# Patient Record
Sex: FEMALE | Race: WHITE | Employment: OTHER | URBAN - METROPOLITAN AREA
[De-identification: names, ages, dates, MRNs, and addresses within clinical notes are randomized per-mention and may not be internally consistent; named-entity substitution may affect disease eponyms.]

---

## 2017-05-09 ENCOUNTER — OFFICE VISIT (OUTPATIENT)
Dept: GASTROENTEROLOGY | Age: 82
End: 2017-05-09
Payer: MEDICARE

## 2017-05-09 VITALS
WEIGHT: 170.5 LBS | RESPIRATION RATE: 18 BRPM | BODY MASS INDEX: 33.47 KG/M2 | HEART RATE: 65 BPM | TEMPERATURE: 97.4 F | HEIGHT: 60 IN | DIASTOLIC BLOOD PRESSURE: 76 MMHG | SYSTOLIC BLOOD PRESSURE: 155 MMHG

## 2017-05-09 DIAGNOSIS — Z87.19 HISTORY OF RECTAL BLEEDING: ICD-10-CM

## 2017-05-09 DIAGNOSIS — K62.89 PROCTALGIA: Primary | ICD-10-CM

## 2017-05-09 PROCEDURE — 4040F PNEUMOC VAC/ADMIN/RCVD: CPT | Performed by: INTERNAL MEDICINE

## 2017-05-09 PROCEDURE — 1090F PRES/ABSN URINE INCON ASSESS: CPT | Performed by: INTERNAL MEDICINE

## 2017-05-09 PROCEDURE — 99202 OFFICE O/P NEW SF 15 MIN: CPT | Performed by: INTERNAL MEDICINE

## 2017-05-09 PROCEDURE — 1036F TOBACCO NON-USER: CPT | Performed by: INTERNAL MEDICINE

## 2017-05-09 PROCEDURE — 1123F ACP DISCUSS/DSCN MKR DOCD: CPT | Performed by: INTERNAL MEDICINE

## 2017-05-09 PROCEDURE — G8417 CALC BMI ABV UP PARAM F/U: HCPCS | Performed by: INTERNAL MEDICINE

## 2017-05-09 PROCEDURE — G8427 DOCREV CUR MEDS BY ELIG CLIN: HCPCS | Performed by: INTERNAL MEDICINE

## 2017-05-09 RX ORDER — ALLOPURINOL 100 MG/1
100 TABLET ORAL 2 TIMES DAILY
COMMUNITY
End: 2017-07-18 | Stop reason: SDUPTHER

## 2018-02-16 ENCOUNTER — HOSPITAL ENCOUNTER (OUTPATIENT)
Age: 83
Discharge: HOME | End: 2018-02-16
Payer: MEDICARE

## 2018-02-16 DIAGNOSIS — E78.2 MIXED HYPERLIPIDEMIA: ICD-10-CM

## 2018-02-16 DIAGNOSIS — I10 ESSENTIAL HYPERTENSION, BENIGN: ICD-10-CM

## 2018-02-16 DIAGNOSIS — E03.9 HYPOTHYROIDISM, UNSPECIFIED TYPE: ICD-10-CM

## 2018-02-16 LAB
ALT SERPL-CCNC: 18 U/L (ref 5–33)
ANION GAP SERPL CALCULATED.3IONS-SCNC: 14 MMOL/L (ref 9–17)
AST SERPL-CCNC: 23 U/L
BUN BLDV-MCNC: 22 MG/DL (ref 8–23)
BUN/CREAT BLD: 20 (ref 9–20)
CALCIUM SERPL-MCNC: 10.2 MG/DL (ref 8.6–10.4)
CHLORIDE BLD-SCNC: 101 MMOL/L (ref 98–107)
CHOLESTEROL/HDL RATIO: 3.8
CHOLESTEROL: 168 MG/DL
CO2: 29 MMOL/L (ref 20–31)
CREAT SERPL-MCNC: 1.12 MG/DL (ref 0.5–0.9)
GFR AFRICAN AMERICAN: 56 ML/MIN
GFR NON-AFRICAN AMERICAN: 46 ML/MIN
GFR SERPL CREATININE-BSD FRML MDRD: ABNORMAL ML/MIN/{1.73_M2}
GFR SERPL CREATININE-BSD FRML MDRD: ABNORMAL ML/MIN/{1.73_M2}
GLUCOSE BLD-MCNC: 108 MG/DL (ref 70–99)
HCT VFR BLD CALC: 41.2 % (ref 36–46)
HDLC SERPL-MCNC: 44 MG/DL
HEMOGLOBIN: 13.2 G/DL (ref 12–16)
LDL CHOLESTEROL: 92 MG/DL (ref 0–130)
MCH RBC QN AUTO: 32 PG (ref 26–34)
MCHC RBC AUTO-ENTMCNC: 32 G/DL (ref 31–37)
MCV RBC AUTO: 100.1 FL (ref 80–100)
NRBC AUTOMATED: ABNORMAL PER 100 WBC
PDW BLD-RTO: 15 % (ref 12.1–15.2)
PLATELET # BLD: 183 K/UL (ref 140–450)
PMV BLD AUTO: 10.1 FL (ref 6–12)
POTASSIUM SERPL-SCNC: 4.2 MMOL/L (ref 3.7–5.3)
RBC # BLD: 4.11 M/UL (ref 4–5.2)
SODIUM BLD-SCNC: 144 MMOL/L (ref 135–144)
T3 FREE: 2.8 PG/ML (ref 2.02–4.43)
THYROXINE, FREE: 1.61 NG/DL (ref 0.93–1.7)
TRIGL SERPL-MCNC: 158 MG/DL
VLDLC SERPL CALC-MCNC: ABNORMAL MG/DL (ref 1–30)
WBC # BLD: 8.1 K/UL (ref 3.5–11)

## 2018-02-16 PROCEDURE — 84439 ASSAY OF FREE THYROXINE: CPT

## 2018-02-16 PROCEDURE — 80061 LIPID PANEL: CPT

## 2018-02-16 PROCEDURE — 85027 COMPLETE CBC AUTOMATED: CPT

## 2018-02-16 PROCEDURE — 84460 ALANINE AMINO (ALT) (SGPT): CPT

## 2018-02-16 PROCEDURE — 84443 ASSAY THYROID STIM HORMONE: CPT

## 2018-02-16 PROCEDURE — 80048 BASIC METABOLIC PNL TOTAL CA: CPT

## 2018-02-16 PROCEDURE — 84481 FREE ASSAY (FT-3): CPT

## 2018-02-16 PROCEDURE — 36415 COLL VENOUS BLD VENIPUNCTURE: CPT

## 2018-02-16 PROCEDURE — 84450 TRANSFERASE (AST) (SGOT): CPT

## 2018-02-20 LAB — TSH SERPL DL<=0.05 MIU/L-ACNC: 0.46 MIU/L (ref 0.3–5)

## 2018-09-26 ENCOUNTER — HOSPITAL ENCOUNTER (OUTPATIENT)
Age: 83
Discharge: HOME | End: 2018-09-26
Payer: MEDICARE

## 2018-09-26 DIAGNOSIS — R73.01 IMPAIRED FASTING GLUCOSE: ICD-10-CM

## 2018-09-26 DIAGNOSIS — I10 ESSENTIAL HYPERTENSION, BENIGN: ICD-10-CM

## 2018-09-26 DIAGNOSIS — E03.9 HYPOTHYROIDISM, UNSPECIFIED TYPE: ICD-10-CM

## 2018-09-26 DIAGNOSIS — N18.30 CHRONIC KIDNEY DISEASE, STAGE III (MODERATE) (HCC): ICD-10-CM

## 2018-09-26 DIAGNOSIS — M10.9 GOUT, UNSPECIFIED CAUSE, UNSPECIFIED CHRONICITY, UNSPECIFIED SITE: ICD-10-CM

## 2018-09-26 DIAGNOSIS — E78.2 MIXED HYPERLIPIDEMIA: ICD-10-CM

## 2018-09-26 LAB
ALT SERPL-CCNC: 17 U/L (ref 5–33)
ANION GAP SERPL CALCULATED.3IONS-SCNC: 15 MMOL/L (ref 9–17)
AST SERPL-CCNC: 19 U/L
BUN BLDV-MCNC: 24 MG/DL (ref 8–23)
BUN/CREAT BLD: 20 (ref 9–20)
CALCIUM SERPL-MCNC: 10 MG/DL (ref 8.6–10.4)
CHLORIDE BLD-SCNC: 106 MMOL/L (ref 98–107)
CHOLESTEROL/HDL RATIO: 3.4
CHOLESTEROL: 148 MG/DL
CO2: 26 MMOL/L (ref 20–31)
CREAT SERPL-MCNC: 1.23 MG/DL (ref 0.5–0.9)
ESTIMATED AVERAGE GLUCOSE: 114 MG/DL
GFR AFRICAN AMERICAN: 50 ML/MIN
GFR NON-AFRICAN AMERICAN: 41 ML/MIN
GFR SERPL CREATININE-BSD FRML MDRD: ABNORMAL ML/MIN/{1.73_M2}
GFR SERPL CREATININE-BSD FRML MDRD: ABNORMAL ML/MIN/{1.73_M2}
GLUCOSE BLD-MCNC: 103 MG/DL (ref 70–99)
HBA1C MFR BLD: 5.6 % (ref 4.8–5.9)
HCT VFR BLD CALC: 40.8 % (ref 36.3–47.1)
HDLC SERPL-MCNC: 44 MG/DL
HEMOGLOBIN: 13.1 G/DL (ref 11.9–15.1)
LDL CHOLESTEROL: 77 MG/DL (ref 0–130)
MCH RBC QN AUTO: 32.5 PG (ref 25.2–33.5)
MCHC RBC AUTO-ENTMCNC: 32.1 G/DL (ref 28.4–34.8)
MCV RBC AUTO: 101.2 FL (ref 82.6–102.9)
NRBC AUTOMATED: 0 PER 100 WBC
PDW BLD-RTO: 14.1 % (ref 11.8–14.4)
PLATELET # BLD: 178 K/UL (ref 138–453)
PMV BLD AUTO: 11.7 FL (ref 8.1–13.5)
POTASSIUM SERPL-SCNC: 3.9 MMOL/L (ref 3.7–5.3)
RBC # BLD: 4.03 M/UL (ref 3.95–5.11)
SODIUM BLD-SCNC: 147 MMOL/L (ref 135–144)
T3 FREE: 2.68 PG/ML (ref 2.02–4.43)
THYROXINE, FREE: 1.76 NG/DL (ref 0.93–1.7)
TRIGL SERPL-MCNC: 135 MG/DL
TSH SERPL DL<=0.05 MIU/L-ACNC: 0.2 MIU/L (ref 0.3–5)
URIC ACID: 6.3 MG/DL (ref 2.4–5.7)
VLDLC SERPL CALC-MCNC: NORMAL MG/DL (ref 1–30)
WBC # BLD: 7 K/UL (ref 3.5–11.3)

## 2018-09-26 PROCEDURE — 83036 HEMOGLOBIN GLYCOSYLATED A1C: CPT

## 2018-09-26 PROCEDURE — 84450 TRANSFERASE (AST) (SGOT): CPT

## 2018-09-26 PROCEDURE — 84439 ASSAY OF FREE THYROXINE: CPT

## 2018-09-26 PROCEDURE — 84443 ASSAY THYROID STIM HORMONE: CPT

## 2018-09-26 PROCEDURE — 84460 ALANINE AMINO (ALT) (SGPT): CPT

## 2018-09-26 PROCEDURE — 80061 LIPID PANEL: CPT

## 2018-09-26 PROCEDURE — 84481 FREE ASSAY (FT-3): CPT

## 2018-09-26 PROCEDURE — 36415 COLL VENOUS BLD VENIPUNCTURE: CPT

## 2018-09-26 PROCEDURE — 84550 ASSAY OF BLOOD/URIC ACID: CPT

## 2018-09-26 PROCEDURE — 85027 COMPLETE CBC AUTOMATED: CPT

## 2018-09-26 PROCEDURE — 80048 BASIC METABOLIC PNL TOTAL CA: CPT

## 2018-11-14 ENCOUNTER — HOSPITAL ENCOUNTER (OUTPATIENT)
Age: 83
Discharge: HOME | End: 2018-11-14
Payer: MEDICARE

## 2018-11-14 DIAGNOSIS — E03.9 HYPOTHYROIDISM, UNSPECIFIED TYPE: ICD-10-CM

## 2018-11-14 LAB
T3 FREE: 2.59 PG/ML (ref 2.02–4.43)
THYROXINE, FREE: 1.79 NG/DL (ref 0.93–1.7)
TSH SERPL DL<=0.05 MIU/L-ACNC: 0.32 MIU/L (ref 0.3–5)

## 2018-11-14 PROCEDURE — 84439 ASSAY OF FREE THYROXINE: CPT

## 2018-11-14 PROCEDURE — 36415 COLL VENOUS BLD VENIPUNCTURE: CPT

## 2018-11-14 PROCEDURE — 84443 ASSAY THYROID STIM HORMONE: CPT

## 2018-11-14 PROCEDURE — 84481 FREE ASSAY (FT-3): CPT

## 2019-02-07 ENCOUNTER — APPOINTMENT (OUTPATIENT)
Dept: CT IMAGING | Age: 84
End: 2019-02-07
Payer: MEDICARE

## 2019-02-07 ENCOUNTER — APPOINTMENT (OUTPATIENT)
Dept: GENERAL RADIOLOGY | Age: 84
End: 2019-02-07
Payer: MEDICARE

## 2019-02-07 ENCOUNTER — APPOINTMENT (OUTPATIENT)
Dept: ULTRASOUND IMAGING | Age: 84
End: 2019-02-07
Payer: MEDICARE

## 2019-02-07 ENCOUNTER — HOSPITAL ENCOUNTER (EMERGENCY)
Age: 84
Discharge: ANOTHER HOSPITAL | End: 2019-02-07
Attending: EMERGENCY MEDICINE
Payer: MEDICARE

## 2019-02-07 VITALS
TEMPERATURE: 98.4 F | DIASTOLIC BLOOD PRESSURE: 64 MMHG | HEART RATE: 76 BPM | WEIGHT: 164 LBS | RESPIRATION RATE: 18 BRPM | BODY MASS INDEX: 32.2 KG/M2 | HEIGHT: 60 IN | SYSTOLIC BLOOD PRESSURE: 122 MMHG | OXYGEN SATURATION: 95 %

## 2019-02-07 DIAGNOSIS — R20.2 PARESTHESIAS: ICD-10-CM

## 2019-02-07 DIAGNOSIS — R74.8 ELEVATED LIVER ENZYMES: ICD-10-CM

## 2019-02-07 DIAGNOSIS — K83.1 BILIARY OBSTRUCTION: Primary | ICD-10-CM

## 2019-02-07 LAB
-: ABNORMAL
ABSOLUTE EOS #: 0.1 K/UL (ref 0–0.44)
ABSOLUTE IMMATURE GRANULOCYTE: 0.03 K/UL (ref 0–0.3)
ABSOLUTE LYMPH #: 0.7 K/UL (ref 1.1–3.7)
ABSOLUTE MONO #: 0.56 K/UL (ref 0.1–1.2)
ALBUMIN SERPL-MCNC: 4.2 G/DL (ref 3.5–5.2)
ALBUMIN/GLOBULIN RATIO: 1.5 (ref 1–2.5)
ALP BLD-CCNC: 167 U/L (ref 35–104)
ALT SERPL-CCNC: 116 U/L (ref 5–33)
AMORPHOUS: ABNORMAL
ANION GAP SERPL CALCULATED.3IONS-SCNC: 8 MMOL/L (ref 9–17)
AST SERPL-CCNC: 124 U/L
BACTERIA: ABNORMAL
BASOPHILS # BLD: 0 % (ref 0–2)
BASOPHILS ABSOLUTE: <0.03 K/UL (ref 0–0.2)
BILIRUB SERPL-MCNC: 0.8 MG/DL (ref 0.3–1.2)
BILIRUBIN DIRECT: 0.25 MG/DL
BILIRUBIN URINE: NEGATIVE
BILIRUBIN, INDIRECT: 0.55 MG/DL (ref 0–1)
BUN BLDV-MCNC: 20 MG/DL (ref 8–23)
BUN/CREAT BLD: 17 (ref 9–20)
CALCIUM SERPL-MCNC: 9.9 MG/DL (ref 8.6–10.4)
CASTS UA: ABNORMAL /LPF
CHLORIDE BLD-SCNC: 103 MMOL/L (ref 98–107)
CO2: 29 MMOL/L (ref 20–31)
COLOR: YELLOW
COMMENT UA: ABNORMAL
CREAT SERPL-MCNC: 1.17 MG/DL (ref 0.5–0.9)
CRYSTALS, UA: ABNORMAL /HPF
D-DIMER QUANTITATIVE: 0.71 MG/L FEU (ref 0.19–0.5)
DIFFERENTIAL TYPE: ABNORMAL
EKG ATRIAL RATE: 69 BPM
EKG P AXIS: 41 DEGREES
EKG P-R INTERVAL: 188 MS
EKG Q-T INTERVAL: 372 MS
EKG QRS DURATION: 76 MS
EKG QTC CALCULATION (BAZETT): 398 MS
EKG R AXIS: 5 DEGREES
EKG T AXIS: 9 DEGREES
EKG VENTRICULAR RATE: 69 BPM
EOSINOPHILS RELATIVE PERCENT: 1 % (ref 1–4)
EPITHELIAL CELLS UA: ABNORMAL /HPF (ref 0–25)
GFR AFRICAN AMERICAN: 53 ML/MIN
GFR NON-AFRICAN AMERICAN: 44 ML/MIN
GFR SERPL CREATININE-BSD FRML MDRD: ABNORMAL ML/MIN/{1.73_M2}
GFR SERPL CREATININE-BSD FRML MDRD: ABNORMAL ML/MIN/{1.73_M2}
GLOBULIN: ABNORMAL G/DL (ref 1.5–3.8)
GLUCOSE BLD-MCNC: 101 MG/DL (ref 70–99)
GLUCOSE URINE: NEGATIVE
HCT VFR BLD CALC: 41 % (ref 36.3–47.1)
HEMOGLOBIN: 13.1 G/DL (ref 11.9–15.1)
IMMATURE GRANULOCYTES: 0 %
KETONES, URINE: NEGATIVE
LEUKOCYTE ESTERASE, URINE: ABNORMAL
LIPASE: 47 U/L (ref 13–60)
LYMPHOCYTES # BLD: 8 % (ref 24–43)
MCH RBC QN AUTO: 32.8 PG (ref 25.2–33.5)
MCHC RBC AUTO-ENTMCNC: 32 G/DL (ref 28.4–34.8)
MCV RBC AUTO: 102.8 FL (ref 82.6–102.9)
MONOCYTES # BLD: 7 % (ref 3–12)
MUCUS: ABNORMAL
NITRITE, URINE: POSITIVE
NRBC AUTOMATED: 0 PER 100 WBC
OTHER OBSERVATIONS UA: ABNORMAL
PDW BLD-RTO: 13.3 % (ref 11.8–14.4)
PH UA: 6 (ref 5–9)
PLATELET # BLD: 166 K/UL (ref 138–453)
PLATELET ESTIMATE: ABNORMAL
PMV BLD AUTO: 12.2 FL (ref 8.1–13.5)
POTASSIUM SERPL-SCNC: 3.9 MMOL/L (ref 3.7–5.3)
PROTEIN UA: NEGATIVE
RBC # BLD: 3.99 M/UL (ref 3.95–5.11)
RBC # BLD: ABNORMAL 10*6/UL
RBC UA: ABNORMAL /HPF (ref 0–2)
RENAL EPITHELIAL, UA: ABNORMAL /HPF
SEG NEUTROPHILS: 84 % (ref 36–65)
SEGMENTED NEUTROPHILS ABSOLUTE COUNT: 7.17 K/UL (ref 1.5–8.1)
SODIUM BLD-SCNC: 140 MMOL/L (ref 135–144)
SPECIFIC GRAVITY UA: 1.01 (ref 1.01–1.02)
TOTAL PROTEIN: 7 G/DL (ref 6.4–8.3)
TRICHOMONAS: ABNORMAL
TROPONIN INTERP: NORMAL
TROPONIN T: <0.03 NG/ML
TROPONIN, HIGH SENSITIVITY: NORMAL NG/L (ref 0–14)
TSH SERPL DL<=0.05 MIU/L-ACNC: 0.48 MIU/L (ref 0.3–5)
TSH SERPL DL<=0.05 MIU/L-ACNC: 0.49 MIU/L (ref 0.3–5)
TURBIDITY: CLEAR
URINE HGB: NEGATIVE
UROBILINOGEN, URINE: NORMAL
WBC # BLD: 8.6 K/UL (ref 3.5–11.3)
WBC # BLD: ABNORMAL 10*3/UL
WBC UA: ABNORMAL /HPF (ref 0–5)
YEAST: ABNORMAL

## 2019-02-07 PROCEDURE — 81001 URINALYSIS AUTO W/SCOPE: CPT

## 2019-02-07 PROCEDURE — 6370000000 HC RX 637 (ALT 250 FOR IP): Performed by: EMERGENCY MEDICINE

## 2019-02-07 PROCEDURE — 85379 FIBRIN DEGRADATION QUANT: CPT

## 2019-02-07 PROCEDURE — 6360000002 HC RX W HCPCS: Performed by: EMERGENCY MEDICINE

## 2019-02-07 PROCEDURE — 76705 ECHO EXAM OF ABDOMEN: CPT

## 2019-02-07 PROCEDURE — 80076 HEPATIC FUNCTION PANEL: CPT

## 2019-02-07 PROCEDURE — 6360000004 HC RX CONTRAST MEDICATION: Performed by: EMERGENCY MEDICINE

## 2019-02-07 PROCEDURE — 74174 CTA ABD&PLVS W/CONTRAST: CPT

## 2019-02-07 PROCEDURE — 84484 ASSAY OF TROPONIN QUANT: CPT

## 2019-02-07 PROCEDURE — 36415 COLL VENOUS BLD VENIPUNCTURE: CPT

## 2019-02-07 PROCEDURE — 71046 X-RAY EXAM CHEST 2 VIEWS: CPT

## 2019-02-07 PROCEDURE — 86403 PARTICLE AGGLUT ANTBDY SCRN: CPT

## 2019-02-07 PROCEDURE — 87186 SC STD MICRODIL/AGAR DIL: CPT

## 2019-02-07 PROCEDURE — 93005 ELECTROCARDIOGRAM TRACING: CPT

## 2019-02-07 PROCEDURE — 70450 CT HEAD/BRAIN W/O DYE: CPT

## 2019-02-07 PROCEDURE — 85025 COMPLETE CBC W/AUTO DIFF WBC: CPT

## 2019-02-07 PROCEDURE — 80048 BASIC METABOLIC PNL TOTAL CA: CPT

## 2019-02-07 PROCEDURE — 87088 URINE BACTERIA CULTURE: CPT

## 2019-02-07 PROCEDURE — 99285 EMERGENCY DEPT VISIT HI MDM: CPT

## 2019-02-07 PROCEDURE — 71275 CT ANGIOGRAPHY CHEST: CPT

## 2019-02-07 PROCEDURE — 83690 ASSAY OF LIPASE: CPT

## 2019-02-07 PROCEDURE — 2580000003 HC RX 258: Performed by: EMERGENCY MEDICINE

## 2019-02-07 PROCEDURE — 84443 ASSAY THYROID STIM HORMONE: CPT

## 2019-02-07 PROCEDURE — 87086 URINE CULTURE/COLONY COUNT: CPT

## 2019-02-07 PROCEDURE — 1200000000 HC SEMI PRIVATE

## 2019-02-07 RX ORDER — SODIUM CHLORIDE, SODIUM LACTATE, POTASSIUM CHLORIDE, CALCIUM CHLORIDE 600; 310; 30; 20 MG/100ML; MG/100ML; MG/100ML; MG/100ML
1000 INJECTION, SOLUTION INTRAVENOUS ONCE
Status: DISCONTINUED | OUTPATIENT
Start: 2019-02-07 | End: 2019-02-07

## 2019-02-07 RX ORDER — ACETAMINOPHEN 500 MG
1000 TABLET ORAL ONCE
Status: COMPLETED | OUTPATIENT
Start: 2019-02-07 | End: 2019-02-07

## 2019-02-07 RX ADMIN — SODIUM CHLORIDE, POTASSIUM CHLORIDE, SODIUM LACTATE AND CALCIUM CHLORIDE 1000 ML: 600; 310; 30; 20 INJECTION, SOLUTION INTRAVENOUS at 12:16

## 2019-02-07 RX ADMIN — IOPAMIDOL 100 ML: 755 INJECTION, SOLUTION INTRAVENOUS at 11:52

## 2019-02-07 RX ADMIN — WATER 1 G: 1 INJECTION INTRAMUSCULAR; INTRAVENOUS; SUBCUTANEOUS at 12:56

## 2019-02-07 RX ADMIN — ACETAMINOPHEN 1000 MG: 500 TABLET, FILM COATED ORAL at 21:18

## 2019-02-07 ASSESSMENT — PAIN SCALES - GENERAL: PAINLEVEL_OUTOF10: 8

## 2019-02-08 ENCOUNTER — HOSPITAL ENCOUNTER (INPATIENT)
Age: 84
LOS: 7 days | Discharge: HOME | DRG: 445 | End: 2019-02-15
Attending: INTERNAL MEDICINE | Admitting: INTERNAL MEDICINE
Payer: MEDICARE

## 2019-02-08 PROBLEM — N30.00 ACUTE CYSTITIS: Status: ACTIVE | Noted: 2019-02-08

## 2019-02-08 LAB
ALBUMIN SERPL-MCNC: 3.7 G/DL (ref 3.5–5.2)
ALBUMIN/GLOBULIN RATIO: ABNORMAL (ref 1–2.5)
ALP BLD-CCNC: 137 U/L (ref 35–104)
ALT SERPL-CCNC: 77 U/L (ref 5–33)
AMYLASE: 83 U/L (ref 28–100)
ANION GAP SERPL CALCULATED.3IONS-SCNC: 15 MMOL/L (ref 9–17)
AST SERPL-CCNC: 61 U/L
BILIRUB SERPL-MCNC: 0.64 MG/DL (ref 0.3–1.2)
BUN BLDV-MCNC: 20 MG/DL (ref 8–23)
BUN/CREAT BLD: 19 (ref 9–20)
CALCIUM SERPL-MCNC: 9.3 MG/DL (ref 8.6–10.4)
CHLORIDE BLD-SCNC: 104 MMOL/L (ref 98–107)
CO2: 25 MMOL/L (ref 20–31)
CREAT SERPL-MCNC: 1.04 MG/DL (ref 0.5–0.9)
GFR AFRICAN AMERICAN: >60 ML/MIN
GFR NON-AFRICAN AMERICAN: 50 ML/MIN
GFR SERPL CREATININE-BSD FRML MDRD: ABNORMAL ML/MIN/{1.73_M2}
GFR SERPL CREATININE-BSD FRML MDRD: ABNORMAL ML/MIN/{1.73_M2}
GLUCOSE BLD-MCNC: 112 MG/DL (ref 70–99)
HCT VFR BLD CALC: 35.9 % (ref 36–46)
HEMOGLOBIN: 11.9 G/DL (ref 12–16)
LIPASE: 38 U/L (ref 13–60)
MAGNESIUM: 1.6 MG/DL (ref 1.6–2.6)
MCH RBC QN AUTO: 32.5 PG (ref 26–34)
MCHC RBC AUTO-ENTMCNC: 33 G/DL (ref 31–37)
MCV RBC AUTO: 98.5 FL (ref 80–100)
NRBC AUTOMATED: ABNORMAL PER 100 WBC
PDW BLD-RTO: 14.9 % (ref 11.5–14.5)
PHOSPHORUS: 3.8 MG/DL (ref 2.6–4.5)
PLATELET # BLD: 154 K/UL (ref 130–400)
PMV BLD AUTO: 10.3 FL (ref 6–12)
POTASSIUM SERPL-SCNC: 3.8 MMOL/L (ref 3.7–5.3)
RBC # BLD: 3.65 M/UL (ref 4–5.2)
SODIUM BLD-SCNC: 144 MMOL/L (ref 135–144)
TOTAL PROTEIN: 5.8 G/DL (ref 6.4–8.3)
WBC # BLD: 7.4 K/UL (ref 3.5–11)

## 2019-02-08 PROCEDURE — 80053 COMPREHEN METABOLIC PANEL: CPT

## 2019-02-08 PROCEDURE — 99223 1ST HOSP IP/OBS HIGH 75: CPT | Performed by: INTERNAL MEDICINE

## 2019-02-08 PROCEDURE — 85027 COMPLETE CBC AUTOMATED: CPT

## 2019-02-08 PROCEDURE — 6370000000 HC RX 637 (ALT 250 FOR IP): Performed by: INTERNAL MEDICINE

## 2019-02-08 PROCEDURE — 36415 COLL VENOUS BLD VENIPUNCTURE: CPT

## 2019-02-08 PROCEDURE — 1200000000 HC SEMI PRIVATE

## 2019-02-08 PROCEDURE — 84100 ASSAY OF PHOSPHORUS: CPT

## 2019-02-08 PROCEDURE — 99222 1ST HOSP IP/OBS MODERATE 55: CPT | Performed by: INTERNAL MEDICINE

## 2019-02-08 PROCEDURE — 2500000003 HC RX 250 WO HCPCS: Performed by: NURSE PRACTITIONER

## 2019-02-08 PROCEDURE — 83690 ASSAY OF LIPASE: CPT

## 2019-02-08 PROCEDURE — 6370000000 HC RX 637 (ALT 250 FOR IP): Performed by: NURSE PRACTITIONER

## 2019-02-08 PROCEDURE — 82150 ASSAY OF AMYLASE: CPT

## 2019-02-08 PROCEDURE — 83735 ASSAY OF MAGNESIUM: CPT

## 2019-02-08 RX ORDER — DEXTROSE, SODIUM CHLORIDE, AND POTASSIUM CHLORIDE 5; .45; .15 G/100ML; G/100ML; G/100ML
INJECTION INTRAVENOUS CONTINUOUS
Status: DISCONTINUED | OUTPATIENT
Start: 2019-02-08 | End: 2019-02-14

## 2019-02-08 RX ORDER — POTASSIUM CHLORIDE 20 MEQ/1
40 TABLET, EXTENDED RELEASE ORAL PRN
Status: DISCONTINUED | OUTPATIENT
Start: 2019-02-08 | End: 2019-02-15 | Stop reason: HOSPADM

## 2019-02-08 RX ORDER — LEVOTHYROXINE SODIUM 112 UG/1
112 TABLET ORAL DAILY
Status: DISCONTINUED | OUTPATIENT
Start: 2019-02-09 | End: 2019-02-15 | Stop reason: HOSPADM

## 2019-02-08 RX ORDER — POTASSIUM CHLORIDE 7.45 MG/ML
10 INJECTION INTRAVENOUS PRN
Status: DISCONTINUED | OUTPATIENT
Start: 2019-02-08 | End: 2019-02-15 | Stop reason: HOSPADM

## 2019-02-08 RX ORDER — ALLOPURINOL 100 MG/1
100 TABLET ORAL DAILY
Status: DISCONTINUED | OUTPATIENT
Start: 2019-02-08 | End: 2019-02-15 | Stop reason: HOSPADM

## 2019-02-08 RX ORDER — NICOTINE 21 MG/24HR
1 PATCH, TRANSDERMAL 24 HOURS TRANSDERMAL DAILY PRN
Status: DISCONTINUED | OUTPATIENT
Start: 2019-02-08 | End: 2019-02-15 | Stop reason: HOSPADM

## 2019-02-08 RX ORDER — MAGNESIUM SULFATE 1 G/100ML
1 INJECTION INTRAVENOUS PRN
Status: DISCONTINUED | OUTPATIENT
Start: 2019-02-08 | End: 2019-02-15 | Stop reason: HOSPADM

## 2019-02-08 RX ORDER — ACETAMINOPHEN 325 MG/1
650 TABLET ORAL EVERY 4 HOURS PRN
Status: DISCONTINUED | OUTPATIENT
Start: 2019-02-08 | End: 2019-02-08

## 2019-02-08 RX ORDER — SODIUM CHLORIDE 0.9 % (FLUSH) 0.9 %
10 SYRINGE (ML) INJECTION EVERY 12 HOURS SCHEDULED
Status: DISCONTINUED | OUTPATIENT
Start: 2019-02-08 | End: 2019-02-15 | Stop reason: HOSPADM

## 2019-02-08 RX ORDER — ATENOLOL 50 MG/1
50 TABLET ORAL EVERY MORNING
Status: DISCONTINUED | OUTPATIENT
Start: 2019-02-09 | End: 2019-02-15 | Stop reason: HOSPADM

## 2019-02-08 RX ORDER — BISACODYL 10 MG
10 SUPPOSITORY, RECTAL RECTAL DAILY PRN
Status: DISCONTINUED | OUTPATIENT
Start: 2019-02-08 | End: 2019-02-15 | Stop reason: HOSPADM

## 2019-02-08 RX ORDER — POTASSIUM CHLORIDE 20MEQ/15ML
40 LIQUID (ML) ORAL PRN
Status: DISCONTINUED | OUTPATIENT
Start: 2019-02-08 | End: 2019-02-15 | Stop reason: HOSPADM

## 2019-02-08 RX ORDER — ONDANSETRON 2 MG/ML
4 INJECTION INTRAMUSCULAR; INTRAVENOUS EVERY 6 HOURS PRN
Status: DISCONTINUED | OUTPATIENT
Start: 2019-02-08 | End: 2019-02-15 | Stop reason: HOSPADM

## 2019-02-08 RX ORDER — SODIUM CHLORIDE 0.9 % (FLUSH) 0.9 %
10 SYRINGE (ML) INJECTION PRN
Status: DISCONTINUED | OUTPATIENT
Start: 2019-02-08 | End: 2019-02-15 | Stop reason: HOSPADM

## 2019-02-08 RX ORDER — ONDANSETRON 2 MG/ML
4 INJECTION INTRAMUSCULAR; INTRAVENOUS EVERY 6 HOURS PRN
Status: DISCONTINUED | OUTPATIENT
Start: 2019-02-08 | End: 2019-02-08 | Stop reason: SDUPTHER

## 2019-02-08 RX ORDER — ONDANSETRON 4 MG/1
4 TABLET, ORALLY DISINTEGRATING ORAL EVERY 6 HOURS PRN
Status: DISCONTINUED | OUTPATIENT
Start: 2019-02-08 | End: 2019-02-15 | Stop reason: HOSPADM

## 2019-02-08 RX ORDER — TRAMADOL HYDROCHLORIDE 50 MG/1
50 TABLET ORAL EVERY 6 HOURS PRN
Status: DISCONTINUED | OUTPATIENT
Start: 2019-02-08 | End: 2019-02-15 | Stop reason: HOSPADM

## 2019-02-08 RX ADMIN — ALLOPURINOL 100 MG: 100 TABLET ORAL at 20:13

## 2019-02-08 RX ADMIN — ACETAMINOPHEN 650 MG: 325 TABLET ORAL at 15:42

## 2019-02-08 RX ADMIN — ACETAMINOPHEN 650 MG: 325 TABLET ORAL at 04:59

## 2019-02-08 RX ADMIN — FAMOTIDINE 20 MG: 10 INJECTION, SOLUTION INTRAVENOUS at 09:30

## 2019-02-08 RX ADMIN — POTASSIUM CHLORIDE, DEXTROSE MONOHYDRATE AND SODIUM CHLORIDE: 150; 5; 450 INJECTION, SOLUTION INTRAVENOUS at 13:14

## 2019-02-08 RX ADMIN — POTASSIUM CHLORIDE, DEXTROSE MONOHYDRATE AND SODIUM CHLORIDE: 150; 5; 450 INJECTION, SOLUTION INTRAVENOUS at 20:12

## 2019-02-08 RX ADMIN — POTASSIUM CHLORIDE, DEXTROSE MONOHYDRATE AND SODIUM CHLORIDE: 150; 5; 450 INJECTION, SOLUTION INTRAVENOUS at 05:00

## 2019-02-08 ASSESSMENT — PAIN DESCRIPTION - LOCATION
LOCATION: LEG

## 2019-02-08 ASSESSMENT — PAIN DESCRIPTION - FREQUENCY
FREQUENCY: INTERMITTENT
FREQUENCY: CONTINUOUS

## 2019-02-08 ASSESSMENT — PAIN SCALES - GENERAL
PAINLEVEL_OUTOF10: 1
PAINLEVEL_OUTOF10: 3

## 2019-02-08 ASSESSMENT — PAIN DESCRIPTION - DESCRIPTORS
DESCRIPTORS: ACHING
DESCRIPTORS: ACHING;DISCOMFORT
DESCRIPTORS: ACHING;DISCOMFORT

## 2019-02-08 ASSESSMENT — PAIN DESCRIPTION - ORIENTATION
ORIENTATION: RIGHT

## 2019-02-08 ASSESSMENT — PAIN DESCRIPTION - PROGRESSION
CLINICAL_PROGRESSION: NOT CHANGED
CLINICAL_PROGRESSION: NOT CHANGED

## 2019-02-08 ASSESSMENT — PAIN DESCRIPTION - PAIN TYPE
TYPE: ACUTE PAIN
TYPE: CHRONIC PAIN
TYPE: ACUTE PAIN
TYPE: ACUTE PAIN

## 2019-02-08 ASSESSMENT — PAIN - FUNCTIONAL ASSESSMENT
PAIN_FUNCTIONAL_ASSESSMENT: ACTIVITIES ARE NOT PREVENTED
PAIN_FUNCTIONAL_ASSESSMENT: PREVENTS OR INTERFERES SOME ACTIVE ACTIVITIES AND ADLS
PAIN_FUNCTIONAL_ASSESSMENT: PREVENTS OR INTERFERES SOME ACTIVE ACTIVITIES AND ADLS

## 2019-02-08 ASSESSMENT — PAIN DESCRIPTION - ONSET
ONSET: ON-GOING
ONSET: ON-GOING

## 2019-02-09 ENCOUNTER — APPOINTMENT (OUTPATIENT)
Dept: MRI IMAGING | Age: 84
DRG: 445 | End: 2019-02-09
Attending: INTERNAL MEDICINE
Payer: MEDICARE

## 2019-02-09 ENCOUNTER — APPOINTMENT (OUTPATIENT)
Dept: GENERAL RADIOLOGY | Age: 84
DRG: 445 | End: 2019-02-09
Attending: INTERNAL MEDICINE
Payer: MEDICARE

## 2019-02-09 PROBLEM — M15.9 OSTEOARTHRITIS OF MULTIPLE JOINTS: Status: ACTIVE | Noted: 2019-02-09

## 2019-02-09 PROBLEM — M79.604 RIGHT LEG PAIN: Status: ACTIVE | Noted: 2019-02-09

## 2019-02-09 LAB
ALBUMIN SERPL-MCNC: 3.6 G/DL (ref 3.5–5.2)
ALBUMIN/GLOBULIN RATIO: ABNORMAL (ref 1–2.5)
ALP BLD-CCNC: 132 U/L (ref 35–104)
ALT SERPL-CCNC: 57 U/L (ref 5–33)
ANION GAP SERPL CALCULATED.3IONS-SCNC: 15 MMOL/L (ref 9–17)
AST SERPL-CCNC: 43 U/L
BILIRUB SERPL-MCNC: 0.54 MG/DL (ref 0.3–1.2)
BILIRUBIN DIRECT: 0.13 MG/DL
BILIRUBIN, INDIRECT: 0.41 MG/DL (ref 0–1)
BUN BLDV-MCNC: 15 MG/DL (ref 8–23)
BUN/CREAT BLD: 15 (ref 9–20)
CALCIUM SERPL-MCNC: 8.8 MG/DL (ref 8.6–10.4)
CHLORIDE BLD-SCNC: 104 MMOL/L (ref 98–107)
CO2: 22 MMOL/L (ref 20–31)
CREAT SERPL-MCNC: 0.97 MG/DL (ref 0.5–0.9)
CULTURE: ABNORMAL
CULTURE: ABNORMAL
GFR AFRICAN AMERICAN: >60 ML/MIN
GFR NON-AFRICAN AMERICAN: 54 ML/MIN
GFR SERPL CREATININE-BSD FRML MDRD: ABNORMAL ML/MIN/{1.73_M2}
GFR SERPL CREATININE-BSD FRML MDRD: ABNORMAL ML/MIN/{1.73_M2}
GLOBULIN: ABNORMAL G/DL (ref 1.5–3.8)
GLUCOSE BLD-MCNC: 124 MG/DL (ref 70–99)
HAV IGM SER IA-ACNC: NONREACTIVE
HEPATITIS B CORE IGM ANTIBODY: NONREACTIVE
HEPATITIS B SURFACE ANTIGEN: NONREACTIVE
HEPATITIS C ANTIBODY: NONREACTIVE
Lab: ABNORMAL
ORGANISM: ABNORMAL
POTASSIUM SERPL-SCNC: 3.8 MMOL/L (ref 3.7–5.3)
SODIUM BLD-SCNC: 141 MMOL/L (ref 135–144)
SPECIMEN DESCRIPTION: ABNORMAL
STATUS: ABNORMAL
TOTAL PROTEIN: 6.1 G/DL (ref 6.4–8.3)

## 2019-02-09 PROCEDURE — 6370000000 HC RX 637 (ALT 250 FOR IP): Performed by: INTERNAL MEDICINE

## 2019-02-09 PROCEDURE — A9579 GAD-BASE MR CONTRAST NOS,1ML: HCPCS | Performed by: INTERNAL MEDICINE

## 2019-02-09 PROCEDURE — 6360000002 HC RX W HCPCS: Performed by: NURSE PRACTITIONER

## 2019-02-09 PROCEDURE — 74183 MRI ABD W/O CNTR FLWD CNTR: CPT

## 2019-02-09 PROCEDURE — 73502 X-RAY EXAM HIP UNI 2-3 VIEWS: CPT

## 2019-02-09 PROCEDURE — 1200000000 HC SEMI PRIVATE

## 2019-02-09 PROCEDURE — 2500000003 HC RX 250 WO HCPCS: Performed by: INTERNAL MEDICINE

## 2019-02-09 PROCEDURE — 72100 X-RAY EXAM L-S SPINE 2/3 VWS: CPT

## 2019-02-09 PROCEDURE — 2580000003 HC RX 258: Performed by: NURSE PRACTITIONER

## 2019-02-09 PROCEDURE — APPSS30 APP SPLIT SHARED TIME 16-30 MINUTES: Performed by: NURSE PRACTITIONER

## 2019-02-09 PROCEDURE — 80074 ACUTE HEPATITIS PANEL: CPT

## 2019-02-09 PROCEDURE — 80076 HEPATIC FUNCTION PANEL: CPT

## 2019-02-09 PROCEDURE — 80048 BASIC METABOLIC PNL TOTAL CA: CPT

## 2019-02-09 PROCEDURE — 99232 SBSQ HOSP IP/OBS MODERATE 35: CPT | Performed by: INTERNAL MEDICINE

## 2019-02-09 PROCEDURE — 36415 COLL VENOUS BLD VENIPUNCTURE: CPT

## 2019-02-09 PROCEDURE — 2500000003 HC RX 250 WO HCPCS: Performed by: NURSE PRACTITIONER

## 2019-02-09 PROCEDURE — 6360000004 HC RX CONTRAST MEDICATION: Performed by: INTERNAL MEDICINE

## 2019-02-09 RX ORDER — HYDRALAZINE HYDROCHLORIDE 20 MG/ML
10 INJECTION INTRAMUSCULAR; INTRAVENOUS EVERY 6 HOURS PRN
Status: DISCONTINUED | OUTPATIENT
Start: 2019-02-09 | End: 2019-02-15 | Stop reason: HOSPADM

## 2019-02-09 RX ORDER — SODIUM CHLORIDE 0.9 % (FLUSH) 0.9 %
10 SYRINGE (ML) INJECTION
Status: DISCONTINUED | OUTPATIENT
Start: 2019-02-09 | End: 2019-02-15 | Stop reason: HOSPADM

## 2019-02-09 RX ORDER — 0.9 % SODIUM CHLORIDE 0.9 %
20 INTRAVENOUS SOLUTION INTRAVENOUS
Status: DISCONTINUED | OUTPATIENT
Start: 2019-02-09 | End: 2019-02-15 | Stop reason: HOSPADM

## 2019-02-09 RX ORDER — CEFTRIAXONE 1 G/1
1 INJECTION, POWDER, FOR SOLUTION INTRAMUSCULAR; INTRAVENOUS EVERY 24 HOURS
Status: DISCONTINUED | OUTPATIENT
Start: 2019-02-09 | End: 2019-02-09 | Stop reason: SDUPTHER

## 2019-02-09 RX ADMIN — Medication 10 ML: at 10:47

## 2019-02-09 RX ADMIN — POTASSIUM CHLORIDE, DEXTROSE MONOHYDRATE AND SODIUM CHLORIDE: 150; 5; 450 INJECTION, SOLUTION INTRAVENOUS at 10:44

## 2019-02-09 RX ADMIN — ATENOLOL 50 MG: 50 TABLET ORAL at 17:34

## 2019-02-09 RX ADMIN — POTASSIUM CHLORIDE, DEXTROSE MONOHYDRATE AND SODIUM CHLORIDE: 150; 5; 450 INJECTION, SOLUTION INTRAVENOUS at 19:59

## 2019-02-09 RX ADMIN — ENOXAPARIN SODIUM 40 MG: 40 INJECTION SUBCUTANEOUS at 17:26

## 2019-02-09 RX ADMIN — FAMOTIDINE 20 MG: 10 INJECTION, SOLUTION INTRAVENOUS at 10:47

## 2019-02-09 RX ADMIN — CEFTRIAXONE SODIUM 1 G: 1 INJECTION, POWDER, FOR SOLUTION INTRAMUSCULAR; INTRAVENOUS at 06:24

## 2019-02-09 RX ADMIN — GADOTERIDOL 15 ML: 279.3 INJECTION, SOLUTION INTRAVENOUS at 11:52

## 2019-02-09 ASSESSMENT — PAIN SCALES - GENERAL
PAINLEVEL_OUTOF10: 0

## 2019-02-10 LAB
ANION GAP SERPL CALCULATED.3IONS-SCNC: 12 MMOL/L (ref 9–17)
BUN BLDV-MCNC: 11 MG/DL (ref 8–23)
BUN/CREAT BLD: 11 (ref 9–20)
CALCIUM SERPL-MCNC: 9.1 MG/DL (ref 8.6–10.4)
CHLORIDE BLD-SCNC: 108 MMOL/L (ref 98–107)
CO2: 24 MMOL/L (ref 20–31)
CREAT SERPL-MCNC: 1.03 MG/DL (ref 0.5–0.9)
GFR AFRICAN AMERICAN: >60 ML/MIN
GFR NON-AFRICAN AMERICAN: 51 ML/MIN
GFR SERPL CREATININE-BSD FRML MDRD: ABNORMAL ML/MIN/{1.73_M2}
GFR SERPL CREATININE-BSD FRML MDRD: ABNORMAL ML/MIN/{1.73_M2}
GLUCOSE BLD-MCNC: 100 MG/DL (ref 70–99)
POTASSIUM SERPL-SCNC: 3.9 MMOL/L (ref 3.7–5.3)
SODIUM BLD-SCNC: 144 MMOL/L (ref 135–144)

## 2019-02-10 PROCEDURE — 6360000002 HC RX W HCPCS: Performed by: INTERNAL MEDICINE

## 2019-02-10 PROCEDURE — APPSS30 APP SPLIT SHARED TIME 16-30 MINUTES: Performed by: NURSE PRACTITIONER

## 2019-02-10 PROCEDURE — 2580000003 HC RX 258: Performed by: NURSE PRACTITIONER

## 2019-02-10 PROCEDURE — 99232 SBSQ HOSP IP/OBS MODERATE 35: CPT | Performed by: INTERNAL MEDICINE

## 2019-02-10 PROCEDURE — 80048 BASIC METABOLIC PNL TOTAL CA: CPT

## 2019-02-10 PROCEDURE — 6370000000 HC RX 637 (ALT 250 FOR IP): Performed by: INTERNAL MEDICINE

## 2019-02-10 PROCEDURE — 36415 COLL VENOUS BLD VENIPUNCTURE: CPT

## 2019-02-10 PROCEDURE — 1200000000 HC SEMI PRIVATE

## 2019-02-10 PROCEDURE — 6360000002 HC RX W HCPCS: Performed by: NURSE PRACTITIONER

## 2019-02-10 PROCEDURE — 2500000003 HC RX 250 WO HCPCS: Performed by: NURSE PRACTITIONER

## 2019-02-10 RX ORDER — CIPROFLOXACIN 2 MG/ML
400 INJECTION, SOLUTION INTRAVENOUS EVERY 12 HOURS
Status: DISCONTINUED | OUTPATIENT
Start: 2019-02-10 | End: 2019-02-13

## 2019-02-10 RX ORDER — FAMOTIDINE 20 MG/1
20 TABLET, FILM COATED ORAL DAILY
Status: DISCONTINUED | OUTPATIENT
Start: 2019-02-10 | End: 2019-02-15 | Stop reason: HOSPADM

## 2019-02-10 RX ADMIN — ATENOLOL 50 MG: 50 TABLET ORAL at 09:14

## 2019-02-10 RX ADMIN — CEFTRIAXONE SODIUM 1 G: 1 INJECTION, POWDER, FOR SOLUTION INTRAMUSCULAR; INTRAVENOUS at 07:05

## 2019-02-10 RX ADMIN — FAMOTIDINE 20 MG: 20 TABLET ORAL at 17:17

## 2019-02-10 RX ADMIN — ALLOPURINOL 100 MG: 100 TABLET ORAL at 09:14

## 2019-02-10 RX ADMIN — TRAMADOL HYDROCHLORIDE 50 MG: 50 TABLET, FILM COATED ORAL at 09:15

## 2019-02-10 RX ADMIN — Medication 10 ML: at 09:14

## 2019-02-10 RX ADMIN — FAMOTIDINE 20 MG: 10 INJECTION, SOLUTION INTRAVENOUS at 09:14

## 2019-02-10 RX ADMIN — LEVOTHYROXINE SODIUM 112 MCG: 112 TABLET ORAL at 07:08

## 2019-02-10 RX ADMIN — TRAMADOL HYDROCHLORIDE 50 MG: 50 TABLET, FILM COATED ORAL at 21:39

## 2019-02-10 RX ADMIN — CIPROFLOXACIN 400 MG: 2 INJECTION, SOLUTION INTRAVENOUS at 17:17

## 2019-02-10 ASSESSMENT — PAIN DESCRIPTION - PROGRESSION: CLINICAL_PROGRESSION: NOT CHANGED

## 2019-02-10 ASSESSMENT — PAIN DESCRIPTION - PAIN TYPE: TYPE: ACUTE PAIN

## 2019-02-10 ASSESSMENT — PAIN DESCRIPTION - ONSET: ONSET: ON-GOING

## 2019-02-10 ASSESSMENT — PAIN SCALES - GENERAL
PAINLEVEL_OUTOF10: 0
PAINLEVEL_OUTOF10: 7
PAINLEVEL_OUTOF10: 0
PAINLEVEL_OUTOF10: 4

## 2019-02-10 ASSESSMENT — PAIN DESCRIPTION - LOCATION: LOCATION: LEG;KNEE

## 2019-02-10 ASSESSMENT — PAIN DESCRIPTION - ORIENTATION: ORIENTATION: RIGHT

## 2019-02-10 ASSESSMENT — PAIN DESCRIPTION - DESCRIPTORS: DESCRIPTORS: ACHING;DISCOMFORT

## 2019-02-10 ASSESSMENT — PAIN DESCRIPTION - FREQUENCY: FREQUENCY: INTERMITTENT

## 2019-02-10 ASSESSMENT — PAIN - FUNCTIONAL ASSESSMENT: PAIN_FUNCTIONAL_ASSESSMENT: PREVENTS OR INTERFERES SOME ACTIVE ACTIVITIES AND ADLS

## 2019-02-11 ENCOUNTER — ANESTHESIA EVENT (OUTPATIENT)
Dept: OPERATING ROOM | Age: 84
DRG: 445 | End: 2019-02-11
Payer: MEDICARE

## 2019-02-11 ENCOUNTER — APPOINTMENT (OUTPATIENT)
Dept: GENERAL RADIOLOGY | Age: 84
DRG: 445 | End: 2019-02-11
Attending: INTERNAL MEDICINE
Payer: MEDICARE

## 2019-02-11 ENCOUNTER — ANESTHESIA (OUTPATIENT)
Dept: OPERATING ROOM | Age: 84
DRG: 445 | End: 2019-02-11
Payer: MEDICARE

## 2019-02-11 VITALS — OXYGEN SATURATION: 97 % | SYSTOLIC BLOOD PRESSURE: 103 MMHG | DIASTOLIC BLOOD PRESSURE: 53 MMHG

## 2019-02-11 LAB
ALBUMIN SERPL-MCNC: 3.4 G/DL (ref 3.5–5.2)
ALBUMIN/GLOBULIN RATIO: ABNORMAL (ref 1–2.5)
ALP BLD-CCNC: 110 U/L (ref 35–104)
ALT SERPL-CCNC: 30 U/L (ref 5–33)
ANION GAP SERPL CALCULATED.3IONS-SCNC: 13 MMOL/L (ref 9–17)
AST SERPL-CCNC: 22 U/L
BILIRUB SERPL-MCNC: 0.52 MG/DL (ref 0.3–1.2)
BILIRUBIN DIRECT: 0.13 MG/DL
BILIRUBIN, INDIRECT: 0.39 MG/DL (ref 0–1)
BUN BLDV-MCNC: 10 MG/DL (ref 8–23)
BUN/CREAT BLD: 10 (ref 9–20)
CALCIUM SERPL-MCNC: 8.9 MG/DL (ref 8.6–10.4)
CHLORIDE BLD-SCNC: 106 MMOL/L (ref 98–107)
CO2: 23 MMOL/L (ref 20–31)
CREAT SERPL-MCNC: 0.98 MG/DL (ref 0.5–0.9)
GFR AFRICAN AMERICAN: >60 ML/MIN
GFR NON-AFRICAN AMERICAN: 54 ML/MIN
GFR SERPL CREATININE-BSD FRML MDRD: ABNORMAL ML/MIN/{1.73_M2}
GFR SERPL CREATININE-BSD FRML MDRD: ABNORMAL ML/MIN/{1.73_M2}
GLOBULIN: ABNORMAL G/DL (ref 1.5–3.8)
GLUCOSE BLD-MCNC: 91 MG/DL (ref 70–99)
POTASSIUM SERPL-SCNC: 4.1 MMOL/L (ref 3.7–5.3)
SODIUM BLD-SCNC: 142 MMOL/L (ref 135–144)
TOTAL PROTEIN: 5.6 G/DL (ref 6.4–8.3)

## 2019-02-11 PROCEDURE — 2720000010 HC SURG SUPPLY STERILE: Performed by: INTERNAL MEDICINE

## 2019-02-11 PROCEDURE — 2500000003 HC RX 250 WO HCPCS: Performed by: NURSE PRACTITIONER

## 2019-02-11 PROCEDURE — 0F798DZ DILATION OF COMMON BILE DUCT WITH INTRALUMINAL DEVICE, VIA NATURAL OR ARTIFICIAL OPENING ENDOSCOPIC: ICD-10-PCS | Performed by: INTERNAL MEDICINE

## 2019-02-11 PROCEDURE — 6360000002 HC RX W HCPCS: Performed by: ANESTHESIOLOGY

## 2019-02-11 PROCEDURE — 6360000002 HC RX W HCPCS: Performed by: INTERNAL MEDICINE

## 2019-02-11 PROCEDURE — 6360000002 HC RX W HCPCS: Performed by: NURSE ANESTHETIST, CERTIFIED REGISTERED

## 2019-02-11 PROCEDURE — 43274 ERCP DUCT STENT PLACEMENT: CPT | Performed by: INTERNAL MEDICINE

## 2019-02-11 PROCEDURE — 3209999900 FLUORO FOR SURGICAL PROCEDURES

## 2019-02-11 PROCEDURE — 73562 X-RAY EXAM OF KNEE 3: CPT

## 2019-02-11 PROCEDURE — C2625 STENT, NON-COR, TEM W/DEL SY: HCPCS | Performed by: INTERNAL MEDICINE

## 2019-02-11 PROCEDURE — 80048 BASIC METABOLIC PNL TOTAL CA: CPT

## 2019-02-11 PROCEDURE — 3609018800 HC ERCP DX COLLECTION SPECIMEN BRUSHING/WASHING: Performed by: INTERNAL MEDICINE

## 2019-02-11 PROCEDURE — 99232 SBSQ HOSP IP/OBS MODERATE 35: CPT | Performed by: INTERNAL MEDICINE

## 2019-02-11 PROCEDURE — 6370000000 HC RX 637 (ALT 250 FOR IP): Performed by: NURSE PRACTITIONER

## 2019-02-11 PROCEDURE — 36415 COLL VENOUS BLD VENIPUNCTURE: CPT

## 2019-02-11 PROCEDURE — 80076 HEPATIC FUNCTION PANEL: CPT

## 2019-02-11 PROCEDURE — 3700000000 HC ANESTHESIA ATTENDED CARE: Performed by: INTERNAL MEDICINE

## 2019-02-11 PROCEDURE — 1200000000 HC SEMI PRIVATE

## 2019-02-11 PROCEDURE — 7100000000 HC PACU RECOVERY - FIRST 15 MIN: Performed by: INTERNAL MEDICINE

## 2019-02-11 PROCEDURE — 7100000001 HC PACU RECOVERY - ADDTL 15 MIN: Performed by: INTERNAL MEDICINE

## 2019-02-11 PROCEDURE — 6370000000 HC RX 637 (ALT 250 FOR IP): Performed by: INTERNAL MEDICINE

## 2019-02-11 PROCEDURE — 93971 EXTREMITY STUDY: CPT

## 2019-02-11 PROCEDURE — 2580000003 HC RX 258: Performed by: NURSE ANESTHETIST, CERTIFIED REGISTERED

## 2019-02-11 PROCEDURE — 2580000003 HC RX 258: Performed by: ANESTHESIOLOGY

## 2019-02-11 PROCEDURE — 6360000004 HC RX CONTRAST MEDICATION: Performed by: INTERNAL MEDICINE

## 2019-02-11 PROCEDURE — 2500000003 HC RX 250 WO HCPCS: Performed by: NURSE ANESTHETIST, CERTIFIED REGISTERED

## 2019-02-11 PROCEDURE — 0FC98ZZ EXTIRPATION OF MATTER FROM COMMON BILE DUCT, VIA NATURAL OR ARTIFICIAL OPENING ENDOSCOPIC: ICD-10-PCS | Performed by: INTERNAL MEDICINE

## 2019-02-11 PROCEDURE — 74328 X-RAY BILE DUCT ENDOSCOPY: CPT

## 2019-02-11 PROCEDURE — 43264 ERCP REMOVE DUCT CALCULI: CPT | Performed by: INTERNAL MEDICINE

## 2019-02-11 PROCEDURE — 3700000001 HC ADD 15 MINUTES (ANESTHESIA): Performed by: INTERNAL MEDICINE

## 2019-02-11 PROCEDURE — BF101ZZ FLUOROSCOPY OF BILE DUCTS USING LOW OSMOLAR CONTRAST: ICD-10-PCS | Performed by: INTERNAL MEDICINE

## 2019-02-11 PROCEDURE — C1769 GUIDE WIRE: HCPCS | Performed by: INTERNAL MEDICINE

## 2019-02-11 PROCEDURE — 74300 X-RAY BILE DUCTS/PANCREAS: CPT | Performed by: INTERNAL MEDICINE

## 2019-02-11 PROCEDURE — 2709999900 HC NON-CHARGEABLE SUPPLY: Performed by: INTERNAL MEDICINE

## 2019-02-11 DEVICE — BILIARY STENT WITH NAVIFLEXTM RX DELIVERY SYSTEM
Type: IMPLANTABLE DEVICE | Site: BILE DUCT | Status: FUNCTIONAL
Brand: ADVANIX™ BILIARY

## 2019-02-11 RX ORDER — SODIUM CHLORIDE, SODIUM LACTATE, POTASSIUM CHLORIDE, CALCIUM CHLORIDE 600; 310; 30; 20 MG/100ML; MG/100ML; MG/100ML; MG/100ML
INJECTION, SOLUTION INTRAVENOUS CONTINUOUS PRN
Status: DISCONTINUED | OUTPATIENT
Start: 2019-02-11 | End: 2019-02-11 | Stop reason: SDUPTHER

## 2019-02-11 RX ORDER — PROMETHAZINE HYDROCHLORIDE 25 MG/ML
6.25 INJECTION, SOLUTION INTRAMUSCULAR; INTRAVENOUS
Status: COMPLETED | OUTPATIENT
Start: 2019-02-11 | End: 2019-02-11

## 2019-02-11 RX ORDER — LIDOCAINE HYDROCHLORIDE 20 MG/ML
INJECTION, SOLUTION EPIDURAL; INFILTRATION; INTRACAUDAL; PERINEURAL PRN
Status: DISCONTINUED | OUTPATIENT
Start: 2019-02-11 | End: 2019-02-11 | Stop reason: SDUPTHER

## 2019-02-11 RX ORDER — HYDRALAZINE HYDROCHLORIDE 20 MG/ML
5 INJECTION INTRAMUSCULAR; INTRAVENOUS EVERY 10 MIN PRN
Status: DISCONTINUED | OUTPATIENT
Start: 2019-02-11 | End: 2019-02-11

## 2019-02-11 RX ORDER — SODIUM CHLORIDE, SODIUM LACTATE, POTASSIUM CHLORIDE, AND CALCIUM CHLORIDE .6; .31; .03; .02 G/100ML; G/100ML; G/100ML; G/100ML
250 INJECTION, SOLUTION INTRAVENOUS ONCE
Status: COMPLETED | OUTPATIENT
Start: 2019-02-11 | End: 2019-02-11

## 2019-02-11 RX ORDER — OXYCODONE HYDROCHLORIDE AND ACETAMINOPHEN 5; 325 MG/1; MG/1
2 TABLET ORAL PRN
Status: DISCONTINUED | OUTPATIENT
Start: 2019-02-11 | End: 2019-02-11

## 2019-02-11 RX ORDER — OXYCODONE HYDROCHLORIDE AND ACETAMINOPHEN 5; 325 MG/1; MG/1
1 TABLET ORAL PRN
Status: DISCONTINUED | OUTPATIENT
Start: 2019-02-11 | End: 2019-02-11

## 2019-02-11 RX ORDER — FENTANYL CITRATE 50 UG/ML
INJECTION, SOLUTION INTRAMUSCULAR; INTRAVENOUS PRN
Status: DISCONTINUED | OUTPATIENT
Start: 2019-02-11 | End: 2019-02-11 | Stop reason: SDUPTHER

## 2019-02-11 RX ORDER — FENTANYL CITRATE 50 UG/ML
25 INJECTION, SOLUTION INTRAMUSCULAR; INTRAVENOUS EVERY 5 MIN PRN
Status: DISCONTINUED | OUTPATIENT
Start: 2019-02-11 | End: 2019-02-11

## 2019-02-11 RX ORDER — ONDANSETRON 2 MG/ML
INJECTION INTRAMUSCULAR; INTRAVENOUS PRN
Status: DISCONTINUED | OUTPATIENT
Start: 2019-02-11 | End: 2019-02-11 | Stop reason: SDUPTHER

## 2019-02-11 RX ORDER — HYDROCODONE BITARTRATE AND ACETAMINOPHEN 5; 325 MG/1; MG/1
1 TABLET ORAL EVERY 4 HOURS PRN
Status: DISCONTINUED | OUTPATIENT
Start: 2019-02-11 | End: 2019-02-15 | Stop reason: HOSPADM

## 2019-02-11 RX ORDER — LABETALOL HYDROCHLORIDE 5 MG/ML
5 INJECTION, SOLUTION INTRAVENOUS EVERY 10 MIN PRN
Status: DISCONTINUED | OUTPATIENT
Start: 2019-02-11 | End: 2019-02-11

## 2019-02-11 RX ORDER — PROPOFOL 10 MG/ML
INJECTION, EMULSION INTRAVENOUS PRN
Status: DISCONTINUED | OUTPATIENT
Start: 2019-02-11 | End: 2019-02-11 | Stop reason: SDUPTHER

## 2019-02-11 RX ORDER — HYDROCODONE BITARTRATE AND ACETAMINOPHEN 5; 325 MG/1; MG/1
2 TABLET ORAL EVERY 4 HOURS PRN
Status: DISCONTINUED | OUTPATIENT
Start: 2019-02-11 | End: 2019-02-15 | Stop reason: HOSPADM

## 2019-02-11 RX ORDER — DEXAMETHASONE SODIUM PHOSPHATE 10 MG/ML
INJECTION INTRAMUSCULAR; INTRAVENOUS PRN
Status: DISCONTINUED | OUTPATIENT
Start: 2019-02-11 | End: 2019-02-11 | Stop reason: SDUPTHER

## 2019-02-11 RX ORDER — ONDANSETRON 2 MG/ML
4 INJECTION INTRAMUSCULAR; INTRAVENOUS
Status: DISCONTINUED | OUTPATIENT
Start: 2019-02-11 | End: 2019-02-11

## 2019-02-11 RX ORDER — SUCCINYLCHOLINE CHLORIDE 20 MG/ML
INJECTION INTRAMUSCULAR; INTRAVENOUS PRN
Status: DISCONTINUED | OUTPATIENT
Start: 2019-02-11 | End: 2019-02-11 | Stop reason: SDUPTHER

## 2019-02-11 RX ADMIN — POTASSIUM CHLORIDE, DEXTROSE MONOHYDRATE AND SODIUM CHLORIDE: 150; 5; 450 INJECTION, SOLUTION INTRAVENOUS at 02:31

## 2019-02-11 RX ADMIN — SODIUM CHLORIDE, POTASSIUM CHLORIDE, SODIUM LACTATE AND CALCIUM CHLORIDE: 600; 310; 30; 20 INJECTION, SOLUTION INTRAVENOUS at 14:07

## 2019-02-11 RX ADMIN — FENTANYL CITRATE 50 MCG: 50 INJECTION, SOLUTION INTRAMUSCULAR; INTRAVENOUS at 14:14

## 2019-02-11 RX ADMIN — HYDROCODONE BITARTRATE AND ACETAMINOPHEN 2 TABLET: 5; 325 TABLET ORAL at 23:33

## 2019-02-11 RX ADMIN — INDOMETHACIN 50 MG: 50 SUPPOSITORY RECTAL at 16:40

## 2019-02-11 RX ADMIN — PROMETHAZINE HYDROCHLORIDE 6.25 MG: 25 INJECTION INTRAMUSCULAR; INTRAVENOUS at 16:05

## 2019-02-11 RX ADMIN — ONDANSETRON 4 MG: 2 INJECTION, SOLUTION INTRAMUSCULAR; INTRAVENOUS at 14:23

## 2019-02-11 RX ADMIN — DEXAMETHASONE SODIUM PHOSPHATE 4 MG: 10 INJECTION INTRAMUSCULAR; INTRAVENOUS at 14:23

## 2019-02-11 RX ADMIN — TRAMADOL HYDROCHLORIDE 50 MG: 50 TABLET, FILM COATED ORAL at 21:12

## 2019-02-11 RX ADMIN — CIPROFLOXACIN 400 MG: 2 INJECTION, SOLUTION INTRAVENOUS at 08:39

## 2019-02-11 RX ADMIN — PROPOFOL 110 MG: 10 INJECTION, EMULSION INTRAVENOUS at 14:14

## 2019-02-11 RX ADMIN — SODIUM CHLORIDE, POTASSIUM CHLORIDE, SODIUM LACTATE AND CALCIUM CHLORIDE 250 ML: 600; 310; 30; 20 INJECTION, SOLUTION INTRAVENOUS at 16:34

## 2019-02-11 RX ADMIN — CIPROFLOXACIN 400 MG: 2 INJECTION, SOLUTION INTRAVENOUS at 21:33

## 2019-02-11 RX ADMIN — FENTANYL CITRATE 50 MCG: 50 INJECTION, SOLUTION INTRAMUSCULAR; INTRAVENOUS at 14:26

## 2019-02-11 RX ADMIN — LIDOCAINE HYDROCHLORIDE 50 MG: 20 INJECTION, SOLUTION EPIDURAL; INFILTRATION; INTRACAUDAL; PERINEURAL at 14:14

## 2019-02-11 RX ADMIN — SUCCINYLCHOLINE CHLORIDE 100 MG: 20 INJECTION, SOLUTION INTRAMUSCULAR; INTRAVENOUS at 14:14

## 2019-02-11 ASSESSMENT — PULMONARY FUNCTION TESTS
PIF_VALUE: 1
PIF_VALUE: 23
PIF_VALUE: 21
PIF_VALUE: 9
PIF_VALUE: 22
PIF_VALUE: 0
PIF_VALUE: 15
PIF_VALUE: 2
PIF_VALUE: 1
PIF_VALUE: 1
PIF_VALUE: 21
PIF_VALUE: 21
PIF_VALUE: 2
PIF_VALUE: 21
PIF_VALUE: 22
PIF_VALUE: 23
PIF_VALUE: 1
PIF_VALUE: 21
PIF_VALUE: 22
PIF_VALUE: 2
PIF_VALUE: 22
PIF_VALUE: 21
PIF_VALUE: 23
PIF_VALUE: 22
PIF_VALUE: 2
PIF_VALUE: 22
PIF_VALUE: 21
PIF_VALUE: 4
PIF_VALUE: 21
PIF_VALUE: 2
PIF_VALUE: 24
PIF_VALUE: 23
PIF_VALUE: 20
PIF_VALUE: 22
PIF_VALUE: 21
PIF_VALUE: 22
PIF_VALUE: 21
PIF_VALUE: 1
PIF_VALUE: 25
PIF_VALUE: 23
PIF_VALUE: 22
PIF_VALUE: 1
PIF_VALUE: 3
PIF_VALUE: 23
PIF_VALUE: 3
PIF_VALUE: 20
PIF_VALUE: 27
PIF_VALUE: 1
PIF_VALUE: 21
PIF_VALUE: 31
PIF_VALUE: 2
PIF_VALUE: 22
PIF_VALUE: 22
PIF_VALUE: 19
PIF_VALUE: 1
PIF_VALUE: 19
PIF_VALUE: 5

## 2019-02-11 ASSESSMENT — PAIN DESCRIPTION - FREQUENCY
FREQUENCY: CONTINUOUS
FREQUENCY: CONTINUOUS

## 2019-02-11 ASSESSMENT — PAIN SCALES - GENERAL
PAINLEVEL_OUTOF10: 0
PAINLEVEL_OUTOF10: 10
PAINLEVEL_OUTOF10: 10
PAINLEVEL_OUTOF10: 0
PAINLEVEL_OUTOF10: 0
PAINLEVEL_OUTOF10: 4
PAINLEVEL_OUTOF10: 4

## 2019-02-11 ASSESSMENT — PAIN DESCRIPTION - PAIN TYPE
TYPE: CHRONIC PAIN

## 2019-02-11 ASSESSMENT — PAIN DESCRIPTION - LOCATION
LOCATION: KNEE;LEG
LOCATION: LEG
LOCATION: KNEE;LEG
LOCATION: LEG

## 2019-02-11 ASSESSMENT — PAIN DESCRIPTION - DESCRIPTORS
DESCRIPTORS: ACHING;DISCOMFORT;SORE
DESCRIPTORS: ACHING;DISCOMFORT;SORE

## 2019-02-11 ASSESSMENT — PAIN DESCRIPTION - ORIENTATION
ORIENTATION: RIGHT

## 2019-02-12 LAB
ABSOLUTE EOS #: 0.3 K/UL (ref 0–0.4)
ABSOLUTE IMMATURE GRANULOCYTE: ABNORMAL K/UL (ref 0–0.3)
ABSOLUTE LYMPH #: 0.4 K/UL (ref 1–4.8)
ABSOLUTE MONO #: 0.7 K/UL (ref 0.2–0.8)
ALBUMIN SERPL-MCNC: 3.6 G/DL (ref 3.5–5.2)
ALBUMIN/GLOBULIN RATIO: ABNORMAL (ref 1–2.5)
ALP BLD-CCNC: 106 U/L (ref 35–104)
ALT SERPL-CCNC: 28 U/L (ref 5–33)
ANION GAP SERPL CALCULATED.3IONS-SCNC: 15 MMOL/L (ref 9–17)
AST SERPL-CCNC: 25 U/L
BASOPHILS # BLD: 0 % (ref 0–2)
BASOPHILS ABSOLUTE: 0 K/UL (ref 0–0.2)
BILIRUB SERPL-MCNC: 0.43 MG/DL (ref 0.3–1.2)
BILIRUBIN DIRECT: 0.09 MG/DL
BILIRUBIN, INDIRECT: 0.34 MG/DL (ref 0–1)
BUN BLDV-MCNC: 16 MG/DL (ref 8–23)
BUN/CREAT BLD: 12 (ref 9–20)
CALCIUM SERPL-MCNC: 8.9 MG/DL (ref 8.6–10.4)
CHLORIDE BLD-SCNC: 104 MMOL/L (ref 98–107)
CO2: 22 MMOL/L (ref 20–31)
CREAT SERPL-MCNC: 1.29 MG/DL (ref 0.5–0.9)
DIFFERENTIAL TYPE: ABNORMAL
EOSINOPHILS RELATIVE PERCENT: 2 % (ref 1–4)
GFR AFRICAN AMERICAN: 47 ML/MIN
GFR NON-AFRICAN AMERICAN: 39 ML/MIN
GFR SERPL CREATININE-BSD FRML MDRD: ABNORMAL ML/MIN/{1.73_M2}
GFR SERPL CREATININE-BSD FRML MDRD: ABNORMAL ML/MIN/{1.73_M2}
GLOBULIN: ABNORMAL G/DL (ref 1.5–3.8)
GLUCOSE BLD-MCNC: 148 MG/DL (ref 70–99)
HCT VFR BLD CALC: 34.7 % (ref 36–46)
HEMOGLOBIN: 11.6 G/DL (ref 12–16)
IMMATURE GRANULOCYTES: ABNORMAL %
LYMPHOCYTES # BLD: 3 % (ref 24–44)
MAGNESIUM: 1.2 MG/DL (ref 1.6–2.6)
MCH RBC QN AUTO: 33 PG (ref 26–34)
MCHC RBC AUTO-ENTMCNC: 33.5 G/DL (ref 31–37)
MCV RBC AUTO: 98.7 FL (ref 80–100)
MONOCYTES # BLD: 6 % (ref 1–7)
NRBC AUTOMATED: ABNORMAL PER 100 WBC
PDW BLD-RTO: 14.8 % (ref 11.5–14.5)
PLATELET # BLD: 150 K/UL (ref 130–400)
PLATELET ESTIMATE: ABNORMAL
PMV BLD AUTO: 11 FL (ref 6–12)
POTASSIUM SERPL-SCNC: 4.2 MMOL/L (ref 3.7–5.3)
RBC # BLD: 3.51 M/UL (ref 4–5.2)
RBC # BLD: ABNORMAL 10*6/UL
SEG NEUTROPHILS: 89 % (ref 36–66)
SEGMENTED NEUTROPHILS ABSOLUTE COUNT: 10.5 K/UL (ref 1.8–7.7)
SODIUM BLD-SCNC: 141 MMOL/L (ref 135–144)
TOTAL PROTEIN: 5.8 G/DL (ref 6.4–8.3)
WBC # BLD: 11.8 K/UL (ref 3.5–11)
WBC # BLD: ABNORMAL 10*3/UL

## 2019-02-12 PROCEDURE — 99232 SBSQ HOSP IP/OBS MODERATE 35: CPT | Performed by: INTERNAL MEDICINE

## 2019-02-12 PROCEDURE — 1200000000 HC SEMI PRIVATE

## 2019-02-12 PROCEDURE — 85025 COMPLETE CBC W/AUTO DIFF WBC: CPT

## 2019-02-12 PROCEDURE — APPSS30 APP SPLIT SHARED TIME 16-30 MINUTES: Performed by: NURSE PRACTITIONER

## 2019-02-12 PROCEDURE — 83735 ASSAY OF MAGNESIUM: CPT

## 2019-02-12 PROCEDURE — 36415 COLL VENOUS BLD VENIPUNCTURE: CPT

## 2019-02-12 PROCEDURE — 6370000000 HC RX 637 (ALT 250 FOR IP): Performed by: NURSE PRACTITIONER

## 2019-02-12 PROCEDURE — 6360000002 HC RX W HCPCS: Performed by: NURSE PRACTITIONER

## 2019-02-12 PROCEDURE — 6360000002 HC RX W HCPCS: Performed by: INTERNAL MEDICINE

## 2019-02-12 PROCEDURE — 99253 IP/OBS CNSLTJ NEW/EST LOW 45: CPT | Performed by: ORTHOPAEDIC SURGERY

## 2019-02-12 PROCEDURE — 6370000000 HC RX 637 (ALT 250 FOR IP): Performed by: INTERNAL MEDICINE

## 2019-02-12 PROCEDURE — 80076 HEPATIC FUNCTION PANEL: CPT

## 2019-02-12 PROCEDURE — 80048 BASIC METABOLIC PNL TOTAL CA: CPT

## 2019-02-12 RX ADMIN — HYDROCODONE BITARTRATE AND ACETAMINOPHEN 2 TABLET: 5; 325 TABLET ORAL at 08:51

## 2019-02-12 RX ADMIN — HYDROCODONE BITARTRATE AND ACETAMINOPHEN 2 TABLET: 5; 325 TABLET ORAL at 18:27

## 2019-02-12 RX ADMIN — CIPROFLOXACIN 400 MG: 2 INJECTION, SOLUTION INTRAVENOUS at 22:04

## 2019-02-12 RX ADMIN — ATENOLOL 50 MG: 50 TABLET ORAL at 08:48

## 2019-02-12 RX ADMIN — FAMOTIDINE 20 MG: 20 TABLET ORAL at 08:48

## 2019-02-12 RX ADMIN — CIPROFLOXACIN 400 MG: 2 INJECTION, SOLUTION INTRAVENOUS at 08:49

## 2019-02-12 RX ADMIN — LEVOTHYROXINE SODIUM 112 MCG: 112 TABLET ORAL at 06:14

## 2019-02-12 RX ADMIN — ALLOPURINOL 100 MG: 100 TABLET ORAL at 08:49

## 2019-02-12 ASSESSMENT — PAIN SCALES - GENERAL
PAINLEVEL_OUTOF10: 5
PAINLEVEL_OUTOF10: 8

## 2019-02-13 ENCOUNTER — APPOINTMENT (OUTPATIENT)
Dept: ULTRASOUND IMAGING | Age: 84
DRG: 445 | End: 2019-02-13
Attending: INTERNAL MEDICINE
Payer: MEDICARE

## 2019-02-13 PROBLEM — E83.42 HYPOMAGNESEMIA: Status: ACTIVE | Noted: 2019-02-13

## 2019-02-13 PROBLEM — N17.9 AKI (ACUTE KIDNEY INJURY) (HCC): Status: ACTIVE | Noted: 2019-02-13

## 2019-02-13 LAB
ABSOLUTE EOS #: 0.4 K/UL (ref 0–0.4)
ABSOLUTE IMMATURE GRANULOCYTE: ABNORMAL K/UL (ref 0–0.3)
ABSOLUTE LYMPH #: 0.7 K/UL (ref 1–4.8)
ABSOLUTE MONO #: 1.2 K/UL (ref 0.2–0.8)
ALBUMIN SERPL-MCNC: 3.7 G/DL (ref 3.5–5.2)
ALBUMIN/GLOBULIN RATIO: ABNORMAL (ref 1–2.5)
ALP BLD-CCNC: 109 U/L (ref 35–104)
ALT SERPL-CCNC: 26 U/L (ref 5–33)
ANION GAP SERPL CALCULATED.3IONS-SCNC: 14 MMOL/L (ref 9–17)
AST SERPL-CCNC: 24 U/L
BASOPHILS # BLD: 0 % (ref 0–2)
BASOPHILS ABSOLUTE: 0 K/UL (ref 0–0.2)
BILIRUB SERPL-MCNC: 0.37 MG/DL (ref 0.3–1.2)
BILIRUBIN DIRECT: 0.1 MG/DL
BILIRUBIN, INDIRECT: 0.27 MG/DL (ref 0–1)
BUN BLDV-MCNC: 24 MG/DL (ref 8–23)
BUN/CREAT BLD: 16 (ref 9–20)
CALCIUM SERPL-MCNC: 9.3 MG/DL (ref 8.6–10.4)
CHLORIDE BLD-SCNC: 103 MMOL/L (ref 98–107)
CO2: 24 MMOL/L (ref 20–31)
CREAT SERPL-MCNC: 1.5 MG/DL (ref 0.5–0.9)
DIFFERENTIAL TYPE: ABNORMAL
EOSINOPHILS RELATIVE PERCENT: 3 % (ref 1–4)
GFR AFRICAN AMERICAN: 40 ML/MIN
GFR NON-AFRICAN AMERICAN: 33 ML/MIN
GFR SERPL CREATININE-BSD FRML MDRD: ABNORMAL ML/MIN/{1.73_M2}
GFR SERPL CREATININE-BSD FRML MDRD: ABNORMAL ML/MIN/{1.73_M2}
GLOBULIN: ABNORMAL G/DL (ref 1.5–3.8)
GLUCOSE BLD-MCNC: 99 MG/DL (ref 70–99)
HCT VFR BLD CALC: 35.6 % (ref 36–46)
HEMOGLOBIN: 11.7 G/DL (ref 12–16)
IMMATURE GRANULOCYTES: ABNORMAL %
LYMPHOCYTES # BLD: 5 % (ref 24–44)
MAGNESIUM: 1.3 MG/DL (ref 1.6–2.6)
MCH RBC QN AUTO: 32.6 PG (ref 26–34)
MCHC RBC AUTO-ENTMCNC: 32.8 G/DL (ref 31–37)
MCV RBC AUTO: 99.3 FL (ref 80–100)
MONOCYTES # BLD: 8 % (ref 1–7)
NRBC AUTOMATED: ABNORMAL PER 100 WBC
PDW BLD-RTO: 15.1 % (ref 11.5–14.5)
PLATELET # BLD: 158 K/UL (ref 130–400)
PLATELET ESTIMATE: ABNORMAL
PMV BLD AUTO: 10.4 FL (ref 6–12)
POTASSIUM SERPL-SCNC: 4.1 MMOL/L (ref 3.7–5.3)
RBC # BLD: 3.58 M/UL (ref 4–5.2)
RBC # BLD: ABNORMAL 10*6/UL
SEG NEUTROPHILS: 84 % (ref 36–66)
SEGMENTED NEUTROPHILS ABSOLUTE COUNT: 13.2 K/UL (ref 1.8–7.7)
SODIUM BLD-SCNC: 141 MMOL/L (ref 135–144)
TOTAL PROTEIN: 6.2 G/DL (ref 6.4–8.3)
WBC # BLD: 15.6 K/UL (ref 3.5–11)
WBC # BLD: ABNORMAL 10*3/UL

## 2019-02-13 PROCEDURE — 99232 SBSQ HOSP IP/OBS MODERATE 35: CPT | Performed by: INTERNAL MEDICINE

## 2019-02-13 PROCEDURE — 6360000002 HC RX W HCPCS: Performed by: INTERNAL MEDICINE

## 2019-02-13 PROCEDURE — 83735 ASSAY OF MAGNESIUM: CPT

## 2019-02-13 PROCEDURE — 6370000000 HC RX 637 (ALT 250 FOR IP): Performed by: INTERNAL MEDICINE

## 2019-02-13 PROCEDURE — 6360000002 HC RX W HCPCS: Performed by: NURSE PRACTITIONER

## 2019-02-13 PROCEDURE — 36415 COLL VENOUS BLD VENIPUNCTURE: CPT

## 2019-02-13 PROCEDURE — 80076 HEPATIC FUNCTION PANEL: CPT

## 2019-02-13 PROCEDURE — 76770 US EXAM ABDO BACK WALL COMP: CPT

## 2019-02-13 PROCEDURE — 1200000000 HC SEMI PRIVATE

## 2019-02-13 PROCEDURE — 6370000000 HC RX 637 (ALT 250 FOR IP): Performed by: NURSE PRACTITIONER

## 2019-02-13 PROCEDURE — 80048 BASIC METABOLIC PNL TOTAL CA: CPT

## 2019-02-13 PROCEDURE — 2580000003 HC RX 258: Performed by: INTERNAL MEDICINE

## 2019-02-13 PROCEDURE — 85025 COMPLETE CBC W/AUTO DIFF WBC: CPT

## 2019-02-13 RX ORDER — DEXTROSE AND SODIUM CHLORIDE 5; .45 G/100ML; G/100ML
INJECTION, SOLUTION INTRAVENOUS CONTINUOUS
Status: DISCONTINUED | OUTPATIENT
Start: 2019-02-13 | End: 2019-02-15 | Stop reason: HOSPADM

## 2019-02-13 RX ORDER — CIPROFLOXACIN 2 MG/ML
200 INJECTION, SOLUTION INTRAVENOUS EVERY 12 HOURS
Status: DISCONTINUED | OUTPATIENT
Start: 2019-02-13 | End: 2019-02-15 | Stop reason: HOSPADM

## 2019-02-13 RX ORDER — MAGNESIUM SULFATE 1 G/100ML
1 INJECTION INTRAVENOUS
Status: DISPENSED | OUTPATIENT
Start: 2019-02-13 | End: 2019-02-13

## 2019-02-13 RX ADMIN — MAGNESIUM SULFATE HEPTAHYDRATE 1 G: 1 INJECTION, SOLUTION INTRAVENOUS at 14:50

## 2019-02-13 RX ADMIN — HYDROCODONE BITARTRATE AND ACETAMINOPHEN 2 TABLET: 5; 325 TABLET ORAL at 05:26

## 2019-02-13 RX ADMIN — LEVOTHYROXINE SODIUM 112 MCG: 112 TABLET ORAL at 05:26

## 2019-02-13 RX ADMIN — FAMOTIDINE 20 MG: 20 TABLET ORAL at 08:02

## 2019-02-13 RX ADMIN — MAGNESIUM SULFATE HEPTAHYDRATE 1 G: 1 INJECTION, SOLUTION INTRAVENOUS at 17:06

## 2019-02-13 RX ADMIN — CIPROFLOXACIN 200 MG: 2 INJECTION, SOLUTION INTRAVENOUS at 12:31

## 2019-02-13 RX ADMIN — MAGNESIUM SULFATE HEPTAHYDRATE 1 G: 1 INJECTION, SOLUTION INTRAVENOUS at 13:44

## 2019-02-13 RX ADMIN — HYDROCODONE BITARTRATE AND ACETAMINOPHEN 2 TABLET: 5; 325 TABLET ORAL at 14:55

## 2019-02-13 RX ADMIN — DEXTROSE AND SODIUM CHLORIDE: 5; 450 INJECTION, SOLUTION INTRAVENOUS at 13:44

## 2019-02-13 RX ADMIN — ENOXAPARIN SODIUM 40 MG: 40 INJECTION SUBCUTANEOUS at 08:02

## 2019-02-13 RX ADMIN — MAGNESIUM SULFATE HEPTAHYDRATE 1 G: 1 INJECTION, SOLUTION INTRAVENOUS at 15:53

## 2019-02-13 RX ADMIN — ATENOLOL 50 MG: 50 TABLET ORAL at 08:02

## 2019-02-13 RX ADMIN — HYDROCODONE BITARTRATE AND ACETAMINOPHEN 2 TABLET: 5; 325 TABLET ORAL at 21:22

## 2019-02-13 RX ADMIN — ALLOPURINOL 100 MG: 100 TABLET ORAL at 08:02

## 2019-02-13 ASSESSMENT — PAIN DESCRIPTION - FREQUENCY
FREQUENCY: INTERMITTENT
FREQUENCY: CONTINUOUS

## 2019-02-13 ASSESSMENT — PAIN DESCRIPTION - ORIENTATION
ORIENTATION: RIGHT
ORIENTATION: RIGHT

## 2019-02-13 ASSESSMENT — PAIN DESCRIPTION - DESCRIPTORS
DESCRIPTORS: ACHING;DISCOMFORT
DESCRIPTORS: ACHING;DISCOMFORT

## 2019-02-13 ASSESSMENT — PAIN SCALES - GENERAL
PAINLEVEL_OUTOF10: 4
PAINLEVEL_OUTOF10: 7
PAINLEVEL_OUTOF10: 7

## 2019-02-13 ASSESSMENT — PAIN DESCRIPTION - PAIN TYPE
TYPE: ACUTE PAIN;CHRONIC PAIN
TYPE: ACUTE PAIN;CHRONIC PAIN

## 2019-02-13 ASSESSMENT — PAIN DESCRIPTION - LOCATION
LOCATION: LEG
LOCATION: LEG

## 2019-02-14 VITALS
DIASTOLIC BLOOD PRESSURE: 64 MMHG | TEMPERATURE: 97.9 F | BODY MASS INDEX: 32.79 KG/M2 | HEART RATE: 106 BPM | RESPIRATION RATE: 16 BRPM | OXYGEN SATURATION: 98 % | WEIGHT: 167 LBS | HEIGHT: 60 IN | SYSTOLIC BLOOD PRESSURE: 119 MMHG

## 2019-02-14 LAB
ABSOLUTE EOS #: 0.4 K/UL (ref 0–0.4)
ABSOLUTE IMMATURE GRANULOCYTE: ABNORMAL K/UL (ref 0–0.3)
ABSOLUTE LYMPH #: 0.8 K/UL (ref 1–4.8)
ABSOLUTE MONO #: 1.2 K/UL (ref 0.2–0.8)
ANION GAP SERPL CALCULATED.3IONS-SCNC: 12 MMOL/L (ref 9–17)
BASOPHILS # BLD: 0 % (ref 0–2)
BASOPHILS ABSOLUTE: 0 K/UL (ref 0–0.2)
BUN BLDV-MCNC: 20 MG/DL (ref 8–23)
BUN/CREAT BLD: 16 (ref 9–20)
CALCIUM SERPL-MCNC: 8.8 MG/DL (ref 8.6–10.4)
CHLORIDE BLD-SCNC: 103 MMOL/L (ref 98–107)
CO2: 24 MMOL/L (ref 20–31)
CREAT SERPL-MCNC: 1.24 MG/DL (ref 0.5–0.9)
DIFFERENTIAL TYPE: ABNORMAL
EOSINOPHILS RELATIVE PERCENT: 4 % (ref 1–4)
GFR AFRICAN AMERICAN: 49 ML/MIN
GFR NON-AFRICAN AMERICAN: 41 ML/MIN
GFR SERPL CREATININE-BSD FRML MDRD: ABNORMAL ML/MIN/{1.73_M2}
GFR SERPL CREATININE-BSD FRML MDRD: ABNORMAL ML/MIN/{1.73_M2}
GLUCOSE BLD-MCNC: 110 MG/DL (ref 70–99)
HCT VFR BLD CALC: 33.4 % (ref 36–46)
HEMOGLOBIN: 11.1 G/DL (ref 12–16)
IMMATURE GRANULOCYTES: ABNORMAL %
LYMPHOCYTES # BLD: 7 % (ref 24–44)
MAGNESIUM: 2.1 MG/DL (ref 1.6–2.6)
MCH RBC QN AUTO: 33.3 PG (ref 26–34)
MCHC RBC AUTO-ENTMCNC: 33.2 G/DL (ref 31–37)
MCV RBC AUTO: 100.1 FL (ref 80–100)
MONOCYTES # BLD: 11 % (ref 1–7)
NRBC AUTOMATED: ABNORMAL PER 100 WBC
PDW BLD-RTO: 15 % (ref 11.5–14.5)
PLATELET # BLD: 148 K/UL (ref 130–400)
PLATELET ESTIMATE: ABNORMAL
PMV BLD AUTO: 10.3 FL (ref 6–12)
POTASSIUM SERPL-SCNC: 3.9 MMOL/L (ref 3.7–5.3)
RBC # BLD: 3.34 M/UL (ref 4–5.2)
RBC # BLD: ABNORMAL 10*6/UL
SEG NEUTROPHILS: 78 % (ref 36–66)
SEGMENTED NEUTROPHILS ABSOLUTE COUNT: 8.8 K/UL (ref 1.8–7.7)
SODIUM BLD-SCNC: 139 MMOL/L (ref 135–144)
URIC ACID: 5.2 MG/DL (ref 2.4–5.7)
WBC # BLD: 11.2 K/UL (ref 3.5–11)
WBC # BLD: ABNORMAL 10*3/UL

## 2019-02-14 PROCEDURE — 85025 COMPLETE CBC W/AUTO DIFF WBC: CPT

## 2019-02-14 PROCEDURE — 6370000000 HC RX 637 (ALT 250 FOR IP): Performed by: NURSE PRACTITIONER

## 2019-02-14 PROCEDURE — 2580000003 HC RX 258: Performed by: INTERNAL MEDICINE

## 2019-02-14 PROCEDURE — 80048 BASIC METABOLIC PNL TOTAL CA: CPT

## 2019-02-14 PROCEDURE — 83735 ASSAY OF MAGNESIUM: CPT

## 2019-02-14 PROCEDURE — 36415 COLL VENOUS BLD VENIPUNCTURE: CPT

## 2019-02-14 PROCEDURE — 6360000002 HC RX W HCPCS: Performed by: NURSE PRACTITIONER

## 2019-02-14 PROCEDURE — 84550 ASSAY OF BLOOD/URIC ACID: CPT

## 2019-02-14 PROCEDURE — 99232 SBSQ HOSP IP/OBS MODERATE 35: CPT | Performed by: INTERNAL MEDICINE

## 2019-02-14 PROCEDURE — 1200000000 HC SEMI PRIVATE

## 2019-02-14 PROCEDURE — 6370000000 HC RX 637 (ALT 250 FOR IP): Performed by: INTERNAL MEDICINE

## 2019-02-14 PROCEDURE — 2500000003 HC RX 250 WO HCPCS: Performed by: NURSE PRACTITIONER

## 2019-02-14 PROCEDURE — 6360000002 HC RX W HCPCS: Performed by: INTERNAL MEDICINE

## 2019-02-14 RX ORDER — SODIUM CHLORIDE 450 MG/100ML
INJECTION, SOLUTION INTRAVENOUS CONTINUOUS
Status: DISCONTINUED | OUTPATIENT
Start: 2019-02-14 | End: 2019-02-14

## 2019-02-14 RX ADMIN — ENOXAPARIN SODIUM 30 MG: 30 INJECTION SUBCUTANEOUS at 11:29

## 2019-02-14 RX ADMIN — FAMOTIDINE 20 MG: 20 TABLET ORAL at 11:29

## 2019-02-14 RX ADMIN — MAGNESIUM HYDROXIDE 30 ML: 400 SUSPENSION ORAL at 11:29

## 2019-02-14 RX ADMIN — LEVOTHYROXINE SODIUM 112 MCG: 112 TABLET ORAL at 05:55

## 2019-02-14 RX ADMIN — ATENOLOL 50 MG: 50 TABLET ORAL at 11:29

## 2019-02-14 RX ADMIN — HYDROCODONE BITARTRATE AND ACETAMINOPHEN 2 TABLET: 5; 325 TABLET ORAL at 05:55

## 2019-02-14 RX ADMIN — CIPROFLOXACIN 200 MG: 2 INJECTION, SOLUTION INTRAVENOUS at 00:49

## 2019-02-14 RX ADMIN — DEXTROSE AND SODIUM CHLORIDE: 5; 450 INJECTION, SOLUTION INTRAVENOUS at 13:27

## 2019-02-14 RX ADMIN — POTASSIUM CHLORIDE, DEXTROSE MONOHYDRATE AND SODIUM CHLORIDE: 150; 5; 450 INJECTION, SOLUTION INTRAVENOUS at 04:46

## 2019-02-14 RX ADMIN — CIPROFLOXACIN 200 MG: 2 INJECTION, SOLUTION INTRAVENOUS at 11:28

## 2019-02-14 RX ADMIN — ALLOPURINOL 100 MG: 100 TABLET ORAL at 11:29

## 2019-02-14 ASSESSMENT — PAIN DESCRIPTION - ORIENTATION
ORIENTATION: RIGHT
ORIENTATION: RIGHT

## 2019-02-14 ASSESSMENT — PAIN DESCRIPTION - LOCATION
LOCATION: LEG
LOCATION: LEG

## 2019-02-14 ASSESSMENT — PAIN DESCRIPTION - ONSET: ONSET: ON-GOING

## 2019-02-14 ASSESSMENT — PAIN SCALES - GENERAL
PAINLEVEL_OUTOF10: 3
PAINLEVEL_OUTOF10: 3
PAINLEVEL_OUTOF10: 7

## 2019-02-14 ASSESSMENT — PAIN DESCRIPTION - DESCRIPTORS
DESCRIPTORS: ACHING;DISCOMFORT
DESCRIPTORS: ACHING

## 2019-02-14 ASSESSMENT — PAIN DESCRIPTION - PROGRESSION: CLINICAL_PROGRESSION: NOT CHANGED

## 2019-02-14 ASSESSMENT — PAIN DESCRIPTION - PAIN TYPE
TYPE: ACUTE PAIN;CHRONIC PAIN
TYPE: CHRONIC PAIN

## 2019-02-14 ASSESSMENT — PAIN - FUNCTIONAL ASSESSMENT: PAIN_FUNCTIONAL_ASSESSMENT: PREVENTS OR INTERFERES SOME ACTIVE ACTIVITIES AND ADLS

## 2019-02-14 ASSESSMENT — PAIN DESCRIPTION - FREQUENCY
FREQUENCY: INTERMITTENT
FREQUENCY: INTERMITTENT

## 2019-02-25 ENCOUNTER — HOSPITAL ENCOUNTER (OUTPATIENT)
Age: 84
Discharge: HOME | End: 2019-02-25
Payer: MEDICARE

## 2019-02-25 LAB
ALBUMIN SERPL-MCNC: 3.8 G/DL (ref 3.5–5.2)
ALBUMIN/GLOBULIN RATIO: 1.4 (ref 1–2.5)
ALP BLD-CCNC: 113 U/L (ref 35–104)
ALT SERPL-CCNC: 13 U/L (ref 5–33)
ANION GAP SERPL CALCULATED.3IONS-SCNC: 14 MMOL/L (ref 9–17)
AST SERPL-CCNC: 18 U/L
BILIRUB SERPL-MCNC: 0.4 MG/DL (ref 0.3–1.2)
BILIRUBIN DIRECT: <0.08 MG/DL
BILIRUBIN, INDIRECT: ABNORMAL MG/DL (ref 0–1)
BUN BLDV-MCNC: 12 MG/DL (ref 8–23)
BUN/CREAT BLD: 13 (ref 9–20)
CALCIUM SERPL-MCNC: 9.1 MG/DL (ref 8.6–10.4)
CHLORIDE BLD-SCNC: 107 MMOL/L (ref 98–107)
CO2: 27 MMOL/L (ref 20–31)
CREAT SERPL-MCNC: 0.93 MG/DL (ref 0.5–0.9)
GFR AFRICAN AMERICAN: >60 ML/MIN
GFR NON-AFRICAN AMERICAN: 57 ML/MIN
GFR SERPL CREATININE-BSD FRML MDRD: ABNORMAL ML/MIN/{1.73_M2}
GFR SERPL CREATININE-BSD FRML MDRD: ABNORMAL ML/MIN/{1.73_M2}
GLOBULIN: ABNORMAL G/DL (ref 1.5–3.8)
GLUCOSE BLD-MCNC: 96 MG/DL (ref 70–99)
HCT VFR BLD CALC: 39.1 % (ref 36.3–47.1)
HEMOGLOBIN: 11.7 G/DL (ref 11.9–15.1)
MAGNESIUM: 1.5 MG/DL (ref 1.6–2.6)
MCH RBC QN AUTO: 32.1 PG (ref 25.2–33.5)
MCHC RBC AUTO-ENTMCNC: 29.9 G/DL (ref 28.4–34.8)
MCV RBC AUTO: 107.4 FL (ref 82.6–102.9)
NRBC AUTOMATED: 0 PER 100 WBC
PDW BLD-RTO: 13.6 % (ref 11.8–14.4)
PLATELET # BLD: 245 K/UL (ref 138–453)
PMV BLD AUTO: 11.6 FL (ref 8.1–13.5)
POTASSIUM SERPL-SCNC: 3.7 MMOL/L (ref 3.7–5.3)
RBC # BLD: 3.64 M/UL (ref 3.95–5.11)
SODIUM BLD-SCNC: 148 MMOL/L (ref 135–144)
TOTAL PROTEIN: 6.5 G/DL (ref 6.4–8.3)
WBC # BLD: 10.8 K/UL (ref 3.5–11.3)

## 2019-02-25 PROCEDURE — 80076 HEPATIC FUNCTION PANEL: CPT

## 2019-02-25 PROCEDURE — 85027 COMPLETE CBC AUTOMATED: CPT

## 2019-02-25 PROCEDURE — 36415 COLL VENOUS BLD VENIPUNCTURE: CPT

## 2019-02-25 PROCEDURE — 83735 ASSAY OF MAGNESIUM: CPT

## 2019-02-25 PROCEDURE — 80048 BASIC METABOLIC PNL TOTAL CA: CPT

## 2019-03-11 ENCOUNTER — OFFICE VISIT (OUTPATIENT)
Dept: GASTROENTEROLOGY | Age: 84
End: 2019-03-11
Payer: MEDICARE

## 2019-03-11 VITALS
BODY MASS INDEX: 32.36 KG/M2 | WEIGHT: 165.7 LBS | SYSTOLIC BLOOD PRESSURE: 166 MMHG | DIASTOLIC BLOOD PRESSURE: 87 MMHG | HEART RATE: 72 BPM

## 2019-03-11 DIAGNOSIS — Z98.890 S/P ERCP: ICD-10-CM

## 2019-03-11 DIAGNOSIS — R79.89 ELEVATED LFTS: ICD-10-CM

## 2019-03-11 DIAGNOSIS — K83.1 BILIARY OBSTRUCTION: Primary | ICD-10-CM

## 2019-03-11 DIAGNOSIS — R19.7 DIARRHEA, UNSPECIFIED TYPE: ICD-10-CM

## 2019-03-11 PROCEDURE — G8427 DOCREV CUR MEDS BY ELIG CLIN: HCPCS | Performed by: INTERNAL MEDICINE

## 2019-03-11 PROCEDURE — 1101F PT FALLS ASSESS-DOCD LE1/YR: CPT | Performed by: INTERNAL MEDICINE

## 2019-03-11 PROCEDURE — 1111F DSCHRG MED/CURRENT MED MERGE: CPT | Performed by: INTERNAL MEDICINE

## 2019-03-11 PROCEDURE — G8417 CALC BMI ABV UP PARAM F/U: HCPCS | Performed by: INTERNAL MEDICINE

## 2019-03-11 PROCEDURE — 4040F PNEUMOC VAC/ADMIN/RCVD: CPT | Performed by: INTERNAL MEDICINE

## 2019-03-11 PROCEDURE — 1036F TOBACCO NON-USER: CPT | Performed by: INTERNAL MEDICINE

## 2019-03-11 PROCEDURE — G8482 FLU IMMUNIZE ORDER/ADMIN: HCPCS | Performed by: INTERNAL MEDICINE

## 2019-03-11 PROCEDURE — 1123F ACP DISCUSS/DSCN MKR DOCD: CPT | Performed by: INTERNAL MEDICINE

## 2019-03-11 PROCEDURE — 99214 OFFICE O/P EST MOD 30 MIN: CPT | Performed by: INTERNAL MEDICINE

## 2019-03-11 PROCEDURE — 1090F PRES/ABSN URINE INCON ASSESS: CPT | Performed by: INTERNAL MEDICINE

## 2019-03-11 ASSESSMENT — ENCOUNTER SYMPTOMS
SINUS PRESSURE: 0
DIARRHEA: 1
WHEEZING: 0
COUGH: 0
RECTAL PAIN: 0
TROUBLE SWALLOWING: 0
ABDOMINAL PAIN: 0
BLOOD IN STOOL: 0
NAUSEA: 0
BACK PAIN: 0
VOICE CHANGE: 0
ANAL BLEEDING: 0
ALLERGIC/IMMUNOLOGIC NEGATIVE: 1
ABDOMINAL DISTENTION: 0
CHOKING: 0
SORE THROAT: 0
CONSTIPATION: 0
VOMITING: 0

## 2019-03-18 ENCOUNTER — TELEPHONE (OUTPATIENT)
Dept: GASTROENTEROLOGY | Age: 84
End: 2019-03-18

## 2019-04-01 ENCOUNTER — HOSPITAL ENCOUNTER (OUTPATIENT)
Age: 84
Discharge: HOME | End: 2019-04-01
Payer: MEDICARE

## 2019-04-01 DIAGNOSIS — R73.01 IMPAIRED FASTING GLUCOSE: ICD-10-CM

## 2019-04-01 DIAGNOSIS — I10 ESSENTIAL HYPERTENSION, BENIGN: ICD-10-CM

## 2019-04-01 DIAGNOSIS — E03.9 HYPOTHYROIDISM, UNSPECIFIED TYPE: ICD-10-CM

## 2019-04-01 DIAGNOSIS — E78.2 MIXED HYPERLIPIDEMIA: ICD-10-CM

## 2019-04-01 LAB
ALT SERPL-CCNC: 8 U/L (ref 5–33)
ANION GAP SERPL CALCULATED.3IONS-SCNC: 13 MMOL/L (ref 9–17)
AST SERPL-CCNC: 16 U/L
BUN BLDV-MCNC: 15 MG/DL (ref 8–23)
BUN/CREAT BLD: 17 (ref 9–20)
CALCIUM SERPL-MCNC: 9.6 MG/DL (ref 8.6–10.4)
CHLORIDE BLD-SCNC: 106 MMOL/L (ref 98–107)
CHOLESTEROL/HDL RATIO: 2.4
CHOLESTEROL: 119 MG/DL
CO2: 27 MMOL/L (ref 20–31)
CREAT SERPL-MCNC: 0.86 MG/DL (ref 0.5–0.9)
ESTIMATED AVERAGE GLUCOSE: 100 MG/DL
GFR AFRICAN AMERICAN: >60 ML/MIN
GFR NON-AFRICAN AMERICAN: >60 ML/MIN
GFR SERPL CREATININE-BSD FRML MDRD: ABNORMAL ML/MIN/{1.73_M2}
GFR SERPL CREATININE-BSD FRML MDRD: ABNORMAL ML/MIN/{1.73_M2}
GLUCOSE BLD-MCNC: 100 MG/DL (ref 70–99)
HBA1C MFR BLD: 5.1 % (ref 4.8–5.9)
HCT VFR BLD CALC: 39.5 % (ref 36.3–47.1)
HDLC SERPL-MCNC: 49 MG/DL
HEMOGLOBIN: 12 G/DL (ref 11.9–15.1)
LDL CHOLESTEROL: 51 MG/DL (ref 0–130)
MCH RBC QN AUTO: 31.6 PG (ref 25.2–33.5)
MCHC RBC AUTO-ENTMCNC: 30.4 G/DL (ref 28.4–34.8)
MCV RBC AUTO: 103.9 FL (ref 82.6–102.9)
NRBC AUTOMATED: 0 PER 100 WBC
PDW BLD-RTO: 14.3 % (ref 11.8–14.4)
PLATELET # BLD: 218 K/UL (ref 138–453)
PMV BLD AUTO: 12 FL (ref 8.1–13.5)
POTASSIUM SERPL-SCNC: 3.6 MMOL/L (ref 3.7–5.3)
RBC # BLD: 3.8 M/UL (ref 3.95–5.11)
SODIUM BLD-SCNC: 146 MMOL/L (ref 135–144)
T3 FREE: 2.58 PG/ML (ref 2.02–4.43)
THYROXINE, FREE: 1.55 NG/DL (ref 0.93–1.7)
TRIGL SERPL-MCNC: 96 MG/DL
TSH SERPL DL<=0.05 MIU/L-ACNC: 0.77 MIU/L (ref 0.3–5)
VLDLC SERPL CALC-MCNC: NORMAL MG/DL (ref 1–30)
WBC # BLD: 8 K/UL (ref 3.5–11.3)

## 2019-04-01 PROCEDURE — 84460 ALANINE AMINO (ALT) (SGPT): CPT

## 2019-04-01 PROCEDURE — 84450 TRANSFERASE (AST) (SGOT): CPT

## 2019-04-01 PROCEDURE — 84443 ASSAY THYROID STIM HORMONE: CPT

## 2019-04-01 PROCEDURE — 84439 ASSAY OF FREE THYROXINE: CPT

## 2019-04-01 PROCEDURE — 83036 HEMOGLOBIN GLYCOSYLATED A1C: CPT

## 2019-04-01 PROCEDURE — 80061 LIPID PANEL: CPT

## 2019-04-01 PROCEDURE — 36415 COLL VENOUS BLD VENIPUNCTURE: CPT

## 2019-04-01 PROCEDURE — 80048 BASIC METABOLIC PNL TOTAL CA: CPT

## 2019-04-01 PROCEDURE — 84481 FREE ASSAY (FT-3): CPT

## 2019-04-01 PROCEDURE — 85027 COMPLETE CBC AUTOMATED: CPT

## 2019-04-07 ENCOUNTER — APPOINTMENT (OUTPATIENT)
Dept: GENERAL RADIOLOGY | Age: 84
DRG: 064 | End: 2019-04-07
Payer: MEDICARE

## 2019-04-07 ENCOUNTER — APPOINTMENT (OUTPATIENT)
Dept: CT IMAGING | Age: 84
DRG: 064 | End: 2019-04-07
Payer: MEDICARE

## 2019-04-07 ENCOUNTER — APPOINTMENT (OUTPATIENT)
Dept: MRI IMAGING | Age: 84
DRG: 064 | End: 2019-04-07
Payer: MEDICARE

## 2019-04-07 ENCOUNTER — HOSPITAL ENCOUNTER (EMERGENCY)
Age: 84
Discharge: ANOTHER HOSPITAL | End: 2019-04-07
Attending: EMERGENCY MEDICINE
Payer: MEDICARE

## 2019-04-07 ENCOUNTER — HOSPITAL ENCOUNTER (INPATIENT)
Age: 84
LOS: 3 days | Discharge: NURSING, REHAB OR LONG-TERM CARE | DRG: 064 | End: 2019-04-10
Attending: EMERGENCY MEDICINE | Admitting: PSYCHIATRY & NEUROLOGY
Payer: MEDICARE

## 2019-04-07 ENCOUNTER — APPOINTMENT (OUTPATIENT)
Dept: CT IMAGING | Age: 84
End: 2019-04-07
Payer: MEDICARE

## 2019-04-07 VITALS
RESPIRATION RATE: 18 BRPM | TEMPERATURE: 98.3 F | OXYGEN SATURATION: 98 % | HEART RATE: 89 BPM | DIASTOLIC BLOOD PRESSURE: 115 MMHG | BODY MASS INDEX: 30.43 KG/M2 | WEIGHT: 155 LBS | HEIGHT: 60 IN | SYSTOLIC BLOOD PRESSURE: 146 MMHG

## 2019-04-07 DIAGNOSIS — I63.9 CEREBROVASCULAR ACCIDENT (CVA), UNSPECIFIED MECHANISM (HCC): Primary | ICD-10-CM

## 2019-04-07 DIAGNOSIS — I63.232 STENOSIS OF INTERNAL CAROTID ARTERY WITH CEREBRAL INFARCTION, LEFT (HCC): ICD-10-CM

## 2019-04-07 DIAGNOSIS — R29.90 STROKE-LIKE SYMPTOMS: Primary | ICD-10-CM

## 2019-04-07 DIAGNOSIS — I48.91 ATRIAL FIBRILLATION WITH RAPID VENTRICULAR RESPONSE (HCC): ICD-10-CM

## 2019-04-07 DIAGNOSIS — I48.91 ATRIAL FIBRILLATION WITH RVR (HCC): ICD-10-CM

## 2019-04-07 DIAGNOSIS — E83.42 HYPOMAGNESEMIA: ICD-10-CM

## 2019-04-07 DIAGNOSIS — N30.00 ACUTE CYSTITIS WITHOUT HEMATURIA: ICD-10-CM

## 2019-04-07 LAB
-: ABNORMAL
ABSOLUTE EOS #: 0 K/UL (ref 0–0.44)
ABSOLUTE IMMATURE GRANULOCYTE: 0 K/UL (ref 0–0.3)
ABSOLUTE LYMPH #: 0.4 K/UL (ref 1.1–3.7)
ABSOLUTE MONO #: 0.1 K/UL (ref 0.1–1.2)
ALBUMIN SERPL-MCNC: 4.3 G/DL (ref 3.5–5.2)
ALBUMIN/GLOBULIN RATIO: 1.5 (ref 1–2.5)
ALP BLD-CCNC: 108 U/L (ref 35–104)
ALT SERPL-CCNC: 10 U/L (ref 5–33)
AMORPHOUS: ABNORMAL
ANION GAP SERPL CALCULATED.3IONS-SCNC: 16 MMOL/L (ref 9–17)
AST SERPL-CCNC: 24 U/L
BACTERIA: ABNORMAL
BASOPHILS # BLD: 0 % (ref 0–2)
BASOPHILS ABSOLUTE: 0 K/UL (ref 0–0.2)
BILIRUB SERPL-MCNC: 0.97 MG/DL (ref 0.3–1.2)
BILIRUBIN DIRECT: 0.28 MG/DL
BILIRUBIN URINE: NEGATIVE
BILIRUBIN, INDIRECT: 0.69 MG/DL (ref 0–1)
BNP INTERPRETATION: ABNORMAL
BUN BLDV-MCNC: 12 MG/DL (ref 8–23)
BUN/CREAT BLD: 13 (ref 9–20)
CALCIUM SERPL-MCNC: 9.5 MG/DL (ref 8.6–10.4)
CASTS UA: ABNORMAL /LPF
CHLORIDE BLD-SCNC: 101 MMOL/L (ref 98–107)
CK MB: 4.7 NG/ML
CO2: 27 MMOL/L (ref 20–31)
COLOR: YELLOW
COMMENT UA: ABNORMAL
CREAT SERPL-MCNC: 0.92 MG/DL (ref 0.5–0.9)
CRYSTALS, UA: ABNORMAL /HPF
DIFFERENTIAL TYPE: ABNORMAL
EKG ATRIAL RATE: 107 BPM
EKG P AXIS: 81 DEGREES
EKG P-R INTERVAL: 144 MS
EKG Q-T INTERVAL: 328 MS
EKG QRS DURATION: 76 MS
EKG QTC CALCULATION (BAZETT): 446 MS
EKG R AXIS: 30 DEGREES
EKG T AXIS: -27 DEGREES
EKG VENTRICULAR RATE: 111 BPM
EOSINOPHILS RELATIVE PERCENT: 0 % (ref 1–4)
EPITHELIAL CELLS UA: ABNORMAL /HPF (ref 0–25)
GFR AFRICAN AMERICAN: >60 ML/MIN
GFR NON-AFRICAN AMERICAN: 58 ML/MIN
GFR SERPL CREATININE-BSD FRML MDRD: ABNORMAL ML/MIN/{1.73_M2}
GFR SERPL CREATININE-BSD FRML MDRD: ABNORMAL ML/MIN/{1.73_M2}
GLOBULIN: ABNORMAL G/DL (ref 1.5–3.8)
GLUCOSE BLD-MCNC: 125 MG/DL (ref 74–100)
GLUCOSE BLD-MCNC: 146 MG/DL (ref 70–99)
GLUCOSE URINE: NEGATIVE
HCT VFR BLD CALC: 38.6 % (ref 36.3–47.1)
HEMOGLOBIN: 12.1 G/DL (ref 11.9–15.1)
IMMATURE GRANULOCYTES: 0 %
INR BLD: 1.1 (ref 0.9–1.2)
KETONES, URINE: ABNORMAL
LEUKOCYTE ESTERASE, URINE: NEGATIVE
LYMPHOCYTES # BLD: 4 % (ref 24–43)
MAGNESIUM: 1.3 MG/DL (ref 1.6–2.6)
MCH RBC QN AUTO: 32.1 PG (ref 25.2–33.5)
MCHC RBC AUTO-ENTMCNC: 31.3 G/DL (ref 28.4–34.8)
MCV RBC AUTO: 102.4 FL (ref 82.6–102.9)
MONOCYTES # BLD: 1 % (ref 3–12)
MORPHOLOGY: NORMAL
MUCUS: ABNORMAL
MYOGLOBIN: 588 NG/ML (ref 25–58)
NITRITE, URINE: POSITIVE
NRBC AUTOMATED: 0 PER 100 WBC
OTHER OBSERVATIONS UA: ABNORMAL
PARTIAL THROMBOPLASTIN TIME: 19.6 SEC (ref 23.2–34.4)
PDW BLD-RTO: 14 % (ref 11.8–14.4)
PH UA: 6 (ref 5–9)
PLATELET # BLD: 188 K/UL (ref 138–453)
PLATELET ESTIMATE: ABNORMAL
PMV BLD AUTO: 12.5 FL (ref 8.1–13.5)
POTASSIUM SERPL-SCNC: 3.7 MMOL/L (ref 3.7–5.3)
PRO-BNP: 3813 PG/ML
PROTEIN UA: ABNORMAL
PROTHROMBIN TIME: 11.4 SEC (ref 9.7–12.2)
RBC # BLD: 3.77 M/UL (ref 3.95–5.11)
RBC # BLD: ABNORMAL 10*6/UL
RBC UA: ABNORMAL /HPF (ref 0–2)
RENAL EPITHELIAL, UA: ABNORMAL /HPF
SEG NEUTROPHILS: 95 % (ref 36–65)
SEGMENTED NEUTROPHILS ABSOLUTE COUNT: 9.4 K/UL (ref 1.5–8.1)
SODIUM BLD-SCNC: 144 MMOL/L (ref 135–144)
SPECIFIC GRAVITY UA: 1.02 (ref 1.01–1.02)
TOTAL CK: 290 U/L (ref 26–192)
TOTAL PROTEIN: 7.1 G/DL (ref 6.4–8.3)
TRICHOMONAS: ABNORMAL
TROPONIN INTERP: ABNORMAL
TROPONIN T: 0.12 NG/ML
TROPONIN T: ABNORMAL NG/ML
TROPONIN T: ABNORMAL NG/ML
TROPONIN, HIGH SENSITIVITY: 136 NG/L (ref 0–14)
TROPONIN, HIGH SENSITIVITY: 148 NG/L (ref 0–14)
TROPONIN, HIGH SENSITIVITY: ABNORMAL NG/L (ref 0–14)
TSH SERPL DL<=0.05 MIU/L-ACNC: 0.61 MIU/L (ref 0.3–5)
TURBIDITY: CLEAR
URINE HGB: ABNORMAL
UROBILINOGEN, URINE: NORMAL
WBC # BLD: 9.9 K/UL (ref 3.5–11.3)
WBC # BLD: ABNORMAL 10*3/UL
WBC UA: ABNORMAL /HPF (ref 0–5)
YEAST: ABNORMAL

## 2019-04-07 PROCEDURE — 74018 RADEX ABDOMEN 1 VIEW: CPT

## 2019-04-07 PROCEDURE — 70551 MRI BRAIN STEM W/O DYE: CPT

## 2019-04-07 PROCEDURE — 82553 CREATINE MB FRACTION: CPT

## 2019-04-07 PROCEDURE — 36415 COLL VENOUS BLD VENIPUNCTURE: CPT

## 2019-04-07 PROCEDURE — 85610 PROTHROMBIN TIME: CPT

## 2019-04-07 PROCEDURE — 2580000003 HC RX 258: Performed by: NURSE PRACTITIONER

## 2019-04-07 PROCEDURE — 99223 1ST HOSP IP/OBS HIGH 75: CPT | Performed by: PSYCHIATRY & NEUROLOGY

## 2019-04-07 PROCEDURE — 70496 CT ANGIOGRAPHY HEAD: CPT

## 2019-04-07 PROCEDURE — 70450 CT HEAD/BRAIN W/O DYE: CPT

## 2019-04-07 PROCEDURE — 93005 ELECTROCARDIOGRAM TRACING: CPT

## 2019-04-07 PROCEDURE — 83735 ASSAY OF MAGNESIUM: CPT

## 2019-04-07 PROCEDURE — 6370000000 HC RX 637 (ALT 250 FOR IP): Performed by: NEUROLOGICAL SURGERY

## 2019-04-07 PROCEDURE — 82947 ASSAY GLUCOSE BLOOD QUANT: CPT

## 2019-04-07 PROCEDURE — 70498 CT ANGIOGRAPHY NECK: CPT

## 2019-04-07 PROCEDURE — 4A10X4Z MONITORING OF CENTRAL NERVOUS ELECTRICAL ACTIVITY, EXTERNAL APPROACH: ICD-10-PCS | Performed by: PSYCHIATRY & NEUROLOGY

## 2019-04-07 PROCEDURE — 82550 ASSAY OF CK (CPK): CPT

## 2019-04-07 PROCEDURE — 84484 ASSAY OF TROPONIN QUANT: CPT

## 2019-04-07 PROCEDURE — 71045 X-RAY EXAM CHEST 1 VIEW: CPT

## 2019-04-07 PROCEDURE — 99285 EMERGENCY DEPT VISIT HI MDM: CPT

## 2019-04-07 PROCEDURE — 83874 ASSAY OF MYOGLOBIN: CPT

## 2019-04-07 PROCEDURE — 6360000002 HC RX W HCPCS: Performed by: EMERGENCY MEDICINE

## 2019-04-07 PROCEDURE — 87641 MR-STAPH DNA AMP PROBE: CPT

## 2019-04-07 PROCEDURE — 80048 BASIC METABOLIC PNL TOTAL CA: CPT

## 2019-04-07 PROCEDURE — 0042T CT BRAIN PERFUSION: CPT

## 2019-04-07 PROCEDURE — 81001 URINALYSIS AUTO W/SCOPE: CPT

## 2019-04-07 PROCEDURE — APPNB60 APP NON BILLABLE TIME 46-60 MINS: Performed by: NURSE PRACTITIONER

## 2019-04-07 PROCEDURE — 2580000003 HC RX 258: Performed by: STUDENT IN AN ORGANIZED HEALTH CARE EDUCATION/TRAINING PROGRAM

## 2019-04-07 PROCEDURE — 96374 THER/PROPH/DIAG INJ IV PUSH: CPT

## 2019-04-07 PROCEDURE — 80076 HEPATIC FUNCTION PANEL: CPT

## 2019-04-07 PROCEDURE — 37195 THROMBOLYTIC THERAPY STROKE: CPT

## 2019-04-07 PROCEDURE — 2580000003 HC RX 258: Performed by: EMERGENCY MEDICINE

## 2019-04-07 PROCEDURE — 2000000003 HC NEURO ICU R&B

## 2019-04-07 PROCEDURE — 85730 THROMBOPLASTIN TIME PARTIAL: CPT

## 2019-04-07 PROCEDURE — 84443 ASSAY THYROID STIM HORMONE: CPT

## 2019-04-07 PROCEDURE — 83880 ASSAY OF NATRIURETIC PEPTIDE: CPT

## 2019-04-07 PROCEDURE — 85025 COMPLETE CBC W/AUTO DIFF WBC: CPT

## 2019-04-07 PROCEDURE — 51702 INSERT TEMP BLADDER CATH: CPT

## 2019-04-07 PROCEDURE — 6360000004 HC RX CONTRAST MEDICATION: Performed by: EMERGENCY MEDICINE

## 2019-04-07 RX ORDER — ACETAMINOPHEN 325 MG/1
650 TABLET ORAL EVERY 4 HOURS PRN
Status: DISCONTINUED | OUTPATIENT
Start: 2019-04-07 | End: 2019-04-10 | Stop reason: HOSPADM

## 2019-04-07 RX ORDER — ONDANSETRON 2 MG/ML
4 INJECTION INTRAMUSCULAR; INTRAVENOUS EVERY 6 HOURS PRN
Status: DISCONTINUED | OUTPATIENT
Start: 2019-04-07 | End: 2019-04-10 | Stop reason: HOSPADM

## 2019-04-07 RX ORDER — SODIUM CHLORIDE 9 MG/ML
INJECTION, SOLUTION INTRAVENOUS CONTINUOUS
Status: DISCONTINUED | OUTPATIENT
Start: 2019-04-07 | End: 2019-04-07 | Stop reason: HOSPADM

## 2019-04-07 RX ORDER — SODIUM CHLORIDE 0.9 % (FLUSH) 0.9 %
10 SYRINGE (ML) INJECTION PRN
Status: DISCONTINUED | OUTPATIENT
Start: 2019-04-07 | End: 2019-04-07 | Stop reason: HOSPADM

## 2019-04-07 RX ORDER — MAGNESIUM SULFATE 1 G/100ML
1 INJECTION INTRAVENOUS ONCE
Status: COMPLETED | OUTPATIENT
Start: 2019-04-07 | End: 2019-04-07

## 2019-04-07 RX ORDER — LEVOTHYROXINE SODIUM 112 UG/1
112 TABLET ORAL DAILY
Status: DISCONTINUED | OUTPATIENT
Start: 2019-04-08 | End: 2019-04-10 | Stop reason: HOSPADM

## 2019-04-07 RX ORDER — ATORVASTATIN CALCIUM 40 MG/1
40 TABLET, FILM COATED ORAL NIGHTLY
Status: DISCONTINUED | OUTPATIENT
Start: 2019-04-07 | End: 2019-04-10 | Stop reason: HOSPADM

## 2019-04-07 RX ORDER — SODIUM CHLORIDE 0.9 % (FLUSH) 0.9 %
10 SYRINGE (ML) INJECTION EVERY 12 HOURS SCHEDULED
Status: DISCONTINUED | OUTPATIENT
Start: 2019-04-07 | End: 2019-04-10 | Stop reason: HOSPADM

## 2019-04-07 RX ORDER — SODIUM CHLORIDE 9 MG/ML
INJECTION, SOLUTION INTRAVENOUS CONTINUOUS
Status: DISCONTINUED | OUTPATIENT
Start: 2019-04-07 | End: 2019-04-09

## 2019-04-07 RX ORDER — DEXTROSE MONOHYDRATE 25 G/50ML
12.5 INJECTION, SOLUTION INTRAVENOUS
Status: DISCONTINUED | OUTPATIENT
Start: 2019-04-07 | End: 2019-04-07 | Stop reason: HOSPADM

## 2019-04-07 RX ORDER — 0.9 % SODIUM CHLORIDE 0.9 %
250 INTRAVENOUS SOLUTION INTRAVENOUS ONCE
Status: COMPLETED | OUTPATIENT
Start: 2019-04-07 | End: 2019-04-08

## 2019-04-07 RX ORDER — MAGNESIUM SULFATE 1 G/100ML
1 INJECTION INTRAVENOUS
Status: DISCONTINUED | OUTPATIENT
Start: 2019-04-07 | End: 2019-04-10 | Stop reason: HOSPADM

## 2019-04-07 RX ORDER — SODIUM CHLORIDE 0.9 % (FLUSH) 0.9 %
10 SYRINGE (ML) INJECTION EVERY 12 HOURS SCHEDULED
Status: DISCONTINUED | OUTPATIENT
Start: 2019-04-07 | End: 2019-04-07 | Stop reason: HOSPADM

## 2019-04-07 RX ORDER — 0.9 % SODIUM CHLORIDE 0.9 %
500 INTRAVENOUS SOLUTION INTRAVENOUS ONCE
Status: COMPLETED | OUTPATIENT
Start: 2019-04-07 | End: 2019-04-07

## 2019-04-07 RX ORDER — FUROSEMIDE 10 MG/ML
20 INJECTION INTRAMUSCULAR; INTRAVENOUS ONCE
Status: DISCONTINUED | OUTPATIENT
Start: 2019-04-07 | End: 2019-04-07

## 2019-04-07 RX ORDER — CHLORHEXIDINE GLUCONATE 0.12 MG/ML
15 RINSE ORAL 2 TIMES DAILY
Status: DISCONTINUED | OUTPATIENT
Start: 2019-04-07 | End: 2019-04-10 | Stop reason: HOSPADM

## 2019-04-07 RX ORDER — SODIUM CHLORIDE 0.9 % (FLUSH) 0.9 %
10 SYRINGE (ML) INJECTION PRN
Status: DISCONTINUED | OUTPATIENT
Start: 2019-04-07 | End: 2019-04-10 | Stop reason: HOSPADM

## 2019-04-07 RX ADMIN — MAGNESIUM SULFATE HEPTAHYDRATE 1 G: 1 INJECTION, SOLUTION INTRAVENOUS at 17:23

## 2019-04-07 RX ADMIN — IOHEXOL 140 ML: 350 INJECTION, SOLUTION INTRAVENOUS at 16:18

## 2019-04-07 RX ADMIN — SODIUM CHLORIDE 500 ML: 9 INJECTION, SOLUTION INTRAVENOUS at 17:21

## 2019-04-07 RX ADMIN — ALTEPLASE 6.3 MG: KIT at 15:11

## 2019-04-07 RX ADMIN — Medication 15 ML: at 21:37

## 2019-04-07 RX ADMIN — SODIUM CHLORIDE 250 ML: 9 INJECTION, SOLUTION INTRAVENOUS at 21:23

## 2019-04-07 RX ADMIN — CEFTRIAXONE SODIUM 1 G: 1 INJECTION, POWDER, FOR SOLUTION INTRAMUSCULAR; INTRAVENOUS at 16:40

## 2019-04-07 RX ADMIN — SODIUM CHLORIDE: 9 INJECTION, SOLUTION INTRAVENOUS at 21:23

## 2019-04-07 RX ADMIN — ALTEPLASE 56.9 MG: KIT at 15:12

## 2019-04-07 ASSESSMENT — PAIN SCALES - WONG BAKER: WONGBAKER_NUMERICALRESPONSE: 0

## 2019-04-07 NOTE — H&P
Neuro ICU History & Physical    Patient Name: Cassandra Grande  Patient : 1930  Room/Bed:   Allergies: No Known Allergies  Problem List:   Patient Active Problem List   Diagnosis    Hypothyroidism    Hyperlipidemia    Hypertension    Gout    Arthritis    Irritable bowel syndrome    Biliary obstruction    Abnormal finding on imaging    Elevated LFTs    Abdominal discomfort    Acute cystitis    Choledocholithiasis    Right leg pain    Osteoarthritis of multiple joints    VERONICA (acute kidney injury) ([facility] [address]    Hypomagnesemia    Diarrhea    Stroke-like symptoms       CHIEF COMPLAINT     Unable to state due to expressive aphasia/dysarthria    HPI    History Obtained From: EMR    The patient is a 80 y.o. female presented with a history of HTN, HLD, CKD stage 3, and hypothyroidism who presents as a transfer from [address] [place] [address] s/p tPA administration for stroke like symptoms. LKW 1030AM when a neighbor last saw her well. Per records, patient's granddaughter visited her at 1245PM and found her standing in the kitchen leaning against the kitchen table. Granddaughter noticed the patient had weakness of her right arm, garbled speech, and needed support to stand. On initial evaluation at [facility] ED, NIH 9 for garbled speech with expressive aphasia, right upper extremity weakness, and bilateral lower extremity weakness. CT Head with no acute abnormality. TPA administered at 1512. Transferred to [facility] ED for further evaluation post tPA. Taken directly to CT scanner on arrival to [facility] ED. NIH 15. Patient is alert and tracks appropriately. She has severe expressive aphasia and dysarthria. Intermittently nods appropriately and follows simple commands, but not consistently. Mild right upper extremity weakness. Bilateral lower extremities edematous and weak, unable to antigravity. EKG with atrial fibrillation, rate 100-110's. Started on Rocephin for UTI.   Trending troponins due to elevation at [facility]. Of note, in EMS report it states the family suspected patient likely fell prior to their arrival.  Patient developed large hematoma with bruising on right forehead, bruising to right foot. CTA Head/Neck revealed severe left ICA stenosis. Given 500cc fluid bolus. Goal -185. STAT MRI Brain performed not demonstrating any area of acute infarct. Admitted to the Neuro ICU for close neurological monitoring post tPA. Admitted to ICU From: ED  Reason for ICU Admission: S/P TPA       PATIENT HISTORY   Past Medical History:        Diagnosis Date    Arthritis     Chronic kidney disease, stage 3 ([facility] [address]     Gout     Hyperlipidemia     Hypertension     Hypothyroidism     Irritable bowel syndrome        Past Surgical History:        Procedure Laterality Date    CARPAL TUNNEL RELEASE  2012    Dr. Radha Boyer - right side    CATARACT REMOVAL WITH IMPLANT Bilateral     CHOLECYSTECTOMY      ERCP N/A 2/11/2019    Driss Birmingham MD - ERCP, Sphincterotomy, balloon dilatation and stent placement with removal of multiple stones    TOTAL KNEE ARTHROPLASTY Right     TOTAL KNEE ARTHROPLASTY Left        Social History:   Social History     Socioeconomic History    Marital status:       Spouse name: Not on file    Number of children: Not on file    Years of education: Not on file    Highest education level: Not on file   Occupational History    Not on file   Social Needs    Financial resource strain: Not on file    Food insecurity:     Worry: Not on file     Inability: Not on file    Transportation needs:     Medical: Not on file     Non-medical: Not on file   Tobacco Use    Smoking status: Never Smoker    Smokeless tobacco: Never Used   Substance and Sexual Activity    Alcohol use: No    Drug use: No    Sexual activity: Not on file   Lifestyle    Physical activity:     Days per week: Not on file     Minutes per session: Not on file    Stress: Not on file   Relationships  Social connections:     Talks on phone: Not on file     Gets together: Not on file     Attends Gnosticist service: Not on file     Active member of club or organization: Not on file     Attends meetings of clubs or organizations: Not on file     Relationship status: Not on file    Intimate partner violence:     Fear of current or ex partner: Not on file     Emotionally abused: Not on file     Physically abused: Not on file     Forced sexual activity: Not on file   Other Topics Concern    Not on file   Social History Narrative    Not on file       Family History:       Problem Relation Age of Onset    High Blood Pressure Mother     Heart Disease Father        Allergies:    Patient has no known allergies. Medications Prior to Admission:    Not in a hospital admission. Current Medications:  Current Facility-Administered Medications: cefTRIAXone (ROCEPHIN) 1 g IVPB in 50 mL D5W minibag, 1 g, Intravenous, Once  magnesium sulfate 1 g in dextrose 5% 100 mL IVPB, 1 g, Intravenous, Once  furosemide (LASIX) injection 20 mg, 20 mg, Intravenous, Once    REVIEW OF SYSTEMS     Unable to obtain reliable ROS due to patient status. PHYSICAL EXAM:     BP (!) 149/95   Pulse 118   Resp 20   SpO2 97%     Estimated body mass index is 30.27 kg/m² as calculated from the following:    Height as of an earlier encounter on 4/7/19: 5' (1.524 m). Weight as of an earlier encounter on 4/7/19: 155 lb (70.3 kg).  []<16 Severe malnutrition  []16-16.99 Moderate malnutrition  []17-18.49 Mild malnutrition  []18.5-24.9 Normal  []25-29.9 Overweight (not obese)  [x]30-34.9 Obese class 1 (Low Risk)  []35-39.9 Obese class 2 (Moderate Risk)  []?40 Obese class 3 (High Risk)    PHYSICAL EXAM:  CONSTITUTIONAL:  Obese. Alert. Severe expressive aphasia and dysarthria. Tracks appropriately. Appears to have some degree of receptive aphasia; difficulty following simple commands intermittently. Tries to state yes/no and nod.    HEAD: 04/07/2019    GLUCOSE 88 04/23/2012       RADIOLOGY:   Ct Head Wo Contrast    Result Date: 4/7/2019  EXAMINATION: CT OF THE HEAD WITHOUT CONTRAST  4/7/2019 2:10 pm TECHNIQUE: CT of the head was performed without the administration of intravenous contrast. Dose modulation, iterative reconstruction, and/or weight based adjustment of the mA/kV was utilized to reduce the radiation dose to as low as reasonably achievable. COMPARISON: 02/07/2019 HISTORY: ORDERING SYSTEM PROVIDED HISTORY: ACUTE VISUAL DEFICIT (OTHER THAN PHOTOPHOBIA AND AURA) TECHNOLOGIST PROVIDED HISTORY: Slurred speech FINDINGS: BRAIN/VENTRICLES: There is no acute infarct or acute intracranial hemorrhage present. There is no mass effect or midline shift present. There is no ventriculomegaly or abnormal extra-axial fluid collection present. There is diffuse cerebral volume loss. Periventricular hypoattenuation is present. ORBITS: Limited evaluation of the orbits is unremarkable. SINUSES: The paranasal sinuses and mastoid air cells are clear. SOFT TISSUES/SKULL:  No lytic or blastic osseous lesions are identified. 1. No acute intracranial process identified. 2. Stable diffuse cerebral volume loss and chronic small vessel ischemic changes. The above findings were discussed with Dr. Daisy Landau at 12:20 p.m. on 04/07/2019. Xr Chest Portable    Result Date: 4/7/2019  EXAMINATION: SINGLE XRAY VIEW OF THE CHEST 4/7/2019 4:31 pm COMPARISON: February 7, 2019 HISTORY: ORDERING SYSTEM PROVIDED HISTORY: Concern for fluid overload TECHNOLOGIST PROVIDED HISTORY: Concern for fluid overload FINDINGS: Cardiac monitoring leads overlie the chest.  Enlarged heart with central vascular congestion and hazy left basilar opacity. Trace effusions. No pneumothorax. Shallow inspiration magnified cardiac size and pulmonary vasculature. Cardiomegaly and mild vascular congestion magnified by shallow inspiration. Labs and Images reviewed with:    [] Casi García, MD    [] Rosa Blakely MD  [x] Myles Hendricks MD  --[] there are no new interval images to review. ASSESSMENT AND PLAN:       The patient is a 79 yo female with a history of HTN, HLD, CKD stage 3, and hypothyroidism who presents as a transfer from [address] [place] [address] s/p TPA for stroke like symptoms. Initial NIH 9. On arrival to [facility] ED, NIH 15; severe expressive aphasia and dysarthria, mild receptive aphasia, right facial droop, right upper extremity drift, bilateral lower extremity weakness. Found to be in new onset afib. CTA Head/Neck revealed severe left ICA stenosis.     NEUROLOGIC:  - Expressive aphasia, dysarthria, right facial droop  - CT Head with no acute findings  - S/P TPA administration @5703  - CTA Head/Neck revealed severe left ICA stenosis  - STAT MRI Brain did not demonstrate an acute infarct  - Repeat CT Head 4/8 around 1512 (24h post tPA)  - Neuro- Endovascular following  - Goal -185  - Neuro checks per protocol    CARDIOVASCULAR:  - Goal -185  - PRN Hydralazine/Labetalol  - History of HTN  - Hold home antihypertensives (Atenolol)  - New onset atrial fibrillation on EKG  - PRN Lopressor IV for HR>120 sustained  - Elevated troponin, 148; continue to trend  - Pro- BNP 3813; given 20mg IVP Lasix in ED  - Echocardiogram ordered  - F/U Lipid panel  - History of HLD; Lipitor 40mg QHS  - Continue telemetry    PULMONARY:  - Maintaining O2 sats on nasal canula  - CXR with cardiomegaly and mild vascular congestion  - Given Lasix 20mg IVP x1 per ED physician   - Continue to monitor for respiratory distress    RENAL/FLUID/ELECTROLYTE:  - Reported history of CKD stage 3  - BUN 12/ Creatinine 0.92  - Monitor I&O  - Manzanares placed out outlying ED, maintain while within tPA precautions  - IVF: [email or web address]  - Hypomagnesia, Mag 1.3; given 1g in ED  - Replace electrolytes PRN  - Daily BMP    GI/NUTRITION:  NUTRITION:  No diet orders on file  - Bowel regimen: Milk of Magnesia  - GI prophylaxis: Not indicated    ID/HEME:  - Afebrile on arrival  - No leukocytosis, WBC 9.9  - UA consistent with UTI  - Started on Rocephin for UTI  - Continue to monitor for fevers  - H&H 12.1/38.6  - Platelets 850  - Daily CBC    ENDOCRINE:  - Continue to monitor blood glucose, goal <180  - Recent hemoglobin A1C 4/1/19 was 5.1    OTHER:  - PT/OT/ST  - Code Status: FULL    PROPHYLAXIS:  Stress ulcer: Not indicated    DVT PROPHYLAXIS:  - SCD sleeves - Thigh High   - EDGARDO stockings - Thigh High  - No chemoprophylaxis anticoagulation for 24h post tPA. DISPOSITION: Admit to Neuro ICU for close monitoring post tPA.       Nash Chavez, APRN - [address] [facility]  Neuro Critical Care Service   Pager [phone]  4/7/2019     4:48 PM

## 2019-04-07 NOTE — PROGRESS NOTES
Pt transported to room 529 from MRI with portable monitor. Report given to [employer]. Pt  s Right side noted to be ecchymotic from waist to right foot.

## 2019-04-07 NOTE — ED PROVIDER NOTES
[facility]     Emergency Department     Faculty Note/ Attestation      Pt Name: Liz Portillo                                       MRN: [id number]  [place] 5/2/1930  Date of evaluation: 4/7/2019    Patients PCP:    Nida Robertson MD    Attestation  I performed a history and physical examination of the patient and discussed management with the resident. I reviewed the residents note and agree with the documented findings and plan of care. Any areas of disagreement are noted on the chart. I was personally present for the key portions of any procedures. I have documented in the chart those procedures where I was not present during the key portions. I have reviewed the emergency nurses triage note. I agree with the chief complaint, past medical history, past surgical history, allergies, medications, social and family history as documented unless otherwise noted below. For Physician Assistant/ Nurse Practitioner cases/documentation I have personally evaluated this patient and have completed at least one if not all key elements of the E/M (history, physical exam, and MDM). Additional findings are as noted. Initial Screens:             Vitals:    Vitals:    04/07/19 1559   BP: (!) 149/95   Pulse: 118   Resp: 20   SpO2: 97%       CHIEF COMPLAINT       Chief Complaint   Patient presents with    Cerebrovascular Accident     The pt was last known well at 1030 she was found later with table turned and on the ground. The pt was found to have NIHSS of 9 and was given TPA at 1512.   Infusion compelte just before arrival.       DIAGNOSTIC RESULTS     RADIOLOGY:   CTA HEAD W CONTRAST    (Results Pending)   CTA NECK W CONTRAST    (Results Pending)   CT BRAIN PERFUSION    (Results Pending)       LABS:  Labs Reviewed - No data to display    EMERGENCY DEPARTMENT COURSE:     -------------------------  BP: (!) 149/95,  , Pulse: 118, Resp: 20  Physical Exam   Constitutional: She appears

## 2019-04-07 NOTE — PROGRESS NOTES
CARDIAC ULTRASOUND:  A limited, bedside ultrasound of the heart was performed. The medical necessity was to evaluate for a pericardial effusion. The structures studied were the heart and pericardium. FINDINGS:  Pericardial effusion was not identified. The study was technically difficult.

## 2019-04-07 NOTE — ED NOTES
Report given to 5B. Pt to MRI on Monitor with dominik and will go up to ICU after MRI.      Anil Client, RN  04/07/19 9202

## 2019-04-07 NOTE — ED PROVIDER NOTES
[facility]  ED  Emergency Department Encounter  EmergencyMedicine Resident     Pt Name:Mitzy Yu  MRN: [id number]  [place] 5/2/1930  Date of evaluation: 4/7/19  PCP:  Katlyn Abdul MD    CHIEF COMPLAINT       Chief Complaint   Patient presents with    Cerebrovascular Accident       HISTORY OF PRESENT ILLNESS  (Location/Symptom, Timing/Onset, Context/Setting, Quality, Duration, Modifying Factors, Severity.)      Chrissy Barreto is a 80 y.o. female who presents as a transfer from [id number] [place] [address] Road at [facility] for concern for a stroke. There was concern for some right-sided weakness and aphasia at the outside hospital facility, patient last known well was 10:30 AM, patient was within the TPA window and after consultation with the stroke team, patient was given TPA at the outside hospital facility. Patient otherwise unable to answer questions. Does follow some limited commands, no obvious weakness of the bilateral upper extremities but significant weakness of the bilateral lower extremities. Patient had a NIH of 9 at outside hospital facility. PAST MEDICAL / SURGICAL / SOCIAL / FAMILY HISTORY      has a past medical history of Arthritis, Chronic kidney disease, stage 3 ([address] Gout, Hyperlipidemia, Hypertension, Hypothyroidism, and Irritable bowel syndrome. has a past surgical history that includes Cataract removal with implant (Bilateral); Cholecystectomy; Carpal tunnel release (2012); ERCP (N/A, 2/11/2019); Total knee arthroplasty (Right); and Total knee arthroplasty (Left). Social History     Socioeconomic History    Marital status:       Spouse name: Not on file    Number of children: Not on file    Years of education: Not on file    Highest education level: Not on file   Occupational History    Not on file   Social Needs    Financial resource strain: Not on file    Food insecurity:     Worry: Not on file     Inability: Not on file    Transportation needs: adjustment of the mA/kV was utilized to reduce the radiation dose to as low as reasonably achievable. COMPARISON: 02/07/2019 HISTORY: ORDERING SYSTEM PROVIDED HISTORY: ACUTE VISUAL DEFICIT (OTHER THAN PHOTOPHOBIA AND AURA) TECHNOLOGIST PROVIDED HISTORY: Slurred speech FINDINGS: BRAIN/VENTRICLES: There is no acute infarct or acute intracranial hemorrhage present. There is no mass effect or midline shift present. There is no ventriculomegaly or abnormal extra-axial fluid collection present. There is diffuse cerebral volume loss. Periventricular hypoattenuation is present. ORBITS: Limited evaluation of the orbits is unremarkable. SINUSES: The paranasal sinuses and mastoid air cells are clear. SOFT TISSUES/SKULL:  No lytic or blastic osseous lesions are identified. 1. No acute intracranial process identified. 2. Stable diffuse cerebral volume loss and chronic small vessel ischemic changes. The above findings were discussed with Dr. Krystal Donnelly at 12:20 p.m. on 04/07/2019. Xr Chest Portable    Result Date: 4/7/2019  EXAMINATION: SINGLE XRAY VIEW OF THE CHEST 4/7/2019 4:31 pm COMPARISON: February 7, 2019 HISTORY: ORDERING SYSTEM PROVIDED HISTORY: Concern for fluid overload TECHNOLOGIST PROVIDED HISTORY: Concern for fluid overload FINDINGS: Cardiac monitoring leads overlie the chest.  Enlarged heart with central vascular congestion and hazy left basilar opacity. Trace effusions. No pneumothorax. Shallow inspiration magnified cardiac size and pulmonary vasculature. Cardiomegaly and mild vascular congestion magnified by shallow inspiration.        EKG    EKG Interpretation    Interpreted by me    Rhythm: A fib  Rate: 117  Axis: normal  Ectopy: PVC  Conduction: normal  ST Segments: no acute change  T Waves: no acute change  Q Waves: none    Clinical Impression: A fib RVR    All EKG's are interpreted by the Emergency Department Physician who either signs or Co-signs this chart in the absence of a cardiologist.    EMERGENCY DEPARTMENT COURSE:    Discussed with stroke team, CT of the thoracic and lumbar spine were ordered due to the bilateral weakness of the lower extremities and the possible fall the patient had. Otherwise patient will be admitted to the neuro ICU, they will also be doing a endovascular procedure on the left ICA. Discussed with cardiology with Dr. Amna Trevizo, no further intervention from cardiology at this time given stroke symptoms. PROCEDURES:  None    CONSULTS:  IP CONSULT TO CARDIOLOGY    CRITICAL CARE:  None    FINAL IMPRESSION      1. Stroke-like symptoms    2. 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 Acute cystitis without hematuria    4. Hypomagnesemia    5.  Atrial fibrillation with RVR ([facility] [address]          DISPOSITION / PLAN     DISPOSITION Admitted 04/07/2019 04:47:44 PM      PATIENT REFERRED TO:  Nida Robertson MD  [address] [facility] [address]  [phone]            DISCHARGE MEDICATIONS:  New Prescriptions    No medications on file       Maryellen Grier MD  Emergency Medicine Resident    (Please note that portions of thisnote were completed with a voice recognition program.  Efforts were made to edit the dictations but occasionally words are mis-transcribed.)       Maryellen Grier MD  04/07/19 9689

## 2019-04-07 NOTE — FLOWSHEET NOTE
[facility] CARE DEPARTMENT - [address]     Emergency/Trauma Note    PATIENT NAME: Jameson Han    Shift date: 4/7/19  Shift day: Sunday  Shift # 2    Room # 25/25   Name: Jameson Han            Age: 80 y.o. Gender: female          Religious: Jain    Trauma/Incident type: Stroke Alert  Admit Date & Time: 4/7/2019  3:53 PM        PATIENT/EVENT DESCRIPTION:  Jameson Han is a 80 y.o. female who arrived via [address]  from [facility] as a Stroke Alert. Patient is aware of where she is at but doesn't process things well. . Pt to be admitted to 25/25. SPIRITUAL ASSESSMENT/INTERVENTION:  Patient was laying flat in bed after arrival via [facility] from [facility] and then going through a CT scan. Patient responded to her name but asked several times who the  was. When  said that he saw she had a son named Isamar Zacarias, she said she did not know him. So  talked about her daughter to which the patient responded positively. When  asked if the patient wanted prayer, the patient again asked who the  was. The  explained who he was again and she said yes she would like prayer.  prayed for the patient.  explained to the patient that if she needed a  to just ask the nurses and we were always available. PATIENT BELONGINGS:  None      REGISTRATION STAFF NOTIFIED? YES      WHAT IS YOUR SPIRITUAL CARE PLAN FOR THIS PATIENT?:   Chaplains will remain available to the patient and her family for any further spiritual or emotional needs that she may have. Electronically signed by Joseph Milner on 4/7/2019 at 4:37 PM.  [facility]  [phone]       04/07/19 1635   Encounter Summary   Services provided to: Patient   Referral/Consult From: Multi-disciplinary team   Support System Family members   Place of [place] [address] No   Continue Visiting   (4/7/19)   Complexity of Encounter High   Length of Encounter 30 minutes   Spiritual Assessment Completed Yes   Crisis   Type Stroke Alert   Assessment Calm;Fearful   Intervention Active listening;Nurtured hope;Prayer;Sustaining presence/ Ministry of presence   Outcome Acceptance   Spiritual/Christian   Type Spiritual struggle

## 2019-04-07 NOTE — CONSULTS
Endovascular Neurosurgery Note  Stroke Alert paged @1246, ETA 3700, arrived [address]  ER Room # 25  Arrival to patient bedside @1550  4/7/2019 3:55 PM    Pt Name: Shawnee Zhang  MRN: [id number]  [place]: 5/2/1930  Date of evaluation: 4/7/2019  Primary Care Physician: Codie Gracia MD    Shawnee Zhang is a 80 y.o. female with a history of HTN, HLD, CKD stage 3, and hypothyroidism who presents as a transfer from [facility] ED s/p tPA administration for stroke like symptoms. LKW 1030AM when a neighbor last saw her well. Per records, patient's granddaughter visited her at 1245PM and found her standing in the kitchen leaning against the kitchen table. Granddaughter noticed the patient had weakness of her right arm, garbled speech, and needed support to stand. On initial evaluation at [facility] ED, NIH 9 for garbled speech with expressive aphasia, right upper extremity weakness, and bilateral lower extremity weakness. CT Head with no acute abnormality. TPA administered at 1512. Transferred to [address] [place] [address] ED for further evaluation post tPA. Taken directly to CT scanner on arrival to [address] [place] [address]. NIH 15. Patient is alert and tracks appropriately. She has severe expressive aphasia and dysarthria. Intermittently nods appropriately and follows simple commands, but not consistently. Mild right upper extremity weakness. Bilateral lower extremities edematous and weak, unable to antigravity. EKG with atrial fibrillation, rate 100-110's. Started on Rocephin for UTI. Trending troponins due to elevation at [facility]. Allergies  has No Known Allergies. Medications  Prior to Admission medications    Medication Sig Start Date End Date Taking?  Authorizing Provider   levothyroxine (SYNTHROID) 112 MCG tablet Take 1 tablet by mouth Daily 11/29/18   Codie Gracia MD   atorvastatin (LIPITOR) 20 MG tablet Take 1 tablet by mouth nightly 11/29/18   Codie Gracia MD   atenolol (TENORMIN) 50 stroke scale items in the order listed. Record performance in each category after each subscale exam. Do not go back and change scores. Follow directions provided for each exam technique. Scores should reflect what the patient does, not what the clinician thinks the patient can do. The clinician should record answers while administering the exam and work quickly. Except where indicated, the patient should not be coached (i.e., repeated requests to patient to make a special effort). NIH Stroke Scale Total (if not done complete detailed one below):    1a.  Level of consciousness:  0 - alert; keenly responsive  1b. Level of consciousness questions:  2 - answers neither question correctly  1c. Level of consciousness questions:  1 - performs one task correctly  2. Best Gaze:  0 - normal  3. Visual:  0 - no visual loss  4. Facial Palsy:  1 - minor paralysis (flattened nasolabial fold, asymmetric on smiling)  5a. Motor left arm:  0 - no drift, limb holds 90 (or 45) degrees for full 10 seconds  5b. Motor right arm:  1 - drift, limb holds 90 (or 45) degrees but drifts down before full 10 seconds: does not hit bed  6a. Motor left leg:  3 - no effort against gravity; leg falls to bed immediately  6b. Motor right leg:  3 - no effort against gravity; leg falls to bed immediately  7. Limb Ataxia:  0 - absent  8. Sensory:  0 - normal; no sensory loss  9. Best Language:  2 - severe aphasia; all communication is through fragmentary expression; great need for inference, questioning, and guessing by the listener. Range of information that can be exchanged is limited; listener carries burden of communication. Examiner cannot identify materials provided from patient response. 10.  Dysarthria:  2 - severe; patient speech is so slurred as to be unintelligible in the absence of or our of proportion to any dysphagia, or is mute/anarthric   11.   Extinction and Inattention:  0 - no abnormality    TOTAL: 15    Imaging:  CT brain without contrast:  No acute abnormality  CTA head/neck with and without contrast:  Severe left ICA stenosis  MRI brain without contrast (limited stroke protocol):  Pending    Assessment  1. Last Known Well (date and time): 4/7/19 1030AM  2. Candidate for IV tPA therapy     Yes [x] TPA administered at 1512    No  [] due to the following exclusion criteria:   3. Candidate for Thrombectomy    Yes []      No [x] due to the following exclusion criteria: No LVO    Recommendations:   [] General Care Status - prefer 5th floor (5A/5C)  [x] ICU Status - prefer Neuro ICU (5B)  Please use the following admission order set for stroke admission:   [] [id number] - BRITANY Intercerebral Hemorrhage Admission   [] [id number] - BRITANY Sub Arachnoid Hemorrhage Admission   [] [id number] - BRITANY Ischemic Stroke TPA Treatment Focused   [x] [id number] - IP Ischemic Stroke ICU Post Alteplase (TPA) Admission    [] [id number] - GEN Ischemic Stroke Non-Thrombolytic Focussed    The patient is a 79 yo female with a history of HTN, HLD, CKD stage 3, and hypothyroidism who presents as a transfer from [address] [place] [address] s/p TPA for stroke like symptoms. Initial NIH 9. On arrival to [facility] ED, NIH 15; severe expressive aphasia and dysarthria, mild receptive aphasia, right facial droop, right upper extremity drift, bilateral lower extremity weakness. Found to be in new onset afib. CTA Head/Neck revealed severe left ICA stenosis. Obtain STAT MRI head without contrast - limited stroke evaluation  0.9% NS - 500 ml bolus post IV contrast   0.9% NS infusion at 80 ml/hour  Recommend -185  Hold antiplatelet/anticoagulation for 24h post tPA  Statin therapy   2D cardiac echo  Physical Therapy  Occupational Therapy  Speech Therapy with swallow evaluation  Fasting Lipid panel  Hgb A1c  Ok for primary team to start anticoagulation as appropriate for DVT prophylaxis  NIHSS assessment every shift / change in caregiver.      Discussed with Dr. Rip No, APRN - CNP  Neuro Critical Care  Pager [address] [place] [phone]

## 2019-04-07 NOTE — ED PROVIDER NOTES
[facility] ED  eMERGENCY dEPARTMENT eNCOUnter      Pt Name: John Gomes  MRN: [id number]  [place] 5/2/1930  Date of evaluation: 4/7/2019  Provider: Alicja Donald MD    CHIEF COMPLAINT     Chief Complaint   Patient presents with    Cerebrovascular Accident     last well known yesterday at noon         HISTORY OF PRESENT ILLNESS   (Location/Symptom, Timing/Onset, Context/Setting,Quality, Duration, Modifying Factors, Severity)  Note limiting factors. John Gomes is a 80 y.o. female who presents to the emergency department brought in by EMS for evaluation of possible stroke. Patient's granddaughter visited her at 12:45 PM today and found her standing by the kitchen against a kitchen table. She noticed that the patient had weakness of the right arm and needed support to stand and his speech was garbled. This is a very new finding for her compared to her baseline. Patient's family member saw her yesterday and she was acting his normal self. I am told that a neighbor visited her today around 10:30 AM at which time also she was acting her normal self. The history is provided by the patient, the EMS personnel, a relative and medical records. Nursing Notes werereviewed. REVIEW OF SYSTEMS    (2-9 systems for level 4, 10 or more for level 5)     Review of Systems   Unable to perform ROS: Mental status change       Except as noted above the remainder of the review of systems was reviewed and negative.        PAST MEDICAL HISTORY     Past Medical History:   Diagnosis Date    Arthritis     Chronic kidney disease, stage 3 ([facility] [address]     Gout     Hyperlipidemia     Hypertension     Hypothyroidism     Irritable bowel syndrome          SURGICALHISTORY       Past Surgical History:   Procedure Laterality Date    CARPAL TUNNEL RELEASE  2012    Dr. Rip Ingram - right side    CATARACT REMOVAL WITH IMPLANT Bilateral     CHOLECYSTECTOMY      ERCP N/A 2/11/2019    Christian Dowd MD - ERCP, Not on file     Emotionally abused: Not on file     Physically abused: Not on file     Forced sexual activity: Not on file   Other Topics Concern    Not on file   Social History Narrative    Not on file       SCREENINGS   NIH Stroke Scale  NIH Stroke Scale Assessed: Yes  Interval: Baseline  Level of Consciousness (1a. ): Alert  LOC Questions (1b. ): Answers one correctly  LOC Commands (1c. ): Obeys both correctly  Best Gaze (2. ): Normal  Visual (3. ): No visual loss  Facial Palsy (4. ): (!) Minor  Motor Arm, Left (5a. ): No drift  Motor Arm, Right (5b. ): Drift, but does not hit bed  Motor Leg, Left (6a. ): Some effort against gravity  Motor Leg, Right (6b. ): Some effort against gravity  Limb Ataxia (7. ): (!) Present in one limb  Sensory (8. ): Normal  Best Language (9. ): Mild to moderate aphasia  Dysarthria (10. ): Normal  Extinction and Inattention (11): No neglect  Total: 9Glasgow Coma Scale  Eye Opening: Spontaneous  Best Verbal Response: Inappropriate words  Best Motor Response: Obeys commands  Streetman Coma Scale Score: 13        PHYSICAL EXAM    (up to 7 for level 4, 8 or more for level 5)     ED Triage Vitals   BP Temp Temp src Pulse Resp SpO2 Height Weight   04/07/19 1410 04/07/19 1410 -- 04/07/19 1401 04/07/19 1401 04/07/19 1401 04/07/19 1410 04/07/19 1410   (!) 180/101 98.3 °F (36.8 °C)  92 19 98 % 5' (1.524 m) 155 lb (70.3 kg)       Physical Exam   Constitutional:   Elderly appearing, in no acute distress. HENT:   Head: Normocephalic. Right Ear: External ear normal.   Left Ear: External ear normal.   Nose: Nose normal.   Mouth/Throat: Oropharynx is clear and moist.   Eyes: Pupils are equal, round, and reactive to light. EOM are normal. No scleral icterus. Neck: Neck supple. Cardiovascular: Normal rate. An irregularly irregular rhythm present. Exam reveals distant heart sounds. Pulmonary/Chest: Effort normal. She has no decreased breath sounds. She has no rhonchi.    Abdominal: Normal Non- 58 (*)     All other components within normal limits   BRAIN NATRIURETIC PEPTIDE - Abnormal; Notable for the following components:    Pro-BNP 3,813 (*)     All other components within normal limits   CK - Abnormal; Notable for the following components: Total  (*)     All other components within normal limits   HEPATIC FUNCTION PANEL - Abnormal; Notable for the following components:    Alkaline Phosphatase 108 (*)     All other components within normal limits   CBC WITH AUTO DIFFERENTIAL - Abnormal; Notable for the following components:    RBC 3.77 (*)     Seg Neutrophils 95 (*)     Lymphocytes 4 (*)     Monocytes 1 (*)     Eosinophils % 0 (*)     Segs Absolute 9.40 (*)     Absolute Lymph # 0.40 (*)     All other components within normal limits   APTT - Abnormal; Notable for the following components:    PTT 19.6 (*)     All other components within normal limits   URINALYSIS - Abnormal; Notable for the following components:    Ketones, Urine 1+ (*)     Urine Hgb 2+ (*)     Protein, UA 2+ (*)     Nitrite, Urine POSITIVE (*)     All other components within normal limits   MYOGLOBIN, SERUM - Abnormal; Notable for the following components:    Myoglobin 588 (*)     All other components within normal limits   MICROSCOPIC URINALYSIS - Abnormal; Notable for the following components:    Bacteria, UA 3+ (*)     All other components within normal limits   GLUCOSE, WHOLE BLOOD - Abnormal; Notable for the following components:    POC Glucose 125 (*)     All other components within normal limits   CK-MB   TROPONIN   PROTIME-INR   TROPONIN   TROPONIN   TSH WITHOUT REFLEX   MAGNESIUM   POCT GLUCOSE       All other labs were within normal range ornot returned as of this dictation.     EMERGENCY DEPARTMENT COURSE and DIFFERENTIAL DIAGNOSIS/MDM:   Vitals:    Vitals:    04/07/19 1401 04/07/19 1410   BP:  (!) 180/101   Pulse: 92    Resp: 19    Temp:  98.3 °F (36.8 °C)   SpO2: 98%    Weight:  155 lb (70.3 kg) Height:  5' (1.524 m)       3:02 PM: Patient's findings and disposition were discussed with Dr. Cheo Feliz and the plan is to administer alteplase. Consent is obtained from her son's and patient is being started on IV alteplase per protocol. She will be transferred to [address] [place] [address]    CRITICAL CARE TIME   Total Critical Caretime was 30 minutes, excluding separately reportable procedures. There was a high probability of clinically significant/life threatening deterioration in the patient's condition which required my urgent intervention. CONSULTS:  None    PROCEDURES:  Unlessotherwise noted below, none     Procedures    FINAL IMPRESSION      1. Cerebrovascular accident (CVA), unspecified mechanism ([facility] [address]    2. Atrial fibrillation with rapid ventricular response [facility])          DISPOSITION/PLAN   DISPOSITION Decision To Transfer 04/07/2019 03:02:08 PM      PATIENT REFERRED TO:  No follow-up provider specified. DISCHARGE MEDICATIONS:         Problem List:  Patient Active Problem List   Diagnosis Code    Hypothyroidism E03.9    Hyperlipidemia E78.5    Hypertension I10    Gout M10.9    Arthritis M19.90    Irritable bowel syndrome K58.9    Biliary obstruction K83.1    Abnormal finding on imaging R93.89    Elevated LFTs R94.5    Abdominal discomfort R10.9    Acute cystitis N30.00    Choledocholithiasis K80.50    Right leg pain M79.604    Osteoarthritis of multiple joints M15.9    VERONICA (acute kidney injury) ([facility] [address] N17.9    Hypomagnesemia E83.42    Diarrhea R19.7           Summation      Patient Course:  Transferred.     ED Medicationsadministered this visit:    Medications   0.9 % sodium chloride infusion (has no administration in time range)   alteplase (ACTIVASE) injection 6.3 mg (6.3 mg Intravenous Given 4/7/19 1511)     Followed by   alteplase (ACTIVASE) injection 56.9 mg (56.9 mg Intravenous New Bag 4/7/19 1512)   sodium chloride flush 0.9 % injection 10 mL (has no administration in time range)   sodium chloride flush 0.9 % injection 10 mL (has no administration in time range)   dextrose 50 % solution 12.5 g (has no administration in time range)       New Prescriptions from this visit:    New Prescriptions    No medications on file       Follow-up:  No follow-up provider specified. Final Impression:   1. Cerebrovascular accident (CVA), unspecified mechanism ([address]    2.  Atrial fibrillation with rapid ventricular response ([address]               (Please note that portions of this note were completed with a voice recognitionprogram.  Efforts were made to edit the dictations but occasionally words are mis-transcribed.)    Rigo Meyer MD (electronically signed)  Attending Emergency Physician            Rigo Meyer MD  04/07/19 Ricky [id number], MD  04/07/19 0641

## 2019-04-07 NOTE — FLOWSHEET NOTE
Arrives per stretcher from ED. Total lifted to the bed and all monitors applied. Noted multiple areas of ecchymosis mostly to the right side and buttocks. Alert and able to look directly at the writer. Expressive aphasia noted as well as some receptive aphasia. Nods head yes and no and will at times follow a simple command. NIH is 14 at this time. TPA flow sheet continues at this time.

## 2019-04-08 ENCOUNTER — APPOINTMENT (OUTPATIENT)
Dept: GENERAL RADIOLOGY | Age: 84
DRG: 064 | End: 2019-04-08
Payer: MEDICARE

## 2019-04-08 ENCOUNTER — APPOINTMENT (OUTPATIENT)
Dept: CT IMAGING | Age: 84
DRG: 064 | End: 2019-04-08
Payer: MEDICARE

## 2019-04-08 LAB
% CKMB: 1.5 % (ref 0–3)
ABSOLUTE EOS #: <0.03 K/UL (ref 0–0.44)
ABSOLUTE IMMATURE GRANULOCYTE: 0.04 K/UL (ref 0–0.3)
ABSOLUTE LYMPH #: 0.55 K/UL (ref 1.1–3.7)
ABSOLUTE MONO #: 0.64 K/UL (ref 0.1–1.2)
ANION GAP SERPL CALCULATED.3IONS-SCNC: 18 MMOL/L (ref 9–17)
BASOPHILS # BLD: 0 % (ref 0–2)
BASOPHILS ABSOLUTE: 0.03 K/UL (ref 0–0.2)
BNP INTERPRETATION: ABNORMAL
BUN BLDV-MCNC: 12 MG/DL (ref 8–23)
BUN/CREAT BLD: ABNORMAL (ref 9–20)
CALCIUM IONIZED: 1.1 MMOL/L (ref 1.13–1.33)
CALCIUM SERPL-MCNC: 8.7 MG/DL (ref 8.6–10.4)
CHLORIDE BLD-SCNC: 102 MMOL/L (ref 98–107)
CHOLESTEROL/HDL RATIO: 2.5
CHOLESTEROL: 119 MG/DL
CK MB: 5.8 NG/ML
CKMB INTERPRETATION: ABNORMAL
CO2: 23 MMOL/L (ref 20–31)
CREAT SERPL-MCNC: 0.8 MG/DL (ref 0.5–0.9)
DIFFERENTIAL TYPE: ABNORMAL
EOSINOPHILS RELATIVE PERCENT: 0 % (ref 1–4)
ESTIMATED AVERAGE GLUCOSE: 100 MG/DL
GFR AFRICAN AMERICAN: >60 ML/MIN
GFR NON-AFRICAN AMERICAN: >60 ML/MIN
GFR SERPL CREATININE-BSD FRML MDRD: ABNORMAL ML/MIN/{1.73_M2}
GFR SERPL CREATININE-BSD FRML MDRD: ABNORMAL ML/MIN/{1.73_M2}
GLUCOSE BLD-MCNC: 131 MG/DL (ref 70–99)
HBA1C MFR BLD: 5.1 % (ref 4–6)
HCT VFR BLD CALC: 31.9 % (ref 36.3–47.1)
HDLC SERPL-MCNC: 48 MG/DL
HEMOGLOBIN: 10 G/DL (ref 11.9–15.1)
IMMATURE GRANULOCYTES: 0 %
LDL CHOLESTEROL: 55 MG/DL (ref 0–130)
LV EF: 60 %
LVEF MODALITY: NORMAL
LYMPHOCYTES # BLD: 5 % (ref 24–43)
MAGNESIUM: 1.4 MG/DL (ref 1.6–2.6)
MCH RBC QN AUTO: 32.3 PG (ref 25.2–33.5)
MCHC RBC AUTO-ENTMCNC: 31.3 G/DL (ref 28.4–34.8)
MCV RBC AUTO: 102.9 FL (ref 82.6–102.9)
MONOCYTES # BLD: 6 % (ref 3–12)
MRSA, DNA, NASAL: NORMAL
MYOGLOBIN: 441 NG/ML (ref 25–58)
NRBC AUTOMATED: 0 PER 100 WBC
PDW BLD-RTO: 14.3 % (ref 11.8–14.4)
PHOSPHORUS: 3.7 MG/DL (ref 2.6–4.5)
PLATELET # BLD: 163 K/UL (ref 138–453)
PLATELET ESTIMATE: ABNORMAL
PMV BLD AUTO: 12.6 FL (ref 8.1–13.5)
POTASSIUM SERPL-SCNC: 3.3 MMOL/L (ref 3.7–5.3)
PRO-BNP: 4880 PG/ML
PROCALCITONIN: 0.1 NG/ML
RBC # BLD: 3.1 M/UL (ref 3.95–5.11)
RBC # BLD: ABNORMAL 10*6/UL
SEG NEUTROPHILS: 89 % (ref 36–65)
SEGMENTED NEUTROPHILS ABSOLUTE COUNT: 10.25 K/UL (ref 1.5–8.1)
SODIUM BLD-SCNC: 143 MMOL/L (ref 135–144)
SPECIMEN DESCRIPTION: NORMAL
TOTAL CK: 383 U/L (ref 26–192)
TRIGL SERPL-MCNC: 79 MG/DL
TROPONIN INTERP: ABNORMAL
TROPONIN T: ABNORMAL NG/ML
TROPONIN, HIGH SENSITIVITY: 124 NG/L (ref 0–14)
VLDLC SERPL CALC-MCNC: NORMAL MG/DL (ref 1–30)
WBC # BLD: 11.5 K/UL (ref 3.5–11.3)
WBC # BLD: ABNORMAL 10*3/UL

## 2019-04-08 PROCEDURE — 80048 BASIC METABOLIC PNL TOTAL CA: CPT

## 2019-04-08 PROCEDURE — 6370000000 HC RX 637 (ALT 250 FOR IP): Performed by: NURSE PRACTITIONER

## 2019-04-08 PROCEDURE — 92523 SPEECH SOUND LANG COMPREHEN: CPT

## 2019-04-08 PROCEDURE — 83874 ASSAY OF MYOGLOBIN: CPT

## 2019-04-08 PROCEDURE — 4A10X4Z MONITORING OF CENTRAL NERVOUS ELECTRICAL ACTIVITY, EXTERNAL APPROACH: ICD-10-PCS | Performed by: PSYCHIATRY & NEUROLOGY

## 2019-04-08 PROCEDURE — 80061 LIPID PANEL: CPT

## 2019-04-08 PROCEDURE — 94762 N-INVAS EAR/PLS OXIMTRY CONT: CPT

## 2019-04-08 PROCEDURE — 84100 ASSAY OF PHOSPHORUS: CPT

## 2019-04-08 PROCEDURE — 95951 PR EEG MONITORING/VIDEORECORD: CPT | Performed by: PSYCHIATRY & NEUROLOGY

## 2019-04-08 PROCEDURE — APPNB60 APP NON BILLABLE TIME 46-60 MINS: Performed by: NURSE PRACTITIONER

## 2019-04-08 PROCEDURE — 93880 EXTRACRANIAL BILAT STUDY: CPT

## 2019-04-08 PROCEDURE — 76937 US GUIDE VASCULAR ACCESS: CPT

## 2019-04-08 PROCEDURE — 84145 PROCALCITONIN (PCT): CPT

## 2019-04-08 PROCEDURE — 2580000003 HC RX 258: Performed by: NURSE PRACTITIONER

## 2019-04-08 PROCEDURE — 82553 CREATINE MB FRACTION: CPT

## 2019-04-08 PROCEDURE — 99233 SBSQ HOSP IP/OBS HIGH 50: CPT | Performed by: PSYCHIATRY & NEUROLOGY

## 2019-04-08 PROCEDURE — 83036 HEMOGLOBIN GLYCOSYLATED A1C: CPT

## 2019-04-08 PROCEDURE — 99291 CRITICAL CARE FIRST HOUR: CPT | Performed by: PSYCHIATRY & NEUROLOGY

## 2019-04-08 PROCEDURE — 83880 ASSAY OF NATRIURETIC PEPTIDE: CPT

## 2019-04-08 PROCEDURE — 95951 HC EEG MONITORING VIDEO RECORDING: CPT

## 2019-04-08 PROCEDURE — 6370000000 HC RX 637 (ALT 250 FOR IP): Performed by: NEUROLOGICAL SURGERY

## 2019-04-08 PROCEDURE — 92610 EVALUATE SWALLOWING FUNCTION: CPT

## 2019-04-08 PROCEDURE — 6360000002 HC RX W HCPCS: Performed by: NURSE PRACTITIONER

## 2019-04-08 PROCEDURE — 93970 EXTREMITY STUDY: CPT

## 2019-04-08 PROCEDURE — 36415 COLL VENOUS BLD VENIPUNCTURE: CPT

## 2019-04-08 PROCEDURE — 97161 PT EVAL LOW COMPLEX 20 MIN: CPT

## 2019-04-08 PROCEDURE — 83735 ASSAY OF MAGNESIUM: CPT

## 2019-04-08 PROCEDURE — 2000000003 HC NEURO ICU R&B

## 2019-04-08 PROCEDURE — 84484 ASSAY OF TROPONIN QUANT: CPT

## 2019-04-08 PROCEDURE — 82550 ASSAY OF CK (CPK): CPT

## 2019-04-08 PROCEDURE — 85025 COMPLETE CBC W/AUTO DIFF WBC: CPT

## 2019-04-08 PROCEDURE — 51798 US URINE CAPACITY MEASURE: CPT | Performed by: NURSE PRACTITIONER

## 2019-04-08 PROCEDURE — 71045 X-RAY EXAM CHEST 1 VIEW: CPT

## 2019-04-08 PROCEDURE — 93005 ELECTROCARDIOGRAM TRACING: CPT

## 2019-04-08 PROCEDURE — 70450 CT HEAD/BRAIN W/O DYE: CPT

## 2019-04-08 PROCEDURE — 97530 THERAPEUTIC ACTIVITIES: CPT

## 2019-04-08 PROCEDURE — 72128 CT CHEST SPINE W/O DYE: CPT

## 2019-04-08 PROCEDURE — 72131 CT LUMBAR SPINE W/O DYE: CPT

## 2019-04-08 PROCEDURE — 82330 ASSAY OF CALCIUM: CPT

## 2019-04-08 PROCEDURE — 93306 TTE W/DOPPLER COMPLETE: CPT

## 2019-04-08 RX ORDER — MAGNESIUM SULFATE 1 G/100ML
1 INJECTION INTRAVENOUS
Status: DISPENSED | OUTPATIENT
Start: 2019-04-08 | End: 2019-04-08

## 2019-04-08 RX ORDER — MAGNESIUM SULFATE 1 G/100ML
1 INJECTION INTRAVENOUS
Status: ACTIVE | OUTPATIENT
Start: 2019-04-08 | End: 2019-04-08

## 2019-04-08 RX ORDER — MIDAZOLAM HYDROCHLORIDE 5 MG/ML
10 INJECTION INTRAMUSCULAR; INTRAVENOUS ONCE
Status: DISCONTINUED | OUTPATIENT
Start: 2019-04-08 | End: 2019-04-08

## 2019-04-08 RX ORDER — SENNA AND DOCUSATE SODIUM 50; 8.6 MG/1; MG/1
2 TABLET, FILM COATED ORAL DAILY
Status: DISCONTINUED | OUTPATIENT
Start: 2019-04-08 | End: 2019-04-10 | Stop reason: HOSPADM

## 2019-04-08 RX ORDER — LEVETIRACETAM 5 MG/ML
500 INJECTION INTRAVASCULAR EVERY 12 HOURS
Status: DISCONTINUED | OUTPATIENT
Start: 2019-04-08 | End: 2019-04-08

## 2019-04-08 RX ORDER — LEVETIRACETAM 10 MG/ML
1000 INJECTION INTRAVASCULAR ONCE
Status: DISCONTINUED | OUTPATIENT
Start: 2019-04-08 | End: 2019-04-08

## 2019-04-08 RX ORDER — ASPIRIN 81 MG/1
81 TABLET ORAL DAILY
Status: DISCONTINUED | OUTPATIENT
Start: 2019-04-08 | End: 2019-04-10 | Stop reason: HOSPADM

## 2019-04-08 RX ORDER — ATENOLOL 50 MG/1
50 TABLET ORAL DAILY
Status: DISCONTINUED | OUTPATIENT
Start: 2019-04-08 | End: 2019-04-08

## 2019-04-08 RX ORDER — METOPROLOL TARTRATE 5 MG/5ML
INJECTION INTRAVENOUS
Status: DISPENSED
Start: 2019-04-08 | End: 2019-04-08

## 2019-04-08 RX ORDER — POTASSIUM CHLORIDE 20 MEQ/1
20 TABLET, EXTENDED RELEASE ORAL
Status: COMPLETED | OUTPATIENT
Start: 2019-04-08 | End: 2019-04-09

## 2019-04-08 RX ADMIN — ASPIRIN 81 MG: 81 TABLET ORAL at 18:05

## 2019-04-08 RX ADMIN — MAGNESIUM SULFATE HEPTAHYDRATE 1 G: 1 INJECTION, SOLUTION INTRAVENOUS at 14:00

## 2019-04-08 RX ADMIN — ENOXAPARIN SODIUM 40 MG: 40 INJECTION SUBCUTANEOUS at 20:35

## 2019-04-08 RX ADMIN — Medication 15 ML: at 20:35

## 2019-04-08 RX ADMIN — DESMOPRESSIN ACETATE 40 MG: 0.2 TABLET ORAL at 20:35

## 2019-04-08 RX ADMIN — Medication 15 ML: at 11:45

## 2019-04-08 RX ADMIN — ONDANSETRON 4 MG: 2 INJECTION INTRAMUSCULAR; INTRAVENOUS at 06:15

## 2019-04-08 RX ADMIN — POTASSIUM CHLORIDE 20 MEQ: 1500 TABLET, EXTENDED RELEASE ORAL at 20:35

## 2019-04-08 RX ADMIN — POTASSIUM CHLORIDE 20 MEQ: 1500 TABLET, EXTENDED RELEASE ORAL at 14:19

## 2019-04-08 RX ADMIN — MAGNESIUM SULFATE HEPTAHYDRATE 1 G: 1 INJECTION, SOLUTION INTRAVENOUS at 11:43

## 2019-04-08 RX ADMIN — LEVOTHYROXINE SODIUM 112 MCG: 112 TABLET ORAL at 07:52

## 2019-04-08 RX ADMIN — CEFTRIAXONE SODIUM 1 G: 1 INJECTION, POWDER, FOR SOLUTION INTRAMUSCULAR; INTRAVENOUS at 01:43

## 2019-04-08 ASSESSMENT — PAIN SCALES - GENERAL
PAINLEVEL_OUTOF10: 0
PAINLEVEL_OUTOF10: 0

## 2019-04-08 NOTE — PROGRESS NOTES
Physical Therapy    Facility/Department: [address]  Initial Assessment    NAME: Shawnee Zhang  : 1930  MRN: [id number]     Chief Complaint   Patient presents with    Cerebrovascular Accident   The patient is a 80 y.o. female presented with a history of HTN, HLD, CKD stage 3, and hypothyroidism who presents as a transfer from [facility] ED s/p tPA administration for stroke like symptoms.  LKW 1030AM when a neighbor last saw her well.  Per records, patient's granddaughter visited her at 1245PM and found her standing in the kitchen leaning against the kitchen table.  Granddaughter noticed the patient had weakness of her right arm, garbled speech, and needed support to stand.  On initial evaluation at [facility] ED, NIH 9 for garbled speech with expressive aphasia, right upper extremity weakness, and bilateral lower extremity weakness.  CT Head with no acute abnormality.  TPA administered at 1512.  Transferred to [facility]. [facility] ED for further evaluation post tPA.  Taken directly to CT scanner on arrival to [facility]. [facility] ED. [address]. Patient is alert and tracks appropriately. She has severe expressive aphasia and dysarthria. Intermittently nods appropriately and follows simple commands, but not consistently. Mild right upper extremity weakness. Bilateral lower extremities edematous and weak, unable to antigravity. EKG with atrial fibrillation, rate 100-110's. Started on Rocephin for UTI.  Trending troponins due to elevation at [facility].  Of note, in EMS report it states the family suspected patient likely fell prior to their arrival.  Patient developed large hematoma with bruising on right forehead, bruising to right foot. CTA Head/Neck revealed severe left ICA stenosis. Given 500cc fluid bolus. Goal -185. STAT MRI Brain performed not demonstrating any area of acute infarct. Admitted to the Neuro ICU for close neurological monitoring post tPA.     Date of Service: 2019    Discharge Recommendations:  Further therapy recommended at discharge. Assessment   Body structures, Functions, Activity limitations: Decreased functional mobility ; Decreased endurance;Decreased strength  Assessment: Pt completed bed mobility tasks using SBA, transfers CGA +2 for safety, amb 5' using no AD with CGA +2 for safety from EOB to chair. Pt occasionally confused with instruction to tasks, benefits from 1 step commands. Unable to assess further mobility at this time secondary to administration of tPA. Per initial assessment, patient has not yet demonstrated safety with activity/ambulation and is unsafe to return to prior living arrangements at this time. Will continue to assess. Specific instructions for Next Treatment: Ambulation with RW wit progression towards standard cane as safe and able. Prognosis: Good  Decision Making: Low Complexity  REQUIRES PT FOLLOW UP: Yes  Activity Tolerance  Activity Tolerance: Patient Tolerated treatment well       Patient Diagnosis(es): The primary encounter diagnosis was Stroke-like symptoms. 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 has a past medical history of Arthritis, Chronic kidney disease, stage 3 ([address] Gout, Hyperlipidemia, Hypertension, Hypothyroidism, and Irritable bowel syndrome. has a past surgical history that includes Cataract removal with implant (Bilateral); Cholecystectomy; Carpal tunnel release (2012); ERCP (N/A, 2/11/2019); Total knee arthroplasty (Right); and Total knee arthroplasty (Left).     Restrictions  Restrictions/Precautions  Restrictions/Precautions: Fall Risk, General Precautions  Required Braces or Orthoses?: No  Implants present? : Deep brain stimulator  Position Activity Restriction  Other position/activity restrictions: Bedrest during and post tPA  Vision/Hearing  Vision: Impaired  Vision Exceptions: Wears glasses for distance  Hearing: Exceptions to [facility]  Hearing Exceptions: Bilateral hearing aid     Subjective  General  Chart Reviewed: Yes  Patient assessed for rehabilitation services?: Yes  Response To Previous Treatment: Not applicable  Family / Caregiver Present: No  Follows Commands: Within Functional Limits(Occasional difficulty with understanding commands. )  Subjective  Subjective: Pt and RN agreeable to PT. Pain Screening  Patient Currently in Pain: No  Vital Signs  Patient Currently in Pain: No  Pre Treatment Pain Screening  Comments / Details: Pt was administered tPA earlier, however was becoming restless in bed. RN requested transfering from EOB to chair at this time. Orientation  Orientation  Overall Orientation Status: Within Functional Limits  Social/Functional History  Social/Functional History  Lives With: Alone  Type of Home: Apartment  Home Layout: One level  Home Access: Level entry  Bathroom Shower/Tub: Tub/Shower unit  Bathroom Toilet: Standard  Bathroom Accessibility: Accessible  Home Equipment: FRS  ADL Assistance: Independent  Homemaking Assistance: Independent  Ambulation Assistance: Independent(Uses a cane. )  Transfer Assistance: Independent  Active : Yes  Mode of Transportation: Car  Additional Comments: Pt reports she lives alone and has occasional help from her son when needed. Otherwise she is ind with ADLs. Objective    AROM RLE (degrees)  RLE AROM: WFL  AROM LLE (degrees)  LLE AROM : WFL  AROM RUE (degrees)  RUE AROM : WFL  AROM LUE (degrees)  LUE AROM : WFL  Strength RLE  Strength RLE: WFL  Strength LLE  Strength LLE: WFL  Strength RUE  Strength RUE: WFL  Strength LUE  Strength LUE: WFL  Strength Other  Other: Pt had difficulty understanding instructions for MMT. Per observation, pt WFL.    Motor Control  Gross Motor?: WFL(Finger-nose WFL bilaterally. )  Sensation  Overall Sensation Status: WNL(Pt denies

## 2019-04-08 NOTE — PROGRESS NOTES
Dr. Ye Fowler to bedside for patient evaulation d/t low SBP. Dr. Ye Fowler in contact with Dr. Aleta Foley, continue to monitor neuro assessment with low SBP. No further orders at this time.

## 2019-04-08 NOTE — PLAN OF CARE
Problem: Risk for Impaired Skin Integrity  Goal: Tissue integrity - skin and mucous membranes  Description  Structural intactness and normal physiological function of skin and  mucous membranes. 4/8/2019 1737 by Kathrine Espinosa RN  Outcome: Ongoing  4/8/2019 0518 by Chica Croft RN  Outcome: Met This Shift  Note:   Patient maintained tissue integrity, skin and mucous membranes. Problem: Falls - Risk of:  Goal: Will remain free from falls  Description  Will remain free from falls  4/8/2019 1737 by Kathrine Espinosa RN  Outcome: Ongoing  4/8/2019 0518 by Chica Croft RN  Outcome: Met This Shift  Note:   Frequent nursing rounds to bedside, bed alarm remains on. Free from falls this shift. Goal: Absence of physical injury  Description  Absence of physical injury  Outcome: Ongoing     Problem: HEMODYNAMIC STATUS  Goal: Patient has stable vital signs and fluid balance  Outcome: Ongoing     Problem: ACTIVITY INTOLERANCE/IMPAIRED MOBILITY  Goal: Mobility/activity is maintained at optimum level for patient  Outcome: Ongoing     Problem: COMMUNICATION IMPAIRMENT  Goal: Ability to express needs and understand communication  4/8/2019 1737 by Kathrine Espinosa RN  Outcome: Ongoing  4/8/2019 0518 by Chica Croft RN  Outcome: Ongoing  Note:   Patient remains at times with expressive aphasia, requires extra time to express needs for communication.      Problem: Neurological  Goal: Maximum potential motor/sensory/cognitive function  Outcome: Ongoing     Problem: Musculor/Skeletal Functional Status  Goal: Highest potential functional level  Outcome: Ongoing

## 2019-04-08 NOTE — PROGRESS NOTES
infarct. Last 24 hours:  Overnight, patient was hypotensive 110-120s with goal 140-185, and was given 250 mL normal saline bolus. On exam this morning, patient had some global aphasia with right facial droop. Repeat exam during rounds, patient was alert and oriented x 3. She is brisk and following commands. She continues to have bilateral lower extremity weakness with generalized edema. Will hold Keppra, and obtain EEG to evaluate for subclinical seizures. UA reviewed and is not concerning for UTI, will watch off antibiotics.      MEDICATIONS:     CURRENT MEDICATIONS:  SCHEDULED MEDICATIONS:   levothyroxine  112 mcg Oral Daily    sodium chloride flush  10 mL Intravenous 2 times per day    atorvastatin  40 mg Oral Nightly    cefTRIAXone (ROCEPHIN) IV  1 g Intravenous Q24H    chlorhexidine  15 mL Mouth/Throat BID     CONTINUOUS INFUSIONS:   sodium chloride 80 mL/hr at 19     PRN MEDICATIONS:   sodium chloride flush, acetaminophen, magnesium hydroxide, ondansetron, magnesium sulfate    VITALS 24 Hours     Temperature Range: Temp: 98.1 °F (36.7 °C) Temp  Av.3 °F (36.8 °C)  Min: 98.1 °F (36.7 °C)  Max: 98.6 °F (37 °C)  BP Range: Systolic (84TBK), YXK:018 , Min:111 , LNS:505     Diastolic (28QJE), RBM:57, Min:42, Max:202    Pulse Range: Pulse  Av.1  Min: 74  Max: 126  Respiration Range: Resp  Av.2  Min: 15  Max: 29  Current Pulse Ox: SpO2: 92 %  24HR Pulse Ox Range: SpO2  Av.8 %  Min: 92 %  Max: 98 %  Patient Vitals for the past 12 hrs:   BP Temp Temp src Pulse Resp SpO2   19 0705 (!) 170/92 -- -- 125 20 92 %   19 0605 (!) 157/67 -- -- 122 17 97 %   19 0535 (!) 189/87 -- -- 109 17 96 %   19 0510 (!) 138/56 -- -- 107 16 97 %   19 0445 (!) 143/64 -- -- 110 18 97 %   19 0435 (!) 141/57 -- -- 93 16 97 %   19 0405 (!) 117/57 -- -- 75 17 95 %   19 0305 132/86 -- -- 86 18 96 %   19 0205 (!) 125/42 -- -- 74 16 98 %   19 0105 (!) 1a.  Level of consciousness:  0 - alert; keenly responsive  1b. Level of consciousness questions:  0 - answers both questions correctly  1c. Level of consciousness questions:  0 - performs both tasks correctly  2. Best Gaze:  0 - normal  3. Visual:  0 - no visual loss  4. Facial Palsy:  1 - minor paralysis (flattened nasolabial fold, asymmetric on smiling)  5a. Motor left arm:  0 - no drift, limb holds 90 (or 45) degrees for full 10 seconds  5b. Motor right arm:  0 - no drift, limb holds 90 (or 45) degrees for full 10 seconds  6a. Motor left le - some effort against gravity; leg falls to bed by 5 seconds but has some effort against gravity  6b. Motor right le - some effort against gravity; leg falls to bed by 5 seconds but has some effort against gravity  7. Limb Ataxia:  0 - absent  8. Sensory:  0 - normal; no sensory loss  9. Best Language:  1 - mild to moderate aphasia; some obvious loss of fluency or facility of comprehension without significant limitation on ideas expressed or form of expression. Reduction of speech and/or comprehension, however, makes conversation about provided materials difficult or impossible. For example, in conversation about provided materials, examiner can identify picture or naming card content from patient's response. 10.  Dysarthria:  1 - mild to moderate, patient slurs at least some words and at worst, can be understood with some difficulty  11. Extinction and Inattention:  0 - no abnormality    INTAKE/OUTPUT & DRAINS   24 HOUR INTAKE/OUTPUT:    Intake/Output Summary (Last 24 hours) at 2019 0803  Last data filed at 2019 0517  Gross per 24 hour   Intake 1358 ml   Output 2125 ml   Net -767 ml       DRAINS:  [x] There are no drains for Neuro Critical Care to monitor at this time.    LABS      Recent Results (from the past 24 hour(s))   EKG 12 Lead    Collection Time: 19  2:13 PM   Result Value Ref Range    Ventricular Rate 111 BPM Atrial Rate 107 BPM    P-R Interval 144 ms    QRS Duration 76 ms    Q-T Interval 328 ms    QTc Calculation (Bazett) 446 ms    P Axis 81 degrees    R Axis 30 degrees    T Axis -27 degrees   Urinalysis    Collection Time: 04/07/19  2:30 PM   Result Value Ref Range    Color, UA YELLOW YELLOW    Turbidity UA CLEAR CLEAR    Glucose, Ur NEGATIVE NEGATIVE    Bilirubin Urine NEGATIVE NEGATIVE    Ketones, Urine 1+ (A) NEGATIVE    Specific Gravity, UA 1.020 1.010 - 1.020    Urine Hgb 2+ (A) NEGATIVE    pH, UA 6.0 5.0 - 9.0    Protein, UA 2+ (A) NEGATIVE    Urobilinogen, Urine Normal Normal    Nitrite, Urine POSITIVE (A) NEGATIVE    Leukocyte Esterase, Urine NEGATIVE NEGATIVE    Urinalysis Comments NOT REPORTED    Microscopic Urinalysis    Collection Time: 04/07/19  2:30 PM   Result Value Ref Range    -          WBC, UA 0 TO 2 0 - 5 /HPF    RBC, UA 0 TO 2 0 - 2 /HPF    Casts UA NOT REPORTED /LPF    Crystals UA NOT REPORTED None /HPF    Epithelial Cells UA 0 TO 2 0 - 25 /HPF    Renal Epithelial, Urine NOT REPORTED 0 /HPF    Bacteria, UA 3+ (A) None    Mucus, UA NOT REPORTED None    Trichomonas, UA NOT REPORTED None    Amorphous, UA NOT REPORTED None    Other Observations UA NOT REPORTED NOT REQ.     Yeast, UA NOT REPORTED None   Basic Metabolic Panel    Collection Time: 04/07/19  2:35 PM   Result Value Ref Range    Glucose 146 (H) 70 - 99 mg/dL    BUN 12 8 - 23 mg/dL    CREATININE 0.92 (H) 0.50 - 0.90 mg/dL    Bun/Cre Ratio 13 9 - 20    Calcium 9.5 8.6 - 10.4 mg/dL    Sodium 144 135 - 144 mmol/L    Potassium 3.7 3.7 - 5.3 mmol/L    Chloride 101 98 - 107 mmol/L    CO2 27 20 - 31 mmol/L    Anion Gap 16 9 - 17 mmol/L    GFR Non-African American 58 (L) >60 mL/min    GFR African American >60 >60 mL/min    GFR Comment          GFR Staging         Brain Natriuretic Peptide    Collection Time: 04/07/19  2:35 PM   Result Value Ref Range    Pro-BNP 3,813 (H) <300 pg/mL    BNP Interpretation Pro-BNP Reference Range:    CK    Collection Time: 04/07/19  2:35 PM   Result Value Ref Range    Total  (H) 26 - 192 U/L   CK MB    Collection Time: 04/07/19  2:35 PM   Result Value Ref Range    CK-MB 4.7 <5.4 ng/mL   Hepatic function panel    Collection Time: 04/07/19  2:35 PM   Result Value Ref Range    Alb 4.3 3.5 - 5.2 g/dL    Alkaline Phosphatase 108 (H) 35 - 104 U/L    ALT 10 5 - 33 U/L    AST 24 <32 U/L    Total Bilirubin 0.97 0.3 - 1.2 mg/dL    Bilirubin, Direct 0.28 <0.31 mg/dL    Bilirubin, Indirect 0.69 0.00 - 1.00 mg/dL    Total Protein 7.1 6.4 - 8.3 g/dL    Globulin NOT REPORTED 1.5 - 3.8 g/dL    Albumin/Globulin Ratio 1.5 1.0 - 2.5   Troponin    Collection Time: 04/07/19  2:35 PM   Result Value Ref Range    Troponin, High Sensitivity NOT REPORTED 0 - 14 ng/L    Troponin T 0.12 (HH) <0.03 ng/mL    Troponin Interp         TSH without Reflex    Collection Time: 04/07/19  2:35 PM   Result Value Ref Range    TSH 0.61 0.30 - 5.00 mIU/L   CBC auto differential    Collection Time: 04/07/19  2:35 PM   Result Value Ref Range    WBC 9.9 3.5 - 11.3 k/uL    RBC 3.77 (L) 3.95 - 5.11 m/uL    Hemoglobin 12.1 11.9 - 15.1 g/dL    Hematocrit 38.6 36.3 - 47.1 %    .4 82.6 - 102.9 fL    MCH 32.1 25.2 - 33.5 pg    MCHC 31.3 28.4 - 34.8 g/dL    RDW 14.0 11.8 - 14.4 %    Platelets 804 500 - 830 k/uL    MPV 12.5 8.1 - 13.5 fL    NRBC Automated 0.0 0.0 per 100 WBC    Differential Type NOT REPORTED     WBC Morphology NOT REPORTED     RBC Morphology NOT REPORTED     Platelet Estimate NOT REPORTED     Seg Neutrophils 95 (H) 36 - 65 %    Lymphocytes 4 (L) 24 - 43 %    Monocytes 1 (L) 3 - 12 %    Eosinophils % 0 (L) 1 - 4 %    Immature Granulocytes 0 0 %    Basophils 0 0 - 2 %    Segs Absolute 9.40 (H) 1.50 - 8.10 k/uL    Absolute Lymph # 0.40 (L) 1.10 - 3.70 k/uL    Absolute Mono # 0.10 0.10 - 1.20 k/uL    Absolute Eos # 0.00 0.00 - 0.44 k/uL    Absolute Immature Granulocyte 0.00 0.00 - 0.30 k/uL    Basophils # 0.00 0.0 - 0.2 k/uL    Morphology Normal    APTT Collection Time: 04/07/19  2:35 PM   Result Value Ref Range    PTT 19.6 (L) 23.2 - 34.4 sec   Protime-INR    Collection Time: 04/07/19  2:35 PM   Result Value Ref Range    Protime 11.4 9.7 - 12.2 sec    INR 1.1 0.9 - 1.2   Magnesium    Collection Time: 04/07/19  2:35 PM   Result Value Ref Range    Magnesium 1.3 (LL) 1.6 - 2.6 mg/dL   Myoglobin, serum    Collection Time: 04/07/19  2:35 PM   Result Value Ref Range    Myoglobin 588 (H) 25 - 58 ng/mL   Glucose, Whole Blood    Collection Time: 04/07/19  2:35 PM   Result Value Ref Range    POC Glucose 125 (H) 74 - 100 mg/dL   EKG 12 Lead    Collection Time: 04/07/19  4:36 PM   Result Value Ref Range    Ventricular Rate 117 BPM    Atrial Rate 110 BPM    QRS Duration 72 ms    Q-T Interval 346 ms    QTc Calculation (Bazett) 482 ms    R Axis 31 degrees    T Axis -8 degrees   Troponin    Collection Time: 04/07/19  4:57 PM   Result Value Ref Range    Troponin, High Sensitivity 148 (HH) 0 - 14 ng/L    Troponin T NOT REPORTED <0.03 ng/mL    Troponin Interp NOT REPORTED    Troponin    Collection Time: 04/07/19 10:55 PM   Result Value Ref Range    Troponin, High Sensitivity 136 (HH) 0 - 14 ng/L    Troponin T NOT REPORTED <0.03 ng/mL    Troponin Interp NOT REPORTED    EKG 12 Lead    Collection Time: 04/08/19  4:36 AM   Result Value Ref Range    Ventricular Rate 124 BPM    Atrial Rate 127 BPM    QRS Duration 72 ms    Q-T Interval 258 ms    QTc Calculation (Bazett) 370 ms    R Axis 24 degrees    T Axis -150 degrees         RADIOLOGY   Xr Abdomen (kub) (single Ap View)    Result Date: 4/7/2019  EXAMINATION: SINGLE SUPINE XRAY VIEW(S) OF THE ABDOMEN 4/7/2019 5:14 pm COMPARISON: None. HISTORY: ORDERING SYSTEM PROVIDED HISTORY: r/o metal for MRI TECHNOLOGIST PROVIDED HISTORY: r/o metal for MRI FINDINGS: Nonspecific bowel gas pattern. The biliary stent is noted in the right upper quadrant. Stool throughout the colon. EKG leads overlie the abdomen. Radiodense biliary stent. Correlation with  guidelines as to the composition of this stent is necessary prior to MRI. Ct Head Wo Contrast    Result Date: 4/7/2019  EXAMINATION: CT OF THE HEAD WITHOUT CONTRAST  4/7/2019 4:49 pm TECHNIQUE: CT of the head was performed without the administration of intravenous contrast. Dose modulation, iterative reconstruction, and/or weight based adjustment of the mA/kV was utilized to reduce the radiation dose to as low as reasonably achievable. COMPARISON: CT of the head 2 p.m. today HISTORY: ORDERING SYSTEM PROVIDED HISTORY: s/p tPA, possible fall TECHNOLOGIST PROVIDED HISTORY: Ordering Physician Provided Reason for Exam: CVA Acuity: Acute Type of Exam: Subsequent/Follow-up FINDINGS: BRAIN/VENTRICLES: There is no acute intracranial hemorrhage, mass effect or midline shift. No abnormal extra-axial fluid collection. The gray-white differentiation is maintained without evidence of an acute infarct. There is prominence of the ventricles and sulci due to global parenchymal volume loss. There are nonspecific areas of hypoattenuation within the periventricular and subcortical white matter, which likely represent chronic microvascular ischemic change. ORBITS: The visualized portion of the orbits demonstrate no acute abnormality. SINUSES: The visualized paranasal sinuses and mastoid air cells demonstrate no acute abnormality. SOFT TISSUES/SKULL: Subcutaneous hematoma right for demonstrates interval enlargement. Possible small subcutaneous hematoma developing in the left forehead. No acute intracranial abnormality. Enlarging right subcutaneous hematoma.      Ct Head Wo Contrast    Result Date: 4/7/2019  EXAMINATION: CT OF THE HEAD WITHOUT CONTRAST  4/7/2019 2:10 pm TECHNIQUE: CT of the head was performed without the administration of intravenous contrast. Dose modulation, iterative reconstruction, and/or weight based adjustment of the mA/kV was utilized to reduce the radiation dose to as low as reasonably achievable. COMPARISON: 02/07/2019 HISTORY: ORDERING SYSTEM PROVIDED HISTORY: ACUTE VISUAL DEFICIT (OTHER THAN PHOTOPHOBIA AND AURA) TECHNOLOGIST PROVIDED HISTORY: Slurred speech FINDINGS: BRAIN/VENTRICLES: There is no acute infarct or acute intracranial hemorrhage present. There is no mass effect or midline shift present. There is no ventriculomegaly or abnormal extra-axial fluid collection present. There is diffuse cerebral volume loss. Periventricular hypoattenuation is present. ORBITS: Limited evaluation of the orbits is unremarkable. SINUSES: The paranasal sinuses and mastoid air cells are clear. SOFT TISSUES/SKULL:  No lytic or blastic osseous lesions are identified. 1. No acute intracranial process identified. 2. Stable diffuse cerebral volume loss and chronic small vessel ischemic changes. The above findings were discussed with Dr. Candance Senter at 12:20 p.m. on 04/07/2019. Xr Chest Portable    Result Date: 4/8/2019  EXAMINATION: SINGLE XRAY VIEW OF THE CHEST 4/8/2019 7:01 am COMPARISON: 7 April 2019 HISTORY: ORDERING SYSTEM PROVIDED HISTORY: SOB TECHNOLOGIST PROVIDED HISTORY: SOB FINDINGS: AP portable view of the chest time stamped at 653 hours demonstrates overlying cardiac monitoring electrodes. Stable mild cardiomegaly, and subtle mild vascular congestion with tiny effusions are redemonstrated. No localized consolidation or extrapleural air is noted. Aorta is calcified ectatic. Osseous structures are age-appropriate with scoliotic curvature. No significant change from prior study. Mild cardiomegaly and subtle vascular congestion are noted.      Xr Chest Portable    Result Date: 4/7/2019  EXAMINATION: SINGLE XRAY VIEW OF THE CHEST 4/7/2019 4:31 pm COMPARISON: February 7, 2019 HISTORY: ORDERING SYSTEM PROVIDED HISTORY: Concern for fluid overload TECHNOLOGIST PROVIDED HISTORY: Concern for fluid overload FINDINGS: Cardiac monitoring leads overlie the chest.  Enlarged heart with central vascular congestion and hazy left basilar opacity. Trace effusions. No pneumothorax. Shallow inspiration magnified cardiac size and pulmonary vasculature. Cardiomegaly and mild vascular congestion magnified by shallow inspiration. Cta Neck W Contrast    Addendum Date: 4/7/2019    ADDENDUM: Curved multiplanar reformats post processed on independent workstation have become available. No change to the original report. Result Date: 4/7/2019  EXAMINATION: CTA OF THE NECK 4/7/2019 4:05 pm TECHNIQUE: CTA of the neck was performed with the administration of intravenous contrast. Multiplanar reformatted images are provided for review. MIP images are provided for review. Stenosis of the internal carotid arteries measured using NASCET criteria. Dose modulation, iterative reconstruction, and/or weight based adjustment of the mA/kV was utilized to reduce the radiation dose to as low as reasonably achievable. COMPARISON: None. HISTORY: ORDERING SYSTEM PROVIDED HISTORY: speech difficulty, right hemibody weakness TECHNOLOGIST PROVIDED HISTORY: Ordering Physician Provided Reason for Exam: CVA ALERT Acuity: Acute Type of Exam: Initial FINDINGS: AORTIC ARCH/ARCH VESSELS: Great vessel origins are tortuous. There is a bovine arch variant branch pattern of the aortic arch. Both common carotid arteries and the right subclavian artery have a common origin off the arch. No significant stenosis is seen of the innominate artery or subclavian arteries. CAROTID ARTERIES: Moderate calcified plaque and moderate stenosis right carotid bulb. Severe calcified plaque and severe stenosis left common carotid artery bulb and proximal internal carotid artery. Common, internal, and external carotid arteries otherwise unremarkable. VERTEBRAL ARTERIES: Occlusion proximal right vertebral artery V1 segment. There is moderate segmental narrowing of the V3 segment.   Remainder of the artery appears normal.  Left vertebral artery is dominant and otherwise normal in course and caliber. There is some calcified plaque at its origin but no significant stenosis appreciated. SOFT TISSUES:  The lung apices are clear. No cervical or superior mediastinal lymphadenopathy. The visualized portion of the larynx and pharynx appear unremarkable. The parotid, submandibular and thyroid glands demonstrate no acute abnormality. Fluid collection right shoulder region incompletely imaged partially outside field of view measuring at least 27 x 50 mm in cross-sectional diameter. BONES: The visualized osseous structures appear unremarkable. Moderately severe 75-85% stenosis proximal left internal carotid artery. Moderate stenosis proximal right internal carotid artery at 50%. 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 Narrowed V3 segment right vertebral artery. RECOMMENDATIONS: Case discussed with Dr. Vanna Canales at 4:55 p.m. [facility] Ct Brain Perfusion    Result Date: 4/7/2019  EXAMINATION: CT OF THE HEAD WITH CONTRAST WITH PERFUSION 4/7/2019 4:05 pm: TECHNIQUE: CT of the head/brain was performed with the administration of intravenous contrast. Multiplanar reformatted images are provided for review. MIP images are provided for review. Dose modulation, iterative reconstruction, and/or weight based adjustment of the mA/kV was utilized to reduce the radiation dose to as low as reasonably achievable. COMPARISON: None. HISTORY: ORDERING SYSTEM PROVIDED HISTORY: aphasia, right hemibody weakness TECHNOLOGIST PROVIDED HISTORY: Ordering Physician Provided Reason for Exam: CVA ALERT Acuity: Acute Type of Exam: Initial FINDINGS: CT PERFUSION: Possible small area of increased mean transit time left posterior division MCA territory. Possible small matching area of decreased cerebral blood flow. Cerebral blood volume appears relatively symmetric.  Increased mean transit time right posterior cerebellar cortex with associated decrease in cerebral blood flow and relatively symmetric cerebral blood volume. 1. Possible small area of ischemia posterior MCA territory on the left. 2. Possible ischemia or crossed cerebellar diaschisis right cerebellar hemisphere. RECOMMENDATIONS: Case discussed with Dr. Dennise Marroquin at 4:55 p.m. Jess Lund Cta Head W Contrast    Addendum Date: 4/7/2019    ADDENDUM: 3D reformats post processed on an independent workstation have become available. No change to the original report. Result Date: 4/7/2019  EXAMINATION: CTA OF THE HEAD WITH CONTRAST  4/7/2019 4:05 pm: TECHNIQUE: CTA of the head/brain was performed with the administration of intravenous contrast. Multiplanar reformatted images are provided for review. MIP images are provided for review. Dose modulation, iterative reconstruction, and/or weight based adjustment of the mA/kV was utilized to reduce the radiation dose to as low as reasonably achievable. COMPARISON: CT of the brain from today HISTORY: ORDERING SYSTEM PROVIDED HISTORY: speech difficulty, right hemibody weakness TECHNOLOGIST PROVIDED HISTORY: Ordering Physician Provided Reason for Exam: CVA  ALERT Acuity: Acute Type of Exam: Initial FINDINGS: ANTERIOR CIRCULATION: The internal carotid arteries are normal in course and caliber without focal stenosis. The anterior cerebral and middle cerebral arteries demonstrate no focal stenosis. Calcified plaque in the cavernous segments causing mild narrowing on the right. Left A1 segment is hypoplastic. POSTERIOR CIRCULATION: The posterior cerebral arteries demonstrate no focal stenosis. The vertebral and basilar arteries appear unremarkable. BRAIN: No mass effect or midline shift. No abnormal extra-axial fluid collection. The gray-white differentiation appears grossly maintained. New or increasing subcutaneous hematoma right forehead. Enlarging subcutaneous hematoma right forehead otherwise unremarkable CTA of the head. RECOMMENDATIONS: Discussed with Dr. Dennise Marroquin at 4 55 p.m.      Mri Brain Wo Contrast    Result Date: 4/7/2019  EXAMINATION: MRI OF THE BRAIN WITHOUT CONTRAST  4/7/2019 5:35 pm TECHNIQUE: Limited multiplanar multisequence MRI of the brain was performed without the administration of intravenous contrast. COMPARISON: CT brain, CTA brain and CT perfusion brain from today. HISTORY: ORDERING SYSTEM PROVIDED HISTORY: stroke alert; aphasia, right sided weakness. FLAIR and DIFFUSION only FINDINGS: INTRACRANIAL STRUCTURES/VENTRICLES: There is no acute infarct. No mass effect or midline shift. No evidence of an acute intracranial hemorrhage. The ventricles and sulci are prominent in size and configuration. The sellar/suprasellar regions appear unremarkable. The normal signal voids within the major intracranial vessels appear maintained. Moderate periventricular T2 lengthening. ORBITS: The visualized portion of the orbits demonstrate no acute abnormality. SINUSES: The visualized paranasal sinuses and mastoid air cells are well aerated. BONES/SOFT TISSUES: Right forehead subcutaneous hematoma. Moderately severe microangiopathic changes throughout. No acute infarct. RECOMMENDATIONS: Case discussed with Dr. Sebastián Retana at 6 p.m. .         Labs and Images reviewed with:  [x] Deandre Trevizo MD  [] Dex Chi MD  [] Yelena Denson MD  [] there are no new interval images to review.      ASSESSMENT AND PLAN:     ASSESSMENT:     The patient is a 81 yo female with a history of HTN, HLD, CKD stage 3, and hypothyroidism who presents as a transfer from [address] [place] [address] s/p TPA for stroke like symptoms.  Initial NIH 9.  On arrival to [address] [place] [address]. [facility] ED, NIH 15; severe expressive aphasia and dysarthria, mild receptive aphasia, right facial droop, right upper extremity drift, bilateral lower extremity weakness.  Found to be in new onset afib.  CTA Head/Neck revealed severe left ICA stenosis.     NEUROLOGIC:  - Expressive aphasia, dysarthria, right facial droop  - CT Head with no acute findings  - S/P TPA Status: FULL     PROPHYLAXIS:  Stress ulcer: Not indicated     DVT PROPHYLAXIS:  - SCD sleeves - Thigh High   - No chemoprophylaxis anticoagulation for 24h post tPA. CODE STATUS: Full    DISPOSITION:  [x] To remain ICU: close neurological monitoring  [] OK for out of ICU from Neuro Critical Care standpoint    For any changes in exam or patient status please contact Neuro Critical Care.       Dennis Avila, KATIA - CNP  Neuro Critical Care  Pager [phone]  4/8/2019     8:03 AM

## 2019-04-08 NOTE — PROCEDURES
LONG-TERM EEG-VIDEO [id number] [address]    Patient: Andrew Taveras  Age: 80 y.o. MRN: [id number]  Dates of recordin2019     Referring Physician: No ref. provider found  History: The patient is a 80 y.o. female who presented with expressive aphasia. This long-term video-EEG monitoring study was performed in order to determine the nature of the patient's clinical events. The patient is on neuroactive medications.    Thanh Barakat   Current Facility-Administered Medications   Medication Dose Route Frequency Provider Last Rate Last Dose    sennosides-docusate sodium (SENOKOT-S) 8.6-50 MG tablet 2 tablet  2 tablet Oral Daily Tani Contrerasde, APRN - CNP        potassium chloride (KLOR-CON M) extended release tablet 20 mEq  20 mEq Oral TID WC Tani Pride, APRN - CNP        metoprolol (LOPRESSOR) 5 MG/5ML injection             levothyroxine (SYNTHROID) tablet 112 mcg  112 mcg Oral Daily Tony Pop, APRN - CNP   112 mcg at 19 0752    sodium chloride flush 0.9 % injection 10 mL  10 mL Intravenous 2 times per day Tony Pop, APRN - CNP        sodium chloride flush 0.9 % injection 10 mL  10 mL Intravenous PRN Tony Pop, APRN - CNP        acetaminophen (TYLENOL) tablet 650 mg  650 mg Oral Q4H PRN Tony Pop, APRN - CNP        magnesium hydroxide (MILK OF MAGNESIA) 400 MG/5ML suspension 30 mL  30 mL Oral Daily PRN Tony Pop, APRN - CNP        ondansetron [facility]) injection 4 mg  4 mg Intravenous Q6H PRN Tony Pop, APRN - CNP   4 mg at 19 0615    atorvastatin (LIPITOR) tablet 40 mg  40 mg Oral Nightly Tony Pop, APRN - CNP        0.9 % sodium chloride infusion   Intravenous Continuous Tony Pop, APRN - CNP 80 mL/hr at 19 2123      magnesium sulfate 1 g in dextrose 5% 100 mL IVPB  1 g Intravenous Q1H PRN Tony Pop, APRN - CNP        chlorhexidine (PERIDEX) 0.12 % solution 15 mL  15 mL Mouth/Throat BID Francesco Correa MD   15 mL at 04/08/19 3973     Technical Description: This is a 21 channel digital EEG recording with time-locked video. Electrodes were placed in accordance with the 10-20 International System of Electrode Placement. Single lead EKG monitoring was included. Baseline EEG Recording:  A formal baseline EEG recording was not obtained. Day 1 - 4/8/2019, starting at 11:23 reviewed through 20:00    Interictal EEG Samples: In the awake state, the background activity consisted of 8-9 Hz of alpha activity of 20-40 µV which was better formed over the right hemisphere. There were frequent runs of 3-5 Hz of polymorphic delta activity over the left hemisphere. During periods of behavioral sleep, vertex sharp waves and symmetric spindles were seen. No abnormalities were activated by sleep. The EKG channel revealed no abnormalities. Ictal EEG Recording / Patient Events: During this period the patient had no events or seizures. Summary: During this day of recording no events were recorded. The interictal EEG was abnormal due to frequent left hemispheric delta and theta slowing suggestive of underlying neuronal dysfunction. This finding can also be secondary to postictal slowing. Monitoring was continued in order to record the patients typical events. The EKG channel revealed no abnormalities. Britany Garcia MD  Diplomate, American Board of Psychiatry and Neurology  Diplomate, American Board of Clinical Neurophysiology  Diplomate, American Board of Epilepsy     Please note this is a preliminary report and updated on a daily basis. The final report will have a summary of behavior and electrographic findings with clinical correlation.

## 2019-04-08 NOTE — PROGRESS NOTES
Speech Language Pathology  [address]   BEDSIDE SWALLOW EVALUATION    NAME: Honey Molina  : 1930  MRN: [id number]    ADMISSION DATE: 2019  ADMITTING DIAGNOSIS: has Hypothyroidism; Hyperlipidemia; Hypertension; Gout; Arthritis; Irritable bowel syndrome; Biliary obstruction; Abnormal finding on imaging; Elevated LFTs; Abdominal discomfort; Acute cystitis; Choledocholithiasis; Right leg pain; Osteoarthritis of multiple joints; VERONICA (acute kidney injury) ([address] Hypomagnesemia; Diarrhea; Stroke-like symptoms; and New onset a-fib ([address] on their problem list.      Date of Eval: 2019  Evaluating Therapist: Chris Hager    Current Diet level:  Current Diet : NPO  Current Liquid Diet : NPO      Primary Complaint:    Honey Molina is a 80 y.o. female who presents as a transfer from [facility] for concern for a stroke. There was concern for some right-sided weakness and aphasia at the outside hospital facility, patient last known well was 10:30 AM, patient was within the TPA window and after consultation with the stroke team, patient was given TPA at the outside hospital facility. Patient otherwise unable to answer questions. Does follow some limited commands, no obvious weakness of the bilateral upper extremities but significant weakness of the bilateral lower extremities. Patient had a NIH of 9 at outside hospital facility. Pain:  Pain Assessment  Patient Currently in Pain: No  Pain Assessment: 0-10  Bautista-Baker Pain Rating: No hurt  Pain Level: 0    Reason for Referral  Honey Molina was referred for a bedside swallow evaluation to assess the efficiency of her swallow function, identify signs and symptoms of aspiration and make recommendations regarding safe dietary consistencies, effective compensatory strategies, and safe eating environment.     Impression: Patient presents with probable safe swallow for Regular diet with thin liquids as evidenced by no overt s/s of aspiration noted with consistencies tested. Recommend small sips and bites, only feed when alert and awake and upright at 90 degrees for all PO intake. Recommend close monitoring for overt/clinical s/s of aspiration and D/C PO intake and complete Modified Barium Swallow Study should they occur. Results and recommendations reported to RN. Dysphagia Outcome Severity Scale: Level 7: Normal in all situations     Treatment Plan  Requires SLP Intervention: Yes  Duration/Frequency of Treatment: 3-5X/week          Recommended Diet and Intervention  Diet Solids Recommendation: Regular  Liquid Consistency Recommendation: Thin  Recommended Form of Meds: PO  Therapeutic Interventions: Diet tolerance monitoring    Compensatory Swallowing Strategies  Compensatory Swallowing Strategies: Alternate solids and liquids;Small bites/sips;Eat/Feed slowly;Upright as possible for all oral intake    Treatment/Goals  Dysphagia Goals: The patient will tolerate recommended diet without observed clinical signs of aspiration    General  Chart Reviewed: Yes  Behavior/Cognition: Alert; Cooperative  Respiratory Status: Room air  Communication Observation: Functional  Follows Directions: Complex  Dentition: Adequate  Patient Positioning: Upright in bed  Baseline Vocal Quality: Normal  Volitional Cough: Strong  Consistencies Administered: Reg solid; Soft solid;Puree; Thin - straw      Oral Motor Deficits  Oral/Motor  Oral Motor:  Within functional limits    Oral Phase Dysfunction  Oral Phase  Oral Phase: WFL  Oral Phase  Oral Phase - Comment: Adequate mastication and oral manipulation noted, not oral loss no oral stasis     Indicators of Pharyngeal Phase Dysfunction   Pharyngeal Phase  Pharyngeal Phase: WFL  Pharyngeal Phase   Pharyngeal: Thin, Puree, Fruit and Dry Solid: No overt s/s of aspiration noted with consistencies tested    Prognosis  Prognosis  Prognosis for safe diet advancement: good  Individuals

## 2019-04-08 NOTE — PROGRESS NOTES
Neuro critical care:     80year-old patient admitted to neuro ICU for acute stroke like symptoms is status post IV TPA administration. Acute onset aphasia and confusion. New onset atrial fibrillation  History of  Hypertension and hyperlipidemia  Carotid stenosis diagnosed on CTA study on admission    Post TPA close monitoring overnight  Close hemodynamic monitoring overnight  MRI brain study reviewed, no diffusion restriction- acute ischemic strokes seen. Improved aphasia this morning. Patient can appropriately verbalize and follows all simple commands. Plan to mobilize out of bed and get PT and OT evaluations. Speech and swallow evaluation. 24-hour CT head this afternoon. Echocardiogram study. Elevated BNP pro BNP. Patient with no 02 supplements. Continue monitoring. Rate control with Lopressor 25 mg twice a day. Oral anticoagulation can be started today with no acute diffusion restriction seen on MRI brain imaging. Monitor off antibiotics- suspect E. Coli colonization and no acute UTI.     CCT spent 45 minutes excluding procedural time

## 2019-04-08 NOTE — PROGRESS NOTES
Physical Therapy  DATE: 2019    NAME: Leonela Shah  MRN: [id number]   : 1930    Patient not seen this date for Physical Therapy due to:  [] Blood transfusion in progress  [] Hemodialysis  []  Patient Declined  [] Spine Precautions   [] Strict Bedrest  [] Surgery/ Procedure  [] Testing      [x] Other: tPa administered, can't see pt until after 3pm . Pending dopplers. Check back as appropriate. [] PT being discontinued at this time. Patient independent. No further needs. [] PT being discontinued at this time as the patient has been transferred to palliative care. No further needs.     Jerel Cisneros, SPT  The above documentation was reviewed and accepted by Perla Seip, Physical Therapist.

## 2019-04-08 NOTE — PROGRESS NOTES
Intensive Care Unit  Endovascular Neurosurgery  Fellow Progress Note    CHIEF COMPLAINT:    SUBJECTIVE:  Events of the last 24 hours include:  Still experiencing expressive aphasia in am which significantly improved in the afternoon after keppra given. OBJECTIVE:    Current Medications:  Current Facility-Administered Medications: sennosides-docusate sodium (SENOKOT-S) 8.6-50 MG tablet 2 tablet, 2 tablet, Oral, Daily  magnesium sulfate 1 g in dextrose 5% 100 mL IVPB, 1 g, Intravenous, Q1H  potassium chloride (KLOR-CON M) extended release tablet 20 mEq, 20 mEq, Oral, TID WC  metoprolol (LOPRESSOR) 5 MG/5ML injection, , ,   levothyroxine (SYNTHROID) tablet 112 mcg, 112 mcg, Oral, Daily  sodium chloride flush 0.9 % injection 10 mL, 10 mL, Intravenous, 2 times per day  sodium chloride flush 0.9 % injection 10 mL, 10 mL, Intravenous, PRN  acetaminophen (TYLENOL) tablet 650 mg, 650 mg, Oral, Q4H PRN  magnesium hydroxide (MILK OF MAGNESIA) 400 MG/5ML suspension 30 mL, 30 mL, Oral, Daily PRN  ondansetron (ZOFRAN) injection 4 mg, 4 mg, Intravenous, Q6H PRN  atorvastatin (LIPITOR) tablet 40 mg, 40 mg, Oral, Nightly  0.9 % sodium chloride infusion, , Intravenous, Continuous  magnesium sulfate 1 g in dextrose 5% 100 mL IVPB, 1 g, Intravenous, Q1H PRN  chlorhexidine (PERIDEX) 0.12 % solution 15 mL, 15 mL, Mouth/Throat, BID    Physical:   VITALS:  BP (!) 167/99   Pulse 104   Temp 98.3 °F (36.8 °C) (Oral)   Resp 17   Ht 5' 1\" (1.549 m)   Wt 170 lb (77.1 kg)   SpO2 94%   BMI 32.12 kg/m²     On exam today:   Patient is Awake, eyes spontaneously opened, will nod appropriately her head to some simple questions, will follow some simple commands. Moderate to severe expressive aphasia. CN II-XII grossly intact, pupils 2 mm reactive to light bilaterally. Normal tone in four extremities. Normal sensory exam to pain. Muscle strength is at least 4/5 in 4 extremities.    No nystagmus    Data:  CBC:   Lab Results   Component Value Date    WBC 11.5 04/08/2019    RBC 3.10 04/08/2019    RBC 4.34 04/23/2012    HGB 10.0 04/08/2019    HCT 31.9 04/08/2019    .9 04/08/2019    RDW 14.3 04/08/2019     04/08/2019     04/23/2012     BMP:    Lab Results   Component Value Date     04/08/2019    K 3.3 04/08/2019     04/08/2019    CO2 23 04/08/2019    BUN 12 04/08/2019    CREATININE 0.80 04/08/2019    CALCIUM 8.7 04/08/2019    GFRAA >60 04/08/2019    LABGLOM >60 04/08/2019    GLUCOSE 131 04/08/2019    GLUCOSE 88 04/23/2012       ICU guidelines/prophylaxis active for the following:    head above bed above 30 degrees    ASSESSMENT:  Meseret Sanchez is a 80 y.o. female with  HTN, HLD, CKD stage 3, and hypothyroidism who presented on 4/7/19 with expressive aphasia for which she received t-PA. Neurological work up CTA head and neck showing 75-85% left ICA stenosis, 50% right ICA stenosis and right VA occlusion. PLAN:  Continue lipitor. Aspirin 24 hours post tpa, if repeat CTH is negative for bleed. keppra 1 gm in am, keppra 500 mg bid,LTME, exam significantly improved today. MRI brain reviewed with radiologist: no acute stroke. UTI management per primary team.   ICA stenosis: further recommendations to follow: address as inpatient vs outpatient.       Plan discussed with Dr Germania Franks MD  PGY8-Neuroendovascular Fellow  Stroke, [facility] Stroke [address] [facility]

## 2019-04-08 NOTE — PROGRESS NOTES
Speech Language Pathology  [facility]  Initial Speech/Language/Cognitive Assessment    NAME: John Gomes  : 1930   MRN: [id number]  ADMISSION DATE: 2019  ADMITTING DIAGNOSIS: has Hypothyroidism; Hyperlipidemia; Hypertension; Gout; Arthritis; Irritable bowel syndrome; Biliary obstruction; Abnormal finding on imaging; Elevated LFTs; Abdominal discomfort; Acute cystitis; Choledocholithiasis; Right leg pain; Osteoarthritis of multiple joints; VERONICA (acute kidney injury) ([address] Hypomagnesemia; Diarrhea; Stroke-like symptoms; and New onset a-fib ([address] on their problem list.      Date of Eval: 2019   Evaluating Therapist: KESHIA Mcgee    RECENT RESULTS  CT OF HEAD/MRI:   No acute intracranial abnormality.       Enlarging right subcutaneous hematoma. Primary Complaint:    John Gomes is a 80 y.o. female who presents as a transfer from [facility] for concern for a stroke. There was concern for some right-sided weakness and aphasia at the outside hospital facility, patient last known well was 10:30 AM, patient was within the TPA window and after consultation with the stroke team, patient was given TPA at the outside hospital facility. Patient otherwise unable to answer questions. Does follow some limited commands, no obvious weakness of the bilateral upper extremities but significant weakness of the bilateral lower extremities. Patient had a NIH of 9 at outside hospital facility. Pain:  Pain Assessment  Pain Assessment: 0-10  Pain Level: 0  Bautista-Baker Pain Rating: No hurt    Assessment:    Pt presents with moderate to severe cognitive deficits characterized by impaired orientation, recall, problem solving, reasoning and word retrieval skills. ST to follow up and provide treatment to address noted deficits. Education provided. Further therapy recommended at discharge. The patient should be able to tolerate at least three hours of

## 2019-04-08 NOTE — CONSULTS
[facility]  In Patient Cardiology Consult             Cardiology   Consult Note          History Obtained From:  patient, electronic medical record    HISTORY OF PRESENT ILLNESS:      The patient is a 80 y.o. female seen for atrial fibrillation with RVR. Patient has a significant past medical history of irritable bowel syndrome, hypothyroidism, hypertension, hyperlipidemia, gout, CK D stage III, and arthritis. Patient was admitted on 4/7/2019 after being transferred from [facility] emergency department following stroke like episode and TPA administration. Patient's last known well time was 10:30 AM on 4/7/2019, patient was found in her kitchen at 12:45 PM standing leaning against the kitchen table by her granddaughter. Patient was noted to have right arm weakness, garbled speech, and inability to stand without support. Patient was transferred to [facility] emergency Department for higher level of care. EKG on arrival to emergency department showed atrial fibrillation with heart rate > 110. CTA of the head and neck revealed severe stenosis of the left internal carotid artery. MRI of the brain performed did not demonstrate any area of acute infarct. It is possible the TPA aborted an infarct. Currently the patient is expressing herself well, there is no apparent extremity weakness at this time. Speech is intact as does not appear to be impaired. Patient reports she's never had any heart problems in the past.  Patient denies any atrial fibrillation events in the past.  Patient reports she is compliant with her blood pressure medications. Patient denies any palpitations, shortness of breath, or chest pain. Patient denies any complaints of orthopnea or PND. Patient denies any recent illnesses. Pt denies any history of sleep apnea. Patient's TSH is 0.61. Patient magnesium is 1.4, and potassium is 3.3.       Past Medical History:    Past Medical History:   Diagnosis Date  Arthritis     Chronic kidney disease, stage 3 (HCC)     Gout     Hyperlipidemia     Hypertension     Hypothyroidism     Irritable bowel syndrome      Past Surgical History:    Past Surgical History:   Procedure Laterality Date    CARPAL TUNNEL RELEASE  2012    Dr. Erasmo Bauer - right side    CATARACT REMOVAL WITH IMPLANT Bilateral     CHOLECYSTECTOMY      ERCP N/A 2/11/2019    Driss Birmingham MD - ERCP, Sphincterotomy, balloon dilatation and stent placement with removal of multiple stones    TOTAL KNEE ARTHROPLASTY Right     TOTAL KNEE ARTHROPLASTY Left      Home Medications:  Prior to Admission medications    Medication Sig Start Date End Date Taking? Authorizing Provider   levothyroxine (SYNTHROID) 112 MCG tablet Take 1 tablet by mouth Daily 11/29/18   Akbar Arnold MD   atorvastatin (LIPITOR) 20 MG tablet Take 1 tablet by mouth nightly 11/29/18   Akbar Arnold MD   atenolol (TENORMIN) 50 MG tablet Take 1 tablet by mouth every morning 11/29/18   Akbar Arnold MD   allopurinol (ZYLOPRIM) 100 MG tablet Take 1 tablet by mouth daily 11/29/18   Akbar Arnold MD   acetaminophen (TYLENOL) 500 MG tablet Take 500 mg by mouth every 6 hours as needed for Pain    Historical Provider, MD     Current Inpatient Medications:   sennosides-docusate sodium  2 tablet Oral Daily    levetiracetam  1,000 mg Intravenous Once    levetiracetam  500 mg Intravenous Q12H    magnesium sulfate  1 g Intravenous Q1H    potassium chloride  20 mEq Oral TID WC    magnesium sulfate  1 g Intravenous Q1H    metoprolol        levothyroxine  112 mcg Oral Daily    sodium chloride flush  10 mL Intravenous 2 times per day    atorvastatin  40 mg Oral Nightly    cefTRIAXone (ROCEPHIN) IV  1 g Intravenous Q24H    chlorhexidine  15 mL Mouth/Throat BID     Allergies:  Patient has no known allergies. Social History:    Social History     Socioeconomic History    Marital status:       Spouse name: Not on file  Number of children: Not on file    Years of education: Not on file    Highest education level: Not on file   Occupational History    Not on file   Social Needs    Financial resource strain: Not on file    Food insecurity:     Worry: Not on file     Inability: Not on file    Transportation needs:     Medical: Not on file     Non-medical: Not on file   Tobacco Use    Smoking status: Never Smoker    Smokeless tobacco: Never Used   Substance and Sexual Activity    Alcohol use: No    Drug use: No    Sexual activity: Not on file   Lifestyle    Physical activity:     Days per week: Not on file     Minutes per session: Not on file    Stress: Not on file   Relationships    Social connections:     Talks on phone: Not on file     Gets together: Not on file     Attends Rastafarian service: Not on file     Active member of club or organization: Not on file     Attends meetings of clubs or organizations: Not on file     Relationship status: Not on file    Intimate partner violence:     Fear of current or ex partner: Not on file     Emotionally abused: Not on file     Physically abused: Not on file     Forced sexual activity: Not on file   Other Topics Concern    Not on file   Social History Narrative    Not on file     Family History:   Family History   Problem Relation Age of Onset    High Blood Pressure Mother     Heart Disease Father        REVIEW OF SYSTEMS:   CONSTITUTIONAL:  Denies any fever or chills, nausea, vomiting, diarrhea, or weight loss.   HEENT:  negative  RESPIRATORY:  Denies wheezing, cough, hemoptysis, dyspnea, shortness of breath  CARDIOVASCULAR:  positive for  syncope  negative for  chest pain, dyspnea, palpitations, orthopnea, PND, exertional chest pressure/discomfort, fatigue, early saiety, edema  GASTROINTESTINAL:  negative for nausea, vomiting, abdominal pain, abdominal distention, hematemesis and hemtochezia  GENITOURINARY:  negative  INTEGUMENT/BREAST:  negative  ALLERGIC/IMMUNOLOGIC: negative  MUSCULOSKELETAL:  negative for  myalgias, arthralgias, joint swelling, stiff joints, decreased range of motion, muscle weakness and bone pain  NEUROLOGICAL:  negative for headaches, visual disturbance, coordination problems, gait problems, weakness and numbness    PHYSICAL EXAM:    VITALS:  BP (!) 167/99   Pulse 104   Temp 98.3 °F (36.8 °C) (Oral)   Resp 18   Ht 5' 1\" (1.549 m)   Wt 170 lb (77.1 kg)   SpO2 92%   BMI 32.12 kg/m²   CONSTITUTIONAL:awake, alert, cooperative, no apparent distress, and appears stated age  HEENT:pupils equal, round, sclera clear, conjunctiva normal.  Ecchymotic areas to write for head and right-sided face  Supple neck. NO THYROMEGALY  LUNGS:  No increased work of breathing, auscultation: Clear to auscultation in all fields, no wheezing, rhonchi, or rales. CARDIOVASCULAR:  Normal apical impulse, irregularly irregular rhythm, NO murmur auscultated  JVP: none  Carotids:NL, no bruit  ABDOMEN:  No scars, normal bowel sounds, soft, non-distended, non-tender, no masses palpated, no hepatosplenomegally  MUSCULOSKELETAL:no redness, warmth, or swelling of the joints  NEUROLOGIC:  Awake, alert, oriented to name, place and time. SKIN:  no bruising or bleeding, normal skin color, texture, turgor  LE: no pitting edema, ecchymosis to Rt. Leg. No cyanosis.      DATA:   ECG: Atrial fibrillation with RVR, nonspecific anteriolateral ST segment changes      Lab:   Lab Results   Component Value Date     04/08/2019    K 3.3 04/08/2019     04/08/2019    CO2 23 04/08/2019    BUN 12 04/08/2019    CREATININE 0.80 04/08/2019    GFRAA >60 04/08/2019    LABGLOM >60 04/08/2019    GLUCOSE 131 04/08/2019    GLUCOSE 88 04/23/2012    PROT 7.1 04/07/2019    LABALBU 4.3 04/07/2019    LABALBU 4.5 04/23/2012    CALCIUM 8.7 04/08/2019    BILITOT 0.97 04/07/2019    ALKPHOS 108 04/07/2019    AST 24 04/07/2019    ALT 10 04/07/2019     Magnesium:    Lab Results   Component Value Date    MG 1.4 04/08/2019     Warfarin PT/INR:    Lab Results   Component Value Date    PROTIME 11.4 04/07/2019    INR 1.1 04/07/2019     TSH:    Lab Results   Component Value Date    TSH 0.61 04/07/2019     BMP:    Lab Results   Component Value Date     04/08/2019    K 3.3 04/08/2019     04/08/2019    CO2 23 04/08/2019    BUN 12 04/08/2019    LABALBU 4.3 04/07/2019    LABALBU 4.5 04/23/2012    CREATININE 0.80 04/08/2019    CALCIUM 8.7 04/08/2019    GFRAA >60 04/08/2019    LABGLOM >60 04/08/2019    GLUCOSE 131 04/08/2019    GLUCOSE 88 04/23/2012     FLP:    Lab Results   Component Value Date    CHOL 119 04/08/2019    TRIG 79 04/08/2019    HDL 48 04/08/2019    LDLCHOLESTEROL 55 04/08/2019       IMPRESSION:  Patient Active Problem List   Diagnosis    Hypothyroidism    Hyperlipidemia    Hypertension    Gout    Arthritis    Irritable bowel syndrome    Biliary obstruction    Abnormal finding on imaging    Elevated LFTs    Abdominal discomfort    Acute cystitis    Choledocholithiasis    Right leg pain    Osteoarthritis of multiple joints    VERONICA (acute kidney injury) (HCC)    Hypomagnesemia    Diarrhea    Stroke-like symptoms    New onset a-fib (HCC)       [id number] Score for Atrial Fibrillation Stroke Risk   Risk   Factors  Component Value   C CHF No 0   H HTN Yes 1   A2 Age >= 76 Yes,  (80 y.o.) 2   D DM No 0   S2 Prior Stroke/TIA No 2   V Vascular Disease No 1   A Age 74-69 No,  (80 y.o.) 0   Sc Sex female 1    [id number]  Score  7   Score last updated 3/4/70 35:07 AM    Click here for a link to the UpToDate guideline \"Atrial Fibrillation: Anticoagulation therapy to prevent embolization    Disclaimer: Risk Score calculation is dependent on accuracy of patient problem list and past encounter diagnosis. RECOMMENDATIONS:       Atrial fibrillation with rapid ventricular response  Patient is status post TPA, on protocol till 1500.   Continue with Potassium and magnesium replacement  Continue

## 2019-04-09 ENCOUNTER — APPOINTMENT (OUTPATIENT)
Dept: CT IMAGING | Age: 84
DRG: 064 | End: 2019-04-09
Payer: MEDICARE

## 2019-04-09 PROBLEM — I63.9 CEREBRAL INFARCTION, UNSPECIFIED (HCC): Status: ACTIVE | Noted: 2019-04-09

## 2019-04-09 LAB
ABSOLUTE EOS #: 0.12 K/UL (ref 0–0.44)
ABSOLUTE IMMATURE GRANULOCYTE: 0.06 K/UL (ref 0–0.3)
ABSOLUTE LYMPH #: 1.28 K/UL (ref 1.1–3.7)
ABSOLUTE MONO #: 1.3 K/UL (ref 0.1–1.2)
ANION GAP SERPL CALCULATED.3IONS-SCNC: 14 MMOL/L (ref 9–17)
BASOPHILS # BLD: 1 % (ref 0–2)
BASOPHILS ABSOLUTE: 0.05 K/UL (ref 0–0.2)
BUN BLDV-MCNC: 14 MG/DL (ref 8–23)
BUN/CREAT BLD: NORMAL (ref 9–20)
CALCIUM IONIZED: 1.21 MMOL/L (ref 1.13–1.33)
CALCIUM SERPL-MCNC: 9.2 MG/DL (ref 8.6–10.4)
CHLORIDE BLD-SCNC: 104 MMOL/L (ref 98–107)
CO2: 21 MMOL/L (ref 20–31)
CREAT SERPL-MCNC: 0.88 MG/DL (ref 0.5–0.9)
DIFFERENTIAL TYPE: ABNORMAL
EOSINOPHILS RELATIVE PERCENT: 1 % (ref 1–4)
GFR AFRICAN AMERICAN: >60 ML/MIN
GFR NON-AFRICAN AMERICAN: >60 ML/MIN
GFR SERPL CREATININE-BSD FRML MDRD: NORMAL ML/MIN/{1.73_M2}
GFR SERPL CREATININE-BSD FRML MDRD: NORMAL ML/MIN/{1.73_M2}
GLUCOSE BLD-MCNC: 95 MG/DL (ref 70–99)
HCT VFR BLD CALC: 32.6 % (ref 36.3–47.1)
HEMOGLOBIN: 10.2 G/DL (ref 11.9–15.1)
IMMATURE GRANULOCYTES: 1 %
LYMPHOCYTES # BLD: 12 % (ref 24–43)
MAGNESIUM: 2.3 MG/DL (ref 1.6–2.6)
MCH RBC QN AUTO: 31.8 PG (ref 25.2–33.5)
MCHC RBC AUTO-ENTMCNC: 31.3 G/DL (ref 28.4–34.8)
MCV RBC AUTO: 101.6 FL (ref 82.6–102.9)
MONOCYTES # BLD: 12 % (ref 3–12)
NRBC AUTOMATED: 0 PER 100 WBC
PDW BLD-RTO: 14.2 % (ref 11.8–14.4)
PHOSPHORUS: 3.7 MG/DL (ref 2.6–4.5)
PLATELET # BLD: 147 K/UL (ref 138–453)
PLATELET ESTIMATE: ABNORMAL
PMV BLD AUTO: 12.5 FL (ref 8.1–13.5)
POTASSIUM SERPL-SCNC: 3.7 MMOL/L (ref 3.7–5.3)
PROCALCITONIN: 0.11 NG/ML
RBC # BLD: 3.21 M/UL (ref 3.95–5.11)
RBC # BLD: ABNORMAL 10*6/UL
SEG NEUTROPHILS: 74 % (ref 36–65)
SEGMENTED NEUTROPHILS ABSOLUTE COUNT: 8.03 K/UL (ref 1.5–8.1)
SODIUM BLD-SCNC: 139 MMOL/L (ref 135–144)
WBC # BLD: 10.8 K/UL (ref 3.5–11.3)
WBC # BLD: ABNORMAL 10*3/UL

## 2019-04-09 PROCEDURE — 6370000000 HC RX 637 (ALT 250 FOR IP): Performed by: NURSE PRACTITIONER

## 2019-04-09 PROCEDURE — 95951 HC EEG MONITORING VIDEO RECORDING: CPT

## 2019-04-09 PROCEDURE — 36415 COLL VENOUS BLD VENIPUNCTURE: CPT

## 2019-04-09 PROCEDURE — 84100 ASSAY OF PHOSPHORUS: CPT

## 2019-04-09 PROCEDURE — 83735 ASSAY OF MAGNESIUM: CPT

## 2019-04-09 PROCEDURE — 97166 OT EVAL MOD COMPLEX 45 MIN: CPT

## 2019-04-09 PROCEDURE — 97530 THERAPEUTIC ACTIVITIES: CPT

## 2019-04-09 PROCEDURE — APPNB60 APP NON BILLABLE TIME 46-60 MINS: Performed by: NURSE PRACTITIONER

## 2019-04-09 PROCEDURE — 97127 HC SP THER IVNTJ W/FOCUS COG FUNCJ: CPT

## 2019-04-09 PROCEDURE — 82330 ASSAY OF CALCIUM: CPT

## 2019-04-09 PROCEDURE — 72125 CT NECK SPINE W/O DYE: CPT

## 2019-04-09 PROCEDURE — 6370000000 HC RX 637 (ALT 250 FOR IP): Performed by: NEUROLOGICAL SURGERY

## 2019-04-09 PROCEDURE — 85025 COMPLETE CBC W/AUTO DIFF WBC: CPT

## 2019-04-09 PROCEDURE — 2580000003 HC RX 258: Performed by: NURSE PRACTITIONER

## 2019-04-09 PROCEDURE — 99233 SBSQ HOSP IP/OBS HIGH 50: CPT | Performed by: PSYCHIATRY & NEUROLOGY

## 2019-04-09 PROCEDURE — 6370000000 HC RX 637 (ALT 250 FOR IP): Performed by: PSYCHIATRY & NEUROLOGY

## 2019-04-09 PROCEDURE — 95951 PR EEG MONITORING/VIDEORECORD: CPT | Performed by: PSYCHIATRY & NEUROLOGY

## 2019-04-09 PROCEDURE — 6360000002 HC RX W HCPCS: Performed by: NURSE PRACTITIONER

## 2019-04-09 PROCEDURE — 70486 CT MAXILLOFACIAL W/O DYE: CPT

## 2019-04-09 PROCEDURE — 84145 PROCALCITONIN (PCT): CPT

## 2019-04-09 PROCEDURE — 2060000000 HC ICU INTERMEDIATE R&B

## 2019-04-09 PROCEDURE — 97110 THERAPEUTIC EXERCISES: CPT

## 2019-04-09 PROCEDURE — 51798 US URINE CAPACITY MEASURE: CPT | Performed by: NURSE PRACTITIONER

## 2019-04-09 PROCEDURE — 80048 BASIC METABOLIC PNL TOTAL CA: CPT

## 2019-04-09 RX ORDER — ATENOLOL 25 MG/1
25 TABLET ORAL DAILY
Status: DISCONTINUED | OUTPATIENT
Start: 2019-04-09 | End: 2019-04-09

## 2019-04-09 RX ORDER — MODAFINIL 100 MG/1
100 TABLET ORAL ONCE
Status: COMPLETED | OUTPATIENT
Start: 2019-04-09 | End: 2019-04-09

## 2019-04-09 RX ORDER — ALLOPURINOL 100 MG/1
100 TABLET ORAL DAILY
Status: DISCONTINUED | OUTPATIENT
Start: 2019-04-09 | End: 2019-04-10 | Stop reason: HOSPADM

## 2019-04-09 RX ADMIN — Medication 15 ML: at 21:14

## 2019-04-09 RX ADMIN — Medication 15 ML: at 09:00

## 2019-04-09 RX ADMIN — APIXABAN 5 MG: 5 TABLET, FILM COATED ORAL at 21:14

## 2019-04-09 RX ADMIN — ASPIRIN 81 MG: 81 TABLET ORAL at 08:11

## 2019-04-09 RX ADMIN — POTASSIUM CHLORIDE 20 MEQ: 1500 TABLET, EXTENDED RELEASE ORAL at 08:11

## 2019-04-09 RX ADMIN — MODAFINIL 100 MG: 100 TABLET ORAL at 11:47

## 2019-04-09 RX ADMIN — METOPROLOL TARTRATE 25 MG: 25 TABLET ORAL at 21:14

## 2019-04-09 RX ADMIN — DESMOPRESSIN ACETATE 40 MG: 0.2 TABLET ORAL at 21:14

## 2019-04-09 RX ADMIN — ACETAMINOPHEN 650 MG: 325 TABLET ORAL at 22:29

## 2019-04-09 RX ADMIN — ALLOPURINOL 100 MG: 100 TABLET ORAL at 11:47

## 2019-04-09 RX ADMIN — LEVOTHYROXINE SODIUM 112 MCG: 112 TABLET ORAL at 06:50

## 2019-04-09 RX ADMIN — ATENOLOL 25 MG: 25 TABLET ORAL at 08:11

## 2019-04-09 RX ADMIN — Medication 10 ML: at 21:14

## 2019-04-09 RX ADMIN — Medication 10 ML: at 09:00

## 2019-04-09 RX ADMIN — ENOXAPARIN SODIUM 40 MG: 40 INJECTION SUBCUTANEOUS at 08:11

## 2019-04-09 ASSESSMENT — PAIN SCALES - GENERAL
PAINLEVEL_OUTOF10: 0
PAINLEVEL_OUTOF10: 0
PAINLEVEL_OUTOF10: 4
PAINLEVEL_OUTOF10: 0
PAINLEVEL_OUTOF10: 0

## 2019-04-09 NOTE — PROGRESS NOTES
Speech Language Pathology  Speech Language Pathology  [facility]    Cognitive Treatment Note    Date: 4/9/2019  Patients Name: Gary Blanco  MRN: [id number]  Diagnosis:   Patient Active Problem List   Diagnosis Code    Hypothyroidism E03.9    Hyperlipidemia E78.5    Hypertension I10    Gout M10.9    Arthritis M19.90    Irritable bowel syndrome K58.9    Biliary obstruction K83.1    Abnormal finding on imaging R93.89    Elevated LFTs R94.5    Abdominal discomfort R10.9    Acute cystitis N30.00    Choledocholithiasis K80.50    Right leg pain M79.604    Osteoarthritis of multiple joints M15.9    VERONICA (acute kidney injury) ([facility] [address] N17.9    Hypomagnesemia E83.42    Diarrhea R19.7    Stroke-like symptoms R29.90    Atrial fibrillation with RVR (HCC) I48.91    Left carotid artery stenosis I65.22       Pain: 0/10    Cognitive Treatment    Treatment time: 9:20-9:45      Subjective: [x] Alert [x] Cooperative     [] Confused     [] Agitated    [] Lethargic      Objective/Assessment:    Orientation: Orientation Info: 5/5    Recall: Delayed (3 units): 0/3 increased to 3/3 with min verbal cues, 0/3 increased to 3/3 with min-mod verbal cues. Immediate (3 digits): 5/5         (4 digits): 5/10 increased to 9/10 with mod-max repetitions. Problem Solving/Reasoning: Similarities/Differences: 5/10 increased to 8/10 with mod-max verbal cues. Multiple Uses for Objects: 7/10 increased to 10/10 with mod verbal cues. Further therapy recommended at discharge. The patient should be able to tolerate at least three hours of therapy per day over 5 days or 15 hours over 7 days. Plan:  [x] Continue  services    [] Discharge from :      Discharge recommendations: [] Inpatient Rehab   [] [place]   [] Outpatient Therapy  [] Follow up at trauma clinic   [x] Other: therapy      Completed by Jody Watkins,  Clinician  Co-signed by Horace Rodgers A.CCC/SLP

## 2019-04-09 NOTE — DISCHARGE INSTR - COC
VERONICA (acute kidney injury) ([facility] [address] N17.9    Hypomagnesemia E83.42    Diarrhea R19.7    Stroke-like symptoms R29.90    Atrial fibrillation with RVR (HCC) I48.91    Left carotid artery stenosis I65.22       Isolation/Infection:   Isolation          No Isolation            Nurse Assessment:  Last Vital Signs: /76   Pulse 96   Temp 97.3 °F (36.3 °C) (Oral)   Resp 15   Ht 5' 1\" (1.549 m)   Wt 170 lb (77.1 kg)   SpO2 98%   BMI 32.12 kg/m²     Last documented pain score (0-10 scale): Pain Level: 0  Last Weight:   Wt Readings from Last 1 Encounters:   04/07/19 170 lb (77.1 kg)     Mental Status:  oriented    IV Access:  - None    Nursing Mobility/ADLs:  Walking   Assisted  Transfer  Assisted  Bathing  Assisted  Dressing  Assisted  Toileting  Assisted  Feeding  Independent  Med Admin  Assisted  Med Delivery   whole    Wound Care Documentation and Therapy:        Elimination:  Continence:   · Bowel: Yes  · Bladder: Yes  Urinary Catheter: None   Colostomy/Ileostomy/Ileal Conduit: No       Date of Last BM: 04/10/2019    Intake/Output Summary (Last 24 hours) at 4/9/2019 0733  Last data filed at 4/9/2019 0520  Gross per 24 hour   Intake 600 ml   Output 300 ml   Net 300 ml     I/O last 3 completed shifts: In: 600 [P.O.:250; I.V.:350]  Out: 300 [Urine:300]    Safety Concerns:     None and History of Falls (last 30 days)    Impairments/Disabilities:      None    Nutrition Therapy:  Current Nutrition Therapy:   - Oral Diet:  General    Routes of Feeding: Oral  Liquids: Thin Liquids  Daily Fluid Restriction: no  Last Modified Barium Swallow with Video (Video Swallowing Test): not done    Treatments at the Time of Hospital Discharge:   Respiratory Treatments: None  Oxygen Therapy:  is not on home oxygen therapy.   Ventilator:    - No ventilator support    Rehab Therapies: Physical Therapy, Occupational Therapy and Speech/Language Therapy  Weight Bearing Status/Restrictions: No weight bearing restirctions  Other Medical Equipment (for information only, NOT a DME order):  walker  Other Treatments: None    Patient's personal belongings (please select all that are sent with patient):  None    RN SIGNATURE: Electronically signed by Coral Anguiano RN, BSN on 4/10/19 at 4:01 PM      CASE MANAGEMENT/SOCIAL WORK SECTION    Inpatient Status Date: 04/07/2019    Readmission Risk Assessment Score:  Readmission Risk              Risk of Unplanned Readmission:        14           Discharging to Facility/ Agency   · Name:  [facility]  · Address:  [address]  · Phone:  [phone]  · Fax:  [phone]    Dialysis Facility (if applicable)   · Name:  · Address:  · Dialysis Schedule:  · Phone:  · Fax:    / signature: Electronically signed by Keely Cameron RN on 4/10/19 at 10:12 AM    PHYSICIAN SECTION    Prognosis: Good    Condition at Discharge: Stable    Rehab Potential (if transferring to Rehab): Good    Recommended Labs or Other Treatments After Discharge: Follow up with Cardiology    Physician Certification: I certify the above information and transfer of Ira Murrell  is necessary for the continuing treatment of the diagnosis listed and that she requires [facility] for less 30 days.      Update Admission H&P: No change in H&P    PHYSICIAN SIGNATURE:  Electronically signed by Blas Carrion MD on 4/10/19 at 4:02 PM

## 2019-04-09 NOTE — CARE COORDINATION
[address] Transitions Interview     2019    Patient: Isaias Piña Patient : 1930   MRN: [id number]  Reason for Admission: CVA  RARS: Readmission Risk Score: 15         Spoke with: Christiana Cardona    Met with patient in room, currently having LTME. Explained CTC role and follow up at discharge. Patient from [facility], referral has been placed to [facility]. nH predict tool given and reviewed with patient. nHpredict Inpatient Visit    Patient/family seen: Yes, spoke with Nichole Dueñas, patient    Provided patient/family with copy of nHpredict tool: Yes     All questions answered at the time of visit. Informed patient/family that the nHpredict is a tool, to be used as a guide, and should not alter their currently established discharge plan. Current discharge plan: [facility] in [address] [place] [address] completed with case management: Yes, spoke with Matthew Lennon CM. Predict tool matches discharge plan      Readmission Risk  Patient Active Problem List   Diagnosis    Hypothyroidism    Hyperlipidemia    Hypertension    Gout    Arthritis    Irritable bowel syndrome    Biliary obstruction    Abnormal finding on imaging    Elevated LFTs    Abdominal discomfort    Acute cystitis    Choledocholithiasis    Right leg pain    Osteoarthritis of multiple joints    VERONICA (acute kidney injury) (HCC)    Hypomagnesemia    Diarrhea    Stroke-like symptoms    Atrial fibrillation with RVR (HCC)    Left carotid artery stenosis    Cerebral infarction, unspecified [facility])       Inpatient Assessment  Care Transitions Summary    Care Transitions Inpatient Review  Medication Review  Are you able to afford your medications?:  Yes  How often do you have difficulty taking your medications?:  I always take them as prescribed.   Housing Review  Who do you live with?:  Alone  Are you an active caregiver in your home?:  No  Social Support  Do you have a ?:  No  Do you have a  Place Mark French

## 2019-04-09 NOTE — PLAN OF CARE
Problem: Risk for Impaired Skin Integrity  Goal: Tissue integrity - skin and mucous membranes  Description  Structural intactness and normal physiological function of skin and  mucous membranes. 4/9/2019 0522 by Rebecca Nassar RN  Outcome: Met This Shift  Note:   Pt. Turned in bed according to hospital policy, extremities elevated off of bed. Tissue integrity of skin and mucous membranes maintained. 4/8/2019 1737 by Alexis Dwyer RN  Outcome: Ongoing     Problem: Falls - Risk of:  Goal: Will remain free from falls  Description  Will remain free from falls  4/9/2019 0522 by Rebecca Nassar RN  Outcome: Met This Shift  Note:   Frequent nursing rounds to patients bedside, bed alarm remains on. Free from falls this shift. 4/8/2019 1737 by Alexis Dwyer RN  Outcome: Ongoing     Problem: ACTIVITY INTOLERANCE/IMPAIRED MOBILITY  Goal: Mobility/activity is maintained at optimum level for patient  4/9/2019 0522 by Rebecca Nassar RN  Outcome: Met This Shift  Note:   Patient mobility/activity maintained at optimum level for patient this shift.   4/8/2019 1737 by Alexis Dwyer RN  Outcome: Ongoing

## 2019-04-09 NOTE — PROGRESS NOTES
sequencing, and safety prior to attempts  Ambulation  Ambulation?: Yes  Ambulation 1  Surface: level tile  Device: Rolling Walker  Other Apparatus: (w/c follow for distance)  Assistance: Contact guard assistance  Distance: 200ft  Gait Deviations  Gait Deviations: Slow Coleen; Increased ELENA; Decreased step length;Decreased step height;Deviated path  Surface: Verbal cues for RW to hip proximity (initial good return with poor carry over). Balance  Posture: Good  Sitting - Static: Good  Sitting - Dynamic: Good  Standing - Static: Good  Standing - Dynamic: Good;-  Comments: Standing balance assessed with RW    Exercise  Seated LE exercise program: Long Arc Quads, hip abduction/adduction, heel/toe raises, and marches. Reps: 10  Upper extremity exercises: Bicep curl, shoulder flexion/extension, punches, tricep curl, shoulder abduction/adduction. Reps: 10     Assessment   Body structures, Functions, Activity limitations: Decreased functional mobility ; Decreased endurance;Decreased strength;Decreased balance  Assessment: Patient completing functional transfers with minimal physical assistance. Safely ambulated household distances. Balance and endurance deficits. Prognosis: Good  Patient Education: Exercises for strengthening  REQUIRES PT FOLLOW UP: Yes  Activity Tolerance  Activity Tolerance: Patient Tolerated treatment well     Goals  Short term goals  Time Frame for Short term goals: 10  Short term goal 1: Pt to complete bed mobility tasks independently  Short term goal 2: Pt to complete transfers using standard cane independently  Short term goal 3: Pt to amb 300' using standard cane with SBA  Short term goal 4: Pt to tolerate 30 min activity to assist with increasing strength and endurance.      Plan    Plan  Times per week: 5-6x/week  Current Treatment Recommendations: Strengthening, Gait Training, Functional Mobility Training, Transfer Training, Safety Education & Training, Endurance Training  Safety Devices  Type of devices: Nurse notified, Gait belt, Patient at risk for falls, Call light within reach, Left in bed, Bed alarm in place  Restraints  Initially in place: No (3 bed rails)    Therapy Time   Individual Concurrent Group Co-treatment   Time In 1457         Time Out 1521         Minutes [address] [place] [address]

## 2019-04-09 NOTE — CONSULTS
Department   of   Otolaryngology    Assessment:   Sensorineural hearing loss (she wears hearing aids)   Fall   Atrial fibrillation with RVR   Left carotid artery stenosis    Plan:   There is no clinical evidence of mastoiditis. Her brain CT yesterday revealed minimal fluid in the right mastoid air cells. Today, he facial bones CT revealed well-aerated mastoids with no opacification and no bony erosion   She should continue with her hearing aids for amplification and follow up with ENT as needed only   ENT will sign off      CHIEF   COMPLAINT:     I fell    CONSULTING PHYSICIAN:  Dr. Anh Griffin:                            Shanell Marsh      is   a   80 y.o.   female   with   significant   past   medical   history   of  irritable bowel syndrome, hypothyroidism, hypertension, hyperlipidemia, gout, CK D stage III, and arthritis. Patient was admitted on 4/7/2019 after being transferred from [facility] emergency department following stroke like episode and TPA administration. Patient's last known well time was 10:30 AM on 4/7/2019, patient was found in her kitchen at 12:45 PM standing leaning against the kitchen table by her granddaughter. Patient was noted to have right arm weakness, garbled speech, and inability to stand without support. Currently the patient is expressing herself well, there is no apparent extremity weakness at this time. Speech is intact as does not appear to be impaired. No acute hearing loss, no tinnitus, no otorrhea, no otalgia and no history of ear infections.         Past   Medical   History:       Diagnosis Date    Arthritis     Chronic kidney disease, stage 3 ([address]     Gout     Hyperlipidemia     Hypertension     Hypothyroidism     Irritable bowel syndrome           Surgical   History:       Procedure Laterality Date    CARPAL TUNNEL RELEASE  2012    Dr. Shaune Spatz - right side    CATARACT REMOVAL WITH IMPLANT Bilateral     CHOLECYSTECTOMY  ERCP N/A 2/11/2019    Geri Mojica MD - ERCP, Sphincterotomy, balloon dilatation and stent placement with removal of multiple stones    TOTAL KNEE ARTHROPLASTY Right     TOTAL KNEE ARTHROPLASTY Left           Medications: Current Facility-Administered Medications: allopurinol (ZYLOPRIM) tablet 100 mg, 100 mg, Oral, Daily  apixaban (ELIQUIS) tablet 5 mg, 5 mg, Oral, BID  metoprolol tartrate (LOPRESSOR) tablet 25 mg, 25 mg, Oral, BID  sennosides-docusate sodium (SENOKOT-S) 8.6-50 MG tablet 2 tablet, 2 tablet, Oral, Daily  aspirin EC tablet 81 mg, 81 mg, Oral, Daily  levothyroxine (SYNTHROID) tablet 112 mcg, 112 mcg, Oral, Daily  sodium chloride flush 0.9 % injection 10 mL, 10 mL, Intravenous, 2 times per day  sodium chloride flush 0.9 % injection 10 mL, 10 mL, Intravenous, PRN  acetaminophen (TYLENOL) tablet 650 mg, 650 mg, Oral, Q4H PRN  magnesium hydroxide (MILK OF MAGNESIA) 400 MG/5ML suspension 30 mL, 30 mL, Oral, Daily PRN  ondansetron (ZOFRAN) injection 4 mg, 4 mg, Intravenous, Q6H PRN  atorvastatin (LIPITOR) tablet 40 mg, 40 mg, Oral, Nightly  magnesium sulfate 1 g in dextrose 5% 100 mL IVPB, 1 g, Intravenous, Q1H PRN  chlorhexidine (PERIDEX) 0.12 % solution 15 mL, 15 mL, Mouth/Throat, BID     Allergies:      Patient has no known allergies. Social History:    Social History     Socioeconomic History    Marital status:       Spouse name: None    Number of children: None    Years of education: None    Highest education level: None   Occupational History    None   Social Needs    Financial resource strain: None    Food insecurity:     Worry: None     Inability: None    Transportation needs:     Medical: None     Non-medical: None   Tobacco Use    Smoking status: Never Smoker    Smokeless tobacco: Never Used   Substance and Sexual Activity    Alcohol use: No    Drug use: No    Sexual activity: None   Lifestyle    Physical activity:     Days per week: None     Minutes per session: None  Stress: None   Relationships    Social connections:     Talks on phone: None     Gets together: None     Attends Congregational service: None     Active member of club or organization: None     Attends meetings of clubs or organizations: None     Relationship status: None    Intimate partner violence:     Fear of current or ex partner: None     Emotionally abused: None     Physically abused: None     Forced sexual activity: None   Other Topics Concern    None   Social History Narrative    None       Family   History:       Problem Relation Age of Onset    High Blood Pressure Mother     Heart Disease Father        REVIEW   OF   SYSTEMS:      As   above   and:   CONSTITUTIONAL:  Denies any fever or chills, nausea, vomiting, diarrhea, or weight loss.   HEENT:  negative  RESPIRATORY:  Denies wheezing, cough, hemoptysis, dyspnea, shortness of breath  CARDIOVASCULAR:  positive for  syncope  negative for  chest pain, dyspnea, palpitations, orthopnea, PND, exertional chest pressure/discomfort, fatigue, early saiety, edema  GASTROINTESTINAL:  negative for nausea, vomiting, abdominal pain, abdominal distention, hematemesis and hemtochezia  GENITOURINARY:  negative  INTEGUMENT/BREAST:  negative  ALLERGIC/IMMUNOLOGIC:  negative  MUSCULOSKELETAL:  negative for  myalgias, arthralgias, joint swelling, stiff joints, decreased range of motion, muscle weakness and bone pain  NEUROLOGICAL:  negative for headaches, visual disturbance, coordination problems, gait problems, weakness and numbness      DIAGNOSTIC STUDIES REVIEWED:  Labs   and   Radiology   reports/films   reviewed         EXAM:   VITALS:      BP (!) 107/44   Pulse 80   Temp 98.3 °F (36.8 °C)   Resp 16   Ht 5' 1\" (1.549 m)   Wt 170 lb (77.1 kg)   SpO2 96%   BMI 32.12 kg/m²     CONSTITUTIONAL:      awake,   alert,   cooperative,   no   apparent   distress,   and   appears   stated   age   No labored breathing, no stridor, normal voice  EYES:      Lids   and lashes   normal,   pupils   equal,   round   and   reactive   to   light,   extra   ocular   muscles intact,   sclera   clear,   conjunctiva   Normal  HEAD:      Normocephalic,   With periorbital ecchymosis,   sinuses   nontender   on   palpation,   EARS:external   ears   without   lesions,   Minimal wax noted, TM's are normal bilaterally, no mastoid tenderness, no facial weakness  NOSE: patent nasal airway, no bleeding noted, no lesions  ORAL: oral cavity &  pharynx   with   moist   mucus   membranes,   tonsils   without erythema   or   exudates,   gums   normal   and   good   Dentition. The floor of mouth is soft without induration, the tongue base is soft as well. NECK:      Supple,   symmetrical,   trachea   midline,   no   adenopathy,   thyroid   symmetric,   not   enlarged and   no   tenderness,   skin   Normal  CHEST: equal expansion and symmetric, lungs CTA  CV: Heart RRR  NEUROLOGIC:      Awake,   alert,   oriented   to   name,   place   and   time. Cranial   nerves   II-XII   are grossly   intact. Motor   is   5   out   of   5   bilaterally. Sensory   is   Intact.   PSYCH: Normal Mood and affect  SKIN: Normal turgor, no rash      Electronically   signed   by   Cr Yu MD   on   4/9/2019   at   4:13 PM

## 2019-04-09 NOTE — PROCEDURES
LONG-TERM EEG-VIDEO [address]    Patient: John Gomes  Age: 80 y.o. MRN: [id number]  Dates of recordin2019     Referring Physician: No ref. provider found  History: The patient is a 80 y.o. female who presented with expressive aphasia. This long-term video-EEG monitoring study was performed in order to determine the nature of the patient's clinical events. The patient is on neuroactive medications.    Mady Barakat   Current Facility-Administered Medications   Medication Dose Route Frequency Provider Last Rate Last Dose    sennosides-docusate sodium (SENOKOT-S) 8.6-50 MG tablet 2 tablet  2 tablet Oral Daily KATIA Shoemaker CNP        potassium chloride (KLOR-CON M) extended release tablet 20 mEq  20 mEq Oral TID WC KATIA Shoemaker - CNP        metoprolol (LOPRESSOR) 5 MG/5ML injection             levothyroxine (SYNTHROID) tablet 112 mcg  112 mcg Oral Daily Duayne Eans, APRN - CNP   112 mcg at 19 0752    sodium chloride flush 0.9 % injection 10 mL  10 mL Intravenous 2 times per day Duayne Eans, APRN - CNP        sodium chloride flush 0.9 % injection 10 mL  10 mL Intravenous PRN Javierayne Eans, APRN - CNP        acetaminophen (TYLENOL) tablet 650 mg  650 mg Oral Q4H PRN Duayne Eans, APRN - CNP        magnesium hydroxide (MILK OF MAGNESIA) 400 MG/5ML suspension 30 mL  30 mL Oral Daily PRN Duayne Eans, APRN - CNP        ondansetron [facility]) injection 4 mg  4 mg Intravenous Q6H PRN Duayne Eans, APRN - CNP   4 mg at 19 0615    atorvastatin (LIPITOR) tablet 40 mg  40 mg Oral Nightly Duayne Eans, APRN - CNP        0.9 % sodium chloride infusion   Intravenous Continuous Duayne Eans, APRN - CNP 80 mL/hr at 19 2123      magnesium sulfate 1 g in dextrose 5% 100 mL IVPB  1 g Intravenous Q1H PRN Duayne Eans, APRN - CNP        chlorhexidine (PERIDEX) 0.12 % solution 15 mL  15 mL Mouth/Throat BID Francesco Correa MD   15 mL at 04/08/19 0868     Technical Description: This is a 21 channel digital EEG recording with time-locked video. Electrodes were placed in accordance with the 10-20 International System of Electrode Placement. Single lead EKG monitoring was included. Baseline EEG Recording:  A formal baseline EEG recording was not obtained. Day 1 - 4/8/2019, starting at 11:23    Interictal EEG Samples: In the awake state, the background activity consisted of 8-9 Hz of alpha activity of 20-40 µV which was better formed over the right hemisphere. There were frequent runs of 3-5 Hz of polymorphic delta activity over the left hemisphere. During periods of behavioral sleep, vertex sharp waves and symmetric spindles were seen. No abnormalities were activated by sleep. The EKG channel revealed no abnormalities. Ictal EEG Recording / Patient Events: During this period the patient had no events or seizures. Summary: During this day of recording no events were recorded. The interictal EEG was abnormal due to frequent left hemispheric delta and theta slowing suggestive of underlying neuronal dysfunction. This finding can also be secondary to postictal slowing. Monitoring was continued in order to record the patients typical events. The EKG channel revealed no abnormalities. Day 2 - 4/9/2019, reviewed through 7:30 am    Interictal EEG Samples: Previously seen left hemispheric slowing appeared less frequent. Ictal EEG Recording / Patient Events: During this period the patient had no events or seizures. Summary: During this day of recording no events were recorded. The interictal EEG was abnormal due to occasional left hemispheric delta and theta slowing suggestive of underlying neuronal dysfunction. This finding can also be secondary to postictal slowing. Monitoring was continued in order to record the patients typical events.  The EKG channel revealed no abnormalities. Britany Garcia MD  Diplomate, American Board of Psychiatry and Neurology  Diplomate, American Board of Clinical Neurophysiology  Diplomate, American Board of Epilepsy     Please note this is a preliminary report and updated on a daily basis. The final report will have a summary of behavior and electrographic findings with clinical correlation.

## 2019-04-09 NOTE — PROGRESS NOTES
Occupational Therapy   Occupational Therapy Initial Assessment  Date: 2019   Patient Name: Chrissy Barreto  MRN: [id number]     : 1930    Date of Service: 2019    Discharge Recommendations: Further therapy recommended at discharge. Pt demo'd balance/ADL completion deficits this date, unless pt can stay with loved ones pt will have difficulty staying safe at home alone. Equipment recommendations listed below are based on what the patient would need if they were able to return to prior living arrangements at the time of discharge. OT Equipment Recommendations  Equipment Needed: Yes  Mobility Devices: Lorrane Fruit; ADL Assistive Devices  Walker: Rolling  ADL Assistive Devices: Transfer Tub Bench    Assessment   Performance deficits / Impairments: Decreased functional mobility ; Decreased ADL status; Decreased balance;Decreased endurance;Decreased high-level IADLs  Prognosis: Good  Decision Making: Medium Complexity  Patient Education: Safety awareness, OTPOC, RW use, transfer tech's-good return  REQUIRES OT FOLLOW UP: Yes  Activity Tolerance  Activity Tolerance: Patient Tolerated treatment well;Patient limited by fatigue;Treatment limited secondary to decreased cognition  Safety Devices  Safety Devices in place: Yes  Type of devices: All fall risk precautions in place; Left in chair;Call light within reach; Chair alarm in place;Nurse notified;Gait belt  Restraints  Initially in place: No         Patient Diagnosis(es): The primary encounter diagnosis was Stroke-like symptoms. 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 has a past medical history of Arthritis, Chronic kidney disease, stage 3 ([address] Gout, Hyperlipidemia, Hypertension, Hypothyroidism, and Irritable bowel syndrome.    has a past surgical history that includes Cataract removal with implant (Bilateral); Cholecystectomy; Carpal tunnel release (2012); ERCP (N/A, 2/11/2019); Total knee arthroplasty (Right); and Total knee arthroplasty (Left). Restrictions  Restrictions/Precautions  Restrictions/Precautions: Fall Risk, General Precautions  Required Braces or Orthoses?: No  Implants present? : Deep brain stimulator  Position Activity Restriction  Other position/activity restrictions: LTME, Neg for DVT's    Subjective   General  Patient assessed for rehabilitation services?: Yes  Family / Caregiver Present: No  Diagnosis: L ICA stenosis, tPA given, AMS     Social/Functional History  Social/Functional History  Lives With: Alone  Type of Home: Apartment((I) living apartment)  Home Layout: One level  Home Access: Level entry  Bathroom Shower/Tub: Tub/Shower unit  Bathroom Toilet: Standard  Bathroom Accessibility: Accessible  Home Equipment: 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 Otherwise she is ind with ADLs.       Objective   Vision: Impaired  Vision Exceptions: Wears glasses for distance  Hearing: Exceptions to [facility]  Hearing Exceptions: Bilateral hearing aid    Orientation  Overall Orientation Status: (Pt was disoriented this am, however, pt remembered re-orientation from RN)     Balance  Sitting Balance: Modified independent   Standing Balance: Stand by assistance  Standing Balance  Time: 6 min  Activity: Pt stood bedside, func mob for household distances  Sit to stand: Minimal assistance  Stand to sit: Minimal assistance(Pt plopped down despite vc's to change position and reach back)  Functional Mobility  Functional - Mobility Device: Rolling Walker  Activity: Other  Assist Level: Contact guard assistance  Functional Mobility Comments: Some unsteadiness noted with RW, very unsteady with no device-furniture walks  ADL  Feeding: Independent  Grooming: Modified independent   UE Bathing: Supervision  LE Bathing: Stand by assistance  UE Dressing: Stand by assistance  LE Dressing: Contact guard assistance  Toileting: Stand by assistance  Tone RUE  RUE Tone: Normotonic  Tone LUE  LUE Tone: Normotonic  Coordination  Movements Are Fluid And Coordinated: No  Coordination and Movement description: Decreased speed;Gross motor impairments;Right UE;Left UE     Bed mobility  Supine to Sit: Unable to assess  Sit to Supine: Unable to assess  Scooting: Stand by assistance  Comment: Pt seated in recliner with alarm on upon arrival/exit this date  Transfers  Sit to stand: Minimal assistance  Stand to sit: Minimal assistance(Pt plopped down despite vc's to change position and reach back)     Cognition  Overall Cognitive Status: Exceptions  Following Commands:  Follows one step commands with increased time  Attention Span: Difficulty dividing attention  Safety Judgement: Decreased awareness of need for assistance  Insights: Decreased awareness of deficits     Sensation  Overall Sensation Status: WFL      LUE AROM (degrees)  LUE AROM : Exceptions  L Shoulder Flexion 0-180: Limited to 130 degrees this date-likely baseline  L Elbow Flexion 0-145: WF's  RUE AROM (degrees)  RUE AROM : Exceptions  R Shoulder Flexion 0-180: Limited to 100 degrees-may be new  R Elbow Flexion 0-145: WFL's  LUE Strength  Gross LUE Strength: Exceptions to [facility]  L Shoulder Flex: 3-/5  L Elbow Flex: 5/5  L Elbow Ext: 5/5  L Hand Grasp: 5/5  L Hand Release: 5/5  RUE Strength  Gross RUE Strength: Exceptions to [facility]  R Shoulder Flex: 3-/5  R Elbow Flex: 5/5  R Elbow Ext: 5/5  R Hand Grasp: 5/5  R Hand Release: 5/5      Plan   Plan  Times per week: 3-4x  Current Treatment Recommendations: Balance Training, Endurance Training, Functional Mobility Training, Home Management Training, Equipment Evaluation, Education, & procurement, Patient/Caregiver Education & Training, Self-Care / ADL, Safety Education & Training     AM-PAC Inpatient Daily Activity Raw Score: 19  AM-PAC Inpatient ADL T-Scale Score : 40.22  ADL Inpatient CMS 0-100% Score: 42.8  ADL Inpatient CMS G-Code Modifier : CK    Goals  Short term goals  Time Frame for Short term goals: Pt will by discharge   Short term goal 1: demo ADL UB/LB dressing/bathing activity seated with supervision  Short term goal 2: demo good safety awareness during func mob around room with RW and SBA  Short term goal 3: demo standing during func activity for 12 min with RW and supervision  Short term goal 4: demo activity tolerance for 25 min+  Short term goal 5: demo 4-step func activity with 1 vc to address cognitive deficits     Therapy Time   Individual Concurrent Group Co-treatment   Time In 0829         Time Out 0852         Minutes [address], OTR/L

## 2019-04-09 NOTE — CARE COORDINATION
Writer left voicemail message for [facility] to verify receipt of referral.    Awaiting OT initial evaluation    Reshma Antonmauro from [facility] returned call. She is forwarding to director for consideration. Awaiting if pt accepted. Cole Palafox at [facility] requested pt's social security number.   Writer provided for referral.

## 2019-04-09 NOTE — PROGRESS NOTES
Intensive Care Unit  Endovascular Neurosurgery  Fellow Progress Note    CHIEF COMPLAINT:    SUBJECTIVE:  Events of the last 24 hours include:  Neurological exam significantly improved spontaneously. OBJECTIVE:    Current Medications:  Current Facility-Administered Medications: sennosides-docusate sodium (SENOKOT-S) 8.6-50 MG tablet 2 tablet, 2 tablet, Oral, Daily  potassium chloride (KLOR-CON M) extended release tablet 20 mEq, 20 mEq, Oral, TID WC  aspirin EC tablet 81 mg, 81 mg, Oral, Daily  enoxaparin (LOVENOX) injection 40 mg, 40 mg, Subcutaneous, Daily  levothyroxine (SYNTHROID) tablet 112 mcg, 112 mcg, Oral, Daily  sodium chloride flush 0.9 % injection 10 mL, 10 mL, Intravenous, 2 times per day  sodium chloride flush 0.9 % injection 10 mL, 10 mL, Intravenous, PRN  acetaminophen (TYLENOL) tablet 650 mg, 650 mg, Oral, Q4H PRN  magnesium hydroxide (MILK OF MAGNESIA) 400 MG/5ML suspension 30 mL, 30 mL, Oral, Daily PRN  ondansetron (ZOFRAN) injection 4 mg, 4 mg, Intravenous, Q6H PRN  atorvastatin (LIPITOR) tablet 40 mg, 40 mg, Oral, Nightly  0.9 % sodium chloride infusion, , Intravenous, Continuous  magnesium sulfate 1 g in dextrose 5% 100 mL IVPB, 1 g, Intravenous, Q1H PRN  chlorhexidine (PERIDEX) 0.12 % solution 15 mL, 15 mL, Mouth/Throat, BID    Physical:   VITALS:  BP (!) 142/96   Pulse 92   Temp 97.3 °F (36.3 °C) (Oral)   Resp 15   Ht 5' 1\" (1.549 m)   Wt 170 lb (77.1 kg)   SpO2 96%   BMI 32.12 kg/m²     On exam today:   Patient is Awake, oriented x3, follow simple commands. CN II-XII grossly intact, pupils 2 mm reactive to light bilaterally. Normal tone in four extremities. Normal sensory exam to pain. Muscle strength is 3/5 in RLE, 4/5 in other 4 extremities.    No nystagmus    Data:  CBC:   Lab Results   Component Value Date    WBC 10.8 04/09/2019    RBC 3.21 04/09/2019    RBC 4.34 04/23/2012    HGB 10.2 04/09/2019    HCT 32.6 04/09/2019    .6 04/09/2019    RDW 14.2 04/09/2019    PLT 147 04/09/2019     04/23/2012     BMP:    Lab Results   Component Value Date     04/09/2019    K 3.7 04/09/2019     04/09/2019    CO2 21 04/09/2019    BUN 14 04/09/2019    CREATININE 0.88 04/09/2019    CALCIUM 9.2 04/09/2019    GFRAA >60 04/09/2019    LABGLOM >60 04/09/2019    GLUCOSE 95 04/09/2019    GLUCOSE 88 04/23/2012       ICU guidelines/prophylaxis active for the following:    head above bed above 30 degrees    ASSESSMENT:  Gary Blanco is a 80 y.o. female with  HTN, HLD, CKD stage 3, and hypothyroidism who presented on 4/7/19 with expressive aphasia for which she received t-PA. Neurological work up CTA head and neck showing 75-85% left ICA stenosis, 50% right ICA stenosis and right VA occlusion. PLAN:  Etiology of symptoms could be secondary to delirium. Continue lipitor. Continue aspirin. LTME negative so far. MRI brain reviewed with radiologist: no acute stroke. UTI management per primary team.   ICA stenosis: does not qualify for CREST2 based on carotid ultrasound results. No further recommendations during this admission, please do not hesitate to contact us for any further questions or concerns.      Plan discussed with Dr Jos Fraga MD  PGY8-Neuroendovascular Fellow  Stroke, [facility] Stroke [address] [facility]

## 2019-04-09 NOTE — H&P
[id number] [facility] Neurology   [address] [place] [address]    HISTORY AND PHYSICAL EXAMINATION            Date:   4/9/2019  Patient name:  Tobin Bobby  Date of admission:  4/7/2019  3:53 PM  MRN:   [id number]  Account:  [de-identified]  YOB: 1930  PCP:    Mihir Workman MD  Room:   [address] [place] [address]  Code Status:    Full Code    Chief Complaint:     Chief Complaint   Patient presents with    Cerebrovascular Accident       History Obtained From:     patient, electronic medical record    History of Present Illness: The patient is a 80 y.o. Non-/non  female who presents with Cerebrovascular Accident   and she is admitted to the hospital for the management of  acute ischemic stroke, new onset atrial fibrillation. He was transferred from [facility] 2 days ago status post TPA. Initial report which was 9. Symptoms include right upper extremity weakness, expressive and receptive aphasia, dysarthria, bilateral lower extremity weakness. After arriving to a facility [facility] was reported to be 15 with severe dysarthria and aphasia. Was found to have atrial fibrillation. CTA showed hypoplastic left A1 segment. Cye Raman MRI showed no acute infarct. Moderately severe microangiopathic changes. Carotid Doppler showed bilateral ICA stenosis of 50-69%. She was admitted to the neuro critical care unit for post-TPA monitoring. After admission, her condition has been waxing and waning. She would go into episodes of fluent speech followed by aphasia and confusion. EEG video monitoring has been ongoing for 2 days. It showed background activity of 8-9 Hz alpha waves better formed on the right hemisphere. Frequent runs of 3-5 Hz polymorphic delta on the left hemisphere during sleep, vertex sharp waves and symmetric spindles were seen. These findings could be due to postictal slowing. She was started on eliquis was 5 mg twice a day for atrial fibrillation.     Today, her aphasia has Absolute 10.25 (H) 1.50 - 8.10 k/uL    Absolute Lymph # 0.55 (L) 1.10 - 3.70 k/uL    Absolute Mono # 0.64 0.10 - 1.20 k/uL    Absolute Eos # <0.03 0.00 - 0.44 k/uL    Basophils # 0.03 0.00 - 0.20 k/uL    Absolute Immature Granulocyte 0.04 0.00 - 0.30 k/uL   Basic Metabolic Panel    Collection Time: 04/09/19  4:20 AM   Result Value Ref Range    Glucose 95 70 - 99 mg/dL    BUN 14 8 - 23 mg/dL    CREATININE 0.88 0.50 - 0.90 mg/dL    Bun/Cre Ratio NOT REPORTED 9 - 20    Calcium 9.2 8.6 - 10.4 mg/dL    Sodium 139 135 - 144 mmol/L    Potassium 3.7 3.7 - 5.3 mmol/L    Chloride 104 98 - 107 mmol/L    CO2 21 20 - 31 mmol/L    Anion Gap 14 9 - 17 mmol/L    GFR Non-African American >60 >60 mL/min    GFR African American >60 >60 mL/min    GFR Comment          GFR Staging NOT REPORTED    Magnesium    Collection Time: 04/09/19  4:20 AM   Result Value Ref Range    Magnesium 2.3 1.6 - 2.6 mg/dL   Phosphorus    Collection Time: 04/09/19  4:20 AM   Result Value Ref Range    Phosphorus 3.7 2.6 - 4.5 mg/dL   Calcium, Ionized    Collection Time: 04/09/19  4:20 AM   Result Value Ref Range    Calcium, Ion 1.21 1.13 - 1.33 mmol/L   CBC auto differential    Collection Time: 04/09/19  4:20 AM   Result Value Ref Range    WBC 10.8 3.5 - 11.3 k/uL    RBC 3.21 (L) 3.95 - 5.11 m/uL    Hemoglobin 10.2 (L) 11.9 - 15.1 g/dL    Hematocrit 32.6 (L) 36.3 - 47.1 %    .6 82.6 - 102.9 fL    MCH 31.8 25.2 - 33.5 pg    MCHC 31.3 28.4 - 34.8 g/dL    RDW 14.2 11.8 - 14.4 %    Platelets 698 837 - 366 k/uL    MPV 12.5 8.1 - 13.5 fL    NRBC Automated 0.0 0.0 per 100 WBC    Differential Type NOT REPORTED     WBC Morphology NOT REPORTED     RBC Morphology NOT REPORTED     Platelet Estimate NOT REPORTED     Seg Neutrophils 74 (H) 36 - 65 %    Lymphocytes 12 (L) 24 - 43 %    Monocytes 12 3 - 12 %    Eosinophils % 1 1 - 4 %    Basophils 1 0 - 2 %    Immature Granulocytes 1 (H) 0 %    Segs Absolute 8.03 1.50 - 8.10 k/uL    Absolute Lymph # 1.28 1.10 - 3.70 k/uL Absolute Mono # 1.30 (H) 0.10 - 1.20 k/uL    Absolute Eos # 0.12 0.00 - 0.44 k/uL    Basophils # 0.05 0.00 - 0.20 k/uL    Absolute Immature Granulocyte 0.06 0.00 - 0.30 k/uL   Procalcitonin    Collection Time: 04/09/19  4:20 AM   Result Value Ref Range    Procalcitonin 0.11 (H) <0.09 ng/mL       Imaging/Diagnostics:  CT of the facial bone and cervical spine 4/9/2019  CT facial bones: No acute facial bone fracture.  Mild soft tissue swelling   over the glabellar ridge.       CT cervical spine: No acute bony abnormality cervical spine.  Multilevel   degenerative changes with neural foraminal narrowing and canal stenosis C5-C6. Assessment :      Primary Problem  <principal problem not specified>    Active Hospital Problems    Diagnosis Date Noted    Left carotid artery stenosis [I65.22]     Stroke-like symptoms [R29.90] 04/07/2019    Atrial fibrillation with RVR (HCC) [I48.91]      Acute ischemic stroke attenuated with TPA     Plan:   1. Start eliquis tonight for atrial fibrillation 5 mg BID  2. Continue ASA 81 mg daily. 3. Continue atorvastatin 40 mg daily  3. Discontinue modafinil   4. Continue LTME for 1 day watching for epileptiform activity   5. Continue PT/OT  6. Continue atenolol for hypertension   7.  Metoprolol 25mg IV PRN for afib RVR    Consultations:   IP CONSULT TO CARDIOLOGY  IP CONSULT TO STROKE TEAM  IP CONSULT TO IV TEAM  IP CONSULT TO NEUROLOGY    Discussed with Dr. Jeffy Torres MD  Neurology Resident PGY-2  4/9/2019 at 10:58 AM

## 2019-04-09 NOTE — PROGRESS NOTES
Daily Progress Note  Neuro Critical Care    Patient Name: Shaun Rhodes  Patient : 1930  Room/Bed: [facility]/6635-31  Allergies: No Known Allergies  Problem List:   Patient Active Problem List   Diagnosis    Hypothyroidism    Hyperlipidemia    Hypertension    Gout    Arthritis    Irritable bowel syndrome    Biliary obstruction    Abnormal finding on imaging    Elevated LFTs    Abdominal discomfort    Acute cystitis    Choledocholithiasis    Right leg pain    Osteoarthritis of multiple joints    VERONICA [facility]acute kidney injury) ([facility] [address]    Hypomagnesemia    Diarrhea    Stroke-like symptoms    Atrial fibrillation with RVR ([facility] [address]    Left carotid artery stenosis       INTERVAL HISTORY    Initial Presentation (date of admission: 19)  The patient is a 81 yo female with history of HTN, HLD, CKD stage 3, and hypothyroidism who presents as a transfer from [facility] ED s/p tPA administration for stroke like symptoms. LKW 1030 am when a neighbor last saw her well. Per records, patient's granddaughter visited her at [phone] pm and found her standing in the kitchen leaning against the kitchen table. Granddaughter noticed the patient had weakness of her right arm, garbled speech, and needed support to stand. On initial evaluation at [facility] ED, NIH 9 for dysarthria with expressive aphasia, right upper extremity weakness, and bilateral lower extremity weakness. CT head with no acute abnormality. tPA administered at 1512. Transferred to [facility] ED for further evaluation post tPA. Taken directly to the CT scanner on arrival to [facility] ED. NIH 15. Patient was alert and tracking appropriately, she had severe dysarthria and aphasia. EKG showing atrial fibrillation, rate 100-110's. Started on Rocephin for concern of UTI. CTA head/neck revealed severe left ICA stenosis. Goal -185.     Hospital Course:  19: CTA head/neck: left ICA stenosis 75-85%; right ICA stenosis 50%; right vertebral artery Pulse Resp SpO2   04/09/19 0705 134/76 -- -- 96 15 98 %   04/09/19 0605 (!) 142/96 -- -- 92 15 96 %   04/09/19 0505 (!) 127/57 -- -- 81 14 96 %   04/09/19 0405 135/62 97.3 °F (36.3 °C) Oral 85 18 100 %   04/09/19 0305 (!) 125/46 -- -- 70 14 100 %   04/09/19 0205 (!) 140/60 -- -- 68 14 99 %   04/09/19 0105 (!) 124/52 -- -- 64 11 100 %   04/09/19 0005 (!) 120/47 97.5 °F (36.4 °C) Oral 67 12 98 %   04/08/19 2305 (!) 114/49 -- -- 66 13 100 %   04/08/19 2205 117/61 -- -- 67 14 100 %   04/08/19 2105 (!) 127/48 -- -- 74 16 96 %   04/08/19 2005 129/62 -- -- 69 14 94 %   04/08/19 1940 111/80 97.5 °F (36.4 °C) Oral 91 18 95 %     Estimated body mass index is 32.12 kg/m² as calculated from the following:    Height as of this encounter: 5' 1\" (1.549 m). Weight as of this encounter: 170 lb (77.1 kg).  []<16 Severe malnutrition  []16-16.99 Moderate malnutrition  []17-18.49 Mild malnutrition  []18.5-24.9 Normal  []25-29.9 Overweight (not obese)  [x]30-34.9 Obese class 1 (Low Risk)  []35-39.9 Obese class 2 (Moderate Risk)  []?40 Obese class 3 (High Risk)      PHYSICAL EXAM       CONSTITUTIONAL:  Well developed, well nourished, alert and oriented x 3, in no acute distress. GCS 15. Nontoxic. Dysarthric speech. No aphasia. HEAD:  normocephalic, atraumatic    EYES:  PERRLA, EOMI.  Periorbital ecchymosis bilaterally   ENT:  moist mucous membranes   NECK:  supple, symmetric   LUNGS:  Equal air entry bilaterally; clear   CARDIOVASCULAR:  normal s1 / s2; irregular   ABDOMEN:  Soft, no rigidity   NEUROLOGIC:  Mental Status:  A & O x3,awake             Cranial Nerves:    II: Visual fields:  normal  III: Pupils:  equal, round, reactive to light  III,IV,VI: Extra Ocular Movements: intact  VII: Facial strength: abnormal mild right facial droop    Motor Exam:    Drift:  present - bilateral lower extremities  Tone:  normal    Motor exam is 4 out of 5 all extremities     Sensory:    Touch:    Right Upper Extremity:  normal  Left Upper Extremity:  normal  Right Lower Extremity:  normal  Left Lower Extremity:  normal        INTAKE/OUTPUT & DRAINS   24 HOUR INTAKE/OUTPUT:    Intake/Output Summary (Last 24 hours) at 4/9/2019 0730  Last data filed at 4/9/2019 0520  Gross per 24 hour   Intake 600 ml   Output 300 ml   Net 300 ml       DRAINS:  [x] There are no drains for Neuro Critical Care to monitor at this time.    LABS      Recent Results (from the past 24 hour(s))   Troponin    Collection Time: 04/08/19  8:03 AM   Result Value Ref Range    Troponin, High Sensitivity 124 (HH) 0 - 14 ng/L    Troponin T NOT REPORTED <0.03 ng/mL    Troponin Interp NOT REPORTED    Basic Metabolic Panel    Collection Time: 04/08/19  8:03 AM   Result Value Ref Range    Glucose 131 (H) 70 - 99 mg/dL    BUN 12 8 - 23 mg/dL    CREATININE 0.80 0.50 - 0.90 mg/dL    Bun/Cre Ratio NOT REPORTED 9 - 20    Calcium 8.7 8.6 - 10.4 mg/dL    Sodium 143 135 - 144 mmol/L    Potassium 3.3 (L) 3.7 - 5.3 mmol/L    Chloride 102 98 - 107 mmol/L    CO2 23 20 - 31 mmol/L    Anion Gap 18 (H) 9 - 17 mmol/L    GFR Non-African American >60 >60 mL/min    GFR African American >60 >60 mL/min    GFR Comment          GFR Staging NOT REPORTED    Brain Natriuretic Peptide    Collection Time: 04/08/19  8:03 AM   Result Value Ref Range    Pro-BNP 4,880 (H) <300 pg/mL    BNP Interpretation Pro-BNP Reference Range:    Lipid Panel    Collection Time: 04/08/19  8:03 AM   Result Value Ref Range    Cholesterol 119 <200 mg/dL    HDL 48 >40 mg/dL    LDL Cholesterol 55 0 - 130 mg/dL    Chol/HDL Ratio 2.5 <5    Triglycerides 79 <150 mg/dL    VLDL NOT REPORTED 1 - 30 mg/dL   Magnesium    Collection Time: 04/08/19  8:03 AM   Result Value Ref Range    Magnesium 1.4 (L) 1.6 - 2.6 mg/dL   Phosphorus    Collection Time: 04/08/19  8:03 AM   Result Value Ref Range    Phosphorus 3.7 2.6 - 4.5 mg/dL   CK isoenzymes    Collection Time: 04/08/19  8:03 AM   Result Value Ref Range    Total  (H) 26 - 192 U/L    CK-MB 5.8 (H) <5.4 ng/mL    % CKMB 1.5 0.0 - 3.0 %    CKMB Interpretation COMPATIBLE WITH SKELETAL MUSCLE ORIGIN    Myoglobin, Serum    Collection Time: 04/08/19  8:03 AM   Result Value Ref Range    Myoglobin 441 (H) 25 - 58 ng/mL   Procalcitonin    Collection Time: 04/08/19  8:03 AM   Result Value Ref Range    Procalcitonin 0.10 (H) <0.09 ng/mL   Hemoglobin A1c    Collection Time: 04/08/19 11:19 AM   Result Value Ref Range    Hemoglobin A1C 5.1 4.0 - 6.0 %    Estimated Avg Glucose 100 mg/dL   Calcium, Ionized    Collection Time: 04/08/19 11:19 AM   Result Value Ref Range    Calcium, Ion 1.10 (L) 1.13 - 1.33 mmol/L   CBC WITH AUTO DIFFERENTIAL    Collection Time: 04/08/19 11:27 AM   Result Value Ref Range    WBC 11.5 (H) 3.5 - 11.3 k/uL    RBC 3.10 (L) 3.95 - 5.11 m/uL    Hemoglobin 10.0 (L) 11.9 - 15.1 g/dL    Hematocrit 31.9 (L) 36.3 - 47.1 %    .9 82.6 - 102.9 fL    MCH 32.3 25.2 - 33.5 pg    MCHC 31.3 28.4 - 34.8 g/dL    RDW 14.3 11.8 - 14.4 %    Platelets 750 764 - 648 k/uL    MPV 12.6 8.1 - 13.5 fL    NRBC Automated 0.0 0.0 per 100 WBC    Differential Type NOT REPORTED     WBC Morphology NOT REPORTED     RBC Morphology NOT REPORTED     Platelet Estimate NOT REPORTED     Seg Neutrophils 89 (H) 36 - 65 %    Lymphocytes 5 (L) 24 - 43 %    Monocytes 6 3 - 12 %    Eosinophils % 0 (L) 1 - 4 %    Basophils 0 0 - 2 %    Immature Granulocytes 0 0 %    Segs Absolute 10.25 (H) 1.50 - 8.10 k/uL    Absolute Lymph # 0.55 (L) 1.10 - 3.70 k/uL    Absolute Mono # 0.64 0.10 - 1.20 k/uL    Absolute Eos # <0.03 0.00 - 0.44 k/uL    Basophils # 0.03 0.00 - 0.20 k/uL    Absolute Immature Granulocyte 0.04 0.00 - 0.30 k/uL   Basic Metabolic Panel    Collection Time: 04/09/19  4:20 AM   Result Value Ref Range    Glucose 95 70 - 99 mg/dL    BUN 14 8 - 23 mg/dL    CREATININE 0.88 0.50 - 0.90 mg/dL    Bun/Cre Ratio NOT REPORTED 9 - 20    Calcium 9.2 8.6 - 10.4 mg/dL    Sodium 139 135 - 144 mmol/L    Potassium 3.7 3.7 - 5.3 mmol/L    Chloride 104 98 - 107 mmol/L    CO2 21 20 - 31 mmol/L    Anion Gap 14 9 - 17 mmol/L    GFR Non-African American >60 >60 mL/min    GFR African American >60 >60 mL/min    GFR Comment          GFR Staging NOT REPORTED    Magnesium    Collection Time: 04/09/19  4:20 AM   Result Value Ref Range    Magnesium 2.3 1.6 - 2.6 mg/dL   Phosphorus    Collection Time: 04/09/19  4:20 AM   Result Value Ref Range    Phosphorus 3.7 2.6 - 4.5 mg/dL   Calcium, Ionized    Collection Time: 04/09/19  4:20 AM   Result Value Ref Range    Calcium, Ion 1.21 1.13 - 1.33 mmol/L   CBC auto differential    Collection Time: 04/09/19  4:20 AM   Result Value Ref Range    WBC 10.8 3.5 - 11.3 k/uL    RBC 3.21 (L) 3.95 - 5.11 m/uL    Hemoglobin 10.2 (L) 11.9 - 15.1 g/dL    Hematocrit 32.6 (L) 36.3 - 47.1 %    .6 82.6 - 102.9 fL    MCH 31.8 25.2 - 33.5 pg    MCHC 31.3 28.4 - 34.8 g/dL    RDW 14.2 11.8 - 14.4 %    Platelets 615 950 - 199 k/uL    MPV 12.5 8.1 - 13.5 fL    NRBC Automated 0.0 0.0 per 100 WBC    Differential Type NOT REPORTED     WBC Morphology NOT REPORTED     RBC Morphology NOT REPORTED     Platelet Estimate NOT REPORTED     Seg Neutrophils 74 (H) 36 - 65 %    Lymphocytes 12 (L) 24 - 43 %    Monocytes 12 3 - 12 %    Eosinophils % 1 1 - 4 %    Basophils 1 0 - 2 %    Immature Granulocytes 1 (H) 0 %    Segs Absolute 8.03 1.50 - 8.10 k/uL    Absolute Lymph # 1.28 1.10 - 3.70 k/uL    Absolute Mono # 1.30 (H) 0.10 - 1.20 k/uL    Absolute Eos # 0.12 0.00 - 0.44 k/uL    Basophils # 0.05 0.00 - 0.20 k/uL    Absolute Immature Granulocyte 0.06 0.00 - 0.30 k/uL         RADIOLOGY   Xr Abdomen (kub) (single Ap View)    Result Date: 4/7/2019  EXAMINATION: SINGLE SUPINE XRAY VIEW(S) OF THE ABDOMEN 4/7/2019 5:14 pm COMPARISON: None. HISTORY: ORDERING SYSTEM PROVIDED HISTORY: r/o metal for MRI TECHNOLOGIST PROVIDED HISTORY: r/o metal for MRI FINDINGS: Nonspecific bowel gas pattern. The biliary stent is noted in the right upper quadrant.   Stool throughout the adjustment of the mA/kV was utilized to reduce the radiation dose to as low as reasonably achievable. COMPARISON: 02/07/2019 HISTORY: ORDERING SYSTEM PROVIDED HISTORY: ACUTE VISUAL DEFICIT (OTHER THAN PHOTOPHOBIA AND AURA) TECHNOLOGIST PROVIDED HISTORY: Slurred speech FINDINGS: BRAIN/VENTRICLES: There is no acute infarct or acute intracranial hemorrhage present. There is no mass effect or midline shift present. There is no ventriculomegaly or abnormal extra-axial fluid collection present. There is diffuse cerebral volume loss. Periventricular hypoattenuation is present. ORBITS: Limited evaluation of the orbits is unremarkable. SINUSES: The paranasal sinuses and mastoid air cells are clear. SOFT TISSUES/SKULL:  No lytic or blastic osseous lesions are identified. 1. No acute intracranial process identified. 2. Stable diffuse cerebral volume loss and chronic small vessel ischemic changes. The above findings were discussed with Dr. Krystal Donnelly at 12:20 p.m. on 04/07/2019. Xr Chest Portable    Result Date: 4/8/2019  EXAMINATION: SINGLE XRAY VIEW OF THE CHEST 4/8/2019 7:01 am COMPARISON: 7 April 2019 HISTORY: ORDERING SYSTEM PROVIDED HISTORY: SOB TECHNOLOGIST PROVIDED HISTORY: SOB FINDINGS: AP portable view of the chest time stamped at 653 hours demonstrates overlying cardiac monitoring electrodes. Stable mild cardiomegaly, and subtle mild vascular congestion with tiny effusions are redemonstrated. No localized consolidation or extrapleural air is noted. Aorta is calcified ectatic. Osseous structures are age-appropriate with scoliotic curvature. No significant change from prior study. Mild cardiomegaly and subtle vascular congestion are noted.      Xr Chest Portable    Result Date: 4/7/2019  EXAMINATION: SINGLE XRAY VIEW OF THE CHEST 4/7/2019 4:31 pm COMPARISON: February 7, 2019 HISTORY: ORDERING SYSTEM PROVIDED HISTORY: Concern for fluid overload TECHNOLOGIST PROVIDED HISTORY: Concern for fluid overload FINDINGS: Cardiac monitoring leads overlie the chest.  Enlarged heart with central vascular congestion and hazy left basilar opacity. Trace effusions. No pneumothorax. Shallow inspiration magnified cardiac size and pulmonary vasculature. Cardiomegaly and mild vascular congestion magnified by shallow inspiration. Cta Neck W Contrast    Addendum Date: 4/7/2019    ADDENDUM: Curved multiplanar reformats post processed on independent workstation have become available. No change to the original report. Result Date: 4/7/2019  EXAMINATION: CTA OF THE NECK 4/7/2019 4:05 pm TECHNIQUE: CTA of the neck was performed with the administration of intravenous contrast. Multiplanar reformatted images are provided for review. MIP images are provided for review. Stenosis of the internal carotid arteries measured using NASCET criteria. Dose modulation, iterative reconstruction, and/or weight based adjustment of the mA/kV was utilized to reduce the radiation dose to as low as reasonably achievable. COMPARISON: None. HISTORY: ORDERING SYSTEM PROVIDED HISTORY: speech difficulty, right hemibody weakness TECHNOLOGIST PROVIDED HISTORY: Ordering Physician Provided Reason for Exam: CVA ALERT Acuity: Acute Type of Exam: Initial FINDINGS: AORTIC ARCH/ARCH VESSELS: Great vessel origins are tortuous. There is a bovine arch variant branch pattern of the aortic arch. Both common carotid arteries and the right subclavian artery have a common origin off the arch. No significant stenosis is seen of the innominate artery or subclavian arteries. CAROTID ARTERIES: Moderate calcified plaque and moderate stenosis right carotid bulb. Severe calcified plaque and severe stenosis left common carotid artery bulb and proximal internal carotid artery. Common, internal, and external carotid arteries otherwise unremarkable. VERTEBRAL ARTERIES: Occlusion proximal right vertebral artery V1 segment.  There is moderate segmental narrowing of the V3 segment. Remainder of the artery appears normal.  Left vertebral artery is dominant and otherwise normal in course and caliber. There is some calcified plaque at its origin but no significant stenosis appreciated. SOFT TISSUES:  The lung apices are clear. No cervical or superior mediastinal lymphadenopathy. The visualized portion of the larynx and pharynx appear unremarkable. The parotid, submandibular and thyroid glands demonstrate no acute abnormality. Fluid collection right shoulder region incompletely imaged partially outside field of view measuring at least 27 x 50 mm in cross-sectional diameter. BONES: The visualized osseous structures appear unremarkable. Moderately severe 75-85% stenosis proximal left internal carotid artery. Moderate stenosis proximal right internal carotid artery at 50%. 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 Narrowed V3 segment right vertebral artery. RECOMMENDATIONS: Case discussed with Dr. Vanna Canales at 4:55 p.m. [facility] Ct Brain Perfusion    Result Date: 4/7/2019  EXAMINATION: CT OF THE HEAD WITH CONTRAST WITH PERFUSION 4/7/2019 4:05 pm: TECHNIQUE: CT of the head/brain was performed with the administration of intravenous contrast. Multiplanar reformatted images are provided for review. MIP images are provided for review. Dose modulation, iterative reconstruction, and/or weight based adjustment of the mA/kV was utilized to reduce the radiation dose to as low as reasonably achievable. COMPARISON: None. HISTORY: ORDERING SYSTEM PROVIDED HISTORY: aphasia, right hemibody weakness TECHNOLOGIST PROVIDED HISTORY: Ordering Physician Provided Reason for Exam: CVA ALERT Acuity: Acute Type of Exam: Initial FINDINGS: CT PERFUSION: Possible small area of increased mean transit time left posterior division MCA territory. Possible small matching area of decreased cerebral blood flow. Cerebral blood volume appears relatively symmetric.  Increased mean transit time right posterior cerebellar cortex with associated decrease in cerebral blood flow and relatively symmetric cerebral blood volume. 1. Possible small area of ischemia posterior MCA territory on the left. 2. Possible ischemia or crossed cerebellar diaschisis right cerebellar hemisphere. RECOMMENDATIONS: Case discussed with Dr. Lala Baptiste at 4:55 p.m. [facility] Cta Head W Contrast    Addendum Date: 4/7/2019    ADDENDUM: 3D reformats post processed on an independent workstation have become available. No change to the original report. Result Date: 4/7/2019  EXAMINATION: CTA OF THE HEAD WITH CONTRAST  4/7/2019 4:05 pm: TECHNIQUE: CTA of the head/brain was performed with the administration of intravenous contrast. Multiplanar reformatted images are provided for review. MIP images are provided for review. Dose modulation, iterative reconstruction, and/or weight based adjustment of the mA/kV was utilized to reduce the radiation dose to as low as reasonably achievable. COMPARISON: CT of the brain from today HISTORY: ORDERING SYSTEM PROVIDED HISTORY: speech difficulty, right hemibody weakness TECHNOLOGIST PROVIDED HISTORY: Ordering Physician Provided Reason for Exam: CVA  ALERT Acuity: Acute Type of Exam: Initial FINDINGS: ANTERIOR CIRCULATION: The internal carotid arteries are normal in course and caliber without focal stenosis. The anterior cerebral and middle cerebral arteries demonstrate no focal stenosis. Calcified plaque in the cavernous segments causing mild narrowing on the right. Left A1 segment is hypoplastic. POSTERIOR CIRCULATION: The posterior cerebral arteries demonstrate no focal stenosis. The vertebral and basilar arteries appear unremarkable. BRAIN: No mass effect or midline shift. No abnormal extra-axial fluid collection. The gray-white differentiation appears grossly maintained. New or increasing subcutaneous hematoma right forehead.      Enlarging subcutaneous hematoma right forehead otherwise unremarkable CTA of the head. RECOMMENDATIONS: Discussed with Dr. Lala Baptiste at 4 55 p.m. Mri Brain Wo Contrast    Result Date: 4/7/2019  EXAMINATION: MRI OF THE BRAIN WITHOUT CONTRAST  4/7/2019 5:35 pm TECHNIQUE: Limited multiplanar multisequence MRI of the brain was performed without the administration of intravenous contrast. COMPARISON: CT brain, CTA brain and CT perfusion brain from today. HISTORY: ORDERING SYSTEM PROVIDED HISTORY: stroke alert; aphasia, right sided weakness. FLAIR and DIFFUSION only FINDINGS: INTRACRANIAL STRUCTURES/VENTRICLES: There is no acute infarct. No mass effect or midline shift. No evidence of an acute intracranial hemorrhage. The ventricles and sulci are prominent in size and configuration. The sellar/suprasellar regions appear unremarkable. The normal signal voids within the major intracranial vessels appear maintained. Moderate periventricular T2 lengthening. ORBITS: The visualized portion of the orbits demonstrate no acute abnormality. SINUSES: The visualized paranasal sinuses and mastoid air cells are well aerated. BONES/SOFT TISSUES: Right forehead subcutaneous hematoma. Moderately severe microangiopathic changes throughout. No acute infarct. RECOMMENDATIONS: Case discussed with Dr. Lala Baptiste at 6 p.m. .         Labs and Images reviewed with:  [x] Tracy Scheuermann, MD  [] Valarie Garcia MD  [] Warren You MD  [] there are no new interval images to review.      ASSESSMENT AND PLAN:     ASSESSMENT:     The patient is a 79 yo female with a history of HTN, HLD, CKD stage 3, and hypothyroidism who presents as a transfer from [address] [place] [address] s/p [facility] for stroke like symptoms.  Initial NIH 9.  On arrival to [facility] ED, NIH 15; severe expressive aphasia and dysarthria, mild receptive aphasia, right facial droop, right upper extremity drift, bilateral lower extremity weakness.  Found to be in new onset afib.  CTA Head/Neck revealed severe left ICA stenosis.     NEUROLOGIC:  - Expressive aphasia, dysarthria, right facial droop  - CT Head with no acute findings  - S/P TPA administration @3846  - CTA Head/Neck revealed severe left ICA stenosis  - STAT MRI Brain did not demonstrate an acute infarct  - Repeat CT Head 4/8 negative for acute findings  - Follow up CT facial and CT cervical  - Discuss mastoiditis with ENT to determine need for treatment  - Carotid dopplers: 50-69% bilateral ICA stenosis  - EEG: left hemisphere delta and theta slowing  - Neuro- Endovascular following, no surgical plans  - Provigil 100 mg x 1 dose  - Goal SBP less than 160  - Neuro checks per protocol     CARDIOVASCULAR:  - Goal SBP < 160  - PRN Hydralazine/Labetalol  - History of HTN  - Metoprolol 25 mg BID  - New onset atrial fibrillation on EKG  - Cardiology following, plan MR workup outpatient  - Start Eliquis this evening  - Troponin trending down, 148 - 136  - Echocardiogram: hyperdynamic LV function, EF > 32%, grade 2 diastolic dysfunction, severely dilated LA with moderate-severe MR  - Lipid panel: Cholesterol 119; LDL 55  - History of HLD; Lipitor 40mg QHS  - Venous dopplers negative  - Continue telemetry     PULMONARY:  - Maintaining O2 sats on nasal canula  - CXR with cardiomegaly and mild vascular congestion  - BNP 4880  - Continue to monitor for respiratory distress     RENAL/FLUID/ELECTROLYTE:  - Reported history of CKD stage 3  - BUN 14 / Creatinine 0.88  - Monitor I&Os  - Replace electrolytes PRN  - Daily BMP     GI/NUTRITION:  NUTRITION: General diet  - Bowel regimen: Milk of Magnesia  - GI prophylaxis: Not indicated     ID/HEME:  - Afebrile, Tmax 37.1  - No leukocytosis, WBC 10.8  - UA - nitrite positive, No WBCs  - Urine culture: e coli and group b strep, concern for colonization  - watch off antibiotics  - Procalcitionin 0.10  - Continue to monitor for fevers  - H&H 10.2/32.6  - Platelets 210  - Daily CBC     ENDOCRINE:  - Continue to monitor blood glucose, goal <180  - Recent hemoglobin A1C 4/1/19 was 5.1     OTHER:  - PT/OT/ST  - Code Status: FULL     PROPHYLAXIS:  Stress ulcer: Not indicated     DVT PROPHYLAXIS:  - SCD sleeves - Thigh High   - Eliquis 5 mg BID      CODE STATUS: Full    DISPOSITION:  [x] OK for out of ICU from Neuro Critical Care standpoint - signout to neurology    For any changes in exam or patient status please contact Neuro Critical Care.       KATIA Ghosh - [facility]  Neuro Critical Care  Pager [phone]  4/9/2019     7:30 AM

## 2019-04-09 NOTE — PROGRESS NOTES
[facility]  Progress Note                   Date:   4/9/2019  Patient name: Leonela Shah  Date of admission:  4/7/2019  3:53 PM  MRN:   [id number]  YOB: 1930  PCP: Keysha Espinosa MD    Reason for Admission: Stroke-like symptoms [R29.90]    Subjective:       Clinical Changes /Abnormalities: Seen & examined in room sitting quietly in bedside chair eating lunch. Denies CP or SOB. Echo reviewed. Tele reviewed - episodes of SR with PVCs and Afib rate controlled noted. Denies palpitations or dizziness. Review of Systems    Medications:   Scheduled Meds:   atenolol  25 mg Oral Daily    allopurinol  100 mg Oral Daily    apixaban  5 mg Oral BID    sennosides-docusate sodium  2 tablet Oral Daily    aspirin  81 mg Oral Daily    levothyroxine  112 mcg Oral Daily    sodium chloride flush  10 mL Intravenous 2 times per day    atorvastatin  40 mg Oral Nightly    chlorhexidine  15 mL Mouth/Throat BID     Continuous Infusions:  CBC:   Recent Labs     04/07/19  1435 04/08/19  1127 04/09/19  0420   WBC 9.9 11.5* 10.8   HGB 12.1 10.0* 10.2*    163 147     BMP:    Recent Labs     04/07/19  1435 04/08/19  0803 04/09/19  0420    143 139   K 3.7 3.3* 3.7    102 104   CO2 27 23 21   BUN 12 12 14   CREATININE 0.92* 0.80 0.88   GLUCOSE 146* 131* 95     Hepatic:  Recent Labs     04/07/19  1435   AST 24   ALT 10   BILITOT 0.97   ALKPHOS 108*     Troponin:   Recent Labs     04/07/19  1657 04/07/19  2255 04/08/19  0803   TROPHS 148* 136* 124*     BNP: No results for input(s): BNP in the last 72 hours.   Lipids:   Recent Labs     04/08/19  0803   CHOL 119   HDL 48     INR:   Recent Labs     04/07/19  1435   INR 1.1       Objective:   Vitals: BP (!) 107/44   Pulse 77   Temp 99.1 °F (37.3 °C)   Resp 19   Ht 5' 1\" (1.549 m)   Wt 170 lb (77.1 kg)   SpO2 96%   BMI 32.12 kg/m²   General appearance: alert and cooperative with exam  HEENT: Head: Normocephalic, no lesions, without obvious abnormality. Neck:no JVD, trachea midline, no adenopathy  Lungs: Clear to auscultation throughout. On RA  Heart: Irregular rate and rhythm, s1/s2 auscultated, no murmurs. Back and forth Afib/SR. Rate controlled  Abdomen: soft, non-tender, bowel sounds active  Extremities: no edema  Neurologic: not done    Echo 4/8/19  Summary  Hyperdynamic LV function with EF>60%. Grade 2 diastolic function. Severely dilated LA with moderate-severe MR (underestimated by color flow). Consider MAMTA. Large LA with A. fib is a significant potential source of thromboembolism. No thrombus seen on TTE. Consider MAMTA. Moderate TR    Assessment / Acute Cardiac Problems:   1. Afib RVR  2. Troponinemia  3. CKD  4. UTI  5. Diastolic CHF on echo  6. Moderate-severe MR on echo  7. Moderate TR  8. Left hemispheric cerebral infarction s/p IV TPA  9.  Left carotid stenosis    Patient Active Problem List:     Hypothyroidism     Hyperlipidemia     Hypertension     Gout     Arthritis     Irritable bowel syndrome     Biliary obstruction     Abnormal finding on imaging     Elevated LFTs     Abdominal discomfort     Acute cystitis     Choledocholithiasis     Right leg pain     Osteoarthritis of multiple joints     VERONICA (acute kidney injury) (HCC)     Hypomagnesemia     Diarrhea     Stroke-like symptoms     Atrial fibrillation with RVR (HCC)     Left carotid artery stenosis     Cerebral infarction, unspecified (HCC)    [id number] Score for Atrial Fibrillation Stroke Risk   Risk   Factors  Component Value   C CHF No 0   H HTN Yes 1   A2 Age >= 76 Yes,  (80 y.o.) 2   D DM No 0   S2 Prior Stroke/TIA No 0   V Vascular Disease No 0   A Age 74-69 No,  (80 y.o.) 0   Sc Sex female 1    [id number]  Score  4   Score last updated 7/0/98 2:94 PM    Click here for a link to the UpToDate guideline \"Atrial Fibrillation: Anticoagulation therapy to prevent embolization    Disclaimer: Risk Score calculation is dependent on accuracy of patient problem list and past encounter diagnosis. Plan of Treatment:   1. Remains on LTME per neuro  2. Echo reviewed - mod to severe MR, underestimated. Started on Eliquis BID. Continue with rate control at this time, Metoprolol 25mg BID. Will likely need MAMTA to further evaluate MR once acute neurological issue stabilizes.  Will follow    Electronically signed by KATIA Ferrell CNP on 4/9/2019 at 1:27 PM  [address]  [phone]

## 2019-04-10 VITALS
OXYGEN SATURATION: 96 % | BODY MASS INDEX: 32.1 KG/M2 | SYSTOLIC BLOOD PRESSURE: 140 MMHG | RESPIRATION RATE: 14 BRPM | HEART RATE: 82 BPM | WEIGHT: 170 LBS | HEIGHT: 61 IN | TEMPERATURE: 97.9 F | DIASTOLIC BLOOD PRESSURE: 84 MMHG

## 2019-04-10 LAB
ABSOLUTE EOS #: 0.19 K/UL (ref 0–0.44)
ABSOLUTE IMMATURE GRANULOCYTE: 0.04 K/UL (ref 0–0.3)
ABSOLUTE LYMPH #: 0.99 K/UL (ref 1.1–3.7)
ABSOLUTE MONO #: 0.94 K/UL (ref 0.1–1.2)
ANION GAP SERPL CALCULATED.3IONS-SCNC: 9 MMOL/L (ref 9–17)
BASOPHILS # BLD: 0 % (ref 0–2)
BASOPHILS ABSOLUTE: 0.03 K/UL (ref 0–0.2)
BUN BLDV-MCNC: 14 MG/DL (ref 8–23)
BUN/CREAT BLD: ABNORMAL (ref 9–20)
CALCIUM IONIZED: 1.18 MMOL/L (ref 1.13–1.33)
CALCIUM SERPL-MCNC: 8.6 MG/DL (ref 8.6–10.4)
CHLORIDE BLD-SCNC: 104 MMOL/L (ref 98–107)
CO2: 25 MMOL/L (ref 20–31)
CREAT SERPL-MCNC: 1 MG/DL (ref 0.5–0.9)
DIFFERENTIAL TYPE: ABNORMAL
EKG ATRIAL RATE: 110 BPM
EKG ATRIAL RATE: 127 BPM
EKG Q-T INTERVAL: 258 MS
EKG Q-T INTERVAL: 346 MS
EKG QRS DURATION: 72 MS
EKG QRS DURATION: 72 MS
EKG QTC CALCULATION (BAZETT): 370 MS
EKG QTC CALCULATION (BAZETT): 482 MS
EKG R AXIS: 24 DEGREES
EKG R AXIS: 31 DEGREES
EKG T AXIS: -150 DEGREES
EKG T AXIS: -8 DEGREES
EKG VENTRICULAR RATE: 117 BPM
EKG VENTRICULAR RATE: 124 BPM
EOSINOPHILS RELATIVE PERCENT: 2 % (ref 1–4)
GFR AFRICAN AMERICAN: >60 ML/MIN
GFR NON-AFRICAN AMERICAN: 52 ML/MIN
GFR SERPL CREATININE-BSD FRML MDRD: ABNORMAL ML/MIN/{1.73_M2}
GFR SERPL CREATININE-BSD FRML MDRD: ABNORMAL ML/MIN/{1.73_M2}
GLUCOSE BLD-MCNC: 94 MG/DL (ref 70–99)
HCT VFR BLD CALC: 27.7 % (ref 36.3–47.1)
HEMOGLOBIN: 8.6 G/DL (ref 11.9–15.1)
IMMATURE GRANULOCYTES: 0 %
LYMPHOCYTES # BLD: 11 % (ref 24–43)
MAGNESIUM: 1.9 MG/DL (ref 1.6–2.6)
MCH RBC QN AUTO: 32.3 PG (ref 25.2–33.5)
MCHC RBC AUTO-ENTMCNC: 31 G/DL (ref 28.4–34.8)
MCV RBC AUTO: 104.1 FL (ref 82.6–102.9)
MONOCYTES # BLD: 10 % (ref 3–12)
NRBC AUTOMATED: 0 PER 100 WBC
PDW BLD-RTO: 13.9 % (ref 11.8–14.4)
PHOSPHORUS: 3 MG/DL (ref 2.6–4.5)
PLATELET # BLD: 154 K/UL (ref 138–453)
PLATELET ESTIMATE: ABNORMAL
PMV BLD AUTO: 12.6 FL (ref 8.1–13.5)
POTASSIUM SERPL-SCNC: 3.6 MMOL/L (ref 3.7–5.3)
RBC # BLD: 2.66 M/UL (ref 3.95–5.11)
RBC # BLD: ABNORMAL 10*6/UL
SEG NEUTROPHILS: 77 % (ref 36–65)
SEGMENTED NEUTROPHILS ABSOLUTE COUNT: 6.87 K/UL (ref 1.5–8.1)
SODIUM BLD-SCNC: 138 MMOL/L (ref 135–144)
WBC # BLD: 9.1 K/UL (ref 3.5–11.3)
WBC # BLD: ABNORMAL 10*3/UL

## 2019-04-10 PROCEDURE — 83735 ASSAY OF MAGNESIUM: CPT

## 2019-04-10 PROCEDURE — 99233 SBSQ HOSP IP/OBS HIGH 50: CPT | Performed by: PSYCHIATRY & NEUROLOGY

## 2019-04-10 PROCEDURE — 6370000000 HC RX 637 (ALT 250 FOR IP): Performed by: NURSE PRACTITIONER

## 2019-04-10 PROCEDURE — 97127 HC SP THER IVNTJ W/FOCUS COG FUNCJ: CPT

## 2019-04-10 PROCEDURE — 97116 GAIT TRAINING THERAPY: CPT

## 2019-04-10 PROCEDURE — 2580000003 HC RX 258: Performed by: NURSE PRACTITIONER

## 2019-04-10 PROCEDURE — 97110 THERAPEUTIC EXERCISES: CPT

## 2019-04-10 PROCEDURE — 6370000000 HC RX 637 (ALT 250 FOR IP): Performed by: STUDENT IN AN ORGANIZED HEALTH CARE EDUCATION/TRAINING PROGRAM

## 2019-04-10 PROCEDURE — 36415 COLL VENOUS BLD VENIPUNCTURE: CPT

## 2019-04-10 PROCEDURE — 84100 ASSAY OF PHOSPHORUS: CPT

## 2019-04-10 PROCEDURE — 6370000000 HC RX 637 (ALT 250 FOR IP): Performed by: PSYCHIATRY & NEUROLOGY

## 2019-04-10 PROCEDURE — 6370000000 HC RX 637 (ALT 250 FOR IP): Performed by: NEUROLOGICAL SURGERY

## 2019-04-10 PROCEDURE — 80048 BASIC METABOLIC PNL TOTAL CA: CPT

## 2019-04-10 PROCEDURE — 82330 ASSAY OF CALCIUM: CPT

## 2019-04-10 PROCEDURE — 85025 COMPLETE CBC W/AUTO DIFF WBC: CPT

## 2019-04-10 RX ORDER — POTASSIUM CHLORIDE 20 MEQ/1
40 TABLET, EXTENDED RELEASE ORAL ONCE
Status: COMPLETED | OUTPATIENT
Start: 2019-04-10 | End: 2019-04-10

## 2019-04-10 RX ORDER — CHLORHEXIDINE GLUCONATE 0.12 MG/ML
15 RINSE ORAL 2 TIMES DAILY
Qty: 420 ML | Refills: 0 | Status: SHIPPED | OUTPATIENT
Start: 2019-04-10 | End: 2019-04-24

## 2019-04-10 RX ORDER — ASPIRIN 81 MG/1
81 TABLET ORAL DAILY
Qty: 30 TABLET | Refills: 3 | Status: SHIPPED | OUTPATIENT
Start: 2019-04-11

## 2019-04-10 RX ORDER — ATORVASTATIN CALCIUM 40 MG/1
40 TABLET, FILM COATED ORAL NIGHTLY
Qty: 30 TABLET | Refills: 3 | Status: ON HOLD | OUTPATIENT
Start: 2019-04-10 | End: 2019-06-21 | Stop reason: HOSPADM

## 2019-04-10 RX ADMIN — LEVOTHYROXINE SODIUM 112 MCG: 112 TABLET ORAL at 09:26

## 2019-04-10 RX ADMIN — SENNOSIDES AND DOCUSATE SODIUM 2 TABLET: 8.6; 5 TABLET ORAL at 09:26

## 2019-04-10 RX ADMIN — Medication 15 ML: at 09:30

## 2019-04-10 RX ADMIN — ALLOPURINOL 100 MG: 100 TABLET ORAL at 09:26

## 2019-04-10 RX ADMIN — ASPIRIN 81 MG: 81 TABLET ORAL at 09:27

## 2019-04-10 RX ADMIN — Medication 10 ML: at 09:30

## 2019-04-10 RX ADMIN — POTASSIUM CHLORIDE 40 MEQ: 1500 TABLET, EXTENDED RELEASE ORAL at 15:24

## 2019-04-10 RX ADMIN — APIXABAN 5 MG: 5 TABLET, FILM COATED ORAL at 09:27

## 2019-04-10 NOTE — PROGRESS NOTES
Physical Therapy  [address]  Daily Treatment Note  NAME: John Gomes  : 1930  MRN: [id number]    Date of Service: 4/10/2019    Discharge Recommendations:    Further therapy recommended at discharge. PT Equipment Recommendations  Equipment Needed: No    Patient Diagnosis(es): The primary encounter diagnosis was Stroke-like symptoms. Diagnoses of Stenosis of internal carotid artery with cerebral infarction, left ([facility] Acute cystitis without hematuria, Hypomagnesemia, and Atrial fibrillation with RVR ([facility] were also pertinent to this visit. has a past medical history of Arthritis, Chronic kidney disease, stage 3 ([facility] Gout, Hyperlipidemia, Hypertension, Hypothyroidism, and Irritable bowel syndrome. has a past surgical history that includes Cataract removal with implant (Bilateral); Cholecystectomy; Carpal tunnel release (); ERCP (N/A, 2019); Total knee arthroplasty (Right); and Total knee arthroplasty (Left). Restrictions  Restrictions/Precautions  Restrictions/Precautions: Fall Risk, General Precautions  Required Braces or Orthoses?: No  Implants present? : Deep brain stimulator  Position Activity Restriction  Other position/activity restrictions: LTME, Neg for DVT's  Subjective   General  Response To Previous Treatment: Patient with no complaints from previous session.   Family / Caregiver Present: No  Subjective  Subjective: Pt agreeable to PT  Pain Screening  Patient Currently in Pain: Denies  Vital Signs  Patient Currently in Pain: Denies       Orientation  Orientation  Overall Orientation Status: Within Functional Limits  Cognition      Objective   Bed mobility  Comment: Up to chair upon arrival  Transfers  Sit to Stand: Minimal Assistance  Stand to sit: Contact guard assistance  Ambulation  Ambulation?: Yes  Ambulation 1  Surface: level tile  Device: Rolling Walker  Assistance: Contact guard assistance  Quality of Gait: Demo decreased cadenc with flexed posture, but steady, no LOB  Distance: 200 ft  Comments: Cues to maintain ELENA within RW  Stairs/Curb  Stairs?: No     Balance  Posture: Good  Sitting - Static: Good  Sitting - Dynamic: Good  Standing - Static: Good  Standing - Dynamic: Fair;+  Comments: Standing balance assessed with RW    Exercise  Seated LE exercise program: Long Arc Quads, hip abduction/adduction, heel/toe raises, and marches. Reps: 15x                       Assessment   Body structures, Functions, Activity limitations: Decreased functional mobility ; Decreased endurance;Decreased strength;Decreased balance  Assessment: Patient completing functional transfers with minimal physical assistance. Safely ambulated household distances. Balance and endurance deficits. Prognosis: Good  REQUIRES PT FOLLOW UP: Yes  Activity Tolerance  Activity Tolerance: Patient Tolerated treatment well          Goals  Short term goals  Time Frame for Short term goals: 10  Short term goal 1: Pt to complete bed mobility tasks independently  Short term goal 2: Pt to complete transfers using standard cane independently  Short term goal 3: Pt to amb 300' using standard cane with SBA  Short term goal 4: Pt to tolerate 30 min activity to assist with increasing strength and endurance. Plan    Plan  Times per week: 5-6x/week  Times per day: Daily  Specific instructions for Next Treatment: Ambulation with RW wit progression towards standard cane as safe and able.    Current Treatment Recommendations: Strengthening, Gait Training, Functional Mobility Training, Transfer Training, Safety Education & Training, Endurance Training  Safety Devices  Type of devices: Nurse notified, Gait belt, Patient at risk for falls, Call light within reach, Left in chair  Restraints  Initially in place: No     Therapy Time   Individual Concurrent Group Co-treatment   Time In 1103         Time Out 1129         Minutes [address] [place] [address]

## 2019-04-10 NOTE — CARE COORDINATION
Tiny 8, onsite visit today. Voicemail message left for [facility] liaison, to verify pt can transfer if discharged today. Call made to Darylene Hotter in Admissions at [facility]. She will verify w/Dolly that pt is appropriate for admission tonight. Per Darylene Hotter, pt can transfer to [facility] if discharged. DESAEN completed. [facility] approximately 5pm.  Faxed CORONAS and DESEAN to [phone]  Pt's assigned unit:  [facility]  Number for report given to RN:  [phone]    Updated Laz Nemwan with transport time. Left voicemail message for pt's son Odessa Duggan and informed pt of transport time. Ginger Reyes again to inform him of delay in transport. Michelle Todd arrived to unit at 1815. Odessa Urban very appreciative.

## 2019-04-10 NOTE — PROGRESS NOTES
Speech Language Pathology  Speech Language Pathology  [address] [facility]    Cognitive Treatment Note    Date: 4/10/2019  Patients Name: Cathy Sellers  MRN: [id number]  Diagnosis:   Patient Active Problem List   Diagnosis Code    Hypothyroidism E03.9    Hyperlipidemia E78.5    Hypertension I10    Gout M10.9    Arthritis M19.90    Irritable bowel syndrome K58.9    Biliary obstruction K83.1    Abnormal finding on imaging R93.89    Elevated LFTs R94.5    Abdominal discomfort R10.9    Acute cystitis N30.00    Choledocholithiasis K80.50    Right leg pain M79.604    Osteoarthritis of multiple joints M15.9    VERONICA (acute kidney injury) ([facility] N17.9    Hypomagnesemia E83.42    Diarrhea R19.7    Stroke-like symptoms R29.90    Atrial fibrillation with RVR (HCC) I48.91    Left carotid artery stenosis I65.22    Cerebral infarction, unspecified ([facility] I63.9       Pain: 0/10    Cognitive Treatment    Treatment time: 2:00-2:20      Subjective: [x] Alert [x] Cooperative     [] Confused     [] Agitated    [] Lethargic      Objective/Assessment:    Recall: (Delayed, 3 units): 2/3 increased to 3/3 with min verbal cues, 1/3 increased to 3/3 with mod verbal cues. Organization: Categorization (Fruits): +8      (Things you drink): +7 increased to +8 with min verbal cues. Problem Solving/Reasoning: Antonyms: 9/10 increased to 10/10 with min verbal cues. Problem Solving (Answering Questions): 6/10 increased to 10/10 with min verbal cues. Word Deductions: 9/10 increased to 10/10 with min verbal cues. Stating Situational Problems: 8/10 increased to 10/10 with min verbal cues. Further therapy recommended at discharge. The patient should be able to tolerate at least three hours of therapy per day over 5 days or 15 hours over 7 days.      Plan:  [x] Continue ST services    [] Discharge from :      Discharge recommendations: [] Inpatient Rehab   [] [place]   [] Outpatient

## 2019-04-10 NOTE — PROGRESS NOTES
[facility] Neurology   [address] [place] [address]    Progress Note            Date:   4/10/2019  Patient name:  Lazarus Prey  Date of admission:  4/7/2019  3:53 PM  MRN:   [id number]  Account:  [de-identified]  YOB: 1930  PCP:    Rai Murrell MD  Room:   [address] [place] [address]  Code Status:    Full Code        Interval History      4/9/2019: No acute events. Aphasia has improved. She had an episode of atrial fibrillation with RVR of 130 beats per minutes. She received 25 mg of metoprolol IV. Had transient hypotension. Currently she has normal sinus rhythm. She is being transferred from Neuro-ICU to the in-patient [facility]. Received modafinil yesterday morning and has been evaluated for improvement of her mental status. Cardiology recommended MAMTA for evaluation of MR as outpatient. Over the last 24 hours, no acute events overnight. She has been neurologically stable overnight. No headache, dizziness, focal weakness, confusion, or changes in speech. History of Present Illness: The patient is a 80 y.o. Non-/non  female who presents with Cerebrovascular Accident   and she is admitted to the hospital for the management of  acute ischemic stroke, new onset atrial fibrillation. She was transferred from [facility] 2 days ago status post TPA. Initial NIHSS was 9. Symptoms include right upper extremity weakness, expressive aphasia, dysarthria, bilateral lower extremity weakness. After arriving to a facility NIH was reported to be 15 with severe dysarthria and aphasia. Was found to have atrial fibrillation. CTA showed hypoplastic left A1 segment. [facility] MRI showed no acute infarct. Moderately severe microangiopathic changes. Carotid Doppler showed bilateral ICA stenosis of 50-69%. She was admitted to the neuro critical care unit for post-TPA monitoring. After admission, her condition has been waxing and waning.   She would go into episodes of fluent speech followed by aphasia and confusion. EEG video monitoring has been ongoing for 2 days. It showed background activity of 8-9 Hz alpha waves better formed on the right hemisphere. Frequent runs of 3-5 Hz polymorphic delta on the left hemisphere during sleep, vertex sharp waves and symmetric spindles were seen. These findings could be due to postictal slowing. She was started on eliquis 5 mg twice a day for atrial fibrillation. Past Medical History:     Past Medical History:   Diagnosis Date    Arthritis     Chronic kidney disease, stage 3 ([address]     Gout     Hyperlipidemia     Hypertension     Hypothyroidism     Irritable bowel syndrome         Past Surgical History:     Past Surgical History:   Procedure Laterality Date    CARPAL TUNNEL RELEASE  2012    Dr. Dina Perez - right side    CATARACT REMOVAL WITH IMPLANT Bilateral     CHOLECYSTECTOMY      ERCP N/A 2/11/2019    Driss Birmingham MD - ERCP, Sphincterotomy, balloon dilatation and stent placement with removal of multiple stones    TOTAL KNEE ARTHROPLASTY Right     TOTAL KNEE ARTHROPLASTY Left         Medications Prior to Admission:     Prior to Admission medications    Medication Sig Start Date End Date Taking?  Authorizing Provider   levothyroxine (SYNTHROID) 112 MCG tablet Take 1 tablet by mouth Daily 11/29/18   Sukumar Carrion MD   atorvastatin (LIPITOR) 20 MG tablet Take 1 tablet by mouth nightly 11/29/18   Sukumar Carrion MD   atenolol (TENORMIN) 50 MG tablet Take 1 tablet by mouth every morning 11/29/18   Sukumar Carrion MD   allopurinol (ZYLOPRIM) 100 MG tablet Take 1 tablet by mouth daily 11/29/18   Sukumar Carrion MD   acetaminophen (TYLENOL) 500 MG tablet Take 500 mg by mouth every 6 hours as needed for Pain    Historical Provider, MD        Current Facility-Administered Medications   Medication Dose Route Frequency Provider Last Rate Last Dose    allopurinol (ZYLOPRIM) tablet 100 mg  100 mg Oral Daily Casi REYNA MD Ricky   100 mg at 04/09/19 1147    apixaban (ELIQUIS) tablet 5 mg  5 mg Oral BID Erick Reyez MD   5 mg at 04/09/19 2114    metoprolol tartrate (LOPRESSOR) tablet 25 mg  25 mg Oral BID KATIA Montero CNP   25 mg at 04/09/19 2114    sennosides-docusate sodium (SENOKOT-S) 8.6-50 MG tablet 2 tablet  2 tablet Oral Daily KATIA Shoemaker - CNP        aspirin EC tablet 81 mg  81 mg Oral Daily Hugo Torres APRN - CNP   81 mg at 04/09/19 4354    levothyroxine (SYNTHROID) tablet 112 mcg  112 mcg Oral Daily Leslie Ramey, APRN - CNP   112 mcg at 04/09/19 0650    sodium chloride flush 0.9 % injection 10 mL  10 mL Intravenous 2 times per day Leslie Ramey, APRN - CNP   10 mL at 04/09/19 2114    sodium chloride flush 0.9 % injection 10 mL  10 mL Intravenous PRN Leslie Ramey APRN - CNP        acetaminophen (TYLENOL) tablet 650 mg  650 mg Oral Q4H PRN Leslie Ramey, APRN - CNP   650 mg at 04/09/19 2229    magnesium hydroxide (MILK OF MAGNESIA) 400 MG/5ML suspension 30 mL  30 mL Oral Daily PRN Leslie Ramey, APRN - CNP        ondansetron [facility]) injection 4 mg  4 mg Intravenous Q6H PRN Leslie Ramey APRN - CNP   4 mg at 04/08/19 0615    atorvastatin (LIPITOR) tablet 40 mg  40 mg Oral Nightly Leslie Ramey, APRN - CNP   40 mg at 04/09/19 2114    magnesium sulfate 1 g in dextrose 5% 100 mL IVPB  1 g Intravenous Q1H PRN Leslie Ramey APRN - CNP   Stopped at 04/08/19 1500    chlorhexidine (PERIDEX) 0.12 % solution 15 mL  15 mL Mouth/Throat BID Francesco Correa MD   15 mL at 04/09/19 2114       Allergies:     Patient has no known allergies. Social History:     Tobacco:    reports that she has never smoked. She has never used smokeless tobacco.  Alcohol:      reports that she does not drink alcohol. Drug Use:  reports that she does not use drugs.     Family History:     Family History   Problem Relation Age of Onset    High Blood Pressure Mother     Heart Disease Father Review of Systems:     ROS:  Constitutional  Negative for fever and chills    HEENT  Negative for ear discharge, ear pain, nosebleed    Eyes  Negative for photophobia, pain and discharge    Respiratory  Negative for hemoptysis and sputum    Cardiovascular  Negative for orthopnea, claudication. Positive for PND   Gastrointestinal  Negative for abdominal pain, diarrhea, blood in stool    Musculoskeletal  Negative for joint pain, negative for myalgia    Skin  Negative for rash or itching    Endo/heme/allergies  Negative for polydipsia, environmental allergy    Psychiatric/behavioral  Negative for suicidal ideation. Patient is not anxious        Physical Exam:   BP (!) 111/43   Pulse 73   Temp 97 °F (36.1 °C) (Oral)   Resp 15   Ht 5' 1\" (1.549 m)   Wt 170 lb (77.1 kg)   SpO2 96%   BMI 32.12 kg/m²   Temp (24hrs), Av.8 °F (36.6 °C), Min:97 °F (36.1 °C), Max:99.1 °F (37.3 °C)    Recent Labs     19  1435   POCGLU 125*       Intake/Output Summary (Last 24 hours) at 4/10/2019 0709  Last data filed at 2019 1800  Gross per 24 hour   Intake 360 ml   Output 275 ml   Net 85 ml       General Appearance:  alert, well appearing, and in no acute distress  HEENT:  Racoon eyes   Lungs: Bilateral equal air entry, clear to ausculation, no wheezing, rales or rhonchi, normal effort  Cardiovascular: normal rate, regular rhythm, no murmur, gallop, rub.   Abdomen: Soft, nontender, nondistended, normal bowel sounds, no hepatomegaly or splenomegaly  MS: Right upper extremity bruising on medial AC fossa    NEUROLOGIC EXAMINATION    MENTAL STATUS:  Alert, oriented, intact memory, no confusion, normal speech, normal language, no hallucination or delusion   CRANIAL NERVES: II     -      Visual fields intact to confrontation  III,IV,VI -  PERR, EOMs full, no ptosis  V     -     Normal facial sensation   VII    -     Normal facial symmetry  VIII   -     Left sided hearing impairment   IX,X -     Symmetrical palate  XI    - Symmetrical shoulder shrug  XII   -     Midline tongue, no atrophy    MOTOR FUNCTION: RUE: Significant for good strength of grade 5/5 in proximal and distal muscle groups   LUE: Significant for good strength of grade 5/5 in proximal and distal muscle groups   RLE: Significant for good strength of grade 5/5 in proximal and distal muscle groups   LLE: Significant for good strength of grade 5/5 in proximal and distal muscle groups      Normal bulk, normal tone and no involuntary movements, no tremor   SENSORY FUNCTION:  Normal touch, normal pin, normal vibration, normal proprioception   CEREBELLAR FUNCTION:  Intact fine motor control over upper limbs and lower limbs   REFLEX FUNCTION:  Symmetric in upper and lower extremities, no Babinski sign   STATION and GAIT  deferred      Investigations:      Laboratory Testing:  Recent Results (from the past 24 hour(s))   Basic Metabolic Panel    Collection Time: 04/10/19  4:04 AM   Result Value Ref Range    Glucose 94 70 - 99 mg/dL    BUN 14 8 - 23 mg/dL    CREATININE 1.00 (H) 0.50 - 0.90 mg/dL    Bun/Cre Ratio NOT REPORTED 9 - 20    Calcium 8.6 8.6 - 10.4 mg/dL    Sodium 138 135 - 144 mmol/L    Potassium 3.6 (L) 3.7 - 5.3 mmol/L    Chloride 104 98 - 107 mmol/L    CO2 25 20 - 31 mmol/L    Anion Gap 9 9 - 17 mmol/L    GFR Non-African American 52 (L) >60 mL/min    GFR African American >60 >60 mL/min    GFR Comment          GFR Staging NOT REPORTED    Magnesium    Collection Time: 04/10/19  4:04 AM   Result Value Ref Range    Magnesium 1.9 1.6 - 2.6 mg/dL   Phosphorus    Collection Time: 04/10/19  4:04 AM   Result Value Ref Range    Phosphorus 3.0 2.6 - 4.5 mg/dL   Calcium, Ionized    Collection Time: 04/10/19  4:04 AM   Result Value Ref Range    Calcium, Ion 1.18 1.13 - 1.33 mmol/L   CBC WITH AUTO DIFFERENTIAL    Collection Time: 04/10/19  4:04 AM   Result Value Ref Range    WBC 9.1 3.5 - 11.3 k/uL    RBC 2.66 (L) 3.95 - 5.11 m/uL    Hemoglobin 8.6 (L) 11.9 - 15.1 g/dL Hematocrit 27.7 (L) 36.3 - 47.1 %    .1 (H) 82.6 - 102.9 fL    MCH 32.3 25.2 - 33.5 pg    MCHC 31.0 28.4 - 34.8 g/dL    RDW 13.9 11.8 - 14.4 %    Platelets 079 582 - 899 k/uL    MPV 12.6 8.1 - 13.5 fL    NRBC Automated 0.0 0.0 per 100 WBC    Differential Type NOT REPORTED     Seg Neutrophils 77 (H) 36 - 65 %    Lymphocytes 11 (L) 24 - 43 %    Monocytes 10 3 - 12 %    Eosinophils % 2 1 - 4 %    Basophils 0 0 - 2 %    Immature Granulocytes 0 0 %    Segs Absolute 6.87 1.50 - 8.10 k/uL    Absolute Lymph # 0.99 (L) 1.10 - 3.70 k/uL    Absolute Mono # 0.94 0.10 - 1.20 k/uL    Absolute Eos # 0.19 0.00 - 0.44 k/uL    Basophils # 0.03 0.00 - 0.20 k/uL    Absolute Immature Granulocyte 0.04 0.00 - 0.30 k/uL    WBC Morphology NOT REPORTED     RBC Morphology MACROCYTOSIS PRESENT     Platelet Estimate NOT REPORTED        Imaging/Diagnostics:  CT of the facial bone and cervical spine 4/9/2019  CT facial bones: No acute facial bone fracture.  Mild soft tissue swelling   over the glabellar ridge.       CT cervical spine: No acute bony abnormality cervical spine.  Multilevel   degenerative changes with neural foraminal narrowing and canal stenosis C5-C6. Assessment :      Primary Problem  <principal problem not specified>    Active Hospital Problems    Diagnosis Date Noted    Cerebral infarction, unspecified ([facility] [I63.9] 04/09/2019    Left carotid artery stenosis [I65.22]     Stroke-like symptoms [R29.90] 04/07/2019    Atrial fibrillation with RVR ([facility]) [I48.91]      Acute ischemic stroke attenuated with TPA   New onset atrial fibrillation   Expressive aphasia, improved     Plan:   1. Eliquis atrial fibrillation 5 mg BID  2. Continue ASA 81 mg daily. 3. Continue atorvastatin 40 mg daily  4. Continue PT/OT  5. Metoprolol 25mg BID for atrial fibrillation   6. Metoprolol 25mg IV PRN for afib RVR  7.  Discharge to [address] [place] [address] TEAM  IP CONSULT TO IV TEAM  IP CONSULT TO NEUROLOGY  IP CONSULT TO ENDOVASCULAR NEUROSURGERY  IP CONSULT TO CARDIOLOGY  IP CONSULT TO INTERNAL MEDICINE  IP CONSULT TO OTOLARYNGOLOGY    Will discuss with Dr. Mingo Awad MD  Neurology Resident PGY-2  4/10/2019 at 7:09 AM

## 2019-04-10 NOTE — DISCHARGE SUMMARY
Neurology Discharge Summary     Patient Identification:  Cassandra Grande is a 80 y.o. female. :  1930  Admit Date:  2019  Discharge date and time: No discharge date for patient encounter. Attending Provider: lAan Multani *     Account Number: [de-identified]                                   Admission Diagnoses:   Stroke-like symptoms [R29.90]    Discharge Diagnoses: Active Problems:    Stroke-like symptoms    Atrial fibrillation with RVR (HCC)    Left carotid artery stenosis    Cerebral infarction, unspecified ([facility] [address]  Resolved Problems:    * No resolved hospital problems. *      Discharge Medications:    Current Discharge Medication List           Details   aspirin 81 MG EC tablet Take 1 tablet by mouth daily  Qty: 30 tablet, Refills: 3      apixaban (ELIQUIS) 5 MG TABS tablet Take 1 tablet by mouth 2 times daily  Qty: 60 tablet, Refills: 1      metoprolol tartrate (LOPRESSOR) 25 MG tablet Take 1 tablet by mouth 2 times daily  Qty: 60 tablet, Refills: 3      chlorhexidine (PERIDEX) 0.12 % solution Take 15 mLs by mouth 2 times daily for 14 days  Qty: 420 mL, Refills: 0              Details   atorvastatin (LIPITOR) 40 MG tablet Take 1 tablet by mouth nightly  Qty: 30 tablet, Refills: 3              Details   levothyroxine (SYNTHROID) 112 MCG tablet Take 1 tablet by mouth Daily  Qty: 90 tablet, Refills: 3      allopurinol (ZYLOPRIM) 100 MG tablet Take 1 tablet by mouth daily  Qty: 90 tablet, Refills: 3      acetaminophen (TYLENOL) 500 MG tablet Take 500 mg by mouth every 6 hours as needed for Pain           Current Discharge Medication List      STOP taking these medications       atenolol (TENORMIN) 50 MG tablet Comments:   Reason for Stopping:                 Consults:   cardiology    Hospital Course: The patient is a 80 y.o.   Non-/non  female who presents with Cerebrovascular Accident   and she is admitted to the hospital for the management of  acute ischemic stroke, new onset atrial fibrillation. She was transferred from [facility] 2 days ago status post TPA. Initial NIHSS was 9. Symptoms include right upper extremity weakness, expressive aphasia, dysarthria, bilateral lower extremity weakness. After arriving to a facility NIH was reported to be 15 with severe dysarthria and aphasia. Was found to have atrial fibrillation. CTA showed hypoplastic left A1 segment. [facility] MRI showed no acute infarct. Moderately severe microangiopathic changes. Carotid Doppler showed bilateral ICA stenosis of 50-69%. She was admitted to the neuro critical care unit for post-TPA monitoring. After admission, her condition has been waxing and waning. She would go into episodes of fluent speech followed by aphasia and confusion. EEG video monitoring has been ongoing for 2 days. It showed background activity of 8-9 Hz alpha waves better formed on the right hemisphere. Frequent runs of 3-5 Hz polymorphic delta on the left hemisphere during sleep, vertex sharp waves and symmetric spindles were seen. These findings could be due to postictal slowing. She was started on eliquis 5 mg twice a day for atrial fibrillation. 4/9/2019: No acute events. Aphasia has improved. She had an episode of atrial fibrillation with RVR of 130 beats per minutes. She received 25 mg of metoprolol IV. Had transient hypotension. Currently she has normal sinus rhythm. She is being transferred from Neuro-ICU to the [facility]-patient [facility]. Received modafinil yesterday morning and has been evaluated for improvement of her mental status. Cardiology recommended MAMTA for evaluation of MR as outpatient.      Over the last 24 hours, no acute events overnight. She has been neurologically stable overnight. No headache, dizziness, focal weakness, confusion, or changes in speech. Significant Diagnostics:   MRI Brain w/o contrast  Moderately severe microangiopathic changes throughout.  No acute infarct. CTP of the brain  1.  Possible small area of ischemia posterior MCA territory on the left. 2. Possible ischemia or crossed cerebellar diaschisis right cerebellar   hemisphere. CTA  Moderately severe 75-85% stenosis proximal left internal carotid artery.       Moderate stenosis proximal right internal carotid artery at 50%.       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  Narrowed V3   segment right vertebral artery. Doppler Carotid ultrasound:  Less than 50% bilaterally     Disposition:   SNF     Condition:  Stable    Patient Instructions: Activity: activity as tolerated  Diet: cardiac diet  Follow-up with cardiology  in 2 weeks.   Restrictions: Driving No, Swimming No, Operating heavy machinery No, Compromising heights No          Joni rGeen MD

## 2019-04-10 NOTE — PLAN OF CARE
Problem: Falls - Risk of:  Goal: Will remain free from falls  Description  Will remain free from falls  Outcome: Met This Shift  Goal: Absence of physical injury  Description  Absence of physical injury  Outcome: Met This Shift   Fall assessment preformed. Bed in low locked position with call light and tray table within reach. Education given. Will continue to monitor. Problem: HEMODYNAMIC STATUS  Goal: Patient has stable vital signs and fluid balance  Outcome: Met This Shift     Problem: Neurological  Goal: Maximum potential motor/sensory/cognitive function  Outcome: Met This Shift   Neuro assessment completed, fall precautions in place, aspirations precautions in place, assess for barriers in communication and mobility, interventions to assist in communication and mobility in place, encouraged to call for assistance, adaptive devices used as needed, assess emotional state and support offered, encouraged patient to communicate by available means, and support systems included in patient care.

## 2019-04-10 NOTE — PLAN OF CARE
PATIENT REFUSES TO WEAR BIPAP     [x] Risks and benefits explained to patient   [x] Patient refuses to wear Bipap stating \"I sleep fine without it. \"  [x] Patient verbalizes understanding of information presented.

## 2019-04-10 NOTE — PROGRESS NOTES
[facility]  Progress Note                   Date:   4/10/2019  Patient name: Chrissy Barreto  Date of admission:  4/7/2019  3:53 PM  MRN:   [id number]  YOB: 1930  PCP: Katlyn Abdul MD    Reason for Admission: Stroke-like symptoms [R29.90]    Subjective:       Clinical Changes /Abnormalities: Seen & examined in room. Denies CP or SOB. Echo reviewed. Tele reviewed - episodes of SR with PVCs and Afib rate controlled noted. Denies palpitations or dizziness. Continues to states she \"feels good. \"    Review of Systems    Medications:   Scheduled Meds:   allopurinol  100 mg Oral Daily    apixaban  5 mg Oral BID    metoprolol tartrate  25 mg Oral BID    sennosides-docusate sodium  2 tablet Oral Daily    aspirin  81 mg Oral Daily    levothyroxine  112 mcg Oral Daily    sodium chloride flush  10 mL Intravenous 2 times per day    atorvastatin  40 mg Oral Nightly    chlorhexidine  15 mL Mouth/Throat BID     Continuous Infusions:  CBC:   Recent Labs     04/08/19  1127 04/09/19  0420 04/10/19  0404   WBC 11.5* 10.8 9.1   HGB 10.0* 10.2* 8.6*    147 154     BMP:    Recent Labs     04/08/19  0803 04/09/19  0420 04/10/19  0404    139 138   K 3.3* 3.7 3.6*    104 104   CO2 23 21 25   BUN 12 14 14   CREATININE 0.80 0.88 1.00*   GLUCOSE 131* 95 94     Hepatic:  Recent Labs     04/07/19  1435   AST 24   ALT 10   BILITOT 0.97   ALKPHOS 108*     Troponin:   Recent Labs     04/07/19  1657 04/07/19  2255 04/08/19  0803   TROPHS 148* 136* 124*     BNP: No results for input(s): BNP in the last 72 hours.   Lipids:   Recent Labs     04/08/19  0803   CHOL 119   HDL 48     INR:   Recent Labs     04/07/19  1435   INR 1.1       Objective:   Vitals: BP (!) 140/84   Pulse 82   Temp 97.9 °F (36.6 °C) (Oral)   Resp 14   Ht 5' 1\" (1.549 m)   Wt 170 lb (77.1 kg)   SpO2 96%   BMI 32.12 kg/m²   General appearance: alert and cooperative with exam  HEENT: Head: Normocephalic, no lesions, without obvious abnormality. Neck:no JVD, trachea midline, no adenopathy  Lungs: Clear to auscultation throughout. On RA  Heart: Irregular rate and rhythm, s1/s2 auscultated, no murmurs. Back and forth Afib/SR. Rate controlled  Abdomen: soft, non-tender, bowel sounds active  Extremities: no edema  Neurologic: not done    Echo 4/8/19  Summary  Hyperdynamic LV function with EF>60%. Grade 2 diastolic function. Severely dilated LA with moderate-severe MR (underestimated by color flow). Consider MAMTA. Large LA with A. fib is a significant potential source of thromboembolism. No thrombus seen on TTE. Consider MAMTA. Moderate TR    Assessment / Acute Cardiac Problems:   1. Afib RVR  2. Troponinemia  3. CKD  4. UTI  5. Diastolic CHF on echo  6. Moderate-severe MR on echo  7. Moderate TR  8. Left hemispheric cerebral infarction s/p IV TPA  9.  Left carotid stenosis    Patient Active Problem List:     Hypothyroidism     Hyperlipidemia     Hypertension     Gout     Arthritis     Irritable bowel syndrome     Biliary obstruction     Abnormal finding on imaging     Elevated LFTs     Abdominal discomfort     Acute cystitis     Choledocholithiasis     Right leg pain     Osteoarthritis of multiple joints     VERONICA (acute kidney injury) (HCC)     Hypomagnesemia     Diarrhea     Stroke-like symptoms     Atrial fibrillation with RVR (HCC)     Left carotid artery stenosis     Cerebral infarction, unspecified (HCC)    [id number] Score for Atrial Fibrillation Stroke Risk   Risk   Factors  Component Value   C CHF No 0   H HTN Yes 1   A2 Age >= 76 Yes,  (80 y.o.) 2   D DM No 0   S2 Prior Stroke/TIA No 0   V Vascular Disease No 0   A Age 74-69 No,  (80 y.o.) 0   Sc Sex female 1    [id number]  Score  4   Score last updated 3/1/02 0:69 PM    Click here for a link to the UpToDate guideline \"Atrial Fibrillation: Anticoagulation therapy to prevent embolization    Disclaimer: Risk Score calculation is dependent on accuracy of patient problem list and past encounter diagnosis. Plan of Treatment:   1. Remains on LTME per neuro  2. Echo reviewed - mod to severe MR, underestimated. Started on Eliquis BID. Continue with rate control at this time, Metoprolol 25mg BID. Discussed with Dr. Jai Schuster Will likely need MAMTA to further evaluate MR once acute neurological issue stabilizes. This can be done as OP. 3. F/U in [facility] office 2-3 weeks.     Electronically signed by KATIA Mathis CNP on 4/10/2019 at 1:15 PM  [address]  [phone]

## 2019-04-10 NOTE — DISCHARGE INSTR - DIET
 Good nutrition is important when healing from an illness, injury, or surgery. Follow any nutrition recommendations given to you during your hospital stay.  If you were given an oral nutrition supplement while in the hospital, continue to take this supplement at home. You can take it with meals, in-between meals, and/or before bedtime. These supplements can be purchased at most local grocery stores, pharmacies, and chain super-stores.  If you have any questions about your diet or nutrition, call the hospital and ask for the dietitian. DASH Diet: Care Instructions  Your Care Instructions    The DASH diet is an eating plan that can help lower your blood pressure. DASH stands for Dietary Approaches to Stop Hypertension. Hypertension is high blood pressure. The DASH diet focuses on eating foods that are high in calcium, potassium, and magnesium. These nutrients can lower blood pressure. The foods that are highest in these nutrients are fruits, vegetables, low-fat dairy products, nuts, seeds, and legumes. But taking calcium, potassium, and magnesium supplements instead of eating foods that are high in those nutrients does not have the same effect. The DASH diet also includes whole grains, fish, and poultry. The DASH diet is one of several lifestyle changes your doctor may recommend to lower your high blood pressure. Your doctor may also want you to decrease the amount of sodium in your diet. Lowering sodium while following the DASH diet can lower blood pressure even further than just the DASH diet alone. Follow-up care is a key part of your treatment and safety. Be sure to make and go to all appointments, and call your doctor if you are having problems. It's also a good idea to know your test results and keep a list of the medicines you take. How can you care for yourself at home? Following the DASH diet  · Eat 4 to 5 servings of fruit each day.  A serving is 1 medium-sized piece of fruit, ½ cup chopped or canned fruit, 1/4 cup dried fruit, or 4 ounces (½ cup) of fruit juice. Choose fruit more often than fruit juice. · Eat 4 to 5 servings of vegetables each day. A serving is 1 cup of lettuce or raw leafy vegetables, ½ cup of chopped or cooked vegetables, or 4 ounces (½ cup) of vegetable juice. Choose vegetables more often than vegetable juice. · Get 2 to 3 servings of low-fat and fat-free dairy each day. A serving is 8 ounces of milk, 1 cup of yogurt, or 1 ½ ounces of cheese. · Eat 6 to 8 servings of grains each day. A serving is 1 slice of bread, 1 ounce of dry cereal, or ½ cup of cooked rice, pasta, or cooked cereal. Try to choose whole-grain products as much as possible. · Limit lean meat, poultry, and fish to 2 servings each day. A serving is 3 ounces, about the size of a deck of cards. · Eat 4 to 5 servings of nuts, seeds, and legumes (cooked dried beans, lentils, and split peas) each week. A serving is 1/3 cup of nuts, 2 tablespoons of seeds, or ½ cup of cooked beans or peas. · Limit fats and oils to 2 to 3 servings each day. A serving is 1 teaspoon of vegetable oil or 2 tablespoons of salad dressing. · Limit sweets and added sugars to 5 servings or less a week. A serving is 1 tablespoon jelly or jam, ½ cup sorbet, or 1 cup of lemonade. · Eat less than 2,300 milligrams (mg) of sodium a day. If you limit your sodium to 1,500 mg a day, you can lower your blood pressure even more. Tips for success  · Start small. Do not try to make dramatic changes to your diet all at once. You might feel that you are missing out on your favorite foods and then be more likely to not follow the plan. Make small changes, and stick with them. Once those changes become habit, add a few more changes. · Try some of the following:  ? Make it a goal to eat a fruit or vegetable at every meal and at snacks. This will make it easy to get the recommended amount of fruits and vegetables each day.   ? Try yogurt topped with fruit and nuts for a snack or healthy dessert. ? Add lettuce, tomato, cucumber, and onion to sandwiches. ? Combine a ready-made pizza crust with low-fat mozzarella cheese and lots of vegetable toppings. Try using tomatoes, squash, spinach, broccoli, carrots, cauliflower, and onions. ? Have a variety of cut-up vegetables with a low-fat dip as an appetizer instead of chips and dip. ? Sprinkle sunflower seeds or chopped almonds over salads. Or try adding chopped walnuts or almonds to cooked vegetables. ? Try some vegetarian meals using beans and peas. Add garbanzo or kidney beans to salads. Make burritos and tacos with mashed hendrix beans or black beans. Where can you learn more? Go to [email or web address] and sign in to your [employer] account. Enter J593 in the [employer]arch box to learn more about \"DASH Diet: Care Instructions. \"     If you do not have an account, please click on the \"Sign Up Now\" link. Current as of: July 22, 2018  Content Version: 11.9  © 1389-4122 [employer]. Care instructions adapted under license by [facility] ([facility]). If you have questions about a medical condition or this instruction, always ask your healthcare professional. [place] [address] any warranty or liability for your use of this information.

## 2019-04-30 ENCOUNTER — TELEPHONE (OUTPATIENT)
Dept: NEUROSURGERY | Age: 84
End: 2019-04-30

## 2019-05-06 ENCOUNTER — TELEPHONE (OUTPATIENT)
Dept: GASTROENTEROLOGY | Age: 84
End: 2019-05-06

## 2019-05-06 NOTE — TELEPHONE ENCOUNTER
Patient LVM, she would like to reschedule her EGD. Patient was previously scheduled for EGD with stent removal on 4/17/19 and the procedure was cancelled.

## 2019-05-07 ENCOUNTER — TELEPHONE (OUTPATIENT)
Dept: GASTROENTEROLOGY | Age: 84
End: 2019-05-07

## 2019-05-07 NOTE — TELEPHONE ENCOUNTER
Writer spoke to pt over phone in regards to receiving cardiac clearance back from Dr Inocencio Matson for Eliquis. Pt was instructed to stop taking Eliquis 7 days prior to procedure. Pt is to stop taking Eliquis on 6/12/19. Pt verbalizes understanding.

## 2019-06-12 ENCOUNTER — TELEPHONE (OUTPATIENT)
Dept: GASTROENTEROLOGY | Age: 84
End: 2019-06-12

## 2019-06-12 NOTE — TELEPHONE ENCOUNTER
Spoke with pt to confirm procedure @ [facility] for Wednesday June 19 @ 12:15am w/arrival 10:45am, pt has 2 drivers and is set with prep instructions

## 2019-06-17 ENCOUNTER — APPOINTMENT (OUTPATIENT)
Dept: GENERAL RADIOLOGY | Age: 84
DRG: 564 | End: 2019-06-17
Payer: MEDICARE

## 2019-06-17 ENCOUNTER — HOSPITAL ENCOUNTER (INPATIENT)
Age: 84
LOS: 3 days | Discharge: HOME HEALTH | DRG: 564 | End: 2019-06-21
Attending: EMERGENCY MEDICINE | Admitting: INTERNAL MEDICINE
Payer: MEDICARE

## 2019-06-17 ENCOUNTER — APPOINTMENT (OUTPATIENT)
Dept: CT IMAGING | Age: 84
DRG: 564 | End: 2019-06-17
Payer: MEDICARE

## 2019-06-17 DIAGNOSIS — N39.0 URINARY TRACT INFECTION WITHOUT HEMATURIA, SITE UNSPECIFIED: ICD-10-CM

## 2019-06-17 DIAGNOSIS — T79.6XXA TRAUMATIC RHABDOMYOLYSIS, INITIAL ENCOUNTER (HCC): ICD-10-CM
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
## 2019-06-17 DIAGNOSIS — T07.XXXA MULTIPLE CONTUSIONS: ICD-10-CM

## 2019-06-17 LAB
-: ABNORMAL
AMORPHOUS: ABNORMAL
ANION GAP SERPL CALCULATED.3IONS-SCNC: 16 MMOL/L (ref 9–17)
BACTERIA: ABNORMAL
BILIRUBIN URINE: NEGATIVE
BUN BLDV-MCNC: 17 MG/DL (ref 8–23)
BUN/CREAT BLD: 23 (ref 9–20)
CALCIUM SERPL-MCNC: 9.8 MG/DL (ref 8.6–10.4)
CASTS UA: ABNORMAL /LPF
CHLORIDE BLD-SCNC: 100 MMOL/L (ref 98–107)
CO2: 28 MMOL/L (ref 20–31)
COLOR: YELLOW
COMMENT UA: ABNORMAL
CREAT SERPL-MCNC: 0.75 MG/DL (ref 0.5–0.9)
CRYSTALS, UA: ABNORMAL /HPF
EPITHELIAL CELLS UA: ABNORMAL /HPF (ref 0–25)
GFR AFRICAN AMERICAN: >60 ML/MIN
GFR NON-AFRICAN AMERICAN: >60 ML/MIN
GFR SERPL CREATININE-BSD FRML MDRD: ABNORMAL ML/MIN/{1.73_M2}
GFR SERPL CREATININE-BSD FRML MDRD: ABNORMAL ML/MIN/{1.73_M2}
GLUCOSE BLD-MCNC: 137 MG/DL (ref 70–99)
GLUCOSE URINE: NEGATIVE
HCT VFR BLD CALC: 41.5 % (ref 36.3–47.1)
HEMOGLOBIN: 13 G/DL (ref 11.9–15.1)
KETONES, URINE: ABNORMAL
LEUKOCYTE ESTERASE, URINE: NEGATIVE
MCH RBC QN AUTO: 30.9 PG (ref 25.2–33.5)
MCHC RBC AUTO-ENTMCNC: 31.3 G/DL (ref 28.4–34.8)
MCV RBC AUTO: 98.6 FL (ref 82.6–102.9)
MUCUS: ABNORMAL
MYOGLOBIN: 2851 NG/ML (ref 25–58)
NITRITE, URINE: POSITIVE
NRBC AUTOMATED: 0 PER 100 WBC
OTHER OBSERVATIONS UA: ABNORMAL
PDW BLD-RTO: 13.7 % (ref 11.8–14.4)
PH UA: 6 (ref 5–9)
PLATELET # BLD: 168 K/UL (ref 138–453)
PMV BLD AUTO: 12 FL (ref 8.1–13.5)
POTASSIUM SERPL-SCNC: 3.6 MMOL/L (ref 3.7–5.3)
PROTEIN UA: ABNORMAL
RBC # BLD: 4.21 M/UL (ref 3.95–5.11)
RBC UA: ABNORMAL /HPF (ref 0–2)
RENAL EPITHELIAL, UA: ABNORMAL /HPF
SODIUM BLD-SCNC: 144 MMOL/L (ref 135–144)
SPECIFIC GRAVITY UA: 1.02 (ref 1.01–1.02)
TOTAL CK: 7521 U/L (ref 26–192)
TRICHOMONAS: ABNORMAL
TROPONIN INTERP: NORMAL
TROPONIN T: <0.03 NG/ML
TROPONIN, HIGH SENSITIVITY: NORMAL NG/L (ref 0–14)
TURBIDITY: CLEAR
URINE HGB: ABNORMAL
UROBILINOGEN, URINE: NORMAL
WBC # BLD: 12.7 K/UL (ref 3.5–11.3)
WBC UA: ABNORMAL /HPF (ref 0–5)
YEAST: ABNORMAL

## 2019-06-17 PROCEDURE — 6360000002 HC RX W HCPCS: Performed by: EMERGENCY MEDICINE

## 2019-06-17 PROCEDURE — 70450 CT HEAD/BRAIN W/O DYE: CPT

## 2019-06-17 PROCEDURE — 72125 CT NECK SPINE W/O DYE: CPT

## 2019-06-17 PROCEDURE — 93005 ELECTROCARDIOGRAM TRACING: CPT | Performed by: EMERGENCY MEDICINE

## 2019-06-17 PROCEDURE — 87186 SC STD MICRODIL/AGAR DIL: CPT

## 2019-06-17 PROCEDURE — 36415 COLL VENOUS BLD VENIPUNCTURE: CPT

## 2019-06-17 PROCEDURE — 80048 BASIC METABOLIC PNL TOTAL CA: CPT

## 2019-06-17 PROCEDURE — 2580000003 HC RX 258: Performed by: EMERGENCY MEDICINE

## 2019-06-17 PROCEDURE — 83874 ASSAY OF MYOGLOBIN: CPT

## 2019-06-17 PROCEDURE — 87086 URINE CULTURE/COLONY COUNT: CPT

## 2019-06-17 PROCEDURE — 81001 URINALYSIS AUTO W/SCOPE: CPT

## 2019-06-17 PROCEDURE — G0378 HOSPITAL OBSERVATION PER HR: HCPCS

## 2019-06-17 PROCEDURE — 87077 CULTURE AEROBIC IDENTIFY: CPT

## 2019-06-17 PROCEDURE — 71045 X-RAY EXAM CHEST 1 VIEW: CPT

## 2019-06-17 PROCEDURE — 82550 ASSAY OF CK (CPK): CPT

## 2019-06-17 PROCEDURE — 84484 ASSAY OF TROPONIN QUANT: CPT

## 2019-06-17 PROCEDURE — 99285 EMERGENCY DEPT VISIT HI MDM: CPT

## 2019-06-17 PROCEDURE — 96375 TX/PRO/DX INJ NEW DRUG ADDON: CPT

## 2019-06-17 PROCEDURE — 72128 CT CHEST SPINE W/O DYE: CPT

## 2019-06-17 PROCEDURE — 72170 X-RAY EXAM OF PELVIS: CPT

## 2019-06-17 PROCEDURE — 85027 COMPLETE CBC AUTOMATED: CPT

## 2019-06-17 PROCEDURE — 96374 THER/PROPH/DIAG INJ IV PUSH: CPT

## 2019-06-17 PROCEDURE — 6370000000 HC RX 637 (ALT 250 FOR IP): Performed by: INTERNAL MEDICINE

## 2019-06-17 PROCEDURE — 2580000003 HC RX 258: Performed by: INTERNAL MEDICINE

## 2019-06-17 PROCEDURE — 72131 CT LUMBAR SPINE W/O DYE: CPT

## 2019-06-17 RX ORDER — SODIUM CHLORIDE 0.9 % (FLUSH) 0.9 %
10 SYRINGE (ML) INJECTION EVERY 12 HOURS SCHEDULED
Status: DISCONTINUED | OUTPATIENT
Start: 2019-06-17 | End: 2019-06-21 | Stop reason: HOSPADM

## 2019-06-17 RX ORDER — SODIUM CHLORIDE 9 MG/ML
INJECTION, SOLUTION INTRAVENOUS CONTINUOUS
Status: DISCONTINUED | OUTPATIENT
Start: 2019-06-17 | End: 2019-06-18

## 2019-06-17 RX ORDER — ATORVASTATIN CALCIUM 20 MG/1
20 TABLET, FILM COATED ORAL NIGHTLY
Status: DISCONTINUED | OUTPATIENT
Start: 2019-06-17 | End: 2019-06-21 | Stop reason: HOSPADM

## 2019-06-17 RX ORDER — ALLOPURINOL 100 MG/1
100 TABLET ORAL DAILY
Status: DISCONTINUED | OUTPATIENT
Start: 2019-06-18 | End: 2019-06-21 | Stop reason: HOSPADM

## 2019-06-17 RX ORDER — SODIUM CHLORIDE 0.9 % (FLUSH) 0.9 %
10 SYRINGE (ML) INJECTION PRN
Status: DISCONTINUED | OUTPATIENT
Start: 2019-06-17 | End: 2019-06-21 | Stop reason: HOSPADM

## 2019-06-17 RX ORDER — ASPIRIN 81 MG/1
81 TABLET ORAL DAILY
Status: DISCONTINUED | OUTPATIENT
Start: 2019-06-18 | End: 2019-06-21 | Stop reason: HOSPADM

## 2019-06-17 RX ORDER — ONDANSETRON 2 MG/ML
4 INJECTION INTRAMUSCULAR; INTRAVENOUS EVERY 6 HOURS PRN
Status: DISCONTINUED | OUTPATIENT
Start: 2019-06-17 | End: 2019-06-21 | Stop reason: HOSPADM

## 2019-06-17 RX ORDER — ACETAMINOPHEN 325 MG/1
650 TABLET ORAL EVERY 4 HOURS PRN
Status: DISCONTINUED | OUTPATIENT
Start: 2019-06-17 | End: 2019-06-21 | Stop reason: HOSPADM

## 2019-06-17 RX ORDER — 0.9 % SODIUM CHLORIDE 0.9 %
500 INTRAVENOUS SOLUTION INTRAVENOUS ONCE
Status: DISCONTINUED | OUTPATIENT
Start: 2019-06-17 | End: 2019-06-21 | Stop reason: HOSPADM

## 2019-06-17 RX ORDER — 0.9 % SODIUM CHLORIDE 0.9 %
500 INTRAVENOUS SOLUTION INTRAVENOUS ONCE
Status: COMPLETED | OUTPATIENT
Start: 2019-06-17 | End: 2019-06-17

## 2019-06-17 RX ORDER — LEVOTHYROXINE SODIUM 112 UG/1
112 TABLET ORAL DAILY
Status: DISCONTINUED | OUTPATIENT
Start: 2019-06-18 | End: 2019-06-21 | Stop reason: HOSPADM

## 2019-06-17 RX ADMIN — SODIUM CHLORIDE: 9 INJECTION, SOLUTION INTRAVENOUS at 23:26

## 2019-06-17 RX ADMIN — Medication 10 ML: at 23:26

## 2019-06-17 RX ADMIN — METOPROLOL TARTRATE 25 MG: 25 TABLET ORAL at 23:42

## 2019-06-17 RX ADMIN — ATORVASTATIN CALCIUM 20 MG: 20 TABLET, FILM COATED ORAL at 23:42

## 2019-06-17 RX ADMIN — SODIUM CHLORIDE 500 ML: 9 INJECTION, SOLUTION INTRAVENOUS at 20:30

## 2019-06-17 RX ADMIN — CEFTRIAXONE 1 G: 1 INJECTION, POWDER, FOR SOLUTION INTRAMUSCULAR; INTRAVENOUS at 21:08

## 2019-06-17 NOTE — ED PROVIDER NOTES
[facility] ED  Emergency Department        Pt Name: Jameson Han  MRN: [id number]  [place] 5/2/1930  Date of evaluation: 6/17/19    CHIEF COMPLAINT       Chief Complaint   Patient presents with    Fall     unknown length of time down; complains of bilateral hip pain according to EMS    Altered Mental Status     last known well at 10 AM; EMS states concerned about patient yesterday due to confustion; today still confused       HISTORY OF PRESENT ILLNESS  (Location/Symptom, Timing/Onset, Context/Setting, Quality, Duration, ModifyingFactors, Severity.)      Jameson Han is a 80 y.o. female who presents with being found on the ground of her bathroom. Unclear how long she was down therefore. Patient does remember eating breakfast this morning but does not recall much else. Her son tried to go over to her house around 10 AM and then as second person around 6 AM, and then eventually EMS was called and they had to break down the door to get her. Patient initially complained of bilateral hip pain. She is awake, alert and oriented. Moving all extremities. Does have a recent history of a possible stroke for which she did receive TPA no deficits. This was in April of this year. Patient is on anticoagulation Eliquis and asa. H/o atrial fibrillation    PAST MEDICAL / SURGICAL / SOCIAL / FAMILY HISTORY      has a past medical history of Arthritis, Atrial fibrillation ([address] Chronic kidney disease, stage 3 ([address] Gout, Hyperlipidemia, Hypertension, Hypothyroidism, and Irritable bowel syndrome. has a past surgical history that includes Cataract removal with implant (Bilateral); Cholecystectomy; Carpal tunnel release (2012); ERCP (N/A, 2/11/2019); Total knee arthroplasty (Right); and Total knee arthroplasty (Left). Social History     Socioeconomic History    Marital status:       Spouse name: Not on file    Number of children: Not on file    Years of education: Not on file    Highest education level: Not on file   Occupational History    Not on file   Social Needs    Financial resource strain: Not on file    Food insecurity:     Worry: Not on file     Inability: Not on file    Transportation needs:     Medical: Not on file     Non-medical: Not on file   Tobacco Use    Smoking status: Never Smoker    Smokeless tobacco: Never Used   Substance and Sexual Activity    Alcohol use: No    Drug use: No    Sexual activity: Not on file   Lifestyle    Physical activity:     Days per week: Not on file     Minutes per session: Not on file    Stress: Not on file   Relationships    Social connections:     Talks on phone: Not on file     Gets together: Not on file     Attends [employer] service: Not on file     Active member of club or organization: Not on file     Attends meetings of clubs or organizations: Not on file     Relationship status: Not on file    Intimate partner violence:     Fear of current or ex partner: Not on file     Emotionally abused: Not on file     Physically abused: Not on file     Forced sexual activity: Not on file   Other Topics Concern    Not on file   Social History Narrative    Not on file       Family History   Problem Relation Age of Onset    High Blood Pressure Mother     Heart Disease Father        Allergies:  Patient has no known allergies. Home Medications:  Prior to Admission medications    Medication Sig Start Date End Date Taking?  Authorizing Provider   apixaban (ELIQUIS) 5 MG TABS tablet Take 1 tablet by mouth 2 times daily 4/25/19 6/24/19 Yes Jorge Zafar MD   aspirin 81 MG EC tablet Take 1 tablet by mouth daily  Patient taking differently: Take 325 mg by mouth daily  4/11/19  Yes Kenny Parra MD   atorvastatin (LIPITOR) 40 MG tablet Take 1 tablet by mouth nightly  Patient taking differently: Take 20 mg by mouth nightly  4/10/19  Yes Kenny Parra MD   metoprolol tartrate (LOPRESSOR) 25 MG tablet Take 1 tablet by mouth 2 times daily 4/10/19  Yes Joseph Garrett MD   levothyroxine (SYNTHROID) 112 MCG tablet Take 1 tablet by mouth Daily 11/29/18  Yes Maikel Blood MD   allopurinol (ZYLOPRIM) 100 MG tablet Take 1 tablet by mouth daily 11/29/18  Yes Timleo Blood MD   acetaminophen (TYLENOL) 500 MG tablet Take 500 mg by mouth every 6 hours as needed for Pain   Yes Historical Provider, MD       REVIEW OF SYSTEMS    (2-9 systems for level 4, 10 or more for level 5)      Review of Systems   Unable to perform ROS: Mental status change       PHYSICAL EXAM   (up to 7 for level 4, 8 or more for level 5)     INITIAL VITALS:   BP (!) 124/94   Pulse 100   Temp 98.3 °F (36.8 °C) (Tympanic)   Resp 20   Wt 165 lb (74.8 kg)   SpO2 96%   BMI 31.18 kg/m²     Physical Exam   Constitutional: She is oriented to person, place, and time. She is awake alert, oriented to person, place but not to time. She is moving all extremities, c-collar is in place. Does have some scattered bruising noted to her left lower leg. HENT:   Head: Normocephalic and atraumatic. Eyes: Conjunctivae are normal. Right eye exhibits no discharge. Left eye exhibits no discharge. Neck: Normal range of motion. Neck supple. No midline cervical ttp   Cardiovascular: Normal rate, regular rhythm and normal heart sounds. Exam reveals no gallop and no friction rub. No murmur heard. Pulmonary/Chest: Effort normal and breath sounds normal. No respiratory distress. She has no wheezes. She has no rales. She exhibits no tenderness. Abdominal: Soft. She exhibits no distension and no mass. There is no tenderness. There is no rebound and no guarding. Neurological: She is alert and oriented to person, place, and time. Skin: Skin is warm and dry. No rash noted. Patient with multiple contusions and erythema noted over right hip. She does have additional contusions over the upper thoracic region and lumbar region.   With some tenderness to palpation overlying this, no midline thoracic or lumbar tenderness to palpation. Nursing note and vitals reviewed. DIFFERENTIAL  DIAGNOSIS     Patient with fall unclear mechanism. Patient does not remember. Laying on the bathroom floor. For multiple hours,moving all extremities, on eliquis for atrial fibrillation, will get ct imaging, head and neck, ekg, trop, labs, ck, myoglobin. Family reports some confusion when they saw her last night, will check UA. Keep on telemetry and reassess after labs and imaging.     PLAN (LABS / IMAGING / EKG):  Orders Placed This Encounter   Procedures    Urine Culture    CT Head WO Contrast    CT Cervical Spine WO Contrast    XR CHEST 1 VW    XR PELVIS (1-2 VIEWS)    CT Thoracic Spine WO Contrast    CT LUMBAR SPINE WO CONTRAST    Troponin    CBC    Basic Metabolic Panel    Urinalysis    CK    Myoglobin, serum    Microscopic Urinalysis    Telemetry monitoring    EKG 12 Lead    Insert peripheral IV       MEDICATIONS ORDERED:  Orders Placed This Encounter   Medications    0.9 % sodium chloride bolus    0.9 % sodium chloride bolus    cefTRIAXone (ROCEPHIN) 1 g in sterile water 10 mL IV syringe       DIAGNOSTIC RESULTS / EMERGENCY DEPARTMENT COURSE / MDM     LABS:  Results for orders placed or performed during the hospital encounter of 06/17/19   Troponin   Result Value Ref Range    Troponin, High Sensitivity NOT REPORTED 0 - 14 ng/L    Troponin T <0.03 <0.03 ng/mL    Troponin Interp         CBC   Result Value Ref Range    WBC 12.7 (H) 3.5 - 11.3 k/uL    RBC 4.21 3.95 - 5.11 m/uL    Hemoglobin 13.0 11.9 - 15.1 g/dL    Hematocrit 41.5 36.3 - 47.1 %    MCV 98.6 82.6 - 102.9 fL    MCH 30.9 25.2 - 33.5 pg    MCHC 31.3 28.4 - 34.8 g/dL    RDW 13.7 11.8 - 14.4 %    Platelets 264 469 - 749 k/uL    MPV 12.0 8.1 - 13.5 fL    NRBC Automated 0.0 0.0 per 100 WBC   Basic Metabolic Panel   Result Value Ref Range    Glucose 137 (H) 70 - 99 mg/dL    BUN 17 8 - 23 mg/dL    CREATININE 0.75 0.50 - 0.90 mg/dL    Bun/Cre Ratio 23 (H) 9 - 20    Calcium 9.8 8.6 - 10.4 mg/dL    Sodium 144 135 - 144 mmol/L    Potassium 3.6 (L) 3.7 - 5.3 mmol/L    Chloride 100 98 - 107 mmol/L    CO2 28 20 - 31 mmol/L    Anion Gap 16 9 - 17 mmol/L    GFR Non-African American >60 >60 mL/min    GFR African American >60 >60 mL/min    GFR Comment          GFR Staging         Urinalysis   Result Value Ref Range    Color, UA YELLOW YELLOW    Turbidity UA CLEAR CLEAR    Glucose, Ur NEGATIVE NEGATIVE    Bilirubin Urine NEGATIVE NEGATIVE    Ketones, Urine 1+ (A) NEGATIVE    Specific Gravity, UA 1.025 (H) 1.010 - 1.020    Urine Hgb 3+ (A) NEGATIVE    pH, UA 6.0 5.0 - 9.0    Protein, UA 2+ (A) NEGATIVE    Urobilinogen, Urine Normal Normal    Nitrite, Urine POSITIVE (A) NEGATIVE    Leukocyte Esterase, Urine NEGATIVE NEGATIVE    Urinalysis Comments NOT REPORTED    CK   Result Value Ref Range    Total CK 7,521 (HH) 26 - 192 U/L   Myoglobin, serum   Result Value Ref Range    Myoglobin 2,851 (H) 25 - 58 ng/mL   Microscopic Urinalysis   Result Value Ref Range    -          WBC, UA 5 TO 10 0 - 5 /HPF    RBC, UA 0 TO 2 0 - 2 /HPF    Casts UA NOT REPORTED /LPF    Crystals UA NOT REPORTED None /HPF    Epithelial Cells UA 5 TO 10 0 - 25 /HPF    Renal Epithelial, Urine NOT REPORTED 0 /HPF    Bacteria, UA 3+ (A) None    Mucus, UA 1+ (A) None    Trichomonas, UA NOT REPORTED None    Amorphous, UA NOT REPORTED None    Other Observations UA NOT REPORTED NOT REQ. Yeast, UA NOT REPORTED None   EKG 12 Lead   Result Value Ref Range    Ventricular Rate 110 BPM    Atrial Rate 81 BPM    QRS Duration 68 ms    Q-T Interval 344 ms    QTc Calculation (Bazett) 465 ms    R Axis 45 degrees    T Axis 9 degrees       IMPRESSION: fall,     RADIOLOGY:  CT LUMBAR SPINE WO CONTRAST   Final Result   1. No acute fracture. CT Thoracic Spine WO Contrast   Final Result   1. No acute fracture. XR PELVIS (1-2 VIEWS)   Final Result   1. No acute fracture identified.    2. Mild-to-moderate bilateral hip osteoarthritis, degenerative changes of the   bilateral sacroiliac joints and pubic symphysis, and severe degenerative   changes of the partially imaged lower lumbar spine. CT Cervical Spine WO Contrast   Final Result   No acute intracranial abnormality. No acute cervical spine fracture. No significant change in brain or cervical spine findings when compared to   the prior studies. CT Head WO Contrast   Final Result   No acute intracranial abnormality. No acute cervical spine fracture. No significant change in brain or cervical spine findings when compared to   the prior studies. XR CHEST 1 VW   Final Result   1. Hypoventilatory chest with no acute cardiopulmonary process identified. EKG:  EKG shows atrial fibrillation with a ventricular rate of 110, nonspecific T wave changes in inferior and lateral leads. No ST elevations noted, occasional PVCs noted. EMERGENCY DEPARTMENT COURSE:  Patient's family reports she is been off Eliquis for the past week. Her CT head and cervical are negative, and her c-collar was removed. Patient without midline tenderness to palpation and has full range of motion at the neck without pain. Patient was rolled and noted to have multiple contusions to her thoracic region, and will get CT imaging. Her labs do show she is in rhabdo. And appears on exam that she has been laying on her right side. Will give IV fluids. And plan on admission    9:57 PM  Spoke with Dr. Monroe Mishra who accepted the patient, to be admitted    FINAL IMPRESSION      1. Fall, initial encounter    2. Urinary tract infection without hematuria, site unspecified    3. Multiple contusions    4. Traumatic rhabdomyolysis, initial encounter [facility])          DISPOSITION / PLAN     DISPOSITION Decision To Admit 06/17/2019 08:41:49 PM      PATIENT REFERRED TO:  No follow-up provider specified.     DISCHARGE MEDICATIONS:  New Prescriptions    No

## 2019-06-18 PROBLEM — N39.0 UTI (URINARY TRACT INFECTION): Status: ACTIVE | Noted: 2019-06-18

## 2019-06-18 PROBLEM — E44.1 MILD MALNUTRITION (HCC): Status: ACTIVE | Noted: 2019-06-18

## 2019-06-18 PROBLEM — T79.6XXA TRAUMATIC RHABDOMYOLYSIS (HCC): Status: ACTIVE | Noted: 2019-06-18

## 2019-06-18 PROBLEM — I48.0 PAF (PAROXYSMAL ATRIAL FIBRILLATION) (HCC): Status: ACTIVE | Noted: 2019-06-18

## 2019-06-18 PROBLEM — G93.41 METABOLIC ENCEPHALOPATHY: Status: ACTIVE | Noted: 2019-06-18

## 2019-06-18 PROBLEM — E87.6 HYPOKALEMIA: Status: ACTIVE | Noted: 2019-06-18

## 2019-06-18 LAB
ALBUMIN SERPL-MCNC: 3.6 G/DL (ref 3.5–5.2)
ALBUMIN/GLOBULIN RATIO: 1.4 (ref 1–2.5)
ALP BLD-CCNC: 103 U/L (ref 35–104)
ALT SERPL-CCNC: 32 U/L (ref 5–33)
AMMONIA: 13 UMOL/L (ref 11–51)
ANION GAP SERPL CALCULATED.3IONS-SCNC: 15 MMOL/L (ref 9–17)
AST SERPL-CCNC: 129 U/L
BILIRUB SERPL-MCNC: 1.37 MG/DL (ref 0.3–1.2)
BUN BLDV-MCNC: 18 MG/DL (ref 8–23)
BUN/CREAT BLD: 23 (ref 9–20)
CALCIUM SERPL-MCNC: 9.1 MG/DL (ref 8.6–10.4)
CHLORIDE BLD-SCNC: 103 MMOL/L (ref 98–107)
CO2: 25 MMOL/L (ref 20–31)
CREAT SERPL-MCNC: 0.77 MG/DL (ref 0.5–0.9)
EKG ATRIAL RATE: 81 BPM
EKG Q-T INTERVAL: 344 MS
EKG QRS DURATION: 68 MS
EKG QTC CALCULATION (BAZETT): 465 MS
EKG R AXIS: 45 DEGREES
EKG T AXIS: 9 DEGREES
EKG VENTRICULAR RATE: 110 BPM
GFR AFRICAN AMERICAN: >60 ML/MIN
GFR NON-AFRICAN AMERICAN: >60 ML/MIN
GFR SERPL CREATININE-BSD FRML MDRD: ABNORMAL ML/MIN/{1.73_M2}
GFR SERPL CREATININE-BSD FRML MDRD: ABNORMAL ML/MIN/{1.73_M2}
GLUCOSE BLD-MCNC: 132 MG/DL (ref 70–99)
MAGNESIUM: 1.3 MG/DL (ref 1.6–2.6)
MAGNESIUM: 2.8 MG/DL (ref 1.6–2.6)
POTASSIUM SERPL-SCNC: 3.4 MMOL/L (ref 3.7–5.3)
SODIUM BLD-SCNC: 143 MMOL/L (ref 135–144)
TOTAL CK: 4833 U/L (ref 26–192)
TOTAL PROTEIN: 6.2 G/DL (ref 6.4–8.3)

## 2019-06-18 PROCEDURE — 99222 1ST HOSP IP/OBS MODERATE 55: CPT | Performed by: FAMILY MEDICINE

## 2019-06-18 PROCEDURE — 80053 COMPREHEN METABOLIC PANEL: CPT

## 2019-06-18 PROCEDURE — 97530 THERAPEUTIC ACTIVITIES: CPT

## 2019-06-18 PROCEDURE — 82550 ASSAY OF CK (CPK): CPT

## 2019-06-18 PROCEDURE — 97162 PT EVAL MOD COMPLEX 30 MIN: CPT

## 2019-06-18 PROCEDURE — 6360000002 HC RX W HCPCS: Performed by: PHYSICIAN ASSISTANT

## 2019-06-18 PROCEDURE — 96367 TX/PROPH/DG ADDL SEQ IV INF: CPT

## 2019-06-18 PROCEDURE — 93010 ELECTROCARDIOGRAM REPORT: CPT | Performed by: INTERNAL MEDICINE

## 2019-06-18 PROCEDURE — 6370000000 HC RX 637 (ALT 250 FOR IP): Performed by: INTERNAL MEDICINE

## 2019-06-18 PROCEDURE — 1200000000 HC SEMI PRIVATE

## 2019-06-18 PROCEDURE — 97167 OT EVAL HIGH COMPLEX 60 MIN: CPT

## 2019-06-18 PROCEDURE — 97110 THERAPEUTIC EXERCISES: CPT

## 2019-06-18 PROCEDURE — 83735 ASSAY OF MAGNESIUM: CPT

## 2019-06-18 PROCEDURE — 96365 THER/PROPH/DIAG IV INF INIT: CPT

## 2019-06-18 PROCEDURE — 92610 EVALUATE SWALLOWING FUNCTION: CPT

## 2019-06-18 PROCEDURE — 96366 THER/PROPH/DIAG IV INF ADDON: CPT

## 2019-06-18 PROCEDURE — 96375 TX/PRO/DX INJ NEW DRUG ADDON: CPT

## 2019-06-18 PROCEDURE — 82140 ASSAY OF AMMONIA: CPT

## 2019-06-18 PROCEDURE — 2500000003 HC RX 250 WO HCPCS: Performed by: PHYSICIAN ASSISTANT

## 2019-06-18 PROCEDURE — 36415 COLL VENOUS BLD VENIPUNCTURE: CPT

## 2019-06-18 PROCEDURE — 96372 THER/PROPH/DIAG INJ SC/IM: CPT

## 2019-06-18 PROCEDURE — 92523 SPEECH SOUND LANG COMPREHEN: CPT

## 2019-06-18 RX ORDER — POTASSIUM CHLORIDE AND SODIUM CHLORIDE 900; 300 MG/100ML; MG/100ML
INJECTION, SOLUTION INTRAVENOUS CONTINUOUS
Status: DISCONTINUED | OUTPATIENT
Start: 2019-06-18 | End: 2019-06-21

## 2019-06-18 RX ORDER — MAGNESIUM SULFATE 1 G/100ML
1 INJECTION INTRAVENOUS PRN
Status: DISPENSED | OUTPATIENT
Start: 2019-06-18 | End: 2019-06-18

## 2019-06-18 RX ORDER — MAGNESIUM SULFATE 1 G/100ML
1 INJECTION INTRAVENOUS
Status: COMPLETED | OUTPATIENT
Start: 2019-06-18 | End: 2019-06-18

## 2019-06-18 RX ORDER — POTASSIUM CHLORIDE 20 MEQ/1
40 TABLET, EXTENDED RELEASE ORAL PRN
Status: DISCONTINUED | OUTPATIENT
Start: 2019-06-18 | End: 2019-06-21 | Stop reason: HOSPADM

## 2019-06-18 RX ORDER — POTASSIUM CHLORIDE 7.45 MG/ML
10 INJECTION INTRAVENOUS PRN
Status: DISCONTINUED | OUTPATIENT
Start: 2019-06-18 | End: 2019-06-21 | Stop reason: HOSPADM

## 2019-06-18 RX ORDER — METOPROLOL TARTRATE 5 MG/5ML
5 INJECTION INTRAVENOUS EVERY 6 HOURS PRN
Status: DISCONTINUED | OUTPATIENT
Start: 2019-06-18 | End: 2019-06-21

## 2019-06-18 RX ADMIN — MAGNESIUM SULFATE HEPTAHYDRATE 1 G: 1 INJECTION, SOLUTION INTRAVENOUS at 12:25

## 2019-06-18 RX ADMIN — MAGNESIUM SULFATE HEPTAHYDRATE 1 G: 1 INJECTION, SOLUTION INTRAVENOUS at 11:25

## 2019-06-18 RX ADMIN — MAGNESIUM SULFATE HEPTAHYDRATE 1 G: 1 INJECTION, SOLUTION INTRAVENOUS at 13:24

## 2019-06-18 RX ADMIN — METOPROLOL TARTRATE 25 MG: 25 TABLET ORAL at 21:54

## 2019-06-18 RX ADMIN — MAGNESIUM SULFATE HEPTAHYDRATE 1 G: 1 INJECTION, SOLUTION INTRAVENOUS at 10:03

## 2019-06-18 RX ADMIN — POTASSIUM CHLORIDE AND SODIUM CHLORIDE: 900; 300 INJECTION, SOLUTION INTRAVENOUS at 13:27

## 2019-06-18 RX ADMIN — ATORVASTATIN CALCIUM 20 MG: 20 TABLET, FILM COATED ORAL at 21:54

## 2019-06-18 RX ADMIN — METOPROLOL TARTRATE 5 MG: 5 INJECTION INTRAVENOUS at 13:51

## 2019-06-18 RX ADMIN — ENOXAPARIN SODIUM 70 MG: 80 INJECTION SUBCUTANEOUS at 17:29

## 2019-06-18 RX ADMIN — ENOXAPARIN SODIUM 70 MG: 80 INJECTION SUBCUTANEOUS at 21:54

## 2019-06-18 ASSESSMENT — PAIN SCALES - GENERAL
PAINLEVEL_OUTOF10: 0

## 2019-06-18 ASSESSMENT — PAIN - FUNCTIONAL ASSESSMENT: PAIN_FUNCTIONAL_ASSESSMENT: 0-10

## 2019-06-18 NOTE — PROGRESS NOTES
[facility]  Inpatient/Observation/Outpatient Rehabilitation    Date: 2019  Patient Name: Lizeth Cavanaugh       [x] Inpatient Acute/Observation       []  Outpatient  : 1930       [] Pt no showed for scheduled appointment    [] Pt refused/declined therapy at this time due to:           [x] Pt cancelled due to:  [] No Reason Given   [] Sick/ill   [x] Other: Unable to arouse pt to initiate PT. (AM tx)    Will attempt evaluation at our earliest opportunity.        Benito Kellogg, PTA Date: 2019

## 2019-06-18 NOTE — PROGRESS NOTES
Patient stated she had to go to the bathroom. Patient assisted to the bathroom at this time with maximum assist. Pt tolerated walking to and from very poorly. Pt washed up once in the bathroom, gown, brief and bed pad all changed. Another set of neuro's completed. Pt is the same as initial assessment. Will continue to monitor.

## 2019-06-18 NOTE — ED NOTES
Family states unable to get any physical contact with patient today. Wouldn't answer the door at either 1000, 1100, or 1700. EMS called at 1700. Patient states she remembers getting up this morning and having breakfast; however does not remember falling.      Anibal Vyas RN  06/17/19 2004

## 2019-06-18 NOTE — PROGRESS NOTES
Met with Patient's son, Kathey Hodgkin, this a.m. Patient is asleep in the room as well. Son states that Patient needs placed as he feels that she is no longer safe to be at home by herself. He chooses [address] in [address] as Patient has been there previously and she \"seemed to like it o.k. there. \"  Discussed Medicare benefit and criteria for Patient to be skilled and son verbalizes understanding of same. [facility] and christopher did come out to assess. Son states that Patient Skyler Hensen nothing, maybe $1,000 in the bank. \"  Medicaid to be applied for, per son, as even if Patient does go to SNF, this will turn into a long term placement. Patient was living in an apartment in [place], [facility]. Uses a walker. No outside services noted. PCP is Dr. Marino Dan. Patient has insurance to assist with medication costs. Plan for Patient is to go to [address] when she is medically stable. LSW to assist with transfer and any other discharge need as it develops.     [address] [facility] [address], [facility]  6/18/2019

## 2019-06-18 NOTE — PROGRESS NOTES
Patient brought up from ER via cart for admission to [facility] room 325 at this time. Report received at bedside. Patient is oriented to name and place but disoriented to time and situation. Patient was able to say the month but states its \"1919\". Admission assessment and vitals are as charted and navigator completed as best as the patient could remember.

## 2019-06-18 NOTE — PROGRESS NOTES
Occupational Therapy   Occupational Therapy Initial Assessment  Date: 2019   Patient Name: John Gomes  MRN: [id number]     : 1930    Date of Service: 2019    Discharge Recommendations:  Continue to assess pending progress, Subacute/Skilled Nursing Facility, ECF with OT       Assessment   Performance deficits / Impairments: Decreased functional mobility ; Decreased ADL status; Decreased strength;Decreased safe awareness;Decreased cognition;Decreased endurance;Decreased balance;Decreased high-level IADLs  Prognosis: Good  Decision Making: High Complexity  REQUIRES OT FOLLOW UP: Yes  Activity Tolerance  Activity Tolerance: Patient limited by fatigue  Activity Tolerance: difficulty arousing/decreased alertness/decreased cognition  Safety Devices  Safety Devices in place: Yes  Type of devices: Left in chair;Call light within reach           Patient Diagnosis(es): The primary encounter diagnosis was Fall, initial encounter. Diagnoses of Urinary tract infection without hematuria, site unspecified, Multiple contusions, and Traumatic rhabdomyolysis, initial encounter [facility]) were also pertinent to this visit. has a past medical history of Arthritis, Atrial fibrillation ([facility] [address] Chronic kidney disease, stage 3 ([facility] [address] [address] Gout, Hyperlipidemia, Hypertension, Hypothyroidism, and Irritable bowel syndrome. has a past surgical history that includes Cataract removal with implant (Bilateral); Cholecystectomy; Carpal tunnel release (); ERCP (N/A, 2019); Total knee arthroplasty (Right); and Total knee arthroplasty (Left).          Restrictions  Restrictions/Precautions  Restrictions/Precautions: General Precautions, Fall Risk       Subjective   General  Patient assessed for rehabilitation services?: Yes  Diagnosis: Fall/Rhabdomyolysis/UTI        Social/Functional History  Social/Functional History  Lives With: Alone  Type of Home: Apartment  Home Layout: One level  Home Access: Level entry  Bathroom Shower/Tub: Tub/Shower unit  [employer]: Standard  Bathroom Equipment: Shower chair, Grab bars in shower  Bathroom Accessibility: Accessible  Home Equipment: Rolling walker, Cane  ADL Assistance: Independent  Homemaking Assistance: Independent  Homemaking Responsibilities: Yes  Ambulation Assistance: Independent  Transfer Assistance: Independent  Active : Yes  Mode of Transportation: Car  Additional Comments: Pt is a poor historian at this time d/t confusion/lethargy-PLOF obtained from son/daughter in law. Pt. was previously (I) with all ADL's, used cane for fxl mobility, had Meals on Wheels twice a week.        Objective   Vision: Within Functional Limits  Hearing: Exceptions to [facility]  Hearing Exceptions: Bilateral hearing aid    Orientation  Overall Orientation Status: Impaired(pt. lethargic, unable to answer orientation questions)        ADL  Feeding: Maximum assistance  Grooming: Maximum assistance  UE Bathing: Maximum assistance  LE Bathing: Maximum assistance  UE Dressing: Maximum assistance  LE Dressing: Dependent/Total  Toileting: Dependent/Total        Transfers  Transfer Comments: unable to assess for safety concerns d/t patient confusion/poor alertness/arousal, per PT, patient requires two person assist     Cognition  Overall Cognitive Status: Exceptions  Cognition Comment: able to follow one step simple commands with frequent repitition/cueing        Sensation  Overall Sensation Status: WFL        LUE PROM (degrees)  LUE General PROM: WFL, pt. unable to complete AROM d/t drowsiness  RUE PROM (degrees)  RUE General PROM: WFL, pt. unable to complete AROM d/t drowsiness        Plan   Plan  Times per week: 7x/wk  Times per day: Daily  Current Treatment Recommendations: Strengthening, Functional Mobility Training, Safety Education & Training, Self-Care / ADL      Goals  Short term goals  Time Frame for Short term goals: 10 days  Short term goal 1: Pt. will participate in 10 mins of fxl activity with min cueing for alertness to increase activity tolerance for self cares. Short term goal 2: Pt. will engage in simple self care tasks (i.e. self feeding/grooming/UB bathing or dressing) with Min A to increase(I).        Therapy Time   Individual Concurrent Group Co-treatment   Time In 0940         Time Out 0952         Minutes [address]

## 2019-06-18 NOTE — PROGRESS NOTES
Patients neuro's rechecked at this time. Pt neuro's are unchanged from initial assessment other than the patient would not follow commands with her left hand. Patient would nod in understanding of what writer was saying but would not follow commands with that hand as writer was asking them. When writer wasn't asking patient to follow certain commands with her left hand, she would  the sheets and pulled them up higher towards her. Will continue to monitor.

## 2019-06-18 NOTE — PLAN OF CARE
PO medications held. Less alert and unable to coordinate to swallow. Will open eyes with stimulation. At report night staff stated she was up most of the night. Will attempt medications later when more awake.  Luisito Zamora RN

## 2019-06-18 NOTE — PROGRESS NOTES
Physical Therapy    Facility/Department: [facility] AT THE [facility] MED SURG  Initial Assessment    NAME: Andrew Taveras  : 1930  MRN: [id number]    Date of Service: 2019    Discharge Recommendations:  Continue to assess pending progress, Subacute/Skilled Nursing Facility, 24 hour supervision or assist, Home with Home health PT   PT Equipment Recommendations  Equipment Needed: No    Assessment   Body structures, Functions, Activity limitations: Decreased functional mobility ; Decreased cognition;Decreased high-level IADLs;Decreased ADL status; Decreased endurance;Decreased strength;Decreased balance  Assessment: The patient is an 80 y.o. female s/p fall. She demonstrates impaired cognition at this time and had difficulty following directions. She demonstrates LE weakness, and required assistance for bed mobility, transfers, and x2 assistance to ambulate 5' to chair with FWW. She would benefit from skilled PT to address her deficits to improve functional mobility. Treatment Diagnosis: General weakness, decreased activity endurance  Prognosis: Fair  Decision Making: Medium Complexity  Patient Education: POC  REQUIRES PT FOLLOW UP: Yes  Activity Tolerance  Activity Tolerance: Patient limited by cognitive status; Patient limited by fatigue       Patient Diagnosis(es): The primary encounter diagnosis was Fall, initial encounter. Diagnoses of Urinary tract infection without hematuria, site unspecified, Multiple contusions, and Traumatic rhabdomyolysis, initial encounter [facility]) were also pertinent to this visit. has a past medical history of Arthritis, Atrial fibrillation ([address] Chronic kidney disease, stage 3 ([address] Gout, Hyperlipidemia, Hypertension, Hypothyroidism, and Irritable bowel syndrome. has a past surgical history that includes Cataract removal with implant (Bilateral); Cholecystectomy; Carpal tunnel release (); ERCP (N/A, 2019);  Total knee arthroplasty (Right); and Total knee arthroplasty (Left). Restrictions  Restrictions/Precautions  Restrictions/Precautions: General Precautions, Fall Risk  Vision/Hearing  Vision: Within Functional Limits  Hearing: Exceptions to [facility]  Hearing Exceptions: Bilateral hearing aid     Subjective  General  Chart Reviewed: Yes  Patient assessed for rehabilitation services?: Yes  Family / Caregiver Present: No  Referring Practitioner: Khadra Bone PA-C  Referral Date : 06/18/19  Diagnosis: Fall with injury, W19. XXXA  Follows Commands: Within Functional Limits  Subjective  Subjective: Pt denies pain at this time. Pain Screening  Patient Currently in Pain: Denies          Orientation  Orientation  Overall Orientation Status: Impaired  Orientation Level: Oriented to person;Disoriented to place; Disoriented to time  Social/Functional History  Social/Functional History  Lives With: Alone  Type of Home: Apartment  Home Layout: One level  Home Access: Level entry  Bathroom Shower/Tub: Tub/Shower unit  Bathroom Toilet: Standard  Bathroom Equipment: Shower chair, Grab bars in shower  Bathroom Accessibility: Accessible  Home Equipment: Rolling walker, Cane  ADL Assistance: Independent  Homemaking Assistance: Independent  Homemaking Responsibilities: Yes  Ambulation Assistance: Independent  Transfer Assistance: Independent  Active : Yes  Mode of Transportation: Car  Additional Comments: Pt is a poor historian at this time d/t confusion/lethargy-PLOF obtained from son/daughter in law. Pt. was previously (I) with all ADL's, used cane for fxl mobility, had Meals on Wheels twice a week.   Cognition   Cognition  Cognition Comment: able to follow one step simple commands with frequent repitition/cueing    Objective     Observation/Palpation  Posture: Poor    AROM RLE (degrees)  RLE AROM: WFL  AROM LLE (degrees)  LLE AROM : WFL  Strength RLE  Comment: Grossly 3/5  Strength LLE  Comment: Grossly 3/5     Sensation  Overall Sensation Status: WFL  Bed mobility  Supine to Sit: Moderate assistance  Scooting: Maximal assistance  Transfers  Sit to Stand: Minimal Assistance  Stand to sit: Minimal Assistance  Ambulation  Ambulation?: Yes  WB Status: FWB  Ambulation 1  Surface: level tile  Device: Rolling Walker  Assistance: 2 Person assistance  Gait Deviations: Shuffles;Decreased step length;Decreased step height;Deviated path; Slow Coleen  Distance: 5' to bedside chair  Stairs/Curb  Stairs?: No     Balance  Posture: Fair  Sitting - Static: Fair  Sitting - Dynamic: Fair;-  Standing - Static: Poor  Standing - Dynamic: Poor        Plan   Plan  Times per week: 7  Times per day: Twice a day  Plan Comment: 1x/day on weekends  Safety Devices  Type of devices:  All fall risk precautions in place    Goals  Short term goals  Time Frame for Short term goals: 10 days  Short term goal 1: Patient will amb 22' with FWW, supervision, without LOB  Short term goal 2: Patient will perform bed mobility and transfers independently  Short term goal 3: Patient will tolerate 20-30 min of therex/act to improve endurance for ADLs  Patient Goals   Patient goals : None stated due to confusion       Therapy Time   Individual Concurrent Group Co-treatment   Time In 0730         Time Out 0755         Minutes 25                 Rajeev Tristan, PT, DPT

## 2019-06-18 NOTE — H&P
Onset    High Blood Pressure Mother     Heart Disease Father        Social History:   TOBACCO:   reports that she has never smoked. She has never used smokeless tobacco.  ETOH:   reports that she does not drink alcohol. OCCUPATION: none    Allergies:  Patient has no known allergies. Medications Prior to Admission:    Prior to Admission medications    Medication Sig Start Date End Date Taking? Authorizing Provider   apixaban (ELIQUIS) 5 MG TABS tablet Take 1 tablet by mouth 2 times daily 4/25/19 6/24/19 Yes Sylvia Frank MD   aspirin 81 MG EC tablet Take 1 tablet by mouth daily  Patient taking differently: Take 325 mg by mouth daily  4/11/19  Yes Elvis Fortune MD   atorvastatin (LIPITOR) 40 MG tablet Take 1 tablet by mouth nightly  Patient taking differently: Take 20 mg by mouth nightly  4/10/19  Yes Elvis Fortune MD   metoprolol tartrate (LOPRESSOR) 25 MG tablet Take 1 tablet by mouth 2 times daily 4/10/19  Yes Elvis Fortune MD   levothyroxine (SYNTHROID) 112 MCG tablet Take 1 tablet by mouth Daily 11/29/18  Yes Sylvia Frank MD   allopurinol (ZYLOPRIM) 100 MG tablet Take 1 tablet by mouth daily 11/29/18  Yes Sylvia Frank MD   acetaminophen (TYLENOL) 500 MG tablet Take 500 mg by mouth every 6 hours as needed for Pain   Yes Historical Provider, MD       Review of Systems:  Constitutional:negative  for fevers, and negative for chills.   Eyes: negative for visual disturbance   ENT: negative for sore throat, negative nasal congestion, and negative for earache  Respiratory: negative for shortness of breath, negative for cough, and negative for wheezing  Cardiovascular: negative for chest pain, negative for palpitations, and negative for syncope  Gastrointestinal: negative for abdominal pain, negative for nausea,negative for vomiting, negative for diarrhea, negative for constipation, and negative for hematochezia or melena  Genitourinary: negative for dysuria, negative for urinary urgency, negative for urinary frequency, and negative for hematuria  Skin: negative for skin rash, and negative for skin lesions  Neurological: negative for unilateral weakness, numbness or tingling.     Physical Exam:    Vitals:   Temp: 98.3 °F (36.8 °C)  BP: (!) 157/94  Resp: 20  Pulse: 85  SpO2: 95 %  24HR INTAKE/OUTPUT:      Intake/Output Summary (Last 24 hours) at 6/18/2019 0731  Last data filed at 6/18/2019 0412  Gross per 24 hour   Intake 665.01 ml   Output --   Net 665.01 ml       Exam:  GEN:  alert and oriented to person, place, but not time, well-developed and well-nourished, in no acute distress, she does not remember how she got to the hospital  EYES: PERRL  NECK: normal, supple  PULM: clear to auscultation bilaterally- no wheezes, rales or rhonchi, normal air movement, no respiratory distress  COR: irregularly irregular and systolic murmur  ABD:  soft, non-tender, non-distended, normal bowel sounds, no masses or organomegaly  EXT:   edema: 1+ affecting bilateral foot, bilateral ankle  NEURO: follows commands, LOYOLA, weak BUE and BLE  SKIN:  Bruising left hip and right hip, bruising across mid back, bruising BUE R>L  -----------------------------------------------------------------  Diagnostic Data:   Lab Results   Component Value Date    WBC 12.7 (H) 06/17/2019    HGB 13.0 06/17/2019     06/17/2019       Lab Results   Component Value Date    BUN 18 06/18/2019    CREATININE 0.77 06/18/2019     06/18/2019    K 3.4 (L) 06/18/2019    CALCIUM 9.1 06/18/2019     06/18/2019    CO2 25 06/18/2019    LABGLOM >60 06/18/2019       Lab Results   Component Value Date    WBCUA 5 TO 10 06/17/2019    RBCUA 0 TO 2 06/17/2019    EPITHUA 5 TO 10 06/17/2019    LEUKOCYTESUR NEGATIVE 06/17/2019    SPECGRAV 1.025 (H) 06/17/2019    GLUCOSEU NEGATIVE 06/17/2019    KETUA 1+ (A) 06/17/2019    PROTEINU 2+ (A) 06/17/2019    HGBUR 3+ (A) 06/17/2019    CASTUA NOT REPORTED 06/17/2019    CRYSTUA NOT REPORTED 06/17/2019 cervical spine April 9, 2019. HISTORY: ORDERING SYSTEM PROVIDED HISTORY: fall TECHNOLOGIST PROVIDED HISTORY: FINDINGS: CT brain: BRAIN/VENTRICLES: There is no acute intracranial hemorrhage, mass effect or midline shift. No abnormal extra-axial fluid collection. The gray-white differentiation is maintained without evidence of an acute infarct. There is no evidence of hydrocephalus. Cortical atrophy with severe chronic microvascular ischemic changes similar to the prior study. ORBITS: The visualized portion of the orbits demonstrate no acute abnormality. SINUSES: The visualized paranasal sinuses and mastoid air cells demonstrate no acute abnormality. SOFT TISSUES/SKULL:  No acute abnormality of the visualized skull or soft tissues. CT cervical spine: Stable 4 mm anterior subluxation C7 on T1 which appears to be due to facet joint degenerative change. No acute fracture is noted. Moderate to severe diffuse spondylosis is noted. Diffuse facet joint degenerative change. No prevertebral soft tissue swelling is noted. Visualized soft tissues surrounding the cervical spine demonstrate no acute findings. Visualized lung apices are clear. No acute intracranial abnormality. No acute cervical spine fracture. No significant change in brain or cervical spine findings when compared to the prior studies. Ct Cervical Spine Wo Contrast    Result Date: 6/17/2019  EXAMINATION: CT OF THE HEAD WITHOUT CONTRAST; CT OF THE CERVICAL SPINE WITHOUT CONTRAST 6/17/2019 7:57 pm TECHNIQUE: CT of the head was performed without the administration of intravenous contrast. Dose modulation, iterative reconstruction, and/or weight based adjustment of the mA/kV was utilized to reduce the radiation dose to as low as reasonably achievable.; CT of the cervical spine was performed without the administration of intravenous contrast. Multiplanar reformatted images are provided for review.  Dose modulation, iterative reconstruction, and/or weight based adjustment of the mA/kV was utilized to reduce the radiation dose to as low as reasonably achievable. COMPARISON: CT brain April 8, 2019. CT cervical spine April 9, 2019. HISTORY: ORDERING SYSTEM PROVIDED HISTORY: fall TECHNOLOGIST PROVIDED HISTORY: FINDINGS: CT brain: BRAIN/VENTRICLES: There is no acute intracranial hemorrhage, mass effect or midline shift. No abnormal extra-axial fluid collection. The gray-white differentiation is maintained without evidence of an acute infarct. There is no evidence of hydrocephalus. Cortical atrophy with severe chronic microvascular ischemic changes similar to the prior study. ORBITS: The visualized portion of the orbits demonstrate no acute abnormality. SINUSES: The visualized paranasal sinuses and mastoid air cells demonstrate no acute abnormality. SOFT TISSUES/SKULL:  No acute abnormality of the visualized skull or soft tissues. CT cervical spine: Stable 4 mm anterior subluxation C7 on T1 which appears to be due to facet joint degenerative change. No acute fracture is noted. Moderate to severe diffuse spondylosis is noted. Diffuse facet joint degenerative change. No prevertebral soft tissue swelling is noted. Visualized soft tissues surrounding the cervical spine demonstrate no acute findings. Visualized lung apices are clear. No acute intracranial abnormality. No acute cervical spine fracture. No significant change in brain or cervical spine findings when compared to the prior studies. Ct Thoracic Spine Wo Contrast    Result Date: 6/17/2019  EXAMINATION: CT OF THE THORACIC SPINE WITHOUT CONTRAST; CT OF THE LUMBAR SPINE WITHOUT CONTRAST 6/17/2019 TECHNIQUE: CT of the thoracic spine was performed without the administration of intravenous contrast. Multiplanar reformatted images are provided for review.  Dose modulation, iterative reconstruction, and/or weight based adjustment of the mA/kV was utilized to reduce the radiation dose to as low as reasonably achievable.; CT of the lumbar spine was performed without the administration of intravenous contrast. Multiplanar reformatted images are provided for review. Dose modulation, iterative reconstruction, and/or weight based adjustment of the mA/kV was utilized to reduce the radiation dose to as low as reasonably achievable. COMPARISON: 04/08/2019 HISTORY: ORDERING SYSTEM PROVIDED HISTORY: contusions and eccymosis to thoracic bilateral; ORDERING SYSTEM PROVIDED HISTORY: fall TECHNOLOGIST PROVIDED HISTORY: fall FINDINGS: BONES/ALIGNMENT:  There is no gross evidence of an acute fracture of the thoracic or lumbar spine. There is normal alignment of the spine. The bones are demineralized. DEGENERATIVE CHANGES: There is moderate to severe multilevel spondylosis and facet arthropathy. Degenerative changes involve the bilateral sacroiliac joints. SOFT TISSUES: No paraspinal mass is seen. Subcutaneous edema is present in the midline of the lumbar spine. There is dependent bibasilar atelectasis. No change in the 1.0 x 1.2 cm right thyroid nodule, no follow-up imaging is recommended. A small hiatal hernia is noted. A biliary stent is in situ. No change in the 1.7 x 1.8 cm left adrenal adenoma. A punctate nonobstructing nephrolithiasis present in the midpole of the right kidney. Moderate atherosclerosis involves the abdominal aorta. 1. No acute fracture. Ct Lumbar Spine Wo Contrast    Result Date: 6/17/2019  EXAMINATION: CT OF THE THORACIC SPINE WITHOUT CONTRAST; CT OF THE LUMBAR SPINE WITHOUT CONTRAST 6/17/2019 TECHNIQUE: CT of the thoracic spine was performed without the administration of intravenous contrast. Multiplanar reformatted images are provided for review.  Dose modulation, iterative reconstruction, and/or weight based adjustment of the mA/kV was utilized to reduce the radiation dose to as low as reasonably achievable.; CT of the lumbar spine was performed without the administration of intravenous cardiopulmonary process identified. Assessment:    Principal Problem:    Traumatic rhabdomyolysis ([facility]  Active Problems:    Hypothyroidism    Hypertension    Hypomagnesemia    Cause of injury, fall    Hypokalemia    UTI (urinary tract infection)  Resolved Problems:    * No resolved hospital problems. *      Patient Active Problem List    Diagnosis Date Noted    Traumatic rhabdom[facility]olysis ([facility] 06/18/2019    Hypokalemia 06/18/2019    UTI (urinary tract infection) 06/18/2019    Cause of injury, fall 06/17/2019    Cerebral infarction, unspecified ([facility] 04/09/2019    Left carotid artery stenosis     Stroke-like symptoms 04/07/2019    Atrial fibrillation with RVR ([facility]     Diarrhea 03/11/2019    VERONICA (acute kidney injury) ([facility] 02/13/2019    Hypomagnesemia 02/13/2019    Right leg pain 02/09/2019    Osteoarthritis of multiple joints 02/09/2019    Choledocholithiasis     Acute cystitis 02/08/2019    Abnormal finding on imaging     Elevated LFTs     Abdominal discomfort     Biliary obstruction 02/07/2019    Hypothyroidism     Hyperlipidemia     Hypertension     Gout     Arthritis     Irritable bowel syndrome        Plan:     · This patient requires inpatient admission because of fall, traumatic rhabdomyolysis, UTI, hypoMg, hypokalemia  · Factors affecting the medical complexity of this patient include recent stroke-like symptoms, afib, HLP, gout, hypothyroidism, CKD, HTN  · Estimated length of stay is 3 days  · IVF  · PT/OT  · Trend CK  · Replace K and Mg  · Rocephin  · SLP  · F/u culture  · High risk medication monitoring: none    CORE MEASURES  DVT prophylaxis: already on chronic anticoagulation  Decubitus ulcer present on admission: No  CODE STATUS: FULL CODE  Nutrition Status: fair   Physical therapy: Yes   Old Charts reviewed: Yes  EKG Reviewed: Yes  Advance Directive Addressed:  Yes    Stephen Rowe PA-C  6/18/2019, 7:31 AM

## 2019-06-18 NOTE — PROGRESS NOTES
Facsimile Transmission Cover Sheet    Information contained in this transmission is for the sole use of the intended recipients and may contain confidential and privileged information. Any unauthorized review, use, disclosure or distribution is prohibited. If you are not the intended recipient, please contact the sender and destroy all copies of the original message. Disclosure is made for the purpose of healthcare operations and continuity of care. To: __Shane Darling________________________    From: Morteza Rhodes M.A., CCC-SLP_________________ Phone Number: [phone] (Kiranrehana Jacobtaiwo)    Shaun Rhodes current unit  [x][facility] [phone]  [phone]    Your bedside evaluation order for Shaun Rhodes has been completed. Based on the results speech therapy recommends:     Dental soft (d/t dentition status) and thin liquids. If you agree please enter the new diet order in [facility] or telephone the nursing unit. Diet will not change without your order. Thank you,  Electronically signed by: Adán Hearn M.A.CCC-SLP

## 2019-06-18 NOTE — PROGRESS NOTES
Speech Language Pathology  Facility/Department: [facility] AT THE [facility] MED SURG  Initial Speech/Language/Cognitive Assessment    NAME: Lisseth Harvey  : 1930   MRN: [id number]  ADMISSION DATE: 2019  ADMITTING DIAGNOSIS: has Hypothyroidism; Hyperlipidemia; Hypertension; Gout; Arthritis; Irritable bowel syndrome; Biliary obstruction; Abnormal finding on imaging; Elevated LFTs; Abdominal discomfort; Acute cystitis; Choledocholithiasis; Right leg pain; Osteoarthritis of multiple joints; VERONICA (acute kidney injury) ([address] Hypomagnesemia; Diarrhea; Stroke-like symptoms; Atrial fibrillation with RVR ([address] Left carotid artery stenosis; Cerebral infarction, unspecified ([address] Cause of injury, fall; Traumatic rhabdomyolysis ([address] Hypokalemia; UTI (urinary tract infection); Chronic kidney disease, stage 3 ([address] Metabolic encephalopathy; Mild malnutrition ([address] and PAF (paroxysmal atrial fibrillation) ([address] on their problem list.  DATE ONSET: 2019    Date of Eval: 2019   Evaluating Therapist: KESHIA Felix    RECENT RESULTS  CT OF HEAD/MRI: 2019  FINDINGS:   CT brain: BRAIN/VENTRICLES: There is no acute intracranial hemorrhage, mass   effect or midline shift.  No abnormal extra-axial fluid collection.  The   gray-white differentiation is maintained without evidence of an acute   infarct.  There is no evidence of hydrocephalus. Cortical atrophy with severe   chronic microvascular ischemic changes similar to the prior study. Primary Complaint: Patient notes \"slurred speech\" after fall and reports difficulty chewing foods d/t dentition status. Pain:  Pain Assessment  Pain Assessment: 0-10  Pain Level: 0    Assessment:      Diagnosis: Mild Cognitive Impairment    Recommendations:  Requires SLP Intervention: Yes  Duration/Frequency of Treatment: 1x daily during inpatient stay. D/C Recommendations:  To be determined       Plan:   Goals:  Short-term Goals  Timeframe for Short-term Goals: 3 days  Goal 1: Pt will increase orientation to place, situation, and temporal concepts with use of visual cues  Goal 2: Pt will complete naming tasks (responsive naming, phrase completion, etc) x8/10 independently without delayed response (<5 sec)  Goal 3: Pt will tolerate recommended diet (dental soft and thin) without overt s/s pen/asp in 80% of trials  Goal 4: Patient will follow basic commands x8/10 with min A  Long-term Goals  Timeframe for Long-term Goals: 1 week  Goal 1: Patient will safely tolerate LRD without overt s/s pen/asp   Goal 2: Patient will participate in conversation with <5 paraphasias    Patient/family involved in developing goals and treatment plan: yes    Subjective:   Previous level of function and limitations:  General  Chart Reviewed: Yes  Family / Caregiver Present: No     Hearing  Hearing: Exceptions to [facility]  Hearing Exceptions: Bilateral hearing aid        Objective:     Oral/Motor  Oral Motor: Exceptions to WFL(generalized weakness)    Auditory Comprehension  Comprehension: Exceptions  Complex Questions: Moderate  One Step Basic Commands: Moderate  Conversation: Moderate  Interfering Components: Attention - selective; Hearing;Processing speed  Effective Techniques: Extra processing time;Repetition;Visual/Gestural cues    Expression  Primary Mode of Expression: Verbal    Verbal Expression  Verbal Expression: Exceptions to functional limits  Initiation: Mild   Responsive: Mild  Conversation: Mild  Interfering Components: Attention; Impaired thought organization;Paraphasia  Effective Techniques: Provide extra time    Cognition:      Orientation  Overall Orientation Status: Impaired  Orientation Level: Oriented to situation;Oriented to person;Disoriented to place; Disoriented to time  Attention  Attention: Exceptions to [facility]  Sustained Attention: Mild  Memory  Memory: (to be assessed in tx)    Prognosis:  Speech Therapy Prognosis  Prognosis: Fair  Prognosis Considerations: Age;Co-Morbidities  Individuals consulted  Consulted and agree with results and recommendations: Patient    Education:  Patient Education: Patient education completed re: ST POT  Patient Education Response: Needs reinforcement  Safety Devices in place: Yes  Type of devices: All fall risk precautions in place; Patient at risk for falls; Left in bed;Call light within reach            Therapy Time:   Individual   Time In 1605   Time Out 1630   Minutes 25         Patient was alert upon SLPs arrival; however noted decreasing level of alertness during evaluation. Patient eventually fell asleep. The patient tolerated minimal trials of thin liquids, regular solids, and puree - declined further trails. Patient tolerated all trials without overt s/s penetration/aspiration - Pt exhibited mild oral phase deficits characterized by mild difficulty with mastication of regular solids d/t dentition status, and moderate oral stasis with regular solids. Patient was able to clear with thin liquid wash (via straw). Recommend dental soft diet with thin liquids and ST follow-up for monitoring of diet tolerance. Additionally, the Patient exhibited occasional phonemic paraphasias with anomia at times. The patient was able to name common objects around the room; however, was unable to complete simple phrase completion task with >50% accuracy. The patient's responses were delayed and Pt needed repetitions. The Patient was oriented to self; however, was not oriented to place, situation, or temporal concepts (knew month to be June). Recommend ST for diet tolerance monitoring and cognitive-linguistic tx. Edgar Wells. ,CCC-SLP    6/18/2019 4:52 PM

## 2019-06-18 NOTE — PROGRESS NOTES
[facility] SPECIALTY [facility]  SPEECH THERAPY  No Visit Note    [] ICU    [x] Acute   Patient: Lynn Olvera  Room: 5055/3969-09      Lynn Olvera not seen on 6/18/2019 at 12:41 PM due to unable to waken. RN reports that Patient was up all previous night. Attempted to administer oral meds and given applesauce. Patient not alert/safe enough or able to follow commands for po at this time. Signature: Kingston Platt M.A. ,CCC-SLP

## 2019-06-18 NOTE — PROGRESS NOTES
Physical Therapy  Facility/Department: [facility] AT THE [facility] MED SURG  Daily Treatment Note  NAME: Any Yousif  : 1930  MRN: [id number]    Date of Service: 2019    Discharge Recommendations:  Continue to assess pending progress, Subacute/Skilled Nursing Facility, 24 hour supervision or assist, Home with Home health PT        Assessment   Treatment Diagnosis: General weakness, decreased activity endurance  Prognosis: Fair  REQUIRES PT FOLLOW UP: Yes  Activity Tolerance  Activity Tolerance: Patient limited by cognitive status; Patient limited by fatigue     Patient Diagnosis(es): The primary encounter diagnosis was Fall, initial encounter. Diagnoses of Urinary tract infection without hematuria, site unspecified, Multiple contusions, and Traumatic rhabdomyolysis, initial encounter [facility]) were also pertinent to this visit. has a past medical history of Arthritis, Atrial fibrillation ([facility] Chronic kidney disease, stage 3 ([facility] Gout, Hyperlipidemia, Hypertension, Hypothyroidism, and Irritable bowel syndrome. has a past surgical history that includes Cataract removal with implant (Bilateral); Cholecystectomy; Carpal tunnel release (); ERCP (N/A, 2019); Total knee arthroplasty (Right); and Total knee arthroplasty (Left). Restrictions  Restrictions/Precautions  Restrictions/Precautions: General Precautions, Fall Risk  Subjective   General  Chart Reviewed: Yes  Family / Caregiver Present: No  Referring Practitioner: Amairani Roberts PA-C  Subjective  Subjective: Nursing in room upon arrival and reports just getting pt back into bed, requested pt stays there. Pt shook head no when asked if having any pain. Orientation  Orientation  Overall Orientation Status: Impaired  Orientation Level: Oriented to person;Disoriented to place; Disoriented to time  Cognition      Objective   Bed mobility  Rolling to Left: Maximum assistance  Scooting: Maximal assistance;2 Person assistance  Comment: Nurse Dada Davies)  in room to assist with bed mobility. Transfers  Comment: Transfers not completed at this time, per nursing request.  Ambulation  Ambulation?: No(Nurse (TWILA) requested for pt to remain in bed d/t just being transfered from chair.)        Exercises  Straight Leg Raise: 2x10  Heelslides: 2x10  Hip Abduction: 2x10  Ankle Pumps: 2x10  Comments: Above exercises completed with pt supine. Pt participated <25% of time. Max cues to participate with poor carryover. Goals  Short term goals  Time Frame for Short term goals: 10 days  Short term goal 1: Patient will amb 22' with FWW, supervision, without LOB  Short term goal 2: Patient will perform bed mobility and transfers independently  Short term goal 3: Patient will tolerate 20-30 min of therex/act to improve endurance for ADLs  Patient Goals   Patient goals : None stated due to confusion    Plan    Plan  Times per week: 7  Times per day: Twice a day  Plan Comment: 1x/day on weekends  Safety Devices  Type of devices:  All fall risk precautions in place, Bed alarm in place, Call light within reach, Left in bed(Nurse (TWILA) in room upon depature.)     Therapy Time   Individual Concurrent Group Co-treatment   Time In 1234         Time Out 2059 [place], [place]

## 2019-06-18 NOTE — TELEPHONE ENCOUNTER
IN review of chart this morning, pt is admitted in hospital, in [facility] since 6/17/19, procedure has been cancelled, LVM for pt to call to reschedule when feeling better

## 2019-06-18 NOTE — PROGRESS NOTES
Nutrition Assessment    Type and Reason for Visit: Initial    Nutrition Recommendations:   1. Continue current diet. 2. Start 4 oz chocolate ensure enlive TID with meals. Nutrition Assessment: Inadequate oral intakes r/t confusion aeb weight loss of 10% x 2 months, PO choice at breakfast poor-toast and OJ after much encouragement, drank 1/4 of juice, did not touch toast. Pt noted to have a low magnesium. Emesis noted. Family states they cannot get Pt to stay awake, nurse aware. Pt family states she was getting meals on wheels 2 days per week. State Pt told them she was eating ok. State Pt was hospitalized 2 months ago. Pt supposed to have a kidney stone taken care of this week, has had a stent in. State Pt would probably like chocolate ONS. Malnutrition Assessment:  · Malnutrition Status: Mild Malnutrition  · Context: Acute illness or injury  · Findings of the 6 clinical characteristics of malnutrition (Minimum of 2 out of 6 clinical characteristics is required to make the diagnosis of moderate or severe Protein Calorie Malnutrition based on AND/ASPEN Guidelines):  1. Energy Intake-Unable to assess(family unsure, from out of town. Pt asleep),      2. Weight Loss-10% loss or greater, (2 months)  3. Fat Loss-No significant subcutaneous fat loss,    4. Muscle Loss-No significant muscle mass loss,    5. Fluid Accumulation-Mild fluid accumulation, Extremities  6.  Strength-Not measured    Nutrition Risk Level:  Moderate    Nutrient Needs:  · Estimated Daily Total Kcal: 1568-0630(42-46/TK)  · Estimated Daily Protein (g): 54-76g(1.2-1.4g/kg)  · Estimated Daily Total Fluid (ml/day): 1750 ml(25/kg)    Nutrition Diagnosis:   · Problem: Inadequate oral intake  · Etiology: related to Cognitive or neurological impairment     Signs and symptoms:  as evidenced by Weight loss(PO choice of toast and OJ after much encouragement)    Objective Information:  · Nutrition-Focused Physical Findings: Pt appears well nourished  · Wound Type: None  · Current Nutrition Therapies:  · Oral Diet Orders: General, No Added Salt (3-4gm)   · Oral Diet intake: Unable to assess  · Oral Nutrition Supplement (ONS) Orders: None  · Anthropometric Measures:  · Ht: 5' (152.4 cm)   · Current Body Wt: 154 lb (69.9 kg)  · Admission Body Wt: 152 lb 12.8 oz (69.3 kg)  · Usual Body Wt: 167 lb (75.8 kg)(164-170#)  · % Weight Change:  ,  10% weight loss x 2 months  · Ideal Body Wt: 100 lb (45.4 kg), % Ideal Body 154%  · BMI Classification: BMI 30.0 - 34.9 Obese Class I  Recent Labs     06/17/19 1945 06/18/19  0548    143   K 3.6* 3.4*    103   CO2 28 25   BUN 17 18   CREATININE 0.75 0.77   GLUCOSE 137* 132*   ALT  --  32   ALKPHOS  --  103   GFR                            Lab Results   Component Value Date    [facility]    LABALBU 4.5 04/23/2012        Nutrition Interventions:   Continue current diet, Start ONS(4 oz ensure enlive TID with meals)  Continued Inpatient Monitoring, Education not appropriate at this time, Coordination of Care    Nutrition Evaluation:   · Evaluation: Goals set   · Goals: PO > 75% of meals and supplements    · Monitoring: Meal Intake, Supplement Intake, I&O, Weight, Pertinent Labs      Electronically signed by Renzo Delaney RD, OXANA on 6/18/19 at 9:52 AM    Contact Number: [phone]

## 2019-06-19 ENCOUNTER — TELEPHONE (OUTPATIENT)
Dept: GASTROENTEROLOGY | Age: 84
End: 2019-06-19

## 2019-06-19 PROBLEM — I48.0 PAROXYSMAL ATRIAL FIBRILLATION WITH RAPID VENTRICULAR RESPONSE (HCC): Status: ACTIVE | Noted: 2019-06-19

## 2019-06-19 LAB
ALBUMIN SERPL-MCNC: 3.4 G/DL (ref 3.5–5.2)
ALBUMIN/GLOBULIN RATIO: 1.3 (ref 1–2.5)
ALP BLD-CCNC: 98 U/L (ref 35–104)
ALT SERPL-CCNC: 31 U/L (ref 5–33)
ANION GAP SERPL CALCULATED.3IONS-SCNC: 13 MMOL/L (ref 9–17)
AST SERPL-CCNC: 90 U/L
BILIRUB SERPL-MCNC: 0.99 MG/DL (ref 0.3–1.2)
BUN BLDV-MCNC: 16 MG/DL (ref 8–23)
BUN/CREAT BLD: 21 (ref 9–20)
CALCIUM SERPL-MCNC: 9.2 MG/DL (ref 8.6–10.4)
CHLORIDE BLD-SCNC: 106 MMOL/L (ref 98–107)
CO2: 23 MMOL/L (ref 20–31)
CREAT SERPL-MCNC: 0.76 MG/DL (ref 0.5–0.9)
GFR AFRICAN AMERICAN: >60 ML/MIN
GFR NON-AFRICAN AMERICAN: >60 ML/MIN
GFR SERPL CREATININE-BSD FRML MDRD: ABNORMAL ML/MIN/{1.73_M2}
GFR SERPL CREATININE-BSD FRML MDRD: ABNORMAL ML/MIN/{1.73_M2}
GLUCOSE BLD-MCNC: 100 MG/DL (ref 70–99)
HCT VFR BLD CALC: 35.8 % (ref 36.3–47.1)
HEMOGLOBIN: 11.2 G/DL (ref 11.9–15.1)
MCH RBC QN AUTO: 31.2 PG (ref 25.2–33.5)
MCHC RBC AUTO-ENTMCNC: 31.3 G/DL (ref 28.4–34.8)
MCV RBC AUTO: 99.7 FL (ref 82.6–102.9)
NRBC AUTOMATED: 0 PER 100 WBC
PDW BLD-RTO: 14.1 % (ref 11.8–14.4)
PLATELET # BLD: ABNORMAL K/UL (ref 138–453)
PLATELET, FLUORESCENCE: 162 K/UL (ref 138–453)
PMV BLD AUTO: ABNORMAL FL (ref 8.1–13.5)
POTASSIUM SERPL-SCNC: 3.8 MMOL/L (ref 3.7–5.3)
RBC # BLD: 3.59 M/UL (ref 3.95–5.11)
SODIUM BLD-SCNC: 142 MMOL/L (ref 135–144)
TOTAL CK: 1739 U/L (ref 26–192)
TOTAL PROTEIN: 6 G/DL (ref 6.4–8.3)
WBC # BLD: 11.2 K/UL (ref 3.5–11.3)

## 2019-06-19 PROCEDURE — 97535 SELF CARE MNGMENT TRAINING: CPT

## 2019-06-19 PROCEDURE — 92526 ORAL FUNCTION THERAPY: CPT

## 2019-06-19 PROCEDURE — 97110 THERAPEUTIC EXERCISES: CPT

## 2019-06-19 PROCEDURE — 97116 GAIT TRAINING THERAPY: CPT

## 2019-06-19 PROCEDURE — 6370000000 HC RX 637 (ALT 250 FOR IP): Performed by: INTERNAL MEDICINE

## 2019-06-19 PROCEDURE — 80053 COMPREHEN METABOLIC PANEL: CPT

## 2019-06-19 PROCEDURE — 85027 COMPLETE CBC AUTOMATED: CPT

## 2019-06-19 PROCEDURE — 82550 ASSAY OF CK (CPK): CPT

## 2019-06-19 PROCEDURE — 6360000002 HC RX W HCPCS: Performed by: PHYSICIAN ASSISTANT

## 2019-06-19 PROCEDURE — 96372 THER/PROPH/DIAG INJ SC/IM: CPT

## 2019-06-19 PROCEDURE — 92507 TX SP LANG VOICE COMM INDIV: CPT

## 2019-06-19 PROCEDURE — 2580000003 HC RX 258: Performed by: PHYSICIAN ASSISTANT

## 2019-06-19 PROCEDURE — 96366 THER/PROPH/DIAG IV INF ADDON: CPT

## 2019-06-19 PROCEDURE — 2580000003 HC RX 258: Performed by: INTERNAL MEDICINE

## 2019-06-19 PROCEDURE — 36415 COLL VENOUS BLD VENIPUNCTURE: CPT

## 2019-06-19 PROCEDURE — 6370000000 HC RX 637 (ALT 250 FOR IP): Performed by: PHYSICIAN ASSISTANT

## 2019-06-19 PROCEDURE — 1200000000 HC SEMI PRIVATE

## 2019-06-19 PROCEDURE — 99232 SBSQ HOSP IP/OBS MODERATE 35: CPT | Performed by: FAMILY MEDICINE

## 2019-06-19 RX ADMIN — ENOXAPARIN SODIUM 70 MG: 80 INJECTION SUBCUTANEOUS at 10:09

## 2019-06-19 RX ADMIN — METOPROLOL TARTRATE 25 MG: 25 TABLET ORAL at 10:13

## 2019-06-19 RX ADMIN — CEFTRIAXONE 1 G: 1 INJECTION, POWDER, FOR SOLUTION INTRAMUSCULAR; INTRAVENOUS at 21:29

## 2019-06-19 RX ADMIN — APIXABAN 5 MG: 5 TABLET, FILM COATED ORAL at 21:28

## 2019-06-19 RX ADMIN — ASPIRIN 81 MG: 81 TABLET, COATED ORAL at 10:10

## 2019-06-19 RX ADMIN — Medication 10 ML: at 00:47

## 2019-06-19 RX ADMIN — METOPROLOL TARTRATE 25 MG: 25 TABLET ORAL at 21:28

## 2019-06-19 RX ADMIN — CEFTRIAXONE 1 G: 1 INJECTION, POWDER, FOR SOLUTION INTRAMUSCULAR; INTRAVENOUS at 00:47

## 2019-06-19 RX ADMIN — POTASSIUM CHLORIDE AND SODIUM CHLORIDE: 900; 300 INJECTION, SOLUTION INTRAVENOUS at 19:24

## 2019-06-19 RX ADMIN — LEVOTHYROXINE SODIUM 112 MCG: 112 TABLET ORAL at 10:10

## 2019-06-19 RX ADMIN — POTASSIUM CHLORIDE AND SODIUM CHLORIDE: 900; 300 INJECTION, SOLUTION INTRAVENOUS at 00:47

## 2019-06-19 RX ADMIN — ALLOPURINOL 100 MG: 100 TABLET ORAL at 10:10

## 2019-06-19 RX ADMIN — ATORVASTATIN CALCIUM 20 MG: 20 TABLET, FILM COATED ORAL at 21:28

## 2019-06-19 RX ADMIN — POTASSIUM CHLORIDE AND SODIUM CHLORIDE: 900; 300 INJECTION, SOLUTION INTRAVENOUS at 10:13

## 2019-06-19 ASSESSMENT — PAIN - FUNCTIONAL ASSESSMENT: PAIN_FUNCTIONAL_ASSESSMENT: 0-10

## 2019-06-19 ASSESSMENT — PAIN SCALES - GENERAL
PAINLEVEL_OUTOF10: 0

## 2019-06-19 NOTE — PROGRESS NOTES
Minimal Assistance;2 Person Assistance  Stand to sit: Minimal Assistance  Comment: Additional PTA (Floy Specter) in room to assist with transfers/ambulation. Ambulation  Ambulation?: Yes  WB Status: FWB  Ambulation 1  Surface: level tile  Device: Rolling Walker  Assistance: Contact guard assistance  Quality of Gait: Slow arsenio with R lateral trunk lean. Distance: 30 ft  Comments: x2 assistance used this date for safety. Pt required cues for direction and technique. No LOB noted. Stairs/Curb  Stairs?: No        Exercises  Straight Leg Raise: x10  Quad Sets: x10  Heelslides: x10  Gluteal Sets: x10  Hip Flexion: x10  Hip Abduction: x10  Knee Long Arc Quad: x10  Ankle Pumps: x10  Comments: Above exercises completed in semi reclined position and seated. AAROM as needed. Goals  Short term goals  Time Frame for Short term goals: 10 days  Short term goal 1: Patient will amb 22' with FWW, supervision, without LOB  Short term goal 2: Patient will perform bed mobility and transfers independently  Short term goal 3: Patient will tolerate 20-30 min of therex/act to improve endurance for ADLs  Patient Goals   Patient goals : None stated due to confusion    Plan    Plan  Times per week: 7  Times per day: Twice a day  Plan Comment: 1x/day on weekends  Safety Devices  Type of devices:  All fall risk precautions in place, Left in chair, Call light within reach, Chair alarm in place, Patient at risk for falls, Gait belt, Nurse notified(ST in room upon departure.)     Therapy Time   Individual Concurrent Group Co-treatment   Time In 1128         Time Out 1151         Minutes [address], PTA

## 2019-06-19 NOTE — PROGRESS NOTES
Patient: Berhane Victor  : 1930  Date of Admission: 2019  Primary Care Physician: Vale Opitz  Today's Date: 2019    REASON FOR CONSULTATION: Fall (unknown length of time down; complains of bilateral hip pain according to EMS) and Altered Mental Status (last known well at 10 AM; EMS states concerned about patient yesterday due to confustion; today still confused)    HPI: Ms. Benson Douglas is a 80 y.o. female who was admitted to the hospital with a fall and was found down for some time. In the ER she was found to have evidence of rhabdomyolysis. She also appears to have a history of atrial fibrillation leading to this consultation. Yesterday, she was so tired I was really not bale to wake her so she was not able to answer questions but appeared to be resting peacefully and was in no acute distress. Today, Ms. Barakat is feeling pretty good. She is nor very mobile and is not able to walk very far. She is up 8 pounds. She is eating well. Past Medical History:   Diagnosis Date    Arthritis     Atrial fibrillation ([address]     Chronic kidney disease, stage 3 (HCC)     Gout     Hyperlipidemia     Hypertension     Hypothyroidism     Irritable bowel syndrome        CURRENT ALLERGIES: Patient has no known allergies. REVIEW OF SYSTEMS: 14 systems were reviewed. Pertinent positives and negatives as above, all else negative. Past Surgical History:   Procedure Laterality Date    CARPAL TUNNEL RELEASE      Dr. Adam Acevedo - right side    CATARACT REMOVAL WITH IMPLANT Bilateral     CHOLECYSTECTOMY      ERCP N/A 2019    Driss Birmingham MD - ERCP, Sphincterotomy, balloon dilatation and stent placement with removal of multiple stones    TOTAL KNEE ARTHROPLASTY Right     TOTAL KNEE ARTHROPLASTY Left     Social History:  Social History     Tobacco Use    Smoking status: Never Smoker    Smokeless tobacco: Never Used   Substance Use Topics    Alcohol use: No    Drug use:  No MEDICATIONS:    apixaban (ELIQUIS) tablet 5 mg BID   cefTRIAXone (ROCEPHIN) 1 g IVPB in 50 mL D5W minibag Q24H   potassium chloride (KLOR-CON M) extended release tablet 40 mEq PRN   Or    potassium bicarb-citric acid (EFFER-K) effervescent tablet 40 mEq PRN   Or    potassium chloride 10 mEq/100 mL IVPB (Peripheral Line) PRN   metoprolol (LOPRESSOR) injection 5 mg Q6H PRN   0.9% NaCl with KCl 40 mEq infusion Continuous   0.9 % sodium chloride bolus Once   allopurinol (ZYLOPRIM) tablet 100 mg Daily   aspirin EC tablet 81 mg Daily   atorvastatin (LIPITOR) tablet 20 mg Nightly   levothyroxine (SYNTHROID) tablet 112 mcg Daily   metoprolol tartrate (LOPRESSOR) tablet 25 mg BID   sodium chloride flush 0.9 % injection 10 mL 2 times per day   sodium chloride flush 0.9 % injection 10 mL PRN   magnesium hydroxide (MILK OF MAGNESIA) 400 MG/5ML suspension 30 mL Daily PRN   ondansetron (ZOFRAN) injection 4 mg Q6H PRN   acetaminophen (TYLENOL) tablet 650 mg Q4H PRN       CURRENT INPATIENT MEDICATIONS:  Prior to Admission medications    Medication Sig Start Date End Date Taking?  Authorizing Provider   apixaban (ELIQUIS) 5 MG TABS tablet Take 1 tablet by mouth 2 times daily 4/25/19 6/24/19 Yes Nadermoharish Blood MD   aspirin 81 MG EC tablet Take 1 tablet by mouth daily  Patient taking differently: Take 325 mg by mouth daily  4/11/19  Yes Joseph Garrett MD   atorvastatin (LIPITOR) 40 MG tablet Take 1 tablet by mouth nightly  Patient taking differently: Take 20 mg by mouth nightly  4/10/19  Yes Joseph Garrett MD   metoprolol tartrate (LOPRESSOR) 25 MG tablet Take 1 tablet by mouth 2 times daily 4/10/19  Yes Joseph Garrett MD   levothyroxine (SYNTHROID) 112 MCG tablet Take 1 tablet by mouth Daily 11/29/18  Yes Timmoharish Blood MD   allopurinol (ZYLOPRIM) 100 MG tablet Take 1 tablet by mouth daily 11/29/18  Yes Timmothy MD Jeremi   acetaminophen (TYLENOL) 500 MG tablet Take 500 mg by mouth every 6 hours as needed for Pain   Yes Historical Provider, MD       FAMILY HISTORY: family history includes Heart Disease in her father; High Blood Pressure in her mother. PHYSICAL EXAM:   BP (!) 140/77   Pulse 82   Temp 97.2 °F (36.2 °C) (Temporal)   Resp 16   Ht 5' (1.524 m)   Wt 162 lb 4.8 oz (73.6 kg)   SpO2 95%   BMI 31.70 kg/m²  Body mass index is 31.7 kg/m². Constitutional: She is oriented to person, place, and time. She appears well-developed and well-nourished. In no acute distress. HEENT: Normocephalic and atraumatic. No JVD present. Carotid bruit is not present. No mass and no thyromegaly present. No lymphadenopathy present. Cardiovascular: Normal rate, irregularly irregular rhythm, normal heart sounds. Exam reveals no gallop and no friction rubs. 2/6 systolic murmur, 5th intercostal space on the LEFT in the mid-clavicular line (cardiac apex). Pulmonary/Chest: Effort normal and breath sounds normal. No respiratory distress. She has no wheezes, rhonchi or rales. Abdominal: Soft, non-tender. Bowel sounds and aorta are normal. She exhibits no organomegaly, mass or bruit. Extremities: Trace. No cyanosis or clubbing. 2+ radial and carotid pulses. Distal extremity pulses: 2+ bilaterally. .   Neurological: She is alert and oriented to person, place, and time. No evidence of gross cranial nerve deficit. Coordination appeared normal.   Skin: Skin is warm and dry. There is no rash or diaphoresis. 10 cm bruise on left back  Psychiatric: She has a normal mood and affect.  Her speech is normal and behavior is normal.      MOST RECENT LABS ON RECORD:   Lab Results   Component Value Date    WBC 11.2 06/19/2019    HGB 11.2 (L) 06/19/2019    HCT 35.8 (L) 06/19/2019    PLT See Reflexed IPF Result 06/19/2019    CHOL 119 04/08/2019    TRIG 79 04/08/2019    HDL 48 04/08/2019    ALT 31 06/19/2019    AST 90 (H) 06/19/2019     06/19/2019    K 3.8 06/19/2019     06/19/2019    CREATININE 0.76 06/19/2019    BUN 16 likely makes her atrial fibrillation chronic    · Rhabdomyolysis due to fall: Agree with hydration and supportive care as needed. Monitor creatinine    · Chronic Diastolic Dysfunction: Currently appears stable but up 8 lbs. · Agree with IVF but reduce to 75 ml/hr. Once again, thank you for allowing me to participate in this patients care. Please do not hesitate to contact me if I could be of any further assistance. Sincerely,  Juan Rhodes. Tristen CALLES, MS, F.A.C.C. [facility] Cardiology Specialist    [address] [facility], [address] [place] [address]  Phone: [phone], Fax: [phone]     I believe that the risk of significant morbidity and mortality related to the patient's current medical conditions are: Intermediate.

## 2019-06-19 NOTE — CONSULTS
Inpatient consult to Cardiology  Consult performed by: Maday Parra MD  Consult ordered by: Cherelle Mensah PA-C  Reason for consult: atrial fibrillation              Patient: Isaias Piña  : 1930  Date of Admission: 2019  Primary Care Physician: Hemant Clay  Today's Date: 2019    REASON FOR CONSULTATION: Fall (unknown length of time down; complains of bilateral hip pain according to EMS) and Altered Mental Status (last known well at 10 AM; EMS states concerned about patient yesterday due to confustion; today still confused)    HPI: Ms. Charis Sneed is a 80 y.o. female who was admitted to the hospital with a fall and was found down for some time. In the ER she was found to have evidence of rhabdomyolysis. She also appears to have a history of atrial fibrillation leading to this consultation. Ms. Charis Sneed was very tired and I was really not able to wake her so she was not able to answer questions but appeared to be resting peacefully and was in no acute distress. Past Medical History:   Diagnosis Date    Arthritis     Atrial fibrillation ([address]     Chronic kidney disease, stage 3 (HCC)     Gout     Hyperlipidemia     Hypertension     Hypothyroidism     Irritable bowel syndrome        CURRENT ALLERGIES: Patient has no known allergies. REVIEW OF SYSTEMS: 14 systems were reviewed. Pertinent positives and negatives as above, all else negative.      Past Surgical History:   Procedure Laterality Date    CARPAL TUNNEL RELEASE      Dr. Radha Boyer - right side    CATARACT REMOVAL WITH IMPLANT Bilateral     CHOLECYSTECTOMY      ERCP N/A 2019    Driss Birmingham MD - ERCP, Sphincterotomy, balloon dilatation and stent placement with removal of multiple stones    TOTAL KNEE ARTHROPLASTY Right     TOTAL KNEE ARTHROPLASTY Left     Social History:  Social History     Tobacco Use    Smoking status: Never Smoker    Smokeless tobacco: Never Used   Substance Use Topics    Alcohol use: No    Drug use: No        MEDICATIONS:    cefTRIAXone (ROCEPHIN) 1 g IVPB in 50 mL D5W minibag Q24H   potassium chloride (KLOR-CON M) extended release tablet 40 mEq PRN   Or    potassium bicarb-citric acid (EFFER-K) effervescent tablet 40 mEq PRN   Or    potassium chloride 10 mEq/100 mL IVPB (Peripheral Line) PRN   metoprolol (LOPRESSOR) injection 5 mg Q6H PRN   0.9% NaCl with KCl 40 mEq infusion Continuous   enoxaparin (LOVENOX) injection 70 mg BID   0.9 % sodium chloride bolus Once   allopurinol (ZYLOPRIM) tablet 100 mg Daily   aspirin EC tablet 81 mg Daily   atorvastatin (LIPITOR) tablet 20 mg Nightly   levothyroxine (SYNTHROID) tablet 112 mcg Daily   metoprolol tartrate (LOPRESSOR) tablet 25 mg BID   sodium chloride flush 0.9 % injection 10 mL 2 times per day   sodium chloride flush 0.9 % injection 10 mL PRN   magnesium hydroxide (MILK OF MAGNESIA) 400 MG/5ML suspension 30 mL Daily PRN   ondansetron (ZOFRAN) injection 4 mg Q6H PRN   acetaminophen (TYLENOL) tablet 650 mg Q4H PRN       CURRENT INPATIENT MEDICATIONS:  Prior to Admission medications    Medication Sig Start Date End Date Taking?  Authorizing Provider   apixaban (ELIQUIS) 5 MG TABS tablet Take 1 tablet by mouth 2 times daily 4/25/19 6/24/19 Yes Sylvia Frnak MD   aspirin 81 MG EC tablet Take 1 tablet by mouth daily  Patient taking differently: Take 325 mg by mouth daily  4/11/19  Yes Elvis Fortune MD   atorvastatin (LIPITOR) 40 MG tablet Take 1 tablet by mouth nightly  Patient taking differently: Take 20 mg by mouth nightly  4/10/19  Yes Elvis Fortune MD   metoprolol tartrate (LOPRESSOR) 25 MG tablet Take 1 tablet by mouth 2 times daily 4/10/19  Yes Elvis Fortune MD   levothyroxine (SYNTHROID) 112 MCG tablet Take 1 tablet by mouth Daily 11/29/18  Yes Sylvia Frank MD   allopurinol (ZYLOPRIM) 100 MG tablet Take 1 tablet by mouth daily 11/29/18  Yes Sylvia Frank MD   acetaminophen (TYLENOL) 500 MG tablet Take 500 mg by mouth every 6 hours as needed for Pain   Yes Historical Provider, MD       FAMILY HISTORY: family history includes Heart Disease in her father; High Blood Pressure in her mother. PHYSICAL EXAM:   BP (!) 146/77   Pulse 85   Temp 97.9 °F (36.6 °C) (Temporal)   Resp 18   Ht 5' (1.524 m)   Wt 154 lb (69.9 kg)   SpO2 96%   BMI 30.08 kg/m²  Body mass index is 30.08 kg/m². Constitutional: She is oriented to person, place, and time. She appears well-developed and well-nourished. In no acute distress. HEENT: Normocephalic and atraumatic. No JVD present. Carotid bruit is not present. No mass and no thyromegaly present. No lymphadenopathy present. Cardiovascular: Normal rate, irregularly irregular rhythm, normal heart sounds. Exam reveals no gallop and no friction rubs. 1/6 systolic murmur, 5th intercostal space on the LEFT in the mid-clavicular line (cardiac apex). Pulmonary/Chest: Effort normal and breath sounds normal. No respiratory distress. She has no wheezes, rhonchi or rales. Abdominal: Soft, non-tender. Bowel sounds and aorta are normal. She exhibits no organomegaly, mass or bruit. Extremities: Trace. No cyanosis or clubbing. 2+ radial and carotid pulses. Distal extremity pulses: 2+ bilaterally. .   Neurological: She is alert and oriented to person, place, and time. No evidence of gross cranial nerve deficit. Coordination appeared normal.   Skin: Skin is warm and dry. There is no rash or diaphoresis. Psychiatric: She has a normal mood and affect.  Her speech is normal and behavior is normal.      MOST RECENT LABS ON RECORD:   Lab Results   Component Value Date    WBC 12.7 (H) 06/17/2019    HGB 13.0 06/17/2019    HCT 41.5 06/17/2019     06/17/2019    CHOL 119 04/08/2019    TRIG 79 04/08/2019    HDL 48 04/08/2019    ALT 32 06/18/2019     (H) 06/18/2019     06/18/2019    K 3.4 (L) 06/18/2019     06/18/2019    CREATININE 0.77 06/18/2019    BUN 18 06/18/2019    CO2 25 06/18/2019    TSH 0.61 04/07/2019    INR 1.1 04/07/2019    LABA1C 5.1 04/08/2019       ASSESSMENT:  Patient Active Problem List    Diagnosis Date Noted    Mild malnutrition ([facility] 06/18/2019     Priority: Medium     Class: Present on Admission    Traumatic rhabdomyolysis ([facility] 06/18/2019     Priority: Low    Hypokalemia 06/18/2019     Priority: Low    UTI (urinary tract infection) 06/18/2019     Priority: Low    Metabolic encephalopathy 77/87/6563     Priority: Low    PAF (paroxysmal atrial fibrillation) ([facility] 06/18/2019     Priority: Low    Chronic kidney disease, stage 3 (HCC)      Priority: Low    Cause of injury, fall 06/17/2019     Priority: Low    Cerebral infarction, unspecified ([facility] 04/09/2019     Priority: Low    Left carotid artery stenosis      Priority: Low    Stroke-like symptoms 04/07/2019     Priority: Low    Atrial fibrillation with RVR ([facility]      Priority: Low    Diarrhea 03/11/2019     Priority: Low    VERONICA (acute kidney injury) ([facility] 02/13/2019     Priority: Low    Hypomagnesemia 02/13/2019     Priority: Low    Right leg pain 02/09/2019     Priority: Low    Osteoarthritis of multiple joints 02/09/2019     Priority: Low    Choledocholithiasis      Priority: Low    Acute cystitis 02/08/2019     Priority: Low    Abnormal finding on imaging      Priority: Low    Elevated LFTs      Priority: Low    Abdominal discomfort      Priority: Low    Biliary obstruction 02/07/2019     Priority: Low    Hypothyroidism      Priority: Low    Hyperlipidemia      Priority: Low    Hypertension      Priority: Low    Gout      Priority: Low    Arthritis      Priority: Low    Irritable bowel syndrome      Priority: Low        PLAN:  · Most likely Chronic Atrial Fibrillation: Rate Control Asymptomatic   Beta Blocker: Continue Metoprolol tartrate (Lopressor) 25 mg bid.  Calcium Channel Blocker: Not indicated at this time.  Anti-Arrhythmic: Not indicated.    Stroke Risk: CHADS2-VASc Score:

## 2019-06-19 NOTE — PROGRESS NOTES
Physical Therapy  Facility/Department: [facility] AT THE [facility] MED SURG  Daily Treatment Note  NAME: Keith Diaz  : 1930  MRN: [id number]    Date of Service: 2019    Discharge Recommendations:  Continue to assess pending progress, Subacute/Skilled Nursing Facility, 24 hour supervision or assist, Home with Home health PT        Assessment   Treatment Diagnosis: General weakness, decreased activity endurance  Prognosis: Fair  Patient Education: Safety with transfers/ambulation  REQUIRES PT FOLLOW UP: Yes  Activity Tolerance  Activity Tolerance: Patient limited by cognitive status; Patient limited by fatigue     Patient Diagnosis(es): The primary encounter diagnosis was Fall, initial encounter. Diagnoses of Urinary tract infection without hematuria, site unspecified, Multiple contusions, and Traumatic rhabdomyolysis, initial encounter [facility]) were also pertinent to this visit. has a past medical history of Arthritis, Atrial fibrillation ([facility] [address] Chronic kidney disease, stage 3 ([facility] [address] Gout, Hyperlipidemia, Hypertension, Hypothyroidism, and Irritable bowel syndrome. has a past surgical history that includes Cataract removal with implant (Bilateral); Cholecystectomy; Carpal tunnel release (); ERCP (N/A, 2019); Total knee arthroplasty (Right); and Total knee arthroplasty (Left). Restrictions  Restrictions/Precautions  Restrictions/Precautions: General Precautions, Fall Risk  Subjective   General  Chart Reviewed: Yes  Family / Caregiver Present: No  Referring Practitioner: Zechariah Mckay PA-C  Subjective  Subjective: Pt denies pain upon arrival. More alert this AM.  Awake and responding to questions with full sentences as compared to grunts/one worded answers during tx yesterday.           Orientation  Orientation  Overall Orientation Status: Impaired  Orientation Level: Oriented to place;Oriented to person;Disoriented to time  Cognition      Objective   Bed mobility  Supine to Sit: Moderate assistance  Scooting: Contact guard assistance  Transfers  Sit to Stand: Contact guard assistance  Stand to sit: Contact guard assistance  Comment: Cues for hand placement. Pt completed Sit<>stand from commode with use of grab bar and Min A. Ambulation  Ambulation?: Yes  WB Status: FWB  Ambulation 1  Surface: level tile  Device: Rolling Walker  Assistance: Contact guard assistance  Quality of Gait: Reduced B step lengths, severe R trunk lean and slow arsenio. Distance: 10 ft x 2 (to and from bathoom)  Comments: Pt able to ambulate with x1 assistance this AM, although is slightly unsteady this AM and requires guarding on R d/t severe trunk lean to R. No LOB. Stairs/Curb  Stairs?: No        Exercises  Straight Leg Raise: x10  Quad Sets: x10  Heelslides: x10  Gluteal Sets: x10  Hip Abduction: x10  Ankle Pumps: x10  Comments: Above exercises completed in supine. AAROM provided as needed. Goals  Short term goals  Time Frame for Short term goals: 10 days  Short term goal 1: Patient will amb 22' with FWW, supervision, without LOB  Short term goal 2: Patient will perform bed mobility and transfers independently  Short term goal 3: Patient will tolerate 20-30 min of therex/act to improve endurance for ADLs  Patient Goals   Patient goals : None stated due to confusion    Plan    Plan  Times per week: 7  Times per day: Twice a day  Plan Comment: 1x/day on weekends  Safety Devices  Type of devices:  All fall risk precautions in place, Left in chair, Call light within reach, Chair alarm in place, Patient at risk for falls, Gait belt, Nurse notified( rod) in room upon departure.)     Therapy Time   Individual Concurrent Group Co-treatment   Time In 0711         Time Out 0745         Minutes [facility]

## 2019-06-19 NOTE — PROGRESS NOTES
Occupational Therapy  Facility/Department: [address] [place] [address] MED SURG  Daily Treatment Note  NAME: Destinee Dominguez  : 1930  MRN: [id number]    Date of Service: 2019    Discharge Recommendations:  Continue to assess pending progress, Subacute/Skilled Nursing Facility, ECF with OT       Assessment      Safety Devices  Safety Devices in place: Yes  Type of devices: Left in chair;Call light within reach         Patient Diagnosis(es): The primary encounter diagnosis was Fall, initial encounter. Diagnoses of Urinary tract infection without hematuria, site unspecified, Multiple contusions, and Traumatic rhabdomyolysis, initial encounter [facility]) were also pertinent to this visit. has a past medical history of Arthritis, Atrial fibrillation ([facility] [address] Chronic kidney disease, stage 3 ([facility] [address] Gout, Hyperlipidemia, Hypertension, Hypothyroidism, and Irritable bowel syndrome. has a past surgical history that includes Cataract removal with implant (Bilateral); Cholecystectomy; Carpal tunnel release (); ERCP (N/A, 2019); Total knee arthroplasty (Right); and Total knee arthroplasty (Left). Restrictions  Restrictions/Precautions  Restrictions/Precautions: General Precautions, Fall Risk  Subjective   General  Chart Reviewed: Yes  Patient assessed for rehabilitation services?: Yes  Response to previous treatment: Patient with no complaints from previous session  Family / Caregiver Present: No  Diagnosis: Fall/Rhabdomyolysis/UTI  Subjective  Subjective: No c/o pain this date. Pt stated she does all of her bathing and dressing on her own. General Comment  Comments: Pt refused ADL treatment and edu of d/c folder. Slight confusion noted. Orientation     Objective    ADL  Grooming: Setup  Additional Comments: Agreed to complete face washing requiring set up only, 2 verbal cues for thoroughness.                                                            Type of ROM/Therapeutic Exercise  Type of ROM/Therapeutic Exercise:

## 2019-06-19 NOTE — PROGRESS NOTES
Delta Bello RN called writer to inform that she heard the patients IV pump alarming and noticed blood on the sheet. Pt had pulled her IV out and was bleeding from the site. Dressing applied and IV will be reestablished.

## 2019-06-19 NOTE — PROGRESS NOTES
which cleared stasis)      Recommended Diet and Intervention  Solid: Diet Solids Recommendation: Dental Soft  Liquid: Liquid Consistency Recommendation: Thin  Medication:   Therapeutic Interventions: Oral motor exercises    Compensatory Swallowing Strategies Attempted  Compensatory Swallowing Strategies: Lingual sweep;Small bites/sips;Upright as possible for all oral intake      Treatment/Goals  Dysphagia Goals: The patient will improve oral motor function via therapeutic oral motor exercises to 5/5 each trial.;The patient will tolerate recommended diet without observed clinical signs of aspiration; The patient/caregiver will demonstrate understanding of compensatory strategies for improved swallowing safety. General  Behavior/Cognition  Behavior/Cognition: Alert; Cooperative;Distractible;Confused  Respiratory Status: Room air  Dentition: Poor dental/oral hygeine; Some missing teeth  Patient Positioning: Upright in chair  Baseline Vocal Quality: Hoarse    Pain Level: 0      Prognosis  Prognosis for safe diet advancement: guarded  Barriers to reach goals: cognitive deficits;age  Individuals consulted  Consulted and agree with results and recommendations: Patient; Family member    Education  Patient Education: (Pt and pt's family member educated via [facility] Quinter[facility] on plan of care and reason for intervention)  Patient Education Response: Verbalizes understanding;Needs reinforcement      Therapy Time      Time in: 11:30   Time out: 12:10   Total Time: 40 minutes         Summary of session:  Pt demonstrated increased alertness and responsiveness this day. Pt demonstrates tolerance of current diet, however will continue to monitor d/t oral-motor weakness. Pt demonstrated increased naming abilities this day with responsive naming, divergent naming, and sentence/phrase completion. Pt is aware of paraphasic errors and self-corrects often. Orientation is improved since yesterday but pt still demonstrates disorientation to time.  Pt observed to perseverate x2 in conversation. Continue to assess during tx sessions: memory, attention, problem solving.       Raymond Lei M.A. CF-SLP  6/19/2019 1:51 PM    Raymond Lei SLP

## 2019-06-19 NOTE — PROGRESS NOTES
Hospitalist Progress Note    SUBJECTIVE/INTERVAL HISTORY:    Patient seen in follow up for afib with RVR, traumatic rhabdo, UTI, fall, metabolic encephalopathy. States that she feels better. More alert during exam but did have disorientation early AM. CK improving. afib with RVR yesterday, now rate controlled. Denies pain. K and Mg now normal.     She was supposed to have stent placed during ERCP removed this week, so she was off of her eliquis    6/19/2019  6:36 AM - [facility] Incoming Lab Results From [facility]     Specimen Information: Urine, clean catch        Component Collected Lab   Specimen Description 06/17/2019  7:30 PM [address] Lab   . CLEAN CATCH URINE    Special Requests 06/17/2019  7:30 PM [address] Lab   NOT REPORTED    Culture 06/17/2019  7:30  [facility]    Testing Performed By     Lab - Abbreviation Name Director Address Valid Date Range   [address] [facility] Lito Still  [place].  [facility] [address] 08/30/17 0802-Present   [address] [place] [address] [place] [facility] [address] 08/30/17 0801-Present   Lab and Collection     Urine Culture - 6/17/2019   Result History     Urine Culture on 6/19/2019         OBJECTIVE:    Vitals:   Temp: 97.2 °F (36.2 °C)  BP: (!) 157/73  Resp: 18  Pulse: 71  SpO2: 96 %  24HR INTAKE/OUTPUT:      Intake/Output Summary (Last 24 hours) at 6/19/2019 0909  Last data filed at 6/19/2019 0545  Gross per 24 hour   Intake 2820.26 ml   Output 350 ml   Net 2470.26 ml       -----------------------------------------------------------------  Review of Systems:  Constitutional:negative  for fevers, and negative for chills.   Eyes: negative for visual disturbance   ENT: negative for sore throat, negative nasal congestion, and negative for earache  Respiratory: negative for shortness of breath, negative for cough, and negative for wheezing  Cardiovascular: negative for chest pain, negative for palpitations, and negative for syncope  Gastrointestinal: negative for abdominal pain, negative for nausea,negative for vomiting, negative for diarrhea, negative for constipation, and negative for hematochezia or melena  Genitourinary: negative for dysuria, negative for urinary urgency, negative for urinary frequency, and negative for hematuria  Skin: negative for skin rash, and positive for skin lesions  Neurological: negative for unilateral weakness, numbness or tingling.     Exam:  GEN:  alert and oriented to person, place, and year, well-developed and well-nourished, in no acute distress, no confusion at time of exam, remains uncertain of events leading up to hospitalization  EYES:  PERRL  NECK: normal, supple  PULM: clear to auscultation bilaterally - no wheezes, rales or rhonchi, normal air movement, no respiratory distress  COR:   irregularly irregular and systolic murmur  ABD:    soft, non-tender, non-distended, normal bowel sounds, no masses or organomegaly  EXT:    edema: 1+ affecting bilateral foot, bilateral ankle, bilateral calf  NEURO: follows commands, LOYOLA, weak BUE and BLE  SKIN:   Bruising left hip and right hip, bruising across mid back, bruising BUE R>L    -----------------------------------------------------------------  Diagnostic Data:  Lab Results   Component Value Date    WBC 11.2 06/19/2019    HGB 11.2 (L) 06/19/2019    HCT 35.8 (L) 06/19/2019    PLT See Reflexed IPF Result 06/19/2019    CHOL 119 04/08/2019    TRIG 79 04/08/2019    HDL 48 04/08/2019    ALT 31 06/19/2019    AST 90 (H) 06/19/2019     06/19/2019    K 3.8 06/19/2019     06/19/2019    CREATININE 0.76 06/19/2019    BUN 16 06/19/2019    CO2 23 06/19/2019    TSH 0.61 04/07/2019    INR 1.1 04/07/2019    LABA1C 5.1 04/08/2019       ASSESSMENT:    Principal Problem:    Traumatic rhabdomyolysis ([address]  Active Problems:    Chronic atrial fibrillation (HCC)

## 2019-06-19 NOTE — PROGRESS NOTES
Patient washed up at this time. Complete linen changed, gown changed and brief changed. Pt also reassessed at this time. Pt has increased confusion. Pt states she was in [place]. Pt also thinks it is April 1919. Pt was able to tell me her name and date of birth. Pt did not know why she is in the hospital. Pt pleasant throughout and easily reoriented. Pt's bruising has worsened. Pt bruising is extensive on over back, legs, right hip and arms. Pt denies any pain or the need for anything else at this time. Will continue to monitor.

## 2019-06-20 ENCOUNTER — APPOINTMENT (OUTPATIENT)
Dept: MRI IMAGING | Age: 84
DRG: 564 | End: 2019-06-20
Payer: MEDICARE

## 2019-06-20 ENCOUNTER — APPOINTMENT (OUTPATIENT)
Dept: GENERAL RADIOLOGY | Age: 84
DRG: 564 | End: 2019-06-20
Payer: MEDICARE

## 2019-06-20 LAB
ALBUMIN SERPL-MCNC: 3.1 G/DL (ref 3.5–5.2)
ALBUMIN/GLOBULIN RATIO: 1.5 (ref 1–2.5)
ALP BLD-CCNC: 82 U/L (ref 35–104)
ALT SERPL-CCNC: 29 U/L (ref 5–33)
ANION GAP SERPL CALCULATED.3IONS-SCNC: 8 MMOL/L (ref 9–17)
AST SERPL-CCNC: 71 U/L
BILIRUB SERPL-MCNC: 0.85 MG/DL (ref 0.3–1.2)
BUN BLDV-MCNC: 16 MG/DL (ref 8–23)
BUN/CREAT BLD: 21 (ref 9–20)
CALCIUM SERPL-MCNC: 8.8 MG/DL (ref 8.6–10.4)
CHLORIDE BLD-SCNC: 111 MMOL/L (ref 98–107)
CO2: 23 MMOL/L (ref 20–31)
CREAT SERPL-MCNC: 0.78 MG/DL (ref 0.5–0.9)
GFR AFRICAN AMERICAN: >60 ML/MIN
GFR NON-AFRICAN AMERICAN: >60 ML/MIN
GFR SERPL CREATININE-BSD FRML MDRD: ABNORMAL ML/MIN/{1.73_M2}
GFR SERPL CREATININE-BSD FRML MDRD: ABNORMAL ML/MIN/{1.73_M2}
GLUCOSE BLD-MCNC: 99 MG/DL (ref 70–99)
HCT VFR BLD CALC: 30.9 % (ref 36.3–47.1)
HEMOGLOBIN: 9.4 G/DL (ref 11.9–15.1)
MCH RBC QN AUTO: 30.7 PG (ref 25.2–33.5)
MCHC RBC AUTO-ENTMCNC: 30.4 G/DL (ref 28.4–34.8)
MCV RBC AUTO: 101 FL (ref 82.6–102.9)
NRBC AUTOMATED: 0 PER 100 WBC
PDW BLD-RTO: 14 % (ref 11.8–14.4)
PLATELET # BLD: 149 K/UL (ref 138–453)
PMV BLD AUTO: 12.7 FL (ref 8.1–13.5)
POTASSIUM SERPL-SCNC: 4.1 MMOL/L (ref 3.7–5.3)
RBC # BLD: 3.06 M/UL (ref 3.95–5.11)
SODIUM BLD-SCNC: 142 MMOL/L (ref 135–144)
TOTAL CK: 1079 U/L (ref 26–192)
TOTAL PROTEIN: 5.2 G/DL (ref 6.4–8.3)
WBC # BLD: 8.7 K/UL (ref 3.5–11.3)

## 2019-06-20 PROCEDURE — 97110 THERAPEUTIC EXERCISES: CPT

## 2019-06-20 PROCEDURE — 97530 THERAPEUTIC ACTIVITIES: CPT

## 2019-06-20 PROCEDURE — 92507 TX SP LANG VOICE COMM INDIV: CPT

## 2019-06-20 PROCEDURE — 80053 COMPREHEN METABOLIC PANEL: CPT

## 2019-06-20 PROCEDURE — 6370000000 HC RX 637 (ALT 250 FOR IP): Performed by: INTERNAL MEDICINE

## 2019-06-20 PROCEDURE — 6360000002 HC RX W HCPCS: Performed by: INTERNAL MEDICINE

## 2019-06-20 PROCEDURE — 93005 ELECTROCARDIOGRAM TRACING: CPT | Performed by: INTERNAL MEDICINE

## 2019-06-20 PROCEDURE — 85027 COMPLETE CBC AUTOMATED: CPT

## 2019-06-20 PROCEDURE — 72195 MRI PELVIS W/O DYE: CPT

## 2019-06-20 PROCEDURE — 2580000003 HC RX 258

## 2019-06-20 PROCEDURE — 36415 COLL VENOUS BLD VENIPUNCTURE: CPT

## 2019-06-20 PROCEDURE — 1200000000 HC SEMI PRIVATE

## 2019-06-20 PROCEDURE — 96372 THER/PROPH/DIAG INJ SC/IM: CPT

## 2019-06-20 PROCEDURE — 97116 GAIT TRAINING THERAPY: CPT

## 2019-06-20 PROCEDURE — 6370000000 HC RX 637 (ALT 250 FOR IP): Performed by: PHYSICIAN ASSISTANT

## 2019-06-20 PROCEDURE — 6360000002 HC RX W HCPCS: Performed by: PHYSICIAN ASSISTANT

## 2019-06-20 PROCEDURE — 97535 SELF CARE MNGMENT TRAINING: CPT

## 2019-06-20 PROCEDURE — 82550 ASSAY OF CK (CPK): CPT

## 2019-06-20 PROCEDURE — 73521 X-RAY EXAM HIPS BI 2 VIEWS: CPT

## 2019-06-20 RX ORDER — CEFTRIAXONE 1 G/1
1 INJECTION, POWDER, FOR SOLUTION INTRAMUSCULAR; INTRAVENOUS ONCE
Status: COMPLETED | OUTPATIENT
Start: 2019-06-20 | End: 2019-06-20

## 2019-06-20 RX ORDER — LORAZEPAM 2 MG/ML
0.5 INJECTION INTRAMUSCULAR
Status: ACTIVE | OUTPATIENT
Start: 2019-06-20 | End: 2019-06-20

## 2019-06-20 RX ORDER — LOSARTAN POTASSIUM 25 MG/1
25 TABLET ORAL DAILY
Status: DISCONTINUED | OUTPATIENT
Start: 2019-06-20 | End: 2019-06-21 | Stop reason: HOSPADM

## 2019-06-20 RX ADMIN — WATER 2.1 ML: 1 INJECTION INTRAMUSCULAR; INTRAVENOUS; SUBCUTANEOUS at 22:03

## 2019-06-20 RX ADMIN — APIXABAN 5 MG: 5 TABLET, FILM COATED ORAL at 08:32

## 2019-06-20 RX ADMIN — ASPIRIN 81 MG: 81 TABLET, COATED ORAL at 08:32

## 2019-06-20 RX ADMIN — METOPROLOL TARTRATE 25 MG: 25 TABLET ORAL at 22:02

## 2019-06-20 RX ADMIN — LEVOTHYROXINE SODIUM 112 MCG: 112 TABLET ORAL at 07:02

## 2019-06-20 RX ADMIN — POTASSIUM CHLORIDE AND SODIUM CHLORIDE: 900; 300 INJECTION, SOLUTION INTRAVENOUS at 09:45

## 2019-06-20 RX ADMIN — METOPROLOL TARTRATE 25 MG: 25 TABLET ORAL at 08:31

## 2019-06-20 RX ADMIN — APIXABAN 5 MG: 5 TABLET, FILM COATED ORAL at 22:02

## 2019-06-20 RX ADMIN — CEFTRIAXONE 1 G: 1 INJECTION, POWDER, FOR SOLUTION INTRAMUSCULAR; INTRAVENOUS at 22:03

## 2019-06-20 RX ADMIN — LOSARTAN POTASSIUM 25 MG: 25 TABLET ORAL at 08:32

## 2019-06-20 RX ADMIN — ATORVASTATIN CALCIUM 20 MG: 20 TABLET, FILM COATED ORAL at 22:02

## 2019-06-20 RX ADMIN — ALLOPURINOL 100 MG: 100 TABLET ORAL at 08:32

## 2019-06-20 ASSESSMENT — PAIN SCALES - GENERAL
PAINLEVEL_OUTOF10: 0

## 2019-06-20 ASSESSMENT — PAIN - FUNCTIONAL ASSESSMENT: PAIN_FUNCTIONAL_ASSESSMENT: 0-10

## 2019-06-20 NOTE — PROGRESS NOTES
Physical Therapy  Facility/Department: [facility] AT THE [facility] MED SURG  Daily Treatment Note  NAME: Caro Hilario  : 1930  MRN: [id number]    Date of Service: 2019    Discharge Recommendations:  Continue to assess pending progress, Subacute/Skilled Nursing Facility, 24 hour supervision or assist, Home with Home health PT        Assessment   Treatment Diagnosis: General weakness, decreased activity endurance  Prognosis: Fair  Activity Tolerance  Activity Tolerance: Patient limited by cognitive status; Patient limited by fatigue     Patient Diagnosis(es): The primary encounter diagnosis was Fall, initial encounter. Diagnoses of Urinary tract infection without hematuria, site unspecified, Multiple contusions, and Traumatic rhabdomyolysis, initial encounter [facility]) were also pertinent to this visit. has a past medical history of Arthritis, Atrial fibrillation ([facility] [address] Chronic kidney disease, stage 3 ([facility] [address] Gout, Hyperlipidemia, Hypertension, Hypothyroidism, and Irritable bowel syndrome. has a past surgical history that includes Cataract removal with implant (Bilateral); Cholecystectomy; Carpal tunnel release (); ERCP (N/A, 2019); Total knee arthroplasty (Right); and Total knee arthroplasty (Left). Restrictions  Restrictions/Precautions  Restrictions/Precautions: General Precautions, Fall Risk  Subjective   General  Chart Reviewed: Yes  Family / Caregiver Present: Yes  Referring Practitioner: Anne Marie Frankel PA-C  Subjective  Subjective: Pt denies pain but states that she had been having cramping earlier [facility] L hip. [facility] of L hip. Nursing stated that it was ok to perform exs and gait.   Pain Screening  Patient Currently in Pain: No  Vital Signs  Patient Currently in Pain: No       Orientation  Orientation  Overall Orientation Status: Impaired  Orientation Level: Oriented to person;Oriented to place  Cognition      Objective   Bed mobility  Supine to Sit: Minimal assistance  Scooting: Contact guard assistance  Transfers  Sit to Stand: Contact guard assistance  Stand to sit: Contact guard assistance(verbal cues for hand placement safety and walker proximity.)  Ambulation  Ambulation?: Yes  WB Status: FWB  Ambulation 1  Surface: level tile  Device: Rolling Walker  Assistance: Contact guard assistance  Quality of Gait: Slow arsenio with flexed posture  Gait Deviations: Decreased step length; Slow Arsenio;Decreased step height;Deviated path  Distance: 100 ft  Stairs/Curb  Stairs?: No     Balance  Posture: Fair  Sitting - Static: Fair  Sitting - Dynamic: Fair  Standing - Static: Fair  Standing - Dynamic: Fair;-  Exercises  Straight Leg Raise: x10  Quad Sets: x15  Heelslides: x15  Gluteal Sets: x15  Hip Abduction: x15  Knee Short Arc Quad: x15  Ankle Pumps: x15  Comments: Above exercises completed in supine position with AAROM as needed. Goals  Short term goals  Time Frame for Short term goals: 10 days  Short term goal 1: Patient will amb 22' with FWW, supervision, without LOB  Short term goal 2: Patient will perform bed mobility and transfers independently  Short term goal 3: Patient will tolerate 20-30 min of therex/act to improve endurance for ADLs  Patient Goals   Patient goals : None stated due to confusion    Plan    Plan  Times per week: 7  Times per day: Twice a day  Plan Comment: 1x/day on weekends  Safety Devices  Type of devices:  All fall risk precautions in place, Left in chair, Call light within reach, Patient at risk for falls, Gait belt, Nurse notified(Pt left in bathroom on chair at sink with aide)     Therapy Time   Individual Concurrent Group Co-treatment   Time In 0846         Time Out 0926         Minutes [address] [facility]

## 2019-06-20 NOTE — CONSULTS
reduced; can do full self care; LOC full  _x__60%  Ambulation reduced; Significant disease; Can't do hobbies/housework; intake normal or reduced; occasional assist; LOC full/confusion  ___50%  Mainly sit/lie; Extensive disease; Can't do any work; Considerable assist; intake normal or reduced; LOC full/confusion  ___40%  Mainly in bed; Extensive disease; Mainly assist; intake normal or reduced; LOC full/confusion   ___30%  Bed Bound; Extensive disease; Total care; intake reduced; LOCfull/confusion  ___20%  Bed Bound; Extensive disease; Total care; intake minimal; Drowsy/coma  ___10%  Bed Bound; Extensive disease; Total care; Mouth care only; Drowsy/coma  ___0       Death    1 year Mortality Risk %:   37  Readmission Risk Score: 20    Notes:   Eloise Block is sitting up in the chair during my visit. She was admitted earlier this week related to falling and being on the floor for an extended period of time. She has been confused during this admission but is much improved today. She does not remember falling or what had happened to her. She is alert, coherent at present time. Does have a history of heart issues including atrial fib and CHF. Also, recent history of stroke like symptoms in April 2019 treated with tPA. Eloise Block states that her appetite has been decreased for a while. Eats breakfast and supper but not a mid day meal. States she does snack, however. Does not use supplements and this is encouraged. Denies issues with bladder or bowel. Lives alone in a senior complex. Does not have a life alert. Eloise Block plans on going back to her home at discharge. Was working with therapy prior to my visit. Uses a walker or a cane at home. ACP discussion. States that she has 2 sons and a brother. Has a living will but not a health care POA. Would like to talk with family prior to completion. DNR discussion. Tells me that she would not want CPR if something happened to her.   Is reluctant to sign order form; would like

## 2019-06-20 NOTE — PROGRESS NOTES
Pt awake, sitting up in chair. Alert and oriented x4 on room air. Vital signs and assessment completed and charted in flow sheets. Pt denies pain but states that she is having cramps in her left thigh. Writer assisted pt to reposition in chair at this time. No other needs voiced. Call light and belongings in reach, chair alarm on.

## 2019-06-20 NOTE — PROGRESS NOTES
Writer notified James Ramirez PA-C of xray results and recommendations. PA will speak with Dr. Dejon Marcum regarding further testing.

## 2019-06-20 NOTE — PROGRESS NOTES
Occupational Therapy  Facility/Department: [facility] AT THE [facility] MED SURG  Daily Treatment Note  NAME: Rema Alexander  : 1930  MRN: [id number]    Date of Service: 2019    Discharge Recommendations:  Continue to assess pending progress, Subacute/Skilled Nursing Facility, ECF with OT       Assessment      Activity Tolerance  Activity Tolerance: Patient Tolerated treatment well  Activity Tolerance: pleasantly confused at this time  Safety Devices  Safety Devices in place: Yes  Type of devices: Left in chair;Call light within reach; Chair alarm in place         Patient Diagnosis(es): The primary encounter diagnosis was Fall, initial encounter. Diagnoses of Urinary tract infection without hematuria, site unspecified, Multiple contusions, and Traumatic rhabdomyolysis, initial encounter [facility]) were also pertinent to this visit. has a past medical history of Arthritis, Atrial fibrillation ([facility] [address] Chronic kidney disease, stage 3 ([facility] [address] Gout, Hyperlipidemia, Hypertension, Hypothyroidism, and Irritable bowel syndrome. has a past surgical history that includes Cataract removal with implant (Bilateral); Cholecystectomy; Carpal tunnel release (); ERCP (N/A, 2019); Total knee arthroplasty (Right); and Total knee arthroplasty (Left). Restrictions  Restrictions/Precautions  Restrictions/Precautions: General Precautions, Fall Risk  Subjective   General  Chart Reviewed: Yes  Patient assessed for rehabilitation services?: Yes  Response to previous treatment: Patient with no complaints from previous session  Family / Caregiver Present: Yes  Diagnosis: Fall/Rhabdomyolysis/UTI  Subjective  Subjective: Pt seated upright in chair upon VALENTINE arrival, nursing present and reported pt had just ripped out IV.   General Comment  Comments: Pt demo'd continued confusion at this time  Vital Signs  Patient Currently in Pain: Denies   Orientation     Objective    ADL  Toileting: Contact guard assistance  Additional Comments: Ambulated to RR using RW CGA no LOB used grab bar and collected urine in hat in toilet. washed hands at sink standing supported ~ 2 min         Standing Balance  Time: ~ 2 min  Activity: static standing washing hands at sink supported  Functional Mobility  Functional - Mobility Device: Rolling Walker  Activity: To/from bathroom  Assist Level: Contact guard assistance  Functional Mobility Comments: no LOB  Toilet Transfers  Toilet - Technique: Ambulating  Equipment Used: Grab bars  Toilet Transfer: Contact guard assistance     Transfers  Sit to stand: Minimal assistance  Stand to sit: Minimal assistance  Transfer Comments: VC for hand placement adn pt tends to \"plop\" into chair, edu provided                                            Type of ROM/Therapeutic Exercise  Comment: Flex bar and 1 # freeweight x all planes x 10-15 reps while seated, Pt required Mod cueing and occ RB and demo'd Min diff d/t decreased strength                    Plan   Plan  Times per week: 7x/wk  Times per day: Daily  Current Treatment Recommendations: Strengthening, Functional Mobility Training, Safety Education & Training, Self-Care / ADL  G-Code     OutComes Score                                                  AM-PAC Score             Goals  Short term goals  Time Frame for Short term goals: 10 days  Short term goal 1: Pt. will participate in 10 mins of fxl activity with min cueing for alertness to increase activity tolerance for self cares. Short term goal 2: Pt. will engage in simple self care tasks (i.e. self feeding/grooming/UB bathing or dressing) with Min A to increase(I).        Therapy Time   Individual Concurrent Group Co-treatment   Time In 1316         Time Out 1342         Minutes 26                 URMILA Padilla

## 2019-06-20 NOTE — PROGRESS NOTES
Speech Language Pathology  Facility/Department: [facility] AT THE [facility] MED SURG  Cognitive-Linguistic Treatment    NAME: Lizeth Cavanaugh  : 1930  MRN: [id number]    Date of Tx: 2019  Evaluating Therapist: Terry Kellogg    Visit Diagnoses       Codes    Urinary tract infection without hematuria, site unspecified     N39.0    Multiple contusions     T07. [place]            Past Medical History: has a past medical history of Arthritis, Atrial fibrillation ([address] Chronic kidney disease, stage 3 ([address] Gout, Hyperlipidemia, Hypertension, Hypothyroidism, and Irritable bowel syndrome. Past Surgical History:  has a past surgical history that includes Cataract removal with implant (Bilateral); Cholecystectomy; Carpal tunnel release (); ERCP (N/A, 2019); Total knee arthroplasty (Right); and Total knee arthroplasty (Left). Primary Complaint related to referral: Altered mental status           Pain:  Pain Assessment  Patient Currently in Pain: Denies  Pain Assessment: 0-10  Pain Level: 0    Assessment:     Diagnosis: Mild Cognitive Impairment      Subjective:     General  Chart Reviewed: Yes  Pain Assessment  Patient Currently in Pain: Denies  Pain Assessment: 0-10  Pain Level: 0  Vital Signs  Patient Currently in Pain: Denies        Hearing  Hearing: Exceptions to [facility]  Hearing Exceptions: Bilateral hearing aid;Hard of hearing/hearing concerns           Objective:     Oral/Motor  Oral Motor: Exceptions to [facility]  Labial Strength: Reduced  Labial Coordination: Reduced  Lingual Strength: Reduced  Lingual Coordination: Reduced  Auditory Comprehension  Comprehension: Exceptions  One Step Basic Commands: (Improved - WFL)  Two Step Basic Commands: Mild  Conversation: Mild  Interfering Components: Hearing;Processing speed; Attention to detail; Attention - selective  Effective Techniques: Increased volume;Repetition;Stressing words; Extra processing time     Expression  Primary Mode of Expression: Verbal  Verbal Expression  Verbal Expression: Exceptions to functional limits(Pt demonstrated isolated incident of paraphasic error - self corrected )  Conversation: Mild     Motor Speech  Motor Speech: Exceptions to [facility]  Intelligibility: Mild  Dysarthria : Mild          Cognition  Orientation  Orientation Level: Oriented X4  Attention  Attention: Exceptions to [facility]  Selective Attention: Mild  Sustained Attention: Mild  Memory  Memory: Exceptions to [facility]  Long-term Memory: Mild  People Encountered: Mild  Short-term Memory: Moderate  Problem Solving  Problem Solving: Exceptions to [facility]  Simple Functional Tasks: Moderate  Safety/Judgement  Novel Situations: Moderate  Insight: Moderate      Additional Assessments: none             Plan:    Goals:   Short-term Goals  Timeframe for Short-term Goals: 3 days  Goal 1: Pt will increase memory and orientation to place, situation, and temporal concepts with use of visual cues  Goal 2: Pt will complete naming tasks (responsive naming, phrase completion, etc) x8/10 independently without delayed response (<5 sec)  Goal 3: Pt will tolerate recommended diet (dental soft and thin) without overt s/s pen/asp in 80% of trials  Goal 4: Patient will follow basic commands x8/10 with min A  Goal 5: Patient will generate 2 solutions to functional problem solving tasks in 8/10 opportunities with moderate cueing. Long-term Goals  Timeframe for Long-term Goals: 1 week  Goal 1: Patient will safely tolerate LRD without overt s/s pen/asp   Goal 2: Patient will participate in conversation with <5 paraphasias   Speech Therapy Prognosis  Prognosis: Fair  Prognosis Considerations: Age, Co-Morbidities  Duration/Frequency of Treatment  Duration/Frequency of Treatment: 1x daily during inpatient stay. Recommendations  Requires SLP Intervention: Yes  D/C Recommendations:  To be determined  Patient Education: (Pt educated via [address] on plan of care and reason for intervention)  Patient Education Response: Mark understanding, Needs reinforcement  D/C Recommendations: To be determined  Requires SLP Intervention: Yes  Patient/family involved in developing goals and treatment plan: pt involvement - verbalizes agreement with SLP goals and plan of care          Follow Up: Follow up in: 1 day         Summary of Session:  Upon entrance to the room, pt demonstrates alert and responsive behavior, oriented x4, chairside. ST noted pt's IV to be dislodged from pt's hand and leaking - RN notified x2. Pt is on room air and demonstrates slightly hoarse voice (ST suspects this is pt's baseline). ST facilitated problem solving tasks - including functional ADL scenarios in which ST instructed pt to generate 1-2 safe solutions (x2/3 accurate) with minimal cueing. Pt demonstrated use of strategies, such as the visuals in the room (clock, whiteboard) to answer orientation questions accurately (x4/4). Pt demonstrated an isolated (x1) paraphasic errors during conversation, however self-corrected independently and verbalized awareness/frustration at her language errors - ST educated pt on nature of her errors and provided word-finding strategy (visualize first letter/sound of target word or associations to target word). Pt verbalized understanding. Pt demonstrated ability to name visitor from previous day, whom she named as her oldest son, showing increased short term memory from previous visit. Pt demonstrated ability to follow one-step, basic directions with 100% accuracy when ST provided increased volume. Pt completed 2-step directions with 75% accuracy despite increased volume. David RIVER-SLP  6/20/19    David Meza, SLP

## 2019-06-20 NOTE — PROGRESS NOTES
Facsimile Transmission Cover Sheet    Information contained in this transmission is for the sole use of the intended recipients and may contain confidential and privileged information. Any unauthorized review, use, disclosure or distribution is prohibited. If you are not the intended recipient, please contact the sender and destroy all copies of the original message. Disclosure is made for the purpose of healthcare operations and continuity of care. To: __Dr. Cristobal________________________    From: [facility]    Fax Number: [phone]    Sender:_MANJIT Alejandro ________________ Phone Number:_[phone]____________      This request is: Urgent - YES    Information and/or questions regarding patient condition:    Pt's IV came out. 3 RN's attempted new IV start with ultrasound but unsuccessful. Do we need to continue IV Fluids and ATB? Physician Signature, Date and Time needed for any orders written on this fax. Thank you.     Signature_____________________ Date __________ Time _______

## 2019-06-20 NOTE — DISCHARGE INSTR - COC
Continuity of Care Form    Patient Name: Zahida Pickett   :  1930  MRN:  [id number]    Admit date:  2019  Discharge date:  19    Code Status Order: Full Code   Advance Directives:   [facility] Documentation     Date/Time Healthcare Directive Type of Healthcare Directive Copy in [facility] [address] Agent's Name Healthcare Agent's Phone Number    [phone]  Unknown, patient unable to respond due to medical condition pt is confused  -- -- -- -- --          Admitting Physician:  Martín Larkin MD  PCP: Martín Larkin MD    Discharging Nurse: [facility] Unit/Room#: 6521/6855-01  Discharging Unit Phone Number: [phone]    Emergency Contact:   Extended Emergency Contact Information  Primary Emergency Contact: Patrizia Dalton [address] Phone: [phone]  Relation: Child  Hearing or visual needs: None  Other needs: None  Preferred language: English   needed?  No    Past Surgical History:  Past Surgical History:   Procedure Laterality Date    CARPAL TUNNEL RELEASE      Dr. Eva Douglas - right side    CATARACT REMOVAL WITH IMPLANT Bilateral     CHOLECYSTECTOMY      ERCP N/A 2019    Driss Birmingham MD - ERCP, Sphincterotomy, balloon dilatation and stent placement with removal of multiple stones    TOTAL KNEE ARTHROPLASTY Right     TOTAL KNEE ARTHROPLASTY Left        Immunization History:   Immunization History   Administered Date(s) Administered    Influenza Vaccine, unspecified formulation 2015    Influenza Virus Vaccine 10/01/2011, 10/01/2016, 10/01/2017    Influenza, Quadv, 3 Years and older, IM (Fluzone 3 yrs and older or Afluria 5 yrs and older) 10/01/2018    Zoster Live (Zostavax) 2015       Active Problems:  Patient Active Problem List   Diagnosis Code    Hypothyroidism E03.9    Hyperlipidemia E78.5    Hypertension I10    Gout M10.9    Arthritis M19.90    Irritable Yes  Urinary Catheter: None   Colostomy/Ileostomy/Ileal Conduit: No       Date of Last BM: 6/20/19    Intake/Output Summary (Last 24 hours) at 6/21/2019 1122  Last data filed at 6/21/2019 0739  Gross per 24 hour   Intake 540 ml   Output 500 ml   Net 40 ml     I/O last 3 completed shifts: In: 80 [P.O.:780]  Out: 800 [Urine:800]    Safety Concerns: At Risk for Falls    Impairments/Disabilities:      None    Nutrition Therapy:  Current Nutrition Therapy:   - Oral Diet:  Low Sodium (3-4gm)    Routes of Feeding: Oral  Liquids: Thin Liquids  Daily Fluid Restriction: no  Last Modified Barium Swallow with Video (Video Swallowing Test): not done    Treatments at the Time of Hospital Discharge:   Respiratory Treatments:   Oxygen Therapy:  is not on home oxygen therapy. Ventilator:    - No ventilator support    Rehab Therapies: Physical Therapy and Occupational Therapy  Weight Bearing Status/Restrictions: No weight bearing restirctions  Other Medical Equipment (for information only, NOT a DME order):  walker  Other Treatments:     Patient's personal belongings (please select all that are sent with patient):  Glasses, Hearing Aides bilateral    RN SIGNATURE:  Electronically signed by Marino Mcgill RN on 6/21/19 at 9:24 AM    CASE MANAGEMENT/SOCIAL WORK SECTION    Inpatient Status Date: 6/18/2019    Readmission Risk Assessment Score:  Readmission Risk              Risk of Unplanned Readmission:        20           Discharging to Facility/ Agency   · Name: [facility]  · Address:[address] [place] [address]  · Phone:[phone]  · Fax:[phone]    Dialysis Facility (if applicable)   · Name:  · Address:  · Dialysis Schedule:  · Phone:  · Fax:    / signature: Electronically signed by BROOKE Posey on 6/20/19 at 1:02 PM    PHYSICIAN/Provider SECTION    Prognosis: Fair    Condition at Discharge: Stable    Rehab Potential (if transferring to Rehab):  Fair    Recommended Labs or Other Treatments After Discharge: PT/OT, CK Monday    Physician/Provider Certification: I certify the above information and transfer of Chrissy Barreto  is necessary for the continuing treatment of the diagnosis listed and that she requires [facility] for less than 30 days.      Update Admission H&P: No change in H&P    PHYSICIAN/Provider SIGNATURE:  Electronically signed by Mariana Mcgovern PA-C on 6/21/19 at 1:23 PM

## 2019-06-20 NOTE — PROGRESS NOTES
Physical Therapy  Facility[facility]Department[facility] MED SURG  Daily Treatment Note  NAME: Jameson Han  : 1930  MRN: [id number]    Date of Service: 2019    Discharge Recommendations:  Continue to assess pending progress, Subacute/Skilled Nursing Facility, 24 hour supervision or assist, Home with Home health PT        Assessment   Treatment Diagnosis: General weakness, decreased activity endurance  Prognosis: Fair  REQUIRES PT FOLLOW UP: Yes  Activity Tolerance  Activity Tolerance: Patient limited by cognitive status; Patient limited by fatigue     Patient Diagnosis(es): The primary encounter diagnosis was Fall, initial encounter. Diagnoses of Urinary tract infection without hematuria, site unspecified, Multiple contusions, and Traumatic rhabdomyolysis, initial encounter [facility]) were also pertinent to this visit. has a past medical history of Arthritis, Atrial fibrillation ([facility] Chronic kidney disease, stage 3 ([facility] Gout, Hyperlipidemia, Hypertension, Hypothyroidism, and Irritable bowel syndrome. has a past surgical history that includes Cataract removal with implant (Bilateral); Cholecystectomy; Carpal tunnel release (); ERCP (N/A, 2019); Total knee arthroplasty (Right); and Total knee arthroplasty (Left). Restrictions  Restrictions/Precautions  Restrictions/Precautions: General Precautions, Fall Risk  Subjective   General  Chart Reviewed: Yes  Response To Previous Treatment: Patient with no complaints from previous session. Family / Caregiver Present: Yes  Referring Practitioner: Chen Salazar PA-C  Subjective  Subjective: Pt reports soreness in L thigh/hip. Pt states the pain \"is so so\". Orientation     Cognition      Objective      Transfers  Sit to Stand: Contact guard assistance  Stand to sit: Contact guard assistance  Comment: Pt required VCs for hand placement and safety with fair understanding.   Ambulation  Ambulation?: Yes  WB Status: FWB  Ambulation 1  Surface: level tile  Device: Rolling Walker  Assistance: Contact guard assistance  Quality of Gait: Slow arsenio with flexed posture, pt deviates path to the right. Gait Deviations: Slow Arsenio;Deviated path  Distance: 100 ft x1  Comments: Pt requried 2 standing RBs d/t fatigue. Balance  Posture: Fair  Sitting - Static: Fair  Sitting - Dynamic: Fair  Standing - Static: Fair  Standing - Dynamic: Fair;-  Exercises  Gluteal Sets: x15  Hip Flexion: x15  Hip Abduction: x15  Knee Long Arc Quad: x15  Ankle Pumps: x15  Comments: BLE ther ex in seated. AAROM as needed on L LE.                         G-Code     OutComes Score                                                     AM-PAC Score             Goals  Short term goals  Time Frame for Short term goals: 10 days  Short term goal 1: Patient will amb 22' with FWW, supervision, without LOB  Short term goal 2: Patient will perform bed mobility and transfers independently  Short term goal 3: Patient will tolerate 20-30 min of therex/act to improve endurance for ADLs  Patient Goals   Patient goals : None stated due to confusion    Plan    Plan  Times per week: 7  Times per day: Twice a day  Plan Comment: 1x/day on weekends  Safety Devices  Type of devices:  All fall risk precautions in place, Left in chair, Call light within reach, Gait belt, Chair alarm in place     Therapy Time   Individual Concurrent Group Co-treatment   Time In 1353         Time Out 1418         Minutes [address] [place] [address]

## 2019-06-20 NOTE — PROGRESS NOTES
Patti Pearl M.D. Internal Medicine Progress Note 6/20/19    SUBJECTIVE:    Patient seen for f/u of Traumatic rhabdomyolysis ([address] She complains of left leg pain / cramping this morning which woke her up. She was able to transfer to chair with assistance this AM.  Denies any chest pain or palpitations. HR has been controlled since her episode of RVR 2 days ago. CK improving slowly. ROS:   Constitutional: negative  for fevers, and negative for chills. Respiratory: negative for shortness of breath, negative for cough, and negative for wheezing  Cardiovascular: negative for chest pain, and negative for palpitations  Gastrointestinal: negative for abdominal pain, negative for nausea,negative for vomiting, negative for diarrhea, and negative for constipation     All other systems were reviewed with the patient and are negative unless otherwise stated in HPI    OBJECTIVE:  Vitals:   Temp: 96.9 °F (36.1 °C)  BP: (!) 168/80  Resp: 16  Pulse: 80  SpO2: 97 %    24HR INTAKE/OUTPUT:      Intake/Output Summary (Last 24 hours) at 6/20/2019 0725  Last data filed at 6/20/2019 0458  Gross per 24 hour   Intake 1874.08 ml   Output --   Net 1874.08 ml     -----------------------------------------------------------------  Exam:  GEN:    Awake, alert and oriented x 3. no acute distress  EYES:  EOMI, pupils equal   NECK: Supple. No lymphadenopathy. No carotid bruit  CVS:    irregularly irregular. 3/6 systolic murmur  PULM:  CTA, no wheezes, rales or rhonchi  ABD:    Bowels sounds normal.  Abdomen is soft. No distention. No tenderness. EXT:   soft tissue swelling bilateral ankles but no pitting edema. No calf tenderness. Tender over left lateral quadricep muscle group. NEURO: Motor and sensory are intact  SKIN:  No rashes.   No skin lesions.    -----------------------------------------------------------------  Diagnostic Data:    · All available data reviewed  Lab Results   Component Value Date    WBC 8.7 06/20/2019    HGB 9.4 (L) 06/20/2019    .0 06/20/2019     06/20/2019      Lab Results   Component Value Date    GLUCOSE 99 06/20/2019    BUN 16 06/20/2019    CREATININE 0.78 06/20/2019     06/20/2019    K 4.1 06/20/2019    CALCIUM 8.8 06/20/2019     (H) 06/20/2019    CO2 23 06/20/2019     Results for Lidia Carlton (MRN [id number]) as of 6/20/2019 07:26   Ref. 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 *      ASSESSMENT / PLAN:  · Traumatic rhabdomyolysis   ? CK slowly improving  ? Continue IVF hydration  ? Trend CK  ? PT / OT  · Hypokalemia   ? Replaced  · E coli UTI  ? Rocephin  · Atrial fibrillation with RVR  ? Rate currently controlled with Metoprolol  ? Anticoagulated with Eliquis  · Left hip / quadricep pain  ? Xray pelvis / left hip  ? Heat  ? PT  · Hypertension   ? Continue Metoprolol  ? Add Losartan 25 mg po QD  · Recent admission for stroke like symptoms  ?  Continue ASA  · Nutrition status: well developed, well nourished   · DVT prophylaxis: Eliquis   · High risk medications: none  · Disposition:  Discharge plan is [facility]    Jorge Zafar M.D.  6/20/2019  7:25 AM

## 2019-06-20 NOTE — PROGRESS NOTES
Writer notified by ST that IV is out. Writer entered room to IV catheter laying on bedside table. Pt states that she did not removed IV. IV was in right hand, 20 minutes prior to this incident, when writer checked on pt. Will attempt IV restart.

## 2019-06-20 NOTE — PLAN OF CARE
Problem: Falls - Risk of:  Goal: Will remain free from falls  Description  Will remain free from falls  6/20/2019 0906 by Sadie Fung RN  Outcome: Ongoing  Note:   Remains free from falls. Fall precautions in place, slipper socks on. Pathway clear. Call light and belongings in reach. Instructed to use call light prior to getting up. Problem: Risk for Impaired Skin Integrity  Goal: Tissue integrity - skin and mucous membranes  Description  Structural intactness and normal physiological function of skin and  mucous membranes. 6/20/2019 0906 by Sadie Fung RN  Outcome: Ongoing  Note:   Skin assessment completed. No new skin issues noted, pt repositions self for comfort frequently. Problem: Safety:  Goal: Free from accidental physical injury  Description  Free from accidental physical injury  6/20/2019 0906 by Sadie Fung RN  Outcome: Ongoing  Note:   Remains free from injury. Pathway clear for ambulation. Bed in lowest position with wheels locked and side rails up x 2. Call light and belongings within reach. Problem: Daily Care:  Goal: Daily care needs are met  Description  Daily care needs are met  6/20/2019 0906 by Sadie Fung RN  Outcome: Ongoing  Note:   Pt. Completes daily care with moderate assistance for set up, completes to max abilities. Problem: Pain:  Goal: Patient's pain/discomfort is manageable  Description  Patient's pain/discomfort is manageable  6/20/2019 0906 by Sadie Fung RN  Outcome: Ongoing  Note:   Pt. Denies and pain or discomfort. Will continue to monitor.

## 2019-06-21 VITALS
OXYGEN SATURATION: 98 % | DIASTOLIC BLOOD PRESSURE: 69 MMHG | RESPIRATION RATE: 16 BRPM | BODY MASS INDEX: 32.67 KG/M2 | HEIGHT: 60 IN | HEART RATE: 78 BPM | SYSTOLIC BLOOD PRESSURE: 167 MMHG | WEIGHT: 166.4 LBS | TEMPERATURE: 97.7 F

## 2019-06-21 LAB
ALBUMIN SERPL-MCNC: 3.2 G/DL (ref 3.5–5.2)
ALBUMIN/GLOBULIN RATIO: 1.5 (ref 1–2.5)
ALP BLD-CCNC: 86 U/L (ref 35–104)
ALT SERPL-CCNC: 25 U/L (ref 5–33)
ANION GAP SERPL CALCULATED.3IONS-SCNC: 9 MMOL/L (ref 9–17)
AST SERPL-CCNC: 60 U/L
BILIRUB SERPL-MCNC: 0.92 MG/DL (ref 0.3–1.2)
BUN BLDV-MCNC: 16 MG/DL (ref 8–23)
BUN/CREAT BLD: 19 (ref 9–20)
CALCIUM SERPL-MCNC: 8.8 MG/DL (ref 8.6–10.4)
CHLORIDE BLD-SCNC: 110 MMOL/L (ref 98–107)
CO2: 25 MMOL/L (ref 20–31)
CREAT SERPL-MCNC: 0.85 MG/DL (ref 0.5–0.9)
CULTURE: ABNORMAL
CULTURE: ABNORMAL
EKG ATRIAL RATE: 75 BPM
EKG P AXIS: 8 DEGREES
EKG P-R INTERVAL: 164 MS
EKG Q-T INTERVAL: 374 MS
EKG QRS DURATION: 68 MS
EKG QTC CALCULATION (BAZETT): 417 MS
EKG R AXIS: 13 DEGREES
EKG T AXIS: -6 DEGREES
EKG VENTRICULAR RATE: 75 BPM
GFR AFRICAN AMERICAN: >60 ML/MIN
GFR NON-AFRICAN AMERICAN: >60 ML/MIN
GFR SERPL CREATININE-BSD FRML MDRD: ABNORMAL ML/MIN/{1.73_M2}
GFR SERPL CREATININE-BSD FRML MDRD: ABNORMAL ML/MIN/{1.73_M2}
GLUCOSE BLD-MCNC: 102 MG/DL (ref 70–99)
HCT VFR BLD CALC: 29.7 % (ref 36.3–47.1)
HEMOGLOBIN: 9.4 G/DL (ref 11.9–15.1)
Lab: ABNORMAL
MCH RBC QN AUTO: 31.6 PG (ref 25.2–33.5)
MCHC RBC AUTO-ENTMCNC: 31.6 G/DL (ref 28.4–34.8)
MCV RBC AUTO: 100 FL (ref 82.6–102.9)
NRBC AUTOMATED: 0 PER 100 WBC
PDW BLD-RTO: 14.1 % (ref 11.8–14.4)
PLATELET # BLD: 160 K/UL (ref 138–453)
PMV BLD AUTO: 12.7 FL (ref 8.1–13.5)
POTASSIUM SERPL-SCNC: 4.1 MMOL/L (ref 3.7–5.3)
RBC # BLD: 2.97 M/UL (ref 3.95–5.11)
SODIUM BLD-SCNC: 144 MMOL/L (ref 135–144)
SPECIMEN DESCRIPTION: ABNORMAL
TOTAL CK: 756 U/L (ref 26–192)
TOTAL PROTEIN: 5.3 G/DL (ref 6.4–8.3)
WBC # BLD: 7.9 K/UL (ref 3.5–11.3)

## 2019-06-21 PROCEDURE — 36415 COLL VENOUS BLD VENIPUNCTURE: CPT

## 2019-06-21 PROCEDURE — 6370000000 HC RX 637 (ALT 250 FOR IP): Performed by: INTERNAL MEDICINE

## 2019-06-21 PROCEDURE — 93010 ELECTROCARDIOGRAM REPORT: CPT | Performed by: INTERNAL MEDICINE

## 2019-06-21 PROCEDURE — 6370000000 HC RX 637 (ALT 250 FOR IP): Performed by: PHYSICIAN ASSISTANT

## 2019-06-21 PROCEDURE — 97110 THERAPEUTIC EXERCISES: CPT

## 2019-06-21 PROCEDURE — 97535 SELF CARE MNGMENT TRAINING: CPT

## 2019-06-21 PROCEDURE — 92507 TX SP LANG VOICE COMM INDIV: CPT

## 2019-06-21 PROCEDURE — 80053 COMPREHEN METABOLIC PANEL: CPT

## 2019-06-21 PROCEDURE — 85027 COMPLETE CBC AUTOMATED: CPT

## 2019-06-21 PROCEDURE — 97116 GAIT TRAINING THERAPY: CPT

## 2019-06-21 PROCEDURE — 82550 ASSAY OF CK (CPK): CPT

## 2019-06-21 RX ORDER — LOSARTAN POTASSIUM 25 MG/1
25 TABLET ORAL DAILY
Qty: 30 TABLET | Refills: 3 | Status: ON HOLD | DISCHARGE
Start: 2019-06-22 | End: 2021-01-01 | Stop reason: HOSPADM

## 2019-06-21 RX ORDER — ATORVASTATIN CALCIUM 20 MG/1
20 TABLET, FILM COATED ORAL NIGHTLY
Qty: 30 TABLET | Refills: 3 | Status: ON HOLD | DISCHARGE
Start: 2019-06-21 | End: 2021-01-01 | Stop reason: SDUPTHER

## 2019-06-21 RX ORDER — CIPROFLOXACIN 500 MG/1
500 TABLET, FILM COATED ORAL 2 TIMES DAILY
Qty: 14 TABLET | Refills: 0 | DISCHARGE
Start: 2019-06-21 | End: 2019-06-28

## 2019-06-21 RX ORDER — CIPROFLOXACIN 500 MG/1
500 TABLET, FILM COATED ORAL 2 TIMES DAILY
Status: DISCONTINUED | OUTPATIENT
Start: 2019-06-21 | End: 2019-06-21 | Stop reason: HOSPADM

## 2019-06-21 RX ADMIN — ALLOPURINOL 100 MG: 100 TABLET ORAL at 08:10

## 2019-06-21 RX ADMIN — APIXABAN 5 MG: 5 TABLET, FILM COATED ORAL at 08:10

## 2019-06-21 RX ADMIN — ASPIRIN 81 MG: 81 TABLET, COATED ORAL at 08:10

## 2019-06-21 RX ADMIN — METOPROLOL TARTRATE 25 MG: 25 TABLET ORAL at 08:09

## 2019-06-21 RX ADMIN — LEVOTHYROXINE SODIUM 112 MCG: 112 TABLET ORAL at 06:38

## 2019-06-21 RX ADMIN — CIPROFLOXACIN HYDROCHLORIDE 500 MG: 500 TABLET, FILM COATED ORAL at 08:10

## 2019-06-21 RX ADMIN — LOSARTAN POTASSIUM 25 MG: 25 TABLET ORAL at 08:10

## 2019-06-21 ASSESSMENT — PAIN SCALES - GENERAL
PAINLEVEL_OUTOF10: 0

## 2019-06-21 NOTE — PROGRESS NOTES
The writer called pharmacy to ask about the reconstitution of IM Rocephin for the patient and the pharmacist instructed to reconstitute the 1gram vial with 2.1 ml of sterile water.

## 2019-06-21 NOTE — PROGRESS NOTES
Pt discharged in wheelchair to [facility] via [address].

## 2019-06-21 NOTE — PROGRESS NOTES
Called Dr. Ariella Mcneil regarding the pt.'s IV antibiotic and the IV continuous fluid since the multiple attempts to secure the IV access with ultrasound failed in the day time, Dr. Ariella Mcneil verbally ordered IM antibiotic and asked to wait until Dr. Wilbert Ace get here in the morning.

## 2019-06-21 NOTE — PROGRESS NOTES
Intervention: Yes  Duration/Frequency of Treatment: 1x daily during inpatient stay. D/C Recommendations: To be determined     Goals:  Short-term Goals  Timeframe for Short-term Goals: 3 days  Goal 1: Pt will increase memory and orientation to place, situation, and temporal concepts with use of visual cues  Goal 2: Pt will complete naming tasks (responsive naming, phrase completion, etc) x8/10 independently without delayed response (<5 sec)  Goal 3: Pt will tolerate recommended diet (dental soft and thin) without overt s/s pen/asp in 80% of trials  Goal 4: Patient will follow basic commands x8/10 with min A  Goal 5: Patient will generate 2 solutions to functional problem solving tasks in 8/10 opportunities with moderate cueing. Long-term Goals  Timeframe for Long-term Goals: 1 week  Goal 1: Patient will safely tolerate LRD without overt s/s pen/asp   Goal 2: Patient will participate in conversation with <5 paraphasias      Subjective:  General  Chart Reviewed: Yes  Patient assessed for rehabilitation services?: Yes     Vision  Vision: Within Functional Limits  Hearing  Hearing: Exceptions to [facility]  Hearing Exceptions: Bilateral hearing aid;Hard of hearing/hearing concerns    Objective:     Oral/Motor  Oral Motor: Exceptions to [facility]  Labial Strength: Reduced  Labial Coordination: Reduced  Lingual Strength: Reduced  Lingual Coordination: Reduced    Auditory Comprehension  Comprehension: Exceptions  Complex Questions: Moderate  Two Step Basic Commands: Mild  Conversation: Mild  Interfering Components: Hearing;Processing speed; Attention to detail; Attention - selective  Effective Techniques: Increased volume;Repetition;Stressing words; Extra processing time    Expression  Primary Mode of Expression: Verbal    Verbal Expression  Verbal Expression: Exceptions to functional limits(x2 paraphasias)  Initiation: Mild   Responsive: Mild  Conversation: Mild  Interfering Components: Attention;Paraphasia  Effective Techniques:

## 2019-06-21 NOTE — PROGRESS NOTES
Occupational Therapy  Facility/Department: [facility] AT THE [facility] MED SURG  Daily Treatment Note  NAME: Laura Marroquin  : 1930  MRN: [id number]    Date of Service: 2019    Discharge Recommendations:  Continue to assess pending progress, Subacute/Skilled Nursing Facility, ECF with OT       Assessment      Activity Tolerance  Activity Tolerance: Patient Tolerated treatment well  Safety Devices  Safety Devices in place: Yes  Type of devices: Left in chair;Call light within reach; Chair alarm in place         Patient Diagnosis(es): The primary encounter diagnosis was Fall, initial encounter. Diagnoses of Urinary tract infection without hematuria, site unspecified, Multiple contusions, and Traumatic rhabdomyolysis, initial encounter [facility]) were also pertinent to this visit. has a past medical history of Arthritis, Atrial fibrillation ([facility] [address] Chronic kidney disease, stage 3 ([facility] [address] Gout, Hyperlipidemia, Hypertension, Hypothyroidism, and Irritable bowel syndrome. has a past surgical history that includes Cataract removal with implant (Bilateral); Cholecystectomy; Carpal tunnel release (); ERCP (N/A, 2019); Total knee arthroplasty (Right); and Total knee arthroplasty (Left). Restrictions  Restrictions/Precautions  Restrictions/Precautions: General Precautions, Fall Risk  Subjective   General  Chart Reviewed: Yes  Patient assessed for rehabilitation services?: Yes  Response to previous treatment: Patient with no complaints from previous session  Family / Caregiver Present: No  Diagnosis: Fall/Rhabdomyolysis/UTI  Subjective  Subjective: \"I feel nauseous right now\"  General Comment  Comments: Pt seated in arm chair upon OCTA arrival and agreeable to 1:1 tx.   Pain Assessment  Pain Assessment: 0-10  Pain Level: 0  Vital Signs  Patient Currently in Pain: Denies   Orientation     Objective    ADL  Additional Comments: Pt declined toileting and ADLs at this time        Functional Mobility  Functional - Mobility Device: Rolling Walker  Activity: Other  Assist Level: Contact guard assistance  Functional Mobility Comments: chair to EOB and back , no LOB, increased time and no reports of pain     Transfers  Sit to stand: Minimal assistance  Stand to sit: Minimal assistance  Transfer Comments: VC for hand placement and pt still \"plops\" into chair, edu provided                                            Type of ROM/Therapeutic Exercise  Comment: Green T band bicep/tricep ex to improve ease of sit<>stands x 10 reps x 2 sets x all planes while seated. Pt required MOD VC and RB                    Plan   Plan  Times per week: 7x/wk  Times per day: Daily  Current Treatment Recommendations: Strengthening, Functional Mobility Training, Safety Education & Training, Self-Care / ADL  G-Code     OutComes Score                                                  AM-PAC Score             Goals  Short term goals  Time Frame for Short term goals: 10 days  Short term goal 1: Pt. will participate in 10 mins of fxl activity with min cueing for alertness to increase activity tolerance for self cares. Short term goal 2: Pt. will engage in simple self care tasks (i.e. self feeding/grooming/UB bathing or dressing) with Min A to increase(I).        Therapy Time   Individual Concurrent Group Co-treatment   Time In 0850         Time Out 0913         Minutes [address], [place]

## 2019-06-21 NOTE — PROGRESS NOTES
Physical Therapy  Facility/Department: [facility] AT THE [facility] MED SURG  Daily Treatment Note  NAME: Berhane Victor  : 1930  MRN: [id number]    Date of Service: 2019    Discharge Recommendations:  Continue to assess pending progress, Subacute/Skilled Nursing Facility, 24 hour supervision or assist, Home with Home health PT        Assessment   Treatment Diagnosis: General weakness, decreased activity endurance  Prognosis: Fair  Patient Education: Educated pt on safety with transfers and AD, with fair understanding. REQUIRES PT FOLLOW UP: Yes  Activity Tolerance  Activity Tolerance: Patient limited by cognitive status; Patient limited by fatigue     Patient Diagnosis(es): The primary encounter diagnosis was Fall, initial encounter. Diagnoses of Urinary tract infection without hematuria, site unspecified, Multiple contusions, and Traumatic rhabdomyolysis, initial encounter [facility]) were also pertinent to this visit. has a past medical history of Arthritis, Atrial fibrillation ([facility] [address] Chronic kidney disease, stage 3 ([facility] [address] Gout, Hyperlipidemia, Hypertension, Hypothyroidism, and Irritable bowel syndrome. has a past surgical history that includes Cataract removal with implant (Bilateral); Cholecystectomy; Carpal tunnel release (); ERCP (N/A, 2019); Total knee arthroplasty (Right); and Total knee arthroplasty (Left). Restrictions  Restrictions/Precautions  Restrictions/Precautions: General Precautions, Fall Risk  Subjective   General  Chart Reviewed: Yes  Response To Previous Treatment: Patient with no complaints from previous session. Family / Caregiver Present: No  Referring Practitioner: Micheal Koyanagi, PA-C  Subjective  Subjective: Pt reports L hip pain 2/10. Pt states she is tired but willing to participate. General Comment  Comments: Nursing notified of pain level.            Orientation  Orientation  Overall Orientation Status: Impaired  Orientation Level: Oriented to person;Oriented to time;Oriented to Time In 1233         Time Out 1259         Minutes [address] [place], [place]

## 2019-06-21 NOTE — PLAN OF CARE
Problem: Falls - Risk of:  Goal: Will remain free from falls  Description  Will remain free from falls  Outcome: Ongoing  Note:   Patient is a high fall risk. Patient is educated to call assistance while getting up. Bed alarm on, bed wheels locked and in the lowest position. Bedside table and call light within the reach. Nonskid wear on. Needs assessed with hourly rounding and environment scanned for any kind of safety hazard. Problem: Risk for Impaired Skin Integrity  Goal: Tissue integrity - skin and mucous membranes  Description  Structural intactness and normal physiological function of skin and  mucous membranes. Outcome: Ongoing  Note:   No further skin issues noted other than the existing ones from the fall at home. Patient turns self in the bed. Will continue to monitor. Problem: Infection:  Goal: Will remain free from infection  Description  Will remain free from infection  Outcome: Ongoing  Note:   Antibiotic is administered to the patient as ordered by the provider. Will continue to monitor. Problem: Safety:  Goal: Free from accidental physical injury  Description  Free from accidental physical injury  Outcome: Ongoing  Note:   Hourly rounding performed. Patient's needs assessed. Bed alarm activated. Patient has been free from accidental physical injury as of now. Will closely monitor. Problem: Daily Care:  Goal: Daily care needs are met  Description  Daily care needs are met  Outcome: Ongoing  Note:   Daily care needs are met by the patient independently once set up with moderate help. Patient offered to get washed up but she refused saying will do after breakfast. Daily care supplies are made available in the pt.'s room. Problem: Pain:  Goal: Patient's pain/discomfort is manageable  Description  Patient's pain/discomfort is manageable  Outcome: Ongoing  Note:   Patient denies of any pain as of now.  Pain is assessed with hourly rounding while patient awake and administer pain meds as ordered. Will continue to assess. Problem: Musculor/Skeletal Functional Status  Goal: Highest potential functional level  Outcome: Ongoing  Note:   The patient has been getting up with one assist and a front wheel walker and tolerating the ambulation fairly well. Problem: COMMUNICATION IMPAIRMENT  Goal: Ability to express needs and understand communication  Outcome: Ongoing  Note:   Patient has been able to communicate well with the staffs and express needs well. Will continue to assess.

## 2019-06-21 NOTE — PROGRESS NOTES
Luisa Devi M.D. Internal Medicine Progress Note 6/21/19    SUBJECTIVE:    Patient seen for f/u of Traumatic rhabdomyolysis ([address] She is feeling better. Left hip doesn't hurt as bad as yesterday. Denies any other joint pain but states she's \"a little achey all over still\". Denies chest pain or palpitations. Denies SOB or cough. Ambulated in hallway 100 ' with rolling walker yesterday. ROS:   Constitutional: negative  for fevers, and negative for chills. Respiratory: negative for shortness of breath, negative for cough, and negative for wheezing  Cardiovascular: negative for chest pain, and negative for palpitations  Gastrointestinal: negative for abdominal pain, negative for nausea,negative for vomiting, negative for diarrhea, and negative for constipation     All other systems were reviewed with the patient and are negative unless otherwise stated in HPI    OBJECTIVE:  Vitals:   Temp: 97.7 °F (36.5 °C)  BP: (!) 167/69  Resp: 16  Pulse: 78  SpO2: 98 %    24HR INTAKE/OUTPUT:      Intake/Output Summary (Last 24 hours) at 6/21/2019 0733  Last data filed at 6/21/2019 3203  Gross per 24 hour   Intake 780 ml   Output 800 ml   Net -20 ml     -----------------------------------------------------------------  Exam:  GEN:    Awake, alert and oriented x 3. no acute distress  EYES:  EOMI, pupils equal   NECK: Supple. No lymphadenopathy. No carotid bruit  CVS:    RRR, no murmur, rub or gallop  PULM:  CTA, no wheezes, rales or rhonchi  ABD:    Bowels sounds normal.  Abdomen is soft. No distention. No tenderness. EXT:   Trace edema bilaterally . No calf tenderness. NEURO: Motor and sensory are intact  SKIN:  No rashes.   No skin lesions.    -----------------------------------------------------------------  Diagnostic Data:    · All available data reviewed  Lab Results   Component Value Date    WBC 7.9 06/21/2019    HGB 9.4 (L) 06/21/2019    .0 06/21/2019     06/21/2019      Lab Results Component Value Date    GLUCOSE 102 (H) 06/21/2019    BUN 16 06/21/2019    CREATININE 0.85 06/21/2019     06/21/2019    K 4.1 06/21/2019    CALCIUM 8.8 06/21/2019     (H) 06/21/2019    CO2 25 06/21/2019     Results for Tyesha Wooten (MRN [id number]) as of 6/21/2019 07:34   Ref. Range 6/17/2019 19:45 6/18/2019 05:48 6/19/2019 07:48 6/20/2019 05:39 6/21/2019 05:42   Total CK Ref Range: 26 - 192 U/L 7,521 (HH) 4,833 (HH) 1,739 (HH) 1,079 (HH) 756 (HH)         MRI PELVIS WO CONTRAST [[id number]] Collected: 06/20/19 1200   Updated: 06/20/19 1506    Narrative:     EXAMINATION:  MRI OF THE PELVIS WITHOUT CONTRAST, 6/20/2019 11:26 am    TECHNIQUE:  Multiplanar multisequence MRI of the pelvis was performed without the  administration of intravenous contrast.    COMPARISON:  Radiographs from 6/20/2019    HISTORY:  ORDERING SYSTEM PROVIDED HISTORY: bilateral hip pain, r/o occult fracture    FINDINGS:  SOFT TISSUES: Generalized subcutaneous edema.  Atrophy of the gluteal  muscles.  Edema within the right iliopsoas muscle, but no evidence of tendon  tear.  Asymmetric muscle edema right greater than left possibly sequela of  trauma with interstitial edema/hemorrhage.  No fluid collection. Narrowing of the right ischial femoral space with edema in the quadratus  femoris indicative of ischial femoral impingement.  Similar findings on the  left. Partial-thickness tear of the distal gluteus medius tendon on the left with  minimal retraction.  Partial-thickness tear of the gluteus minimus tendon  insertion with minimal retraction. Diverticulosis without evidence of diverticulitis. BONE MARROW: The signal characteristics of the bone marrow are normal.  No  evidence of fracture or suspicious bone marrow edema to indicate stress  response. JOINTS: Arthritic changes of bilateral hips, pubic symphysis, and SI joints. Lower lumbar spine degenerative changes.      Impression:     No evidence of occult fracture or suspicious bone marrow edema within the  pelvic bones or proximal femora. Asymmetric edema right gluteal muscles compared with the left likely due to  muscle strain with interstitial edema and possibly interstitial hemorrhage. Partial-thickness tear of the anterior left gluteus medius tendon and left  gluteus minimus tendon. Evidence of ischial femoral impingement right greater than left. 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 *      ASSESSMENT / PLAN:  · Traumatic rhabdomyolysis   ? CK slowly improving  ? PT / OT  · Hypokalemia   ? Replaced  · UTI   ? DC rocephin  ? Change to PO Cipro   ? Awaiting final culture results  · Atrial fibrillation with RVR  ? Rate currently controlled with Metoprolol  ? Anticoagulated with Eliquis  · Left hip pain  ? MRI pelvis / hips show no fracture but she has partial-thickness tear of the anterior left gluteus medius tendon and left  gluteus minimus tendon. ? Heat  ? Stretching  ? PT  · Hypertension   ? Continue Metoprolol, losartan  ? BP yesterday evening much better, down to 120's/60's, but back up this AM prior to med pass  · Recent admission in April for stroke like symptoms  ?  Continue ASA, atorvastatin  · Nutrition status: well developed, well nourished   · DVT prophylaxis: Eliquis   · High risk medications: none  · Disposition:  Discharge plan is [facility]  · Will need to trend CK until normalized        Shira Oakes M.D.  6/21/2019  7:33 AM

## 2019-06-21 NOTE — PROGRESS NOTES
Nutrition Assessment    Type and Reason for Visit: Reassess    Nutrition Recommendations:   1. modify diet to include dental soft. 2. Continue with current ONS. Nutrition Assessment: Improving oral intakes AEB PO % of meals and supplements. SLP saw Pt and recommended addition of dental soft. Diet not modifed yet. D/C plan to [facility]. + BM 6/20. Weight is back to historical UBW. Would expect fluid gains as cause. Hx IVF 75 ml/hour. Has some edema. Malnutrition Assessment:  · Malnutrition Status: Mild Malnutrition  · Context: Acute illness or injury  · Findings of the 6 clinical characteristics of malnutrition (Minimum of 2 out of 6 clinical characteristics is required to make the diagnosis of moderate or severe Protein Calorie Malnutrition based on AND/ASPEN Guidelines):  1. Energy Intake-Unable to assess(family unsure, from out of town. Pt asleep),      2. Weight Loss-10% loss or greater, (2 months)  3. Fat Loss-No significant subcutaneous fat loss,    4. Muscle Loss-No significant muscle mass loss,    5. Fluid Accumulation-Mild fluid accumulation, Extremities  6.  Strength-Not measured    Nutrition Risk Level:  Moderate    Nutrient Needs:  · Estimated Daily Total Kcal: 4500-0910(79-54/OZ)  · Estimated Daily Protein (g): 54-76g(1.2-1.4g/kg)  · Estimated Daily Total Fluid (ml/day): 1750 ml(25/kg)    Nutrition Diagnosis:   · Problem: Inadequate oral intake  · Etiology: related to Cognitive or neurological impairment     Signs and symptoms:  as evidenced by Weight loss(PO choice of toast and OJ after much encouragement)    Objective Information:  · Nutrition-Focused Physical Findings: Pt appears well nourished  · Wound Type: None  · Current Nutrition Therapies:  · Oral Diet Orders: General, No Added Salt (3-4gm)(add dental soft)   · Oral Diet intake: %  · Oral Nutrition Supplement (ONS) Orders: Standard High Calorie Oral Supplement  · ONS intake: %  · Anthropometric Measures:  · Ht: 5'

## 2019-06-21 NOTE — PROGRESS NOTES
Physical Therapy  Facility/Department: [facility] AT THE [facility] MED SURG  Daily Treatment Note  NAME: Venice Clarke  : 1930  MRN: [id number]    Date of Service: 2019    Discharge Recommendations:  Continue to assess pending progress, Subacute/Skilled Nursing Facility, 24 hour supervision or assist, Home with Home health PT        Assessment   Treatment Diagnosis: General weakness, decreased activity endurance  Prognosis: Fair  Patient Education: Educated pt on safety with transfers and AD, with fair understanding. REQUIRES PT FOLLOW UP: Yes  Activity Tolerance  Activity Tolerance: Patient limited by cognitive status; Patient Tolerated treatment well     Patient Diagnosis(es): The primary encounter diagnosis was Fall, initial encounter. Diagnoses of Urinary tract infection without hematuria, site unspecified, Multiple contusions, and Traumatic rhabdomyolysis, initial encounter [facility]) were also pertinent to this visit. has a past medical history of Arthritis, Atrial fibrillation ([facility] [address] Chronic kidney disease, stage 3 ([facility] [address] Gout, Hyperlipidemia, Hypertension, Hypothyroidism, and Irritable bowel syndrome. has a past surgical history that includes Cataract removal with implant (Bilateral); Cholecystectomy; Carpal tunnel release (); ERCP (N/A, 2019); Total knee arthroplasty (Right); and Total knee arthroplasty (Left). Restrictions  Restrictions/Precautions  Restrictions/Precautions: General Precautions, Fall Risk  Subjective   General  Chart Reviewed: Yes  Response To Previous Treatment: Patient with no complaints from previous session. Family / Caregiver Present: Yes  Referring Practitioner: David Newell PA-C  Subjective  Subjective: Pt reports some pain in L thigh/hip, rating 8/10. Pt states she is feeling better today, and that she is leaving today. General Comment  Comments: Nursing notified of pain level.            Orientation  Orientation  Overall Orientation Status: Impaired  Orientation Level: Oriented to person;Oriented to time;Oriented to situation  Cognition      Objective      Transfers  Sit to Stand: Contact guard assistance;Minimal Assistance  Stand to sit: Contact guard assistance  Comment: Pt required VCs for hand placement with all transfers with fair understanding. Ambulation  Ambulation?: Yes  WB Status: FWB  Ambulation 1  Surface: level tile  Device: Rolling Walker  Assistance: Contact guard assistance  Quality of Gait: Slow arsenio with flexed posture, pt deviates path to the right. Gait Deviations: Slow Arsenio;Deviated path  Distance: 100 ft x1  Comments: Pt required 1 standing RB d/t fatigue. Frequent VCs required for safety with AD. Balance  Posture: Fair  Sitting - Static: Fair;+  Sitting - Dynamic: Fair;+  Standing - Static: Fair  Standing - Dynamic: Fair;-  Exercises  Quad Sets: x15  Gluteal Sets: x15  Hip Flexion: x15  Hip Abduction: x15  Knee Long Arc Quad: x15  Knee Short Arc Quad: x15  Ankle Pumps: x15  Comments: BLE ther ex in seated and reclined. AAROM as needed on L LE.                         G-Code     OutComes Score                                                     AM-PAC Score             Goals  Short term goals  Time Frame for Short term goals: 10 days  Short term goal 1: Patient will amb 22' with FWW, supervision, without LOB  Short term goal 2: Patient will perform bed mobility and transfers independently  Short term goal 3: Patient will tolerate 20-30 min of therex/act to improve endurance for ADLs  Patient Goals   Patient goals : None stated due to confusion    Plan    Plan  Times per week: 7  Times per day: Twice a day  Plan Comment: 1x/day on weekends  Safety Devices  Type of devices:  All fall risk precautions in place, Left in chair, Call light within reach, Gait belt, Chair alarm in place, Nurse notified     Therapy Time   Individual Concurrent Group Co-treatment   Time In 0930         Time Out [address] [place] [address]

## 2019-06-21 NOTE — PROGRESS NOTES
Chair alarm sounding, writer entered room to pt up in front of chair attempting to walk to the restroom without her walker. Writer gave walker to pt and assisted to restroom. Pt voided and returned to chair. Chair alarm in place, call light and belongings in reach.

## 2019-06-21 NOTE — DISCHARGE SUMMARY
Discharge Summary    Isaias Piña  :  1930  MRN:  [id number]    Admit date:  2019      Discharge date: 2019     Admitting Physician:  Hemant Clay MD    Consultants:  none    Procedures: none    Complications: none    Discharge Condition: stable    Hospital Course:   Isaias Piña is a 80 y.o. female admitted after being found on the floor in her bathroom. Son and another person tried to visit her but no one came to the door. EMS was called, door had to be broken in and patient was found on bathroom floor confused. She does not remember the events leading up to the fall. It is unknown how long she was on the floor. She complained of bilateral hip pain in the ER. . H/O recent stroke-like symptoms - right upper extremity weakness, expressive aphasia, dysarthria, bilateral lower extremity weakness in 2019 with new onset afib. She was given TPA at the time. She was at [facility], gout, hypothyroidism, CKD, HTN Pelvis imaging showed no fracture. CT head, c-spin, t-spine, l-spine no acute process. CXR no acute process. CK and myoglobin significantly elevated. U/A + for UTI. Low K and Mg. She was admitted for fall, traumatic rhabdomyolysis, UTI, hypoMg, hypokalemia. She is known to Dr. Nadia Aden and patient was not at baseline mental status during his or my exam.   She was given IVF and CK was trended. Electrolytes were replaced. She was treated with abx for UTI. She grew enterobacter and hafnia parlevei from her urine. She will be on cipro on discharge. She complained of bilateral hip pain, imaging including MRI pelvis showed no fracture. She felt better and her mental status cleared. Her CK trended down to 756. She will have CK on Monday.  She will be discharged to SNF.     2019 11:52 AM - [place], [place] Incoming Lab Results From [facility]     Specimen Information: Urine, clean catch        Component Collected Lab   Specimen Description 2019  7:30 PM [facility] [place] CLEAN CATCH URINE    Special Requests 06/17/2019  7:30 PM [address]   NOT REPORTED    Culture Abnormal  06/17/2019  7:30  [address]   ENTEROBACTER CLOACAE >[id number] CFU/ML    Culture Abnormal  06/17/2019  7:30 PM [address] >[id number] CFU/ML    Testing Performed By     Ashley Gonzalez Name Director Address Valid Date Range   [id number] [facility] Nette Goncalves  [address]  [place] [address] 08/30/17 0802-Present   [address] [place] [address] [address] [facility] [address] 08/30/17 0801-Present   Susceptibility     Enterobacter cloacae (1)     Antibiotic Interpretation CHRISTOFER Status    amikacin   Final     NOT REPORTED   aztreonam Sensitive  Final     <=1  SUSCEPTIBLE   ceFAZolin   Final     NOT REPORTED   cefepime   Final     NOT REPORTED   cefTRIAXone Sensitive  Final     <=1  SUSCEPTIBLE   ciprofloxacin Sensitive  Final     <=0.25  SUSCEPTIBLE   ertapenem   Final     NOT REPORTED   gentamicin Sensitive  Final     <=1  SUSCEPTIBLE   meropenem   Final     NOT REPORTED   nitrofurantoin Sensitive  Final     <=16  SUSCEPTIBLE   tobramycin Sensitive  Final     <=1  SUSCEPTIBLE   trimethoprim-sulfamethoxazole Sensitive  Final     <=20  SUSCEPTIBLE   piperacillin-tazobactam Sensitive  Final     8  SUSCEPTIBLE   Hafnia paralvei (2)     Antibiotic Interpretation CHRISTOFER Status    amikacin   Final     NOT REPORTED   ampicillin Resistant RESISTANT Final    ampicillin-sulbactam  NOT REPORTED Final    aztreonam Sensitive  Final     <=1  SUSCEPTIBLE   ceFAZolin   Final     NOT REPORTED   cefepime   Final     NOT REPORTED   cefTRIAXone Sensitive  Final     <=1  SUSCEPTIBLE   ciprofloxacin Sensitive  Final     <=0.25  SUSCEPTIBLE   ertapenem   Final     NOT REPORTED   gentamicin Sensitive  Final     <=1  SUSCEPTIBLE   nitrofurantoin Sensitive  Final     <=16  SUSCEPTIBLE SYSTEM PROVIDED HISTORY: fall TECHNOLOGIST PROVIDED HISTORY: fall FINDINGS: Two views of the pelvis were reviewed. No acute fracture or dislocation is identified. Mild-to-moderate osteoarthritis of the bilateral hips. Severe degenerative changes of the imaged lower lumbar spine. Degenerative changes of the bilateral sacroiliac joints and pubic symphysis also redemonstrated. Osseous mineralization is decreased. Multiple phleboliths project over the pelvis. 1. No acute fracture identified. 2. Mild-to-moderate bilateral hip osteoarthritis, degenerative changes of the bilateral sacroiliac joints and pubic symphysis, and severe degenerative changes of the partially imaged lower lumbar spine. Ct Head Wo Contrast    Result Date: 6/17/2019  EXAMINATION: CT OF THE HEAD WITHOUT CONTRAST; CT OF THE CERVICAL SPINE WITHOUT CONTRAST 6/17/2019 7:57 pm TECHNIQUE: CT of the head was performed without the administration of intravenous contrast. Dose modulation, iterative reconstruction, and/or weight based adjustment of the mA/kV was utilized to reduce the radiation dose to as low as reasonably achievable.; CT of the cervical spine was performed without the administration of intravenous contrast. Multiplanar reformatted images are provided for review. Dose modulation, iterative reconstruction, and/or weight based adjustment of the mA/kV was utilized to reduce the radiation dose to as low as reasonably achievable. COMPARISON: CT brain April 8, 2019. CT cervical spine April 9, 2019. HISTORY: ORDERING SYSTEM PROVIDED HISTORY: fall TECHNOLOGIST PROVIDED HISTORY: FINDINGS: CT brain: BRAIN/VENTRICLES: There is no acute intracranial hemorrhage, mass effect or midline shift. No abnormal extra-axial fluid collection. The gray-white differentiation is maintained without evidence of an acute infarct. There is no evidence of hydrocephalus.  Cortical atrophy with severe chronic microvascular ischemic changes similar to the prior of an acute infarct. There is no evidence of hydrocephalus. Cortical atrophy with severe chronic microvascular ischemic changes similar to the prior study. ORBITS: The visualized portion of the orbits demonstrate no acute abnormality. SINUSES: The visualized paranasal sinuses and mastoid air cells demonstrate no acute abnormality. SOFT TISSUES/SKULL:  No acute abnormality of the visualized skull or soft tissues. CT cervical spine: Stable 4 mm anterior subluxation C7 on T1 which appears to be due to facet joint degenerative change. No acute fracture is noted. Moderate to severe diffuse spondylosis is noted. Diffuse facet joint degenerative change. No prevertebral soft tissue swelling is noted. Visualized soft tissues surrounding the cervical spine demonstrate no acute findings. Visualized lung apices are clear. No acute intracranial abnormality. No acute cervical spine fracture. No significant change in brain or cervical spine findings when compared to the prior studies. Ct Thoracic Spine Wo Contrast    Result Date: 6/17/2019  EXAMINATION: CT OF THE THORACIC SPINE WITHOUT CONTRAST; CT OF THE LUMBAR SPINE WITHOUT CONTRAST 6/17/2019 TECHNIQUE: CT of the thoracic spine was performed without the administration of intravenous contrast. Multiplanar reformatted images are provided for review. Dose modulation, iterative reconstruction, and/or weight based adjustment of the mA/kV was utilized to reduce the radiation dose to as low as reasonably achievable.; CT of the lumbar spine was performed without the administration of intravenous contrast. Multiplanar reformatted images are provided for review. Dose modulation, iterative reconstruction, and/or weight based adjustment of the mA/kV was utilized to reduce the radiation dose to as low as reasonably achievable.  COMPARISON: 04/08/2019 HISTORY: ORDERING SYSTEM PROVIDED HISTORY: contusions and eccymosis to thoracic bilateral; ORDERING SYSTEM PROVIDED HISTORY: fall TECHNOLOGIST PROVIDED HISTORY: fall FINDINGS: BONES/ALIGNMENT:  There is no gross evidence of an acute fracture of the thoracic or lumbar spine. There is normal alignment of the spine. The bones are demineralized. DEGENERATIVE CHANGES: There is moderate to severe multilevel spondylosis and facet arthropathy. Degenerative changes involve the bilateral sacroiliac joints. SOFT TISSUES: No paraspinal mass is seen. Subcutaneous edema is present in the midline of the lumbar spine. There is dependent bibasilar atelectasis. No change in the 1.0 x 1.2 cm right thyroid nodule, no follow-up imaging is recommended. A small hiatal hernia is noted. A biliary stent is in situ. No change in the 1.7 x 1.8 cm left adrenal adenoma. A punctate nonobstructing nephrolithiasis present in the midpole of the right kidney. Moderate atherosclerosis involves the abdominal aorta. 1. No acute fracture. Ct Lumbar Spine Wo Contrast    Result Date: 6/17/2019  EXAMINATION: CT OF THE THORACIC SPINE WITHOUT CONTRAST; CT OF THE LUMBAR SPINE WITHOUT CONTRAST 6/17/2019 TECHNIQUE: CT of the thoracic spine was performed without the administration of intravenous contrast. Multiplanar reformatted images are provided for review. Dose modulation, iterative reconstruction, and/or weight based adjustment of the mA/kV was utilized to reduce the radiation dose to as low as reasonably achievable.; CT of the lumbar spine was performed without the administration of intravenous contrast. Multiplanar reformatted images are provided for review. Dose modulation, iterative reconstruction, and/or weight based adjustment of the mA/kV was utilized to reduce the radiation dose to as low as reasonably achievable.  COMPARISON: 04/08/2019 HISTORY: ORDERING SYSTEM PROVIDED HISTORY: contusions and eccymosis to thoracic bilateral; ORDERING SYSTEM PROVIDED HISTORY: fall TECHNOLOGIST PROVIDED HISTORY: fall FINDINGS: BONES/ALIGNMENT:  There is no gross evidence of an Chronic kidney disease, stage 3 (HCC)    Metabolic encephalopathy    PAF (paroxysmal atrial fibrillation) (HCC)    Paroxysmal atrial fibrillation with rapid ventricular response (HCC)  Resolved Problems:    * No resolved hospital problems. 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 XXXA]      Priority: High    Severe mitral regurgitation by prior echocardiogram [I34.0]      Priority: High    Mild malnutrition ([address] [E44.1] 06/18/2019     Priority: Medium     Class: Present on Admission    Paroxysmal atrial fibrillation with rapid ventricular response ([address] [I48.0] 06/19/2019    Traumatic rhabdomyolysis ([address] [T79. 6XXA] 06/18/2019    Hypokalemia [E87.6] 06/18/2019    UTI (urinary tract infection) [N39.0] 99/22/1712    Metabolic encephalopathy [V35.67] 06/18/2019    PAF (paroxysmal atrial fibrillation) ([address] [I48.0] 06/18/2019    Chronic kidney disease, stage 3 ([address] [N18.3]     Cause of injury, fall [W19. XXXA] 06/17/2019    Hypomagnesemia [E83.42] 02/13/2019    Hypothyroidism [E03.9]     Hypertension [I10]     Hyperlipidemia [E78.5]     Gout [M10.9]        Discharge Medications:       Tania Arango   Home Medication Instructions [id number]    Printed on:06/21/19 1330   Medication Information                      acetaminophen (TYLENOL) 500 MG tablet  Take 500 mg by mouth every 6 hours as needed for Pain             apixaban (ELIQUIS) 5 MG TABS tablet  Take 1 tablet by mouth 2 times daily             aspirin 81 MG EC tablet  Take 1 tablet by mouth daily             atorvastatin (LIPITOR) 20 MG tablet  Take 1 tablet by mouth nightly             ciprofloxacin (CIPRO) 500 MG tablet  Take 1 tablet by mouth 2 times daily for 7 days             levothyroxine (SYNTHROID) 112 MCG tablet  Take 1 tablet by mouth Daily             losartan (COZAAR) 25 MG tablet  Take 1 tablet by mouth daily             metoprolol tartrate (LOPRESSOR) 25 MG tablet  Take 1 tablet by mouth 2 times daily                 Patient Instructions:    Activity: activity as tolerated with assistance  Diet: regular diet  Wound Care: none needed  Other: PT/OT, CK monday     Disposition:   DC to [address] [facility]    Follow up:  Patient will be followed by Milan Brumfield MD in 1-2 weeks    [facility] on Discharge (if applicable)  ACE/ARB in CHF: NA  Statin in MI: NA  ASA in MI: NA  Statin in CVA: NA  Antiplatelet in CVA: NA    Total time spent on discharge services: 25 minutes    Including the following activities:  Evaluation and Management of patient  Discussion with patient and/or surrogate about current care plan  Coordination with Case Management and/or   Coordination of care with Consultants (if applicable)   Coordination of care with Receiving Facility Physician (if applicable)  Completion of DME forms (if applicable)  Preparation of Discharge Summary  Preparation of Medication Reconciliation  Preparation of Discharge Prescriptions    Signed:  Magalis Clinton PA-C  6/21/2019, 1:30 PM

## 2019-06-27 ENCOUNTER — HOSPITAL ENCOUNTER (OUTPATIENT)
Age: 84
Setting detail: SPECIMEN
Discharge: HOME | End: 2019-06-27
Payer: MEDICARE

## 2019-06-27 LAB
ALBUMIN SERPL-MCNC: 2.9 G/DL (ref 3.5–5.2)
ALBUMIN/GLOBULIN RATIO: 1.5 (ref 1–2.5)
ALP BLD-CCNC: 83 U/L (ref 35–104)
ALT SERPL-CCNC: 22 U/L (ref 5–33)
ANION GAP SERPL CALCULATED.3IONS-SCNC: 9 MMOL/L (ref 9–17)
AST SERPL-CCNC: 20 U/L
BILIRUB SERPL-MCNC: 0.89 MG/DL (ref 0.3–1.2)
BUN BLDV-MCNC: 16 MG/DL (ref 8–23)
BUN/CREAT BLD: 17 (ref 9–20)
CALCIUM SERPL-MCNC: 8.3 MG/DL (ref 8.6–10.4)
CHLORIDE BLD-SCNC: 107 MMOL/L (ref 98–107)
CO2: 27 MMOL/L (ref 20–31)
CREAT SERPL-MCNC: 0.94 MG/DL (ref 0.5–0.9)
GFR AFRICAN AMERICAN: >60 ML/MIN
GFR NON-AFRICAN AMERICAN: 56 ML/MIN
GFR SERPL CREATININE-BSD FRML MDRD: ABNORMAL ML/MIN/{1.73_M2}
GFR SERPL CREATININE-BSD FRML MDRD: ABNORMAL ML/MIN/{1.73_M2}
GLUCOSE BLD-MCNC: 81 MG/DL (ref 70–99)
POTASSIUM SERPL-SCNC: 4 MMOL/L (ref 3.7–5.3)
SODIUM BLD-SCNC: 143 MMOL/L (ref 135–144)
TOTAL CK: 81 U/L (ref 26–192)
TOTAL PROTEIN: 4.8 G/DL (ref 6.4–8.3)

## 2019-06-27 PROCEDURE — P9603 ONE-WAY ALLOW PRORATED MILES: HCPCS

## 2019-06-27 PROCEDURE — 36415 COLL VENOUS BLD VENIPUNCTURE: CPT

## 2019-06-27 PROCEDURE — 80053 COMPREHEN METABOLIC PANEL: CPT

## 2019-06-27 PROCEDURE — 82550 ASSAY OF CK (CPK): CPT

## 2019-07-18 PROBLEM — N39.0 UTI (URINARY TRACT INFECTION): Status: RESOLVED | Noted: 2019-06-18 | Resolved: 2019-07-18

## 2019-07-24 ENCOUNTER — TELEPHONE (OUTPATIENT)
Dept: GASTROENTEROLOGY | Age: 84
End: 2019-07-24

## 2019-07-30 ENCOUNTER — ANESTHESIA EVENT (OUTPATIENT)
Dept: OPERATING ROOM | Age: 84
End: 2019-07-30
Payer: MEDICARE

## 2019-07-31 ENCOUNTER — ANESTHESIA (OUTPATIENT)
Dept: OPERATING ROOM | Age: 84
End: 2019-07-31
Payer: MEDICARE

## 2019-07-31 ENCOUNTER — HOSPITAL ENCOUNTER (OUTPATIENT)
Age: 84
Setting detail: OUTPATIENT SURGERY
Discharge: NURSING, REHAB OR LONG-TERM CARE | End: 2019-07-31
Attending: INTERNAL MEDICINE | Admitting: INTERNAL MEDICINE
Payer: MEDICARE

## 2019-07-31 VITALS
WEIGHT: 163.9 LBS | HEART RATE: 76 BPM | SYSTOLIC BLOOD PRESSURE: 134 MMHG | HEIGHT: 60 IN | DIASTOLIC BLOOD PRESSURE: 64 MMHG | RESPIRATION RATE: 16 BRPM | BODY MASS INDEX: 32.18 KG/M2 | OXYGEN SATURATION: 96 % | TEMPERATURE: 97.7 F

## 2019-07-31 VITALS — DIASTOLIC BLOOD PRESSURE: 88 MMHG | SYSTOLIC BLOOD PRESSURE: 179 MMHG | OXYGEN SATURATION: 96 %

## 2019-07-31 PROCEDURE — 6360000002 HC RX W HCPCS: Performed by: NURSE ANESTHETIST, CERTIFIED REGISTERED

## 2019-07-31 PROCEDURE — 7100000010 HC PHASE II RECOVERY - FIRST 15 MIN: Performed by: INTERNAL MEDICINE

## 2019-07-31 PROCEDURE — 3700000000 HC ANESTHESIA ATTENDED CARE: Performed by: INTERNAL MEDICINE

## 2019-07-31 PROCEDURE — 43247 EGD REMOVE FOREIGN BODY: CPT | Performed by: INTERNAL MEDICINE

## 2019-07-31 PROCEDURE — 7100000011 HC PHASE II RECOVERY - ADDTL 15 MIN: Performed by: INTERNAL MEDICINE

## 2019-07-31 PROCEDURE — 2709999900 HC NON-CHARGEABLE SUPPLY: Performed by: INTERNAL MEDICINE

## 2019-07-31 PROCEDURE — 2580000003 HC RX 258: Performed by: ANESTHESIOLOGY

## 2019-07-31 PROCEDURE — 3609155700 HC EGD STENT REMOVAL: Performed by: INTERNAL MEDICINE

## 2019-07-31 PROCEDURE — 6360000002 HC RX W HCPCS: Performed by: ANESTHESIOLOGY

## 2019-07-31 PROCEDURE — 2500000003 HC RX 250 WO HCPCS: Performed by: NURSE ANESTHETIST, CERTIFIED REGISTERED

## 2019-07-31 PROCEDURE — 3700000001 HC ADD 15 MINUTES (ANESTHESIA): Performed by: INTERNAL MEDICINE

## 2019-07-31 RX ORDER — PROPOFOL 10 MG/ML
INJECTION, EMULSION INTRAVENOUS PRN
Status: DISCONTINUED | OUTPATIENT
Start: 2019-07-31 | End: 2019-07-31 | Stop reason: SDUPTHER

## 2019-07-31 RX ORDER — LABETALOL HYDROCHLORIDE 5 MG/ML
INJECTION, SOLUTION INTRAVENOUS
Status: DISCONTINUED
Start: 2019-07-31 | End: 2019-07-31

## 2019-07-31 RX ORDER — FENTANYL CITRATE 50 UG/ML
50 INJECTION, SOLUTION INTRAMUSCULAR; INTRAVENOUS EVERY 5 MIN PRN
Status: DISCONTINUED | OUTPATIENT
Start: 2019-07-31 | End: 2019-07-31 | Stop reason: HOSPADM

## 2019-07-31 RX ORDER — LABETALOL HYDROCHLORIDE 5 MG/ML
5 INJECTION, SOLUTION INTRAVENOUS ONCE
Status: DISCONTINUED | OUTPATIENT
Start: 2019-07-31 | End: 2019-07-31 | Stop reason: HOSPADM

## 2019-07-31 RX ORDER — FENTANYL CITRATE 50 UG/ML
25 INJECTION, SOLUTION INTRAMUSCULAR; INTRAVENOUS EVERY 5 MIN PRN
Status: DISCONTINUED | OUTPATIENT
Start: 2019-07-31 | End: 2019-07-31 | Stop reason: HOSPADM

## 2019-07-31 RX ORDER — LIDOCAINE HYDROCHLORIDE 10 MG/ML
1 INJECTION, SOLUTION EPIDURAL; INFILTRATION; INTRACAUDAL; PERINEURAL
Status: DISCONTINUED | OUTPATIENT
Start: 2019-08-01 | End: 2019-07-31 | Stop reason: HOSPADM

## 2019-07-31 RX ORDER — LIDOCAINE HYDROCHLORIDE 20 MG/ML
INJECTION, SOLUTION INFILTRATION; PERINEURAL PRN
Status: DISCONTINUED | OUTPATIENT
Start: 2019-07-31 | End: 2019-07-31 | Stop reason: SDUPTHER

## 2019-07-31 RX ORDER — SODIUM CHLORIDE, SODIUM LACTATE, POTASSIUM CHLORIDE, CALCIUM CHLORIDE 600; 310; 30; 20 MG/100ML; MG/100ML; MG/100ML; MG/100ML
INJECTION, SOLUTION INTRAVENOUS CONTINUOUS
Status: DISCONTINUED | OUTPATIENT
Start: 2019-08-01 | End: 2019-07-31 | Stop reason: HOSPADM

## 2019-07-31 RX ORDER — ONDANSETRON 2 MG/ML
4 INJECTION INTRAMUSCULAR; INTRAVENOUS
Status: COMPLETED | OUTPATIENT
Start: 2019-07-31 | End: 2019-07-31

## 2019-07-31 RX ADMIN — PROPOFOL 20 MG: 10 INJECTION, EMULSION INTRAVENOUS at 12:08

## 2019-07-31 RX ADMIN — PROPOFOL 30 MG: 10 INJECTION, EMULSION INTRAVENOUS at 11:57

## 2019-07-31 RX ADMIN — PROPOFOL 20 MG: 10 INJECTION, EMULSION INTRAVENOUS at 12:11

## 2019-07-31 RX ADMIN — SODIUM CHLORIDE, POTASSIUM CHLORIDE, SODIUM LACTATE AND CALCIUM CHLORIDE: 600; 310; 30; 20 INJECTION, SOLUTION INTRAVENOUS at 11:15

## 2019-07-31 RX ADMIN — PROPOFOL 20 MG: 10 INJECTION, EMULSION INTRAVENOUS at 12:00

## 2019-07-31 RX ADMIN — PROPOFOL 20 MG: 10 INJECTION, EMULSION INTRAVENOUS at 12:04

## 2019-07-31 RX ADMIN — PROPOFOL 40 MG: 10 INJECTION, EMULSION INTRAVENOUS at 11:53

## 2019-07-31 RX ADMIN — LIDOCAINE HYDROCHLORIDE 50 MG: 20 INJECTION, SOLUTION INFILTRATION; PERINEURAL at 11:53

## 2019-07-31 RX ADMIN — PROPOFOL 20 MG: 10 INJECTION, EMULSION INTRAVENOUS at 11:54

## 2019-07-31 RX ADMIN — ONDANSETRON 4 MG: 2 INJECTION INTRAMUSCULAR; INTRAVENOUS at 12:36

## 2019-07-31 ASSESSMENT — PULMONARY FUNCTION TESTS
PIF_VALUE: 1

## 2019-07-31 ASSESSMENT — PAIN SCALES - GENERAL
PAINLEVEL_OUTOF10: 0
PAINLEVEL_OUTOF10: 0

## 2019-07-31 NOTE — H&P
exposed skin area  Extremities:  +peripheral edema bilateral lower extremities  pulses decreased but palpable, no calf pain with palpation      Investigations:      Laboratory Testing:  No results found for this or any previous visit (from the past 24 hour(s)). Imaging/Diagnostics:      Diagnosis:      1. S/p ERCP    Plans:     1.  EGD with stent removal       KATIA Lara CNP  7/31/2019  10:45 AM

## 2019-07-31 NOTE — ANESTHESIA PRE PROCEDURE
Department of Anesthesiology  Preprocedure Note       Name:  Jose Armando Weathers   Age:  80 y.o.  :  1930                                          MRN:  [id number]         Date:  2019      Surgeon: Julio Loo):  Ashleigh London MD    Procedure: EGD STENT REMOVAL (N/A )    Medications prior to admission:   Prior to Admission medications    Medication Sig Start Date End Date Taking?  Authorizing Provider   atorvastatin (LIPITOR) 20 MG tablet Take 1 tablet by mouth nightly 19   Kvng Darling PA-C   losartan (COZAAR) 25 MG tablet Take 1 tablet by mouth daily 19   Kvng Darling PA-C   aspirin 81 MG EC tablet Take 1 tablet by mouth daily  Patient taking differently: Take 325 mg by mouth daily  19   Renato Azevedo MD   metoprolol tartrate (LOPRESSOR) 25 MG tablet Take 1 tablet by mouth 2 times daily 4/10/19   Renato Azevedo MD   levothyroxine (SYNTHROID) 112 MCG tablet Take 1 tablet by mouth Daily 18   Jacquelyn Wright MD   acetaminophen (TYLENOL) 500 MG tablet Take 500 mg by mouth every 6 hours as needed for Pain    Historical Provider, MD       Current medications:    Current Facility-Administered Medications   Medication Dose Route Frequency Provider Last Rate Last Dose    [START ON 2019] lactated ringers infusion   Intravenous Continuous Lawrence Silverio MD        [START ON 2019] lidocaine PF 1 % injection 1 mL  1 mL Intradermal Once PRN Lawrence Silverio MD           Allergies:  No Known Allergies    Problem List:    Patient Active Problem List   Diagnosis Code    Hypothyroidism E03.9    Hyperlipidemia E78.5    Hypertension I10    Gout M10.9    Arthritis M19.90    Irritable bowel syndrome K58.9    Biliary obstruction K83.1    Abnormal finding on imaging R93.89    Elevated LFTs R94.5    Abdominal discomfort R10.9    Acute cystitis N30.00    Choledocholithiasis K80.50    Right leg pain M79.604    Osteoarthritis of multiple joints M15.9    VERONICA (acute kidney injury) ([facility] N17.9    Hypomagnesemia E83.42    Diarrhea R19.7    Stroke-like symptoms R29.90    Atrial fibrillation with RVR ([facility]) I48.91    Left carotid artery stenosis I65.22    Cerebral infarction, unspecified ([facility] I63.9    Cause of injury, fall W19. XXXA    Traumatic rhabdomyolysis ([facility] T79. 6XXA    Hypokalemia E87.6    Chronic kidney disease, stage 3 ([facility]) U68.1    Metabolic encephalopathy I60.64    Mild malnutrition ([facility]) E44.1    PAF (paroxysmal atrial fibrillation) ([facility]) I48.0    Chronic atrial fibrillation ([facility]) I48.2    Severe mitral regurgitation by prior echocardiogram I34.0    Paroxysmal atrial fibrillation with rapid ventricular response ([facility]) I48.0    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 ([facility]) I50.32, I38       Past Medical History:        Diagnosis Date    Arthritis     Atrial fibrillation (HCC)     Chronic kidney disease, stage 3 (HCC)     Gout     Hyperlipidemia     Hypertension     Hypothyroidism     Irritable bowel syndrome        Past Surgical History:        Procedure Laterality Date    CARPAL TUNNEL RELEASE  2012    Dr. Barber Urbina - right side    CATARACT REMOVAL WITH IMPLANT Bilateral     CHOLECYSTECTOMY      ERCP N/A 2/11/2019    Driss Birmingham MD - ERCP, Sphincterotomy, balloon dilatation and stent placement with removal of multiple stones    TOTAL KNEE ARTHROPLASTY Right     TOTAL KNEE ARTHROPLASTY Left        Social History:    Social History     Tobacco Use    Smoking status: Never Smoker    Smokeless tobacco: Never Used   Substance Use Topics    Alcohol use:  No                                Counseling given: Not Answered      Vital Signs (Current):   Vitals:    07/31/19 1038   BP: (!) 180/72   Pulse: 77   Resp: 18   Temp: 97.7 °F (36.5 °C)   TempSrc: Oral   SpO2: 97%                                              BP Readings from Last 3 Encounters:   07/31/19 (!) 180/72   06/21/19 (!) 167/69   04/10/19 (!) 140/84       NPO Status: Endo/Other:    (+) hypothyroidism::., .                 Abdominal:           Vascular:                                      Anesthesia Plan      MAC     ASA 3       Induction: intravenous. Anesthetic plan and risks discussed with patient.                       Carmelo Suarez MD   7/31/2019

## 2019-08-09 ENCOUNTER — CARE COORDINATION (OUTPATIENT)
Dept: CASE MANAGEMENT | Age: 84
End: 2019-08-09

## 2019-08-09 NOTE — CARE COORDINATION
[facility] Update Call    2019    Patient: Nan Solorio Patient : 1930   MRN: [id number]  Reason for Admission:   Discharge Date: 19 RARS: Readmission Risk Score: 20    Per incoming notification from [facility]:    Nivia Osler discharged to LTC on 7/15/19 at same facility ([address] [place] [address]). No further identified needs. PAC flow-sheet updated.    Care Transitions Post Acute Facility Update    Care Transitions Interventions  Post Acute Facility:  LTC at [address] [place] [address] Update

## 2019-09-03 ENCOUNTER — HOSPITAL ENCOUNTER (EMERGENCY)
Age: 84
Discharge: NURSING, REHAB OR LONG-TERM CARE | End: 2019-09-03
Attending: EMERGENCY MEDICINE
Payer: MEDICARE

## 2019-09-03 ENCOUNTER — HOSPITAL ENCOUNTER (OUTPATIENT)
Age: 84
Setting detail: SPECIMEN
Discharge: HOME | End: 2019-09-03
Payer: MEDICARE

## 2019-09-03 VITALS
HEART RATE: 87 BPM | SYSTOLIC BLOOD PRESSURE: 172 MMHG | OXYGEN SATURATION: 95 % | TEMPERATURE: 98.1 F | RESPIRATION RATE: 16 BRPM | DIASTOLIC BLOOD PRESSURE: 91 MMHG

## 2019-09-03 DIAGNOSIS — M25.511 RIGHT SHOULDER PAIN, UNSPECIFIED CHRONICITY: Primary | ICD-10-CM

## 2019-09-03 LAB
ALBUMIN SERPL-MCNC: 3.4 G/DL (ref 3.5–5.2)
ALBUMIN/GLOBULIN RATIO: 1.3 (ref 1–2.5)
ALP BLD-CCNC: 112 U/L (ref 35–104)
ALT SERPL-CCNC: 7 U/L (ref 5–33)
ANION GAP SERPL CALCULATED.3IONS-SCNC: 15 MMOL/L (ref 9–17)
AST SERPL-CCNC: 12 U/L
BILIRUB SERPL-MCNC: 0.46 MG/DL (ref 0.3–1.2)
BUN BLDV-MCNC: 15 MG/DL (ref 8–23)
BUN/CREAT BLD: 17 (ref 9–20)
CALCIUM SERPL-MCNC: 9.3 MG/DL (ref 8.6–10.4)
CHLORIDE BLD-SCNC: 106 MMOL/L (ref 98–107)
CHOLESTEROL/HDL RATIO: 2.6
CHOLESTEROL: 115 MG/DL
CO2: 24 MMOL/L (ref 20–31)
CREAT SERPL-MCNC: 0.86 MG/DL (ref 0.5–0.9)
GFR AFRICAN AMERICAN: >60 ML/MIN
GFR NON-AFRICAN AMERICAN: >60 ML/MIN
GFR SERPL CREATININE-BSD FRML MDRD: ABNORMAL ML/MIN/{1.73_M2}
GFR SERPL CREATININE-BSD FRML MDRD: ABNORMAL ML/MIN/{1.73_M2}
GLUCOSE BLD-MCNC: 84 MG/DL (ref 70–99)
HCT VFR BLD CALC: 34.3 % (ref 36.3–47.1)
HDLC SERPL-MCNC: 45 MG/DL
HEMOGLOBIN: 10.7 G/DL (ref 11.9–15.1)
LDL CHOLESTEROL: 59 MG/DL (ref 0–130)
MCH RBC QN AUTO: 30 PG (ref 25.2–33.5)
MCHC RBC AUTO-ENTMCNC: 31.2 G/DL (ref 28.4–34.8)
MCV RBC AUTO: 96.1 FL (ref 82.6–102.9)
NRBC AUTOMATED: 0 PER 100 WBC
PDW BLD-RTO: 14.4 % (ref 11.8–14.4)
PLATELET # BLD: 159 K/UL (ref 138–453)
PMV BLD AUTO: 12.8 FL (ref 8.1–13.5)
POTASSIUM SERPL-SCNC: 3.4 MMOL/L (ref 3.7–5.3)
RBC # BLD: 3.57 M/UL (ref 3.95–5.11)
SODIUM BLD-SCNC: 145 MMOL/L (ref 135–144)
TOTAL PROTEIN: 6 G/DL (ref 6.4–8.3)
TRIGL SERPL-MCNC: 53 MG/DL
TSH SERPL DL<=0.05 MIU/L-ACNC: 0.62 MIU/L (ref 0.3–5)
VLDLC SERPL CALC-MCNC: NORMAL MG/DL (ref 1–30)
WBC # BLD: 7.7 K/UL (ref 3.5–11.3)

## 2019-09-03 PROCEDURE — 99283 EMERGENCY DEPT VISIT LOW MDM: CPT

## 2019-09-03 PROCEDURE — 80053 COMPREHEN METABOLIC PANEL: CPT

## 2019-09-03 PROCEDURE — 80061 LIPID PANEL: CPT

## 2019-09-03 PROCEDURE — 84443 ASSAY THYROID STIM HORMONE: CPT

## 2019-09-03 PROCEDURE — 85027 COMPLETE CBC AUTOMATED: CPT

## 2019-09-03 PROCEDURE — 36415 COLL VENOUS BLD VENIPUNCTURE: CPT

## 2019-09-03 PROCEDURE — P9604 ONE-WAY ALLOW PRORATED TRIP: HCPCS

## 2019-09-13 ENCOUNTER — HOSPITAL ENCOUNTER (OUTPATIENT)
Age: 84
Setting detail: SPECIMEN
Discharge: HOME | End: 2019-09-13
Payer: MEDICARE

## 2019-09-13 LAB
ANION GAP SERPL CALCULATED.3IONS-SCNC: 12 MMOL/L (ref 9–17)
BUN BLDV-MCNC: 17 MG/DL (ref 8–23)
BUN/CREAT BLD: 18 (ref 9–20)
CALCIUM SERPL-MCNC: 9 MG/DL (ref 8.6–10.4)
CHLORIDE BLD-SCNC: 109 MMOL/L (ref 98–107)
CO2: 24 MMOL/L (ref 20–31)
CREAT SERPL-MCNC: 0.94 MG/DL (ref 0.5–0.9)
GFR AFRICAN AMERICAN: >60 ML/MIN
GFR NON-AFRICAN AMERICAN: 56 ML/MIN
GFR SERPL CREATININE-BSD FRML MDRD: ABNORMAL ML/MIN/{1.73_M2}
GFR SERPL CREATININE-BSD FRML MDRD: ABNORMAL ML/MIN/{1.73_M2}
GLUCOSE BLD-MCNC: 88 MG/DL (ref 70–99)
POTASSIUM SERPL-SCNC: 4 MMOL/L (ref 3.7–5.3)
SODIUM BLD-SCNC: 145 MMOL/L (ref 135–144)

## 2019-09-13 PROCEDURE — 36415 COLL VENOUS BLD VENIPUNCTURE: CPT

## 2019-09-13 PROCEDURE — 80048 BASIC METABOLIC PNL TOTAL CA: CPT

## 2019-09-13 PROCEDURE — P9604 ONE-WAY ALLOW PRORATED TRIP: HCPCS

## 2019-09-24 ENCOUNTER — TELEPHONE (OUTPATIENT)
Dept: GASTROENTEROLOGY | Age: 84
End: 2019-09-24

## 2019-09-24 NOTE — TELEPHONE ENCOUNTER
Solo Dan from the [address] [place] [address] called to r/s appointment that was for 9/25/19 due to no ride. New appointment is for 11/5/19.

## 2019-10-18 ENCOUNTER — HOSPITAL ENCOUNTER (OUTPATIENT)
Age: 84
Setting detail: SPECIMEN
Discharge: HOME | End: 2019-10-18
Payer: MEDICARE

## 2019-10-18 LAB
ANION GAP SERPL CALCULATED.3IONS-SCNC: 15 MMOL/L (ref 9–17)
BUN BLDV-MCNC: 19 MG/DL (ref 8–23)
BUN/CREAT BLD: 23 (ref 9–20)
CALCIUM SERPL-MCNC: 9.3 MG/DL (ref 8.6–10.4)
CHLORIDE BLD-SCNC: 105 MMOL/L (ref 98–107)
CO2: 22 MMOL/L (ref 20–31)
CREAT SERPL-MCNC: 0.83 MG/DL (ref 0.5–0.9)
GFR AFRICAN AMERICAN: >60 ML/MIN
GFR NON-AFRICAN AMERICAN: >60 ML/MIN
GFR SERPL CREATININE-BSD FRML MDRD: ABNORMAL ML/MIN/{1.73_M2}
GFR SERPL CREATININE-BSD FRML MDRD: ABNORMAL ML/MIN/{1.73_M2}
GLUCOSE BLD-MCNC: 90 MG/DL (ref 70–99)
POTASSIUM SERPL-SCNC: 3.9 MMOL/L (ref 3.7–5.3)
SODIUM BLD-SCNC: 142 MMOL/L (ref 135–144)

## 2019-10-18 PROCEDURE — P9604 ONE-WAY ALLOW PRORATED TRIP: HCPCS

## 2019-10-18 PROCEDURE — 36415 COLL VENOUS BLD VENIPUNCTURE: CPT

## 2019-10-18 PROCEDURE — 80048 BASIC METABOLIC PNL TOTAL CA: CPT

## 2019-11-05 ENCOUNTER — OFFICE VISIT (OUTPATIENT)
Dept: GASTROENTEROLOGY | Age: 84
End: 2019-11-05
Payer: MEDICARE

## 2019-11-05 VITALS
DIASTOLIC BLOOD PRESSURE: 82 MMHG | SYSTOLIC BLOOD PRESSURE: 186 MMHG | HEART RATE: 76 BPM | WEIGHT: 160.2 LBS | BODY MASS INDEX: 31.29 KG/M2

## 2019-11-05 DIAGNOSIS — Z98.890 S/P ERCP: ICD-10-CM

## 2019-11-05 DIAGNOSIS — K83.1 BILIARY OBSTRUCTION: Primary | ICD-10-CM

## 2019-11-05 PROCEDURE — 1036F TOBACCO NON-USER: CPT | Performed by: INTERNAL MEDICINE

## 2019-11-05 PROCEDURE — 99214 OFFICE O/P EST MOD 30 MIN: CPT | Performed by: INTERNAL MEDICINE

## 2019-11-05 PROCEDURE — 1090F PRES/ABSN URINE INCON ASSESS: CPT | Performed by: INTERNAL MEDICINE

## 2019-11-05 PROCEDURE — 4040F PNEUMOC VAC/ADMIN/RCVD: CPT | Performed by: INTERNAL MEDICINE

## 2019-11-05 PROCEDURE — G8417 CALC BMI ABV UP PARAM F/U: HCPCS | Performed by: INTERNAL MEDICINE

## 2019-11-05 PROCEDURE — G8427 DOCREV CUR MEDS BY ELIG CLIN: HCPCS | Performed by: INTERNAL MEDICINE

## 2019-11-05 PROCEDURE — 1123F ACP DISCUSS/DSCN MKR DOCD: CPT | Performed by: INTERNAL MEDICINE

## 2019-11-05 PROCEDURE — G8484 FLU IMMUNIZE NO ADMIN: HCPCS | Performed by: INTERNAL MEDICINE

## 2019-11-05 PROCEDURE — G8598 ASA/ANTIPLAT THER USED: HCPCS | Performed by: INTERNAL MEDICINE

## 2019-11-05 RX ORDER — FERROUS SULFATE 325(65) MG
325 TABLET ORAL
Qty: 180 TABLET | Refills: 1 | Status: SHIPPED | OUTPATIENT
Start: 2019-11-05

## 2019-11-05 ASSESSMENT — ENCOUNTER SYMPTOMS
NAUSEA: 0
CONSTIPATION: 0
ABDOMINAL PAIN: 0
ABDOMINAL DISTENTION: 0
SINUS PRESSURE: 0
SORE THROAT: 0
COUGH: 0
TROUBLE SWALLOWING: 0
DIARRHEA: 0
ALLERGIC/IMMUNOLOGIC NEGATIVE: 1
VOICE CHANGE: 0
RECTAL PAIN: 0
WHEEZING: 0
CHOKING: 0
ANAL BLEEDING: 0
BACK PAIN: 0
GASTROINTESTINAL NEGATIVE: 1
VOMITING: 0
BLOOD IN STOOL: 0

## 2019-11-12 PROBLEM — I63.9 CEREBRAL INFARCTION, UNSPECIFIED (HCC): Chronic | Status: ACTIVE | Noted: 2019-04-09

## 2019-11-12 PROBLEM — N17.9 AKI (ACUTE KIDNEY INJURY) (HCC): Status: RESOLVED | Noted: 2019-02-13 | Resolved: 2019-11-12

## 2019-11-12 PROBLEM — G93.41 METABOLIC ENCEPHALOPATHY: Status: RESOLVED | Noted: 2019-06-18 | Resolved: 2019-11-12

## 2019-11-12 PROBLEM — M79.604 RIGHT LEG PAIN: Status: RESOLVED | Noted: 2019-02-09 | Resolved: 2019-11-12

## 2019-11-12 PROBLEM — R19.7 DIARRHEA: Status: RESOLVED | Noted: 2019-03-11 | Resolved: 2019-11-12

## 2019-11-12 PROBLEM — E83.42 HYPOMAGNESEMIA: Status: RESOLVED | Noted: 2019-02-13 | Resolved: 2019-11-12

## 2019-11-12 PROBLEM — N30.00 ACUTE CYSTITIS: Status: RESOLVED | Noted: 2019-02-08 | Resolved: 2019-11-12

## 2019-11-12 PROBLEM — I48.0 PAF (PAROXYSMAL ATRIAL FIBRILLATION) (HCC): Status: RESOLVED | Noted: 2019-06-18 | Resolved: 2019-11-12

## 2019-11-12 PROBLEM — I48.0 PAROXYSMAL ATRIAL FIBRILLATION WITH RAPID VENTRICULAR RESPONSE (HCC): Status: RESOLVED | Noted: 2019-06-19 | Resolved: 2019-11-12

## 2019-11-12 PROBLEM — K83.1 BILIARY OBSTRUCTION: Status: RESOLVED | Noted: 2019-02-07 | Resolved: 2019-11-12

## 2019-11-12 PROBLEM — T79.6XXA TRAUMATIC RHABDOMYOLYSIS (HCC): Status: RESOLVED | Noted: 2019-06-18 | Resolved: 2019-11-12

## 2019-11-12 PROBLEM — E87.6 HYPOKALEMIA: Status: RESOLVED | Noted: 2019-06-18 | Resolved: 2019-11-12

## 2019-11-12 PROBLEM — R29.90 STROKE-LIKE SYMPTOMS: Status: RESOLVED | Noted: 2019-04-07 | Resolved: 2019-11-12
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
## 2019-11-12 PROBLEM — M15.9 OSTEOARTHRITIS OF MULTIPLE JOINTS: Status: RESOLVED | Noted: 2019-02-09 | Resolved: 2019-11-12

## 2019-11-12 PROBLEM — E44.1 MILD MALNUTRITION (HCC): Status: RESOLVED | Noted: 2019-06-18 | Resolved: 2019-11-12

## 2020-01-01 ENCOUNTER — TELEMEDICINE (OUTPATIENT)
Dept: CARDIOLOGY | Age: 85
End: 2020-01-01
Payer: MEDICARE

## 2020-01-01 ENCOUNTER — HOSPITAL ENCOUNTER (OUTPATIENT)
Age: 85
Setting detail: SPECIMEN
Discharge: HOME | End: 2020-12-03
Payer: MEDICARE

## 2020-01-01 ENCOUNTER — TELEPHONE (OUTPATIENT)
Dept: GASTROENTEROLOGY | Age: 85
End: 2020-01-01

## 2020-01-01 ENCOUNTER — TELEPHONE (OUTPATIENT)
Dept: NEUROLOGY | Age: 85
End: 2020-01-01

## 2020-01-01 ENCOUNTER — OFFICE VISIT (OUTPATIENT)
Dept: GASTROENTEROLOGY | Age: 85
End: 2020-01-01
Payer: MEDICARE

## 2020-01-01 ENCOUNTER — HOSPITAL ENCOUNTER (OUTPATIENT)
Age: 85
Setting detail: SPECIMEN
Discharge: HOME | End: 2020-10-30
Payer: MEDICARE

## 2020-01-01 ENCOUNTER — OFFICE VISIT (OUTPATIENT)
Dept: NEUROLOGY | Age: 85
End: 2020-01-01
Payer: MEDICARE

## 2020-01-01 ENCOUNTER — HOSPITAL ENCOUNTER (OUTPATIENT)
Dept: NEUROLOGY | Age: 85
Discharge: HOME | End: 2020-10-06
Payer: MEDICARE

## 2020-01-01 ENCOUNTER — HOSPITAL ENCOUNTER (OUTPATIENT)
Age: 85
Setting detail: SPECIMEN
Discharge: HOME | End: 2020-09-15
Payer: MEDICARE

## 2020-01-01 VITALS — HEART RATE: 75 BPM | SYSTOLIC BLOOD PRESSURE: 138 MMHG | DIASTOLIC BLOOD PRESSURE: 80 MMHG

## 2020-01-01 LAB
-: ABNORMAL
ALBUMIN SERPL-MCNC: 3.9 G/DL (ref 3.5–5.2)
ALBUMIN/GLOBULIN RATIO: 1.7 (ref 1–2.5)
ALP BLD-CCNC: 108 U/L (ref 35–104)
ALT SERPL-CCNC: 21 U/L (ref 5–33)
AMORPHOUS: ABNORMAL
ANION GAP SERPL CALCULATED.3IONS-SCNC: 11 MMOL/L (ref 9–17)
AST SERPL-CCNC: 21 U/L
BACTERIA: ABNORMAL
BILIRUB SERPL-MCNC: 0.42 MG/DL (ref 0.3–1.2)
BILIRUBIN URINE: NEGATIVE
BUN BLDV-MCNC: 19 MG/DL (ref 8–23)
BUN/CREAT BLD: 20 (ref 9–20)
CALCIUM SERPL-MCNC: 9.5 MG/DL (ref 8.6–10.4)
CASTS UA: ABNORMAL /LPF
CASTS UA: ABNORMAL /LPF
CHLORIDE BLD-SCNC: 109 MMOL/L (ref 98–107)
CHOLESTEROL/HDL RATIO: 2.7
CHOLESTEROL: 108 MG/DL
CO2: 25 MMOL/L (ref 20–31)
COLOR: YELLOW
COMMENT UA: ABNORMAL
CREAT SERPL-MCNC: 0.93 MG/DL (ref 0.5–0.9)
CRYSTALS, UA: ABNORMAL /HPF
EPITHELIAL CELLS UA: ABNORMAL /HPF (ref 0–25)
GFR AFRICAN AMERICAN: >60 ML/MIN
GFR NON-AFRICAN AMERICAN: 57 ML/MIN
GFR SERPL CREATININE-BSD FRML MDRD: ABNORMAL ML/MIN/{1.73_M2}
GFR SERPL CREATININE-BSD FRML MDRD: ABNORMAL ML/MIN/{1.73_M2}
GLUCOSE BLD-MCNC: 85 MG/DL (ref 70–99)
GLUCOSE URINE: NEGATIVE
HCT VFR BLD CALC: 43.5 % (ref 36.3–47.1)
HDLC SERPL-MCNC: 40 MG/DL
HEMOGLOBIN: 13.1 G/DL (ref 11.9–15.1)
KEPPRA: 15 UG/ML
KETONES, URINE: NEGATIVE
LDL CHOLESTEROL: 51 MG/DL (ref 0–130)
LEUKOCYTE ESTERASE, URINE: NEGATIVE
MCH RBC QN AUTO: 31.9 PG (ref 25.2–33.5)
MCHC RBC AUTO-ENTMCNC: 30.1 G/DL (ref 28.4–34.8)
MCV RBC AUTO: 105.8 FL (ref 82.6–102.9)
MUCUS: ABNORMAL
NITRITE, URINE: NEGATIVE
NRBC AUTOMATED: 0 PER 100 WBC
OTHER OBSERVATIONS UA: ABNORMAL
PDW BLD-RTO: 13 % (ref 11.8–14.4)
PH UA: 5 (ref 5–9)
PLATELET # BLD: 153 K/UL (ref 138–453)
PMV BLD AUTO: 11.7 FL (ref 8.1–13.5)
POTASSIUM SERPL-SCNC: 4.5 MMOL/L (ref 3.7–5.3)
PROTEIN UA: NEGATIVE
RBC # BLD: 4.11 M/UL (ref 3.95–5.11)
RBC UA: ABNORMAL /HPF (ref 0–2)
RENAL EPITHELIAL, UA: ABNORMAL /HPF
SODIUM BLD-SCNC: 145 MMOL/L (ref 135–144)
SPECIFIC GRAVITY UA: 1.02 (ref 1.01–1.02)
TOTAL PROTEIN: 6.2 G/DL (ref 6.4–8.3)
TRICHOMONAS: ABNORMAL
TRIGL SERPL-MCNC: 87 MG/DL
TSH SERPL DL<=0.05 MIU/L-ACNC: 3.31 MIU/L (ref 0.3–5)
TURBIDITY: CLEAR
URINE HGB: NEGATIVE
UROBILINOGEN, URINE: NORMAL
VLDLC SERPL CALC-MCNC: ABNORMAL MG/DL (ref 1–30)
WBC # BLD: 6.6 K/UL (ref 3.5–11.3)
WBC UA: ABNORMAL /HPF (ref 0–5)
YEAST: ABNORMAL

## 2020-01-01 PROCEDURE — G8427 DOCREV CUR MEDS BY ELIG CLIN: HCPCS | Performed by: INTERNAL MEDICINE

## 2020-01-01 PROCEDURE — 1090F PRES/ABSN URINE INCON ASSESS: CPT | Performed by: FAMILY MEDICINE

## 2020-01-01 PROCEDURE — 1123F ACP DISCUSS/DSCN MKR DOCD: CPT | Performed by: INTERNAL MEDICINE

## 2020-01-01 PROCEDURE — P9603 ONE-WAY ALLOW PRORATED MILES: HCPCS

## 2020-01-01 PROCEDURE — 80061 LIPID PANEL: CPT

## 2020-01-01 PROCEDURE — 1036F TOBACCO NON-USER: CPT | Performed by: INTERNAL MEDICINE

## 2020-01-01 PROCEDURE — 1123F ACP DISCUSS/DSCN MKR DOCD: CPT | Performed by: FAMILY MEDICINE

## 2020-01-01 PROCEDURE — 99215 OFFICE O/P EST HI 40 MIN: CPT | Performed by: INTERNAL MEDICINE

## 2020-01-01 PROCEDURE — G8417 CALC BMI ABV UP PARAM F/U: HCPCS | Performed by: INTERNAL MEDICINE

## 2020-01-01 PROCEDURE — 4040F PNEUMOC VAC/ADMIN/RCVD: CPT | Performed by: INTERNAL MEDICINE

## 2020-01-01 PROCEDURE — 99214 OFFICE O/P EST MOD 30 MIN: CPT | Performed by: FAMILY MEDICINE

## 2020-01-01 PROCEDURE — G8427 DOCREV CUR MEDS BY ELIG CLIN: HCPCS | Performed by: FAMILY MEDICINE

## 2020-01-01 PROCEDURE — 95819 EEG AWAKE AND ASLEEP: CPT

## 2020-01-01 PROCEDURE — 99214 OFFICE O/P EST MOD 30 MIN: CPT | Performed by: INTERNAL MEDICINE

## 2020-01-01 PROCEDURE — 85027 COMPLETE CBC AUTOMATED: CPT

## 2020-01-01 PROCEDURE — 1090F PRES/ABSN URINE INCON ASSESS: CPT | Performed by: INTERNAL MEDICINE

## 2020-01-01 PROCEDURE — P9604 ONE-WAY ALLOW PRORATED TRIP: HCPCS

## 2020-01-01 PROCEDURE — G8484 FLU IMMUNIZE NO ADMIN: HCPCS | Performed by: INTERNAL MEDICINE

## 2020-01-01 PROCEDURE — 80053 COMPREHEN METABOLIC PANEL: CPT

## 2020-01-01 PROCEDURE — 36415 COLL VENOUS BLD VENIPUNCTURE: CPT

## 2020-01-01 PROCEDURE — 4040F PNEUMOC VAC/ADMIN/RCVD: CPT | Performed by: FAMILY MEDICINE

## 2020-01-01 PROCEDURE — 84443 ASSAY THYROID STIM HORMONE: CPT

## 2020-01-01 PROCEDURE — 81001 URINALYSIS AUTO W/SCOPE: CPT

## 2020-01-01 PROCEDURE — 80177 DRUG SCRN QUAN LEVETIRACETAM: CPT

## 2020-01-01 RX ORDER — LEVETIRACETAM 750 MG/1
TABLET ORAL
COMMUNITY
Start: 2020-01-01 | End: 2021-01-01 | Stop reason: ALTCHOICE

## 2020-01-01 ASSESSMENT — ENCOUNTER SYMPTOMS
TROUBLE SWALLOWING: 0
CONSTIPATION: 0
SORE THROAT: 0
BLOOD IN STOOL: 0
CHOKING: 0
BACK PAIN: 0
ABDOMINAL DISTENTION: 0
RECTAL PAIN: 0
VOMITING: 0
ABDOMINAL PAIN: 0
VOICE CHANGE: 0
WHEEZING: 0
ALLERGIC/IMMUNOLOGIC NEGATIVE: 1
DIARRHEA: 0
COUGH: 0
NAUSEA: 1
SINUS PRESSURE: 0
ANAL BLEEDING: 0

## 2020-01-09 ENCOUNTER — CARE COORDINATION (OUTPATIENT)
Dept: CARE COORDINATION | Age: 85
End: 2020-01-09

## 2020-02-09 ENCOUNTER — APPOINTMENT (OUTPATIENT)
Dept: MRI IMAGING | Age: 85
DRG: 101 | End: 2020-02-09
Payer: MEDICARE

## 2020-02-09 ENCOUNTER — APPOINTMENT (OUTPATIENT)
Dept: CT IMAGING | Age: 85
DRG: 101 | End: 2020-02-09
Payer: MEDICARE

## 2020-02-09 ENCOUNTER — APPOINTMENT (OUTPATIENT)
Dept: CT IMAGING | Age: 85
End: 2020-02-09
Payer: MEDICARE

## 2020-02-09 ENCOUNTER — HOSPITAL ENCOUNTER (INPATIENT)
Age: 85
LOS: 3 days | Discharge: NURSING, REHAB OR LONG-TERM CARE | DRG: 101 | End: 2020-02-12
Attending: EMERGENCY MEDICINE | Admitting: PSYCHIATRY & NEUROLOGY
Payer: MEDICARE

## 2020-02-09 ENCOUNTER — APPOINTMENT (OUTPATIENT)
Dept: GENERAL RADIOLOGY | Age: 85
End: 2020-02-09
Payer: MEDICARE

## 2020-02-09 ENCOUNTER — HOSPITAL ENCOUNTER (EMERGENCY)
Age: 85
Discharge: ANOTHER HOSPITAL | End: 2020-02-09
Attending: EMERGENCY MEDICINE
Payer: MEDICARE

## 2020-02-09 VITALS
RESPIRATION RATE: 15 BRPM | TEMPERATURE: 97.9 F | HEART RATE: 83 BPM | DIASTOLIC BLOOD PRESSURE: 109 MMHG | SYSTOLIC BLOOD PRESSURE: 171 MMHG | OXYGEN SATURATION: 95 %

## 2020-02-09 PROBLEM — I63.9 CEREBROVASCULAR ACCIDENT (CVA) DETERMINED BY CLINICAL ASSESSMENT (HCC): Status: ACTIVE | Noted: 2020-02-09

## 2020-02-09 LAB
% CKMB: 3 % (ref 0–3)
-: ABNORMAL
-: ABNORMAL
ABSOLUTE EOS #: 0.03 K/UL (ref 0–0.44)
ABSOLUTE EOS #: 0.04 K/UL (ref 0–0.44)
ABSOLUTE IMMATURE GRANULOCYTE: <0.03 K/UL (ref 0–0.3)
ABSOLUTE IMMATURE GRANULOCYTE: <0.03 K/UL (ref 0–0.3)
ABSOLUTE LYMPH #: 0.71 K/UL (ref 1.1–3.7)
ABSOLUTE LYMPH #: 0.78 K/UL (ref 1.1–3.7)
ABSOLUTE MONO #: 0.48 K/UL (ref 0.1–1.2)
ABSOLUTE MONO #: 0.65 K/UL (ref 0.1–1.2)
ALBUMIN SERPL-MCNC: 4.4 G/DL (ref 3.5–5.2)
ALBUMIN/GLOBULIN RATIO: 1.9 (ref 1–2.5)
ALLEN TEST: ABNORMAL
ALP BLD-CCNC: 101 U/L (ref 35–104)
ALT SERPL-CCNC: 12 U/L (ref 5–33)
AMORPHOUS: ABNORMAL
AMORPHOUS: ABNORMAL
ANION GAP SERPL CALCULATED.3IONS-SCNC: 13 MMOL/L (ref 9–17)
ANION GAP SERPL CALCULATED.3IONS-SCNC: 18 MMOL/L (ref 9–17)
ANION GAP: 11 MMOL/L (ref 7–16)
AST SERPL-CCNC: 18 U/L
BACTERIA: ABNORMAL
BACTERIA: ABNORMAL
BASOPHILS # BLD: 1 % (ref 0–2)
BASOPHILS # BLD: 1 % (ref 0–2)
BASOPHILS ABSOLUTE: 0.04 K/UL (ref 0–0.2)
BASOPHILS ABSOLUTE: 0.04 K/UL (ref 0–0.2)
BILIRUB SERPL-MCNC: 0.71 MG/DL (ref 0.3–1.2)
BILIRUBIN URINE: NEGATIVE
BILIRUBIN URINE: NEGATIVE
BUN BLDV-MCNC: 15 MG/DL (ref 8–23)
BUN BLDV-MCNC: 17 MG/DL (ref 8–23)
BUN/CREAT BLD: 19 (ref 9–20)
BUN/CREAT BLD: ABNORMAL (ref 9–20)
CALCIUM SERPL-MCNC: 9.2 MG/DL (ref 8.6–10.4)
CALCIUM SERPL-MCNC: 9.9 MG/DL (ref 8.6–10.4)
CASTS UA: ABNORMAL /LPF
CASTS UA: ABNORMAL /LPF (ref 0–8)
CHLORIDE BLD-SCNC: 101 MMOL/L (ref 98–107)
CHLORIDE BLD-SCNC: 106 MMOL/L (ref 98–107)
CHP ED QC CHECK: YES
CK MB: 2.1 NG/ML
CKMB INTERPRETATION: ABNORMAL
CO2: 22 MMOL/L (ref 20–31)
CO2: 22 MMOL/L (ref 20–31)
COLOR: YELLOW
COLOR: YELLOW
COMMENT UA: ABNORMAL
CREAT SERPL-MCNC: 0.77 MG/DL (ref 0.5–0.9)
CREAT SERPL-MCNC: 0.9 MG/DL (ref 0.5–0.9)
CRYSTALS, UA: ABNORMAL /HPF
CRYSTALS, UA: ABNORMAL /HPF
DIFFERENTIAL TYPE: ABNORMAL
DIFFERENTIAL TYPE: ABNORMAL
EKG ATRIAL RATE: 375 BPM
EKG Q-T INTERVAL: 380 MS
EKG QRS DURATION: 74 MS
EKG QTC CALCULATION (BAZETT): 441 MS
EKG R AXIS: 18 DEGREES
EKG T AXIS: 10 DEGREES
EKG VENTRICULAR RATE: 81 BPM
EOSINOPHILS RELATIVE PERCENT: 0 % (ref 1–4)
EOSINOPHILS RELATIVE PERCENT: 1 % (ref 1–4)
EPITHELIAL CELLS UA: ABNORMAL /HPF (ref 0–25)
EPITHELIAL CELLS UA: ABNORMAL /HPF (ref 0–5)
FIO2: ABNORMAL
GFR AFRICAN AMERICAN: >60 ML/MIN
GFR AFRICAN AMERICAN: >60 ML/MIN
GFR NON-AFRICAN AMERICAN: 55 ML/MIN
GFR NON-AFRICAN AMERICAN: 59 ML/MIN
GFR NON-AFRICAN AMERICAN: >60 ML/MIN
GFR SERPL CREATININE-BSD FRML MDRD: >60 ML/MIN
GFR SERPL CREATININE-BSD FRML MDRD: ABNORMAL ML/MIN/{1.73_M2}
GLUCOSE BLD-MCNC: 115 MG/DL (ref 70–99)
GLUCOSE BLD-MCNC: 116 MG/DL (ref 74–100)
GLUCOSE BLD-MCNC: 171 MG/DL
GLUCOSE BLD-MCNC: 171 MG/DL (ref 74–100)
GLUCOSE BLD-MCNC: 180 MG/DL (ref 70–99)
GLUCOSE URINE: NEGATIVE
GLUCOSE URINE: NEGATIVE
HCO3 VENOUS: 27.3 MMOL/L (ref 22–29)
HCT VFR BLD CALC: 42.4 % (ref 36.3–47.1)
HCT VFR BLD CALC: 44.9 % (ref 36.3–47.1)
HEMOGLOBIN: 13.2 G/DL (ref 11.9–15.1)
HEMOGLOBIN: 14.2 G/DL (ref 11.9–15.1)
IMMATURE GRANULOCYTES: 0 %
IMMATURE GRANULOCYTES: 0 %
INR BLD: 1.2
INR BLD: 1.2 (ref 0.9–1.2)
KETONES, URINE: NEGATIVE
KETONES, URINE: NEGATIVE
LEUKOCYTE ESTERASE, URINE: NEGATIVE
LEUKOCYTE ESTERASE, URINE: NEGATIVE
LYMPHOCYTES # BLD: 8 % (ref 24–43)
LYMPHOCYTES # BLD: 9 % (ref 24–43)
MCH RBC QN AUTO: 31.8 PG (ref 25.2–33.5)
MCH RBC QN AUTO: 32.3 PG (ref 25.2–33.5)
MCHC RBC AUTO-ENTMCNC: 31.1 G/DL (ref 28.4–34.8)
MCHC RBC AUTO-ENTMCNC: 31.6 G/DL (ref 28.4–34.8)
MCV RBC AUTO: 100.4 FL (ref 82.6–102.9)
MCV RBC AUTO: 103.7 FL (ref 82.6–102.9)
MODE: ABNORMAL
MONOCYTES # BLD: 6 % (ref 3–12)
MONOCYTES # BLD: 8 % (ref 3–12)
MUCUS: ABNORMAL
MUCUS: ABNORMAL
MYOGLOBIN: 89 NG/ML (ref 25–58)
NEGATIVE BASE EXCESS, VEN: ABNORMAL (ref 0–2)
NITRITE, URINE: NEGATIVE
NITRITE, URINE: NEGATIVE
NRBC AUTOMATED: 0 PER 100 WBC
NRBC AUTOMATED: 0 PER 100 WBC
O2 DEVICE/FLOW/%: ABNORMAL
O2 SAT, VEN: 37 % (ref 60–85)
OTHER OBSERVATIONS UA: ABNORMAL
OTHER OBSERVATIONS UA: ABNORMAL
PARTIAL THROMBOPLASTIN TIME: 26.7 SEC (ref 20.5–30.5)
PARTIAL THROMBOPLASTIN TIME: 29.4 SEC (ref 23.2–34.4)
PATIENT TEMP: ABNORMAL
PCO2, VEN: 47 MM HG (ref 41–51)
PDW BLD-RTO: 13.2 % (ref 11.8–14.4)
PDW BLD-RTO: 13.3 % (ref 11.8–14.4)
PH UA: 6 (ref 5–8)
PH UA: 6 (ref 5–9)
PH VENOUS: 7.37 (ref 7.32–7.43)
PLATELET # BLD: 175 K/UL (ref 138–453)
PLATELET # BLD: 188 K/UL (ref 138–453)
PLATELET ESTIMATE: ABNORMAL
PLATELET ESTIMATE: ABNORMAL
PMV BLD AUTO: 11.7 FL (ref 8.1–13.5)
PMV BLD AUTO: 11.7 FL (ref 8.1–13.5)
PO2, VEN: 22.6 MM HG (ref 30–50)
POC CHLORIDE: 107 MMOL/L (ref 98–107)
POC CREATININE: 0.95 MG/DL (ref 0.51–1.19)
POC HEMATOCRIT: 42 % (ref 36–46)
POC HEMOGLOBIN: 14.2 G/DL (ref 12–16)
POC IONIZED CALCIUM: 1.21 MMOL/L (ref 1.15–1.33)
POC LACTIC ACID: 1.65 MMOL/L (ref 0.56–1.39)
POC PCO2 TEMP: ABNORMAL MM HG
POC PH TEMP: ABNORMAL
POC PO2 TEMP: ABNORMAL MM HG
POC POTASSIUM: 3.6 MMOL/L (ref 3.5–4.5)
POC SODIUM: 145 MMOL/L (ref 138–146)
POSITIVE BASE EXCESS, VEN: 1 (ref 0–3)
POTASSIUM SERPL-SCNC: 3.8 MMOL/L (ref 3.7–5.3)
POTASSIUM SERPL-SCNC: 4 MMOL/L (ref 3.7–5.3)
PROTEIN UA: ABNORMAL
PROTEIN UA: ABNORMAL
PROTHROMBIN TIME: 12.6 SEC (ref 9.7–12.2)
PROTHROMBIN TIME: 12.6 SEC (ref 9–12)
RBC # BLD: 4.09 M/UL (ref 3.95–5.11)
RBC # BLD: 4.47 M/UL (ref 3.95–5.11)
RBC # BLD: ABNORMAL 10*6/UL
RBC # BLD: ABNORMAL 10*6/UL
RBC UA: ABNORMAL /HPF (ref 0–2)
RBC UA: ABNORMAL /HPF (ref 0–4)
RENAL EPITHELIAL, UA: ABNORMAL /HPF
RENAL EPITHELIAL, UA: ABNORMAL /HPF
SAMPLE SITE: ABNORMAL
SEG NEUTROPHILS: 82 % (ref 36–65)
SEG NEUTROPHILS: 84 % (ref 36–65)
SEGMENTED NEUTROPHILS ABSOLUTE COUNT: 6.76 K/UL (ref 1.5–8.1)
SEGMENTED NEUTROPHILS ABSOLUTE COUNT: 7.16 K/UL (ref 1.5–8.1)
SODIUM BLD-SCNC: 141 MMOL/L (ref 135–144)
SODIUM BLD-SCNC: 141 MMOL/L (ref 135–144)
SPECIFIC GRAVITY UA: 1.02 (ref 1.01–1.02)
SPECIFIC GRAVITY UA: 1.03 (ref 1–1.03)
TOTAL CK: 71 U/L (ref 26–192)
TOTAL CO2, VENOUS: 29 MMOL/L (ref 23–30)
TOTAL PROTEIN: 6.7 G/DL (ref 6.4–8.3)
TRICHOMONAS: ABNORMAL
TRICHOMONAS: ABNORMAL
TROPONIN INTERP: ABNORMAL
TROPONIN T: ABNORMAL NG/ML
TROPONIN, HIGH SENSITIVITY: 29 NG/L (ref 0–14)
TURBIDITY: CLEAR
TURBIDITY: CLEAR
URINE HGB: ABNORMAL
URINE HGB: NEGATIVE
UROBILINOGEN, URINE: NORMAL
UROBILINOGEN, URINE: NORMAL
WBC # BLD: 8.3 K/UL (ref 3.5–11.3)
WBC # BLD: 8.5 K/UL (ref 3.5–11.3)
WBC # BLD: ABNORMAL 10*3/UL
WBC # BLD: ABNORMAL 10*3/UL
WBC UA: ABNORMAL /HPF (ref 0–5)
WBC UA: ABNORMAL /HPF (ref 0–5)
YEAST: ABNORMAL
YEAST: ABNORMAL

## 2020-02-09 PROCEDURE — 85014 HEMATOCRIT: CPT

## 2020-02-09 PROCEDURE — 83605 ASSAY OF LACTIC ACID: CPT

## 2020-02-09 PROCEDURE — 84484 ASSAY OF TROPONIN QUANT: CPT

## 2020-02-09 PROCEDURE — 70551 MRI BRAIN STEM W/O DYE: CPT

## 2020-02-09 PROCEDURE — 93005 ELECTROCARDIOGRAM TRACING: CPT | Performed by: EMERGENCY MEDICINE

## 2020-02-09 PROCEDURE — 85730 THROMBOPLASTIN TIME PARTIAL: CPT

## 2020-02-09 PROCEDURE — 99222 1ST HOSP IP/OBS MODERATE 55: CPT | Performed by: PSYCHIATRY & NEUROLOGY

## 2020-02-09 PROCEDURE — 82947 ASSAY GLUCOSE BLOOD QUANT: CPT

## 2020-02-09 PROCEDURE — 99285 EMERGENCY DEPT VISIT HI MDM: CPT

## 2020-02-09 PROCEDURE — 93005 ELECTROCARDIOGRAM TRACING: CPT | Performed by: STUDENT IN AN ORGANIZED HEALTH CARE EDUCATION/TRAINING PROGRAM

## 2020-02-09 PROCEDURE — 85610 PROTHROMBIN TIME: CPT

## 2020-02-09 PROCEDURE — 80048 BASIC METABOLIC PNL TOTAL CA: CPT

## 2020-02-09 PROCEDURE — 82330 ASSAY OF CALCIUM: CPT

## 2020-02-09 PROCEDURE — 0042T CT BRAIN PERFUSION: CPT

## 2020-02-09 PROCEDURE — 82565 ASSAY OF CREATININE: CPT

## 2020-02-09 PROCEDURE — 6360000004 HC RX CONTRAST MEDICATION: Performed by: FAMILY MEDICINE

## 2020-02-09 PROCEDURE — 85025 COMPLETE CBC W/AUTO DIFF WBC: CPT

## 2020-02-09 PROCEDURE — 6370000000 HC RX 637 (ALT 250 FOR IP): Performed by: STUDENT IN AN ORGANIZED HEALTH CARE EDUCATION/TRAINING PROGRAM

## 2020-02-09 PROCEDURE — 84132 ASSAY OF SERUM POTASSIUM: CPT

## 2020-02-09 PROCEDURE — 82803 BLOOD GASES ANY COMBINATION: CPT

## 2020-02-09 PROCEDURE — 80053 COMPREHEN METABOLIC PANEL: CPT

## 2020-02-09 PROCEDURE — 93010 ELECTROCARDIOGRAM REPORT: CPT | Performed by: INTERNAL MEDICINE

## 2020-02-09 PROCEDURE — 73030 X-RAY EXAM OF SHOULDER: CPT

## 2020-02-09 PROCEDURE — 82550 ASSAY OF CK (CPK): CPT

## 2020-02-09 PROCEDURE — 2580000003 HC RX 258: Performed by: EMERGENCY MEDICINE

## 2020-02-09 PROCEDURE — 6370000000 HC RX 637 (ALT 250 FOR IP): Performed by: EMERGENCY MEDICINE

## 2020-02-09 PROCEDURE — 81001 URINALYSIS AUTO W/SCOPE: CPT

## 2020-02-09 PROCEDURE — 87086 URINE CULTURE/COLONY COUNT: CPT

## 2020-02-09 PROCEDURE — 82553 CREATINE MB FRACTION: CPT

## 2020-02-09 PROCEDURE — 2580000003 HC RX 258: Performed by: STUDENT IN AN ORGANIZED HEALTH CARE EDUCATION/TRAINING PROGRAM

## 2020-02-09 PROCEDURE — 83874 ASSAY OF MYOGLOBIN: CPT

## 2020-02-09 PROCEDURE — 36415 COLL VENOUS BLD VENIPUNCTURE: CPT

## 2020-02-09 PROCEDURE — 70496 CT ANGIOGRAPHY HEAD: CPT

## 2020-02-09 PROCEDURE — 2060000000 HC ICU INTERMEDIATE R&B

## 2020-02-09 PROCEDURE — 84295 ASSAY OF SERUM SODIUM: CPT

## 2020-02-09 PROCEDURE — 82435 ASSAY OF BLOOD CHLORIDE: CPT

## 2020-02-09 PROCEDURE — 70450 CT HEAD/BRAIN W/O DYE: CPT

## 2020-02-09 RX ORDER — SODIUM CHLORIDE 0.9 % (FLUSH) 0.9 %
10 SYRINGE (ML) INJECTION PRN
Status: DISCONTINUED | OUTPATIENT
Start: 2020-02-09 | End: 2020-02-12 | Stop reason: HOSPADM

## 2020-02-09 RX ORDER — LEVOTHYROXINE SODIUM 112 UG/1
112 TABLET ORAL DAILY
Status: DISCONTINUED | OUTPATIENT
Start: 2020-02-10 | End: 2020-02-12 | Stop reason: HOSPADM

## 2020-02-09 RX ORDER — SODIUM CHLORIDE 9 MG/ML
INJECTION, SOLUTION INTRAVENOUS CONTINUOUS
Status: DISCONTINUED | OUTPATIENT
Start: 2020-02-09 | End: 2020-02-12 | Stop reason: HOSPADM

## 2020-02-09 RX ORDER — ATORVASTATIN CALCIUM 20 MG/1
20 TABLET, FILM COATED ORAL NIGHTLY
Status: DISCONTINUED | OUTPATIENT
Start: 2020-02-09 | End: 2020-02-12 | Stop reason: HOSPADM

## 2020-02-09 RX ORDER — METOPROLOL TARTRATE 50 MG/1
25 TABLET, FILM COATED ORAL 2 TIMES DAILY
Status: DISCONTINUED | OUTPATIENT
Start: 2020-02-09 | End: 2020-02-12 | Stop reason: HOSPADM

## 2020-02-09 RX ORDER — ASPIRIN 81 MG/1
81 TABLET ORAL DAILY
Status: DISCONTINUED | OUTPATIENT
Start: 2020-02-09 | End: 2020-02-12 | Stop reason: HOSPADM

## 2020-02-09 RX ORDER — POTASSIUM CHLORIDE 750 MG/1
10 CAPSULE, EXTENDED RELEASE ORAL 2 TIMES DAILY
Status: ON HOLD | COMMUNITY
End: 2021-01-01 | Stop reason: HOSPADM

## 2020-02-09 RX ORDER — ASPIRIN 300 MG/1
300 SUPPOSITORY RECTAL DAILY
Status: DISCONTINUED | OUTPATIENT
Start: 2020-02-09 | End: 2020-02-12 | Stop reason: HOSPADM

## 2020-02-09 RX ORDER — SODIUM CHLORIDE 0.9 % (FLUSH) 0.9 %
10 SYRINGE (ML) INJECTION EVERY 12 HOURS SCHEDULED
Status: DISCONTINUED | OUTPATIENT
Start: 2020-02-09 | End: 2020-02-12 | Stop reason: HOSPADM

## 2020-02-09 RX ORDER — LOSARTAN POTASSIUM 25 MG/1
25 TABLET ORAL DAILY
Status: DISCONTINUED | OUTPATIENT
Start: 2020-02-09 | End: 2020-02-12 | Stop reason: HOSPADM

## 2020-02-09 RX ORDER — ASPIRIN 81 MG/1
243 TABLET, CHEWABLE ORAL ONCE
Status: COMPLETED | OUTPATIENT
Start: 2020-02-09 | End: 2020-02-09

## 2020-02-09 RX ORDER — 0.9 % SODIUM CHLORIDE 0.9 %
1000 INTRAVENOUS SOLUTION INTRAVENOUS ONCE
Status: COMPLETED | OUTPATIENT
Start: 2020-02-09 | End: 2020-02-09

## 2020-02-09 RX ORDER — ONDANSETRON 2 MG/ML
4 INJECTION INTRAMUSCULAR; INTRAVENOUS EVERY 6 HOURS PRN
Status: DISCONTINUED | OUTPATIENT
Start: 2020-02-09 | End: 2020-02-12 | Stop reason: HOSPADM

## 2020-02-09 RX ADMIN — IOHEXOL 140 ML: 350 INJECTION, SOLUTION INTRAVENOUS at 13:50

## 2020-02-09 RX ADMIN — ASPIRIN 81 MG 243 MG: 81 TABLET ORAL at 10:51

## 2020-02-09 RX ADMIN — LOSARTAN POTASSIUM 25 MG: 25 TABLET, FILM COATED ORAL at 23:01

## 2020-02-09 RX ADMIN — SODIUM CHLORIDE 1000 ML: 9 INJECTION, SOLUTION INTRAVENOUS at 10:54

## 2020-02-09 RX ADMIN — SODIUM CHLORIDE: 9 INJECTION, SOLUTION INTRAVENOUS at 22:07

## 2020-02-09 RX ADMIN — ATORVASTATIN CALCIUM 20 MG: 20 TABLET, FILM COATED ORAL at 23:01

## 2020-02-09 RX ADMIN — SODIUM CHLORIDE, PRESERVATIVE FREE 10 ML: 5 INJECTION INTRAVENOUS at 22:08

## 2020-02-09 RX ADMIN — Medication 81 MG: at 23:01

## 2020-02-09 RX ADMIN — METOPROLOL TARTRATE 25 MG: 50 TABLET, FILM COATED ORAL at 23:01

## 2020-02-09 ASSESSMENT — ENCOUNTER SYMPTOMS
BACK PAIN: 0
ABDOMINAL PAIN: 0
SORE THROAT: 0
SHORTNESS OF BREATH: 0
VOMITING: 0
NAUSEA: 0
COUGH: 0

## 2020-02-09 NOTE — ED NOTES
Patient transfer from [place] [address]. Patient arrived via EMS placed on cardiac monitor, IV placed labs obtained and EKG completed. Patient was last known well 2/8/2020 after dinner. Patient at [facility] was having extremity weakness and expressive aphasia.  NIH assessment placed on assessment        Marilyn No RN  02/09/20 1840

## 2020-02-09 NOTE — ED PROVIDER NOTES
Extraocular Movements: Extraocular movements intact. Pupils: Pupils are equal, round, and reactive to light. Neck:      Musculoskeletal: Normal range of motion and neck supple. Cardiovascular:      Rate and Rhythm: Normal rate and regular rhythm. Pulmonary:      Effort: Pulmonary effort is normal.      Breath sounds: Normal breath sounds. Abdominal:      General: Bowel sounds are normal. There is no distension. Palpations: Abdomen is soft. Tenderness: There is no abdominal tenderness. Musculoskeletal: Normal range of motion. Right lower leg: Edema present. Left lower leg: Edema present. Skin:     General: Skin is warm and dry. Neurological:      Mental Status: She is alert. Cranial Nerves: Dysarthria (mild) present. No facial asymmetry. Motor: Weakness (LE B/L) present. No seizure activity or pronator drift. Coordination: Finger-Nose-Finger Test normal.      Comments: Mild aphasia.           DIFFERENTIAL  DIAGNOSIS     PLAN (LABS / IMAGING / EKG):  Orders Placed This Encounter   Procedures    CT BRAIN PERFUSION    CTA HEAD NECK W CONTRAST    STROKE PANEL    Hemoglobin and hematocrit, blood    SODIUM (POC)    POTASSIUM (POC)    CHLORIDE (POC)    CALCIUM, IONIC (POC)    Inpatient consult to Stroke Team    Venous Blood Gas, POC    Creatinine W/GFR Point of Care    Lactic Acid, POC    POCT Glucose    Anion Gap (Calc) POC    EKG 12 Lead       MEDICATIONS ORDERED:  Orders Placed This Encounter   Medications    iohexol (OMNIPAQUE 350) solution 140 mL       DDX: CVA, TIA, SDH, SAH, hypoglycemia, infection, trauma    DIAGNOSTIC RESULTS / EMERGENCY DEPARTMENT COURSE / MDM     LABS:  Results for orders placed or performed during the hospital encounter of 02/09/20   STROKE PANEL   Result Value Ref Range    WBC 8.3 3.5 - 11.3 k/uL    RBC 4.09 3.95 - 5.11 m/uL    Hemoglobin 13.2 11.9 - 15.1 g/dL    Hematocrit 42.4 36.3 - 47.1 %    .7 (H) 82.6 - 102.9 fL MCH 32.3 25.2 - 33.5 pg    MCHC 31.1 28.4 - 34.8 g/dL    RDW 13.3 11.8 - 14.4 %    Platelets 867 223 - 600 k/uL    MPV 11.7 8.1 - 13.5 fL    NRBC Automated 0.0 0.0 per 100 WBC    Differential Type NOT REPORTED     Seg Neutrophils 82 (H) 36 - 65 %    Lymphocytes 9 (L) 24 - 43 %    Monocytes 8 3 - 12 %    Eosinophils % 0 (L) 1 - 4 %    Basophils 1 0 - 2 %    Immature Granulocytes 0 0 %    Segs Absolute 6.76 1.50 - 8.10 k/uL    Absolute Lymph # 0.78 (L) 1.10 - 3.70 k/uL    Absolute Mono # 0.65 0.10 - 1.20 k/uL    Absolute Eos # 0.03 0.00 - 0.44 k/uL    Basophils Absolute 0.04 0.00 - 0.20 k/uL    Absolute Immature Granulocyte <0.03 0.00 - 0.30 k/uL    WBC Morphology NOT REPORTED     RBC Morphology MACROCYTOSIS PRESENT     Platelet Estimate NOT REPORTED     Protime PENDING sec    INR PENDING     PTT PENDING sec    Myoglobin PENDING ng/mL    Troponin, High Sensitivity PENDING 0 - 14 ng/L    Troponin T PENDING <0.03 ng/mL    Troponin Interp PENDING     Glucose PENDING mg/dL    BUN PENDING mg/dL    CREATININE PENDING mg/dL    Bun/Cre Ratio PENDING 9 - 20    Calcium PENDING mg/dL    Sodium PENDING mmol/L    Potassium PENDING mmol/L    Chloride PENDING mmol/L    CO2 PENDING mmol/L    Anion Gap PENDING mmol/L    GFR Non-African American PENDING >60 mL/min    GFR  PENDING >60 mL/min    GFR Comment PENDING     GFR Staging PENDING     Total CK PENDING U/L    CK-MB PENDING ng/mL    % CKMB PENDING %    CKMB Interpretation PENDING    Hemoglobin and hematocrit, blood   Result Value Ref Range    POC Hemoglobin 14.2 12.0 - 16.0 g/dL    POC Hematocrit 42 36 - 46 %   SODIUM (POC)   Result Value Ref Range    POC Sodium 145 138 - 146 mmol/L   POTASSIUM (POC)   Result Value Ref Range    POC Potassium 3.6 3.5 - 4.5 mmol/L   CHLORIDE (POC)   Result Value Ref Range    POC Chloride 107 98 - 107 mmol/L   CALCIUM, IONIC (POC)   Result Value Ref Range    POC Ionized Calcium 1.21 1.15 - 1.33 mmol/L   Venous Blood Gas, POC Result Value Ref Range    pH, Romulo 7.372 7.320 - 7.430    pCO2, Romulo 47.0 41.0 - 51.0 mm Hg    pO2, Romulo 22.6 (L) 30.0 - 50.0 mm Hg    HCO3, Venous 27.3 22.0 - 29.0 mmol/L    Total CO2, Venous 29 23.0 - 30.0 mmol/L    Negative Base Excess, Romulo NOT REPORTED 0.0 - 2.0    Positive Base Excess, Romulo 1 0.0 - 3.0    O2 Sat, Romulo 37 (L) 60.0 - 85.0 %    O2 Device/Flow/% NOT REPORTED     Rigo Test NOT REPORTED     Sample Site NOT REPORTED     Mode NOT REPORTED     FIO2 NOT REPORTED     Pt Temp NOT REPORTED     POC pH Temp NOT REPORTED     POC pCO2 Temp NOT REPORTED mm Hg    POC pO2 Temp NOT REPORTED mm Hg   Creatinine W/GFR Point of Care   Result Value Ref Range    POC Creatinine 0.95 0.51 - 1.19 mg/dL    GFR Comment >60 >60 mL/min    GFR Non- 55 (L) >60 mL/min    GFR Comment         Lactic Acid, POC   Result Value Ref Range    POC Lactic Acid 1.65 (H) 0.56 - 1.39 mmol/L   POCT Glucose   Result Value Ref Range    POC Glucose 116 (H) 74 - 100 mg/dL   Anion Gap (Calc) POC   Result Value Ref Range    Anion Gap 11 7 - 16 mmol/L       RADIOLOGY:  Xr Shoulder Right (min 2 Views)    Result Date: 2/9/2020  EXAMINATION: THREE XRAY VIEWS OF THE RIGHT SHOULDER 2/9/2020 9:58 am COMPARISON: 29 December 2015 HISTORY: ORDERING SYSTEM PROVIDED HISTORY: pain TECHNOLOGIST PROVIDED HISTORY: pain fall FINDINGS: The humeral head is well circumscribed and situated within the glenoid fossa. No acute fracture or dislocation is noted. Lordotic positioning limits assessment but the acromiohumeral distance is narrowed suggesting rotator cuff tear, chronic. No acute fracture or dislocation.   Arthritic changes in the right shoulder suggesting chronic rotator cuff tear with worsening since prior exam.     Ct Head Wo Contrast    Result Date: 2/9/2020  EXAMINATION: CT OF THE HEAD WITHOUT CONTRAST,  2/9/2020 9:54 am TECHNIQUE: CT of the head was performed without the administration of intravenous contrast. Dose modulation, iterative reconstruction, and/or weight based adjustment of the mA/kV was utilized to reduce the radiation dose to as low as reasonably achievable. COMPARISON: 17 June 2019 HISTORY: ORDERING SYSTEM PROVIDED HISTORY: Expressive aphasia TECHNOLOGIST PROVIDED HISTORY: Expressive aphasia FINDINGS: BRAIN/VENTRICLES: There is no acute intracranial hemorrhage, mass effect or midline shift. No abnormal extra-axial fluid collection. The gray-white differentiation is maintained without evidence of an acute infarct. There is no evidence of hydrocephalus. Diffuse atrophy is noted. Scattered hypodensity is present in the white matter consistent with chronic microvascular change. ORBITS: The visualized portion of the orbits demonstrate no acute abnormality. SINUSES: The visualized paranasal sinuses and mastoid air cells demonstrate no acute abnormality. SOFT TISSUES/SKULL:  No acute abnormality of the visualized skull or soft tissues. No acute intracranial abnormality. Senescent changes including chronic microvascular change. Little change from prior exam.     Ct Brain Perfusion    Result Date: 2/9/2020  EXAMINATION: CTA OF THE HEAD WITH CONTRAST WITH PERFUSION; CTA OF THE HEAD AND NECK WITH CONTRAST 2/9/2020 1:37 pm: TECHNIQUE: CTA of the head/brain was performed with the administration of intravenous contrast. Multiplanar reformatted images are provided for review. MIP images are provided for review. Dose modulation, iterative reconstruction, and/or weight based adjustment of the mA/kV was utilized to reduce the radiation dose to as low as reasonably achievable.; CTA of the head and neck was performed with the administration of intravenous contrast. Multiplanar reformatted images are provided for review. MIP images are provided for review. Stenosis of the internal carotid arteries measured using NASCET criteria.  Dose modulation, iterative reconstruction, and/or weight based adjustment of the mA/kV was utilized to reduce the radiation dose to as low as reasonably achievable. COMPARISON: Noncontrast CT head from earlier today HISTORY: ORDERING SYSTEM PROVIDED HISTORY: stroke TECHNOLOGIST PROVIDED HISTORY: stroke; ORDERING SYSTEM PROVIDED HISTORY: stroke TECHNOLOGIST PROVIDED HISTORY: stroke Reason for Exam: stroke FINDINGS: CTA NECK: Limited study due to suboptimal phase of contrast.  AORTIC ARCH/ARCH VESSELS: No dissection or arterial injury. No significant stenosis of the brachiocephalic or subclavian arteries. CAROTID ARTERIES: There is up to 75% focal stenosis at the origin of the left internal carotid artery by NASCET criteria. There is up to 75% focal stenosis at the origin of the right internal carotid artery. There is no acute abnormality or flow-limiting stenosis in the bilateral common carotid arteries. 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 There is minimal contrast in the distal V2 and proximal V3 segments. There is focal fusiform dilatation at the origin of the V3 segment of the left vertebral artery measuring up to 6 mm in diameter. Otherwise, there is no acute abnormality or flow-limiting stenosis in the left vertebral artery. SOFT TISSUES: The lung apices are clear. No cervical or superior mediastinal lymphadenopathy. The larynx and pharynx are unremarkable. No acute abnormality of the salivary and thyroid glands. There is partially visualized fluid collection in the right axilla. BONES: No acute osseous abnormality. CTA HEAD: ANTERIOR CIRCULATION: No significant stenosis of the intracranial internal carotid, anterior cerebral, or middle cerebral arteries. No aneurysm. POSTERIOR CIRCULATION: There is asymmetrically decreased contrast opacification of the V4 segment of the right vertebral artery secondary to proximal occlusion. There is fetal origin of the left PCA, a normal variant.  There is 50% focal stenosis in the proximal P2 anterior cerebral, or middle cerebral arteries. No aneurysm. POSTERIOR CIRCULATION: There is asymmetrically decreased contrast opacification of the V4 segment of the right vertebral artery secondary to proximal occlusion. There is fetal origin of the left PCA, a normal variant. There is 50% focal stenosis in the proximal P2 segment of right PCA. No significant stenosis of the left vertebral, basilar, or left posterior cerebral arteries. No aneurysm. OTHER: No dural venous sinus thrombosis on this non-dedicated study. BRAIN: There is mild parenchymal volume loss. There is periventricular white matter low attenuation, likely related to moderate chronic microvascular disease. No mass effect or midline shift. No extra-axial fluid collection. The gray-white differentiation is maintained. CT PERFUSION: There is question of decreased perfusion in the right parietooccipital lobe. No significant mismatch. Question of decreased perfusion in the right parietooccipital lobe. No significant mismatch. Further evaluation with MRI brain is recommended. 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 Minimal contrast in the distal V2 and proximal V3 segments. Asymmetrically decreased contrast opacification of the V4 segment of the right vertebral artery secondary to proximal occlusion. 50% focal stenosis in the proximal P2 segment of right PCA. 75% focal stenosis at the origins of the bilateral internal carotid arteries. Partially visualized fluid collection in the right axilla. Further evaluation with CT chest or targeted ultrasound may be beneficial. Results were reported to Dr. Rina Richardson at 2:36 p.m. on February 9, 2020. Mri Brain Wo Contrast    Result Date: 2/9/2020  EXAMINATION: MRI OF THE BRAIN WITHOUT CONTRAST  2/9/2020 4:09 pm TECHNIQUE: Multiplanar multisequence MRI of the brain was performed without the administration of intravenous contrast. COMPARISON: None. HISTORY: ORDERING SYSTEM PROVIDED HISTORY: r/o stroke TECHNOLOGIST PROVIDED HISTORY: r/o stroke FINDINGS: INTRACRANIAL STRUCTURES/VENTRICLES: Ventricles and sulci are prominent. Ventricles are midline. Multifocal high T2 and FLAIR signal is noted bilaterally in the periventricular and the subcortical white matter. There is multifocal high signal noted bilaterally in the lentiform nuclei as well. Small foci of high T2 and FLAIR signal noted in the caudate bilaterally as well as in the left thalamus. Small focus of old hemorrhage in the right caudate head is noted. Small focus of old hemorrhage is suggested in the left temporal lobe and perhaps the right anterior temporal lobe is well. No restricted diffusion signal is noted. ORBITS: No acute orbital abnormality is noted SINUSES: Moderate right mastoid opacification is noted. BONES/SOFT TISSUES: The bone marrow signal intensity appears normal. The soft tissues demonstrate no acute abnormality. Multifocal small-vessel ischemic change bilaterally with multiple small prior infarcts. No acute infarct Moderate right mastoid opacification. EKG    Irregularly irregular rhythm, low voltage, normal axis, intervals within normal limits, right bundle branch block pattern, no ST elevations or ST depressions rate of 83, Q wave in lead III. When compared to previous no acute changes. Interpretation is A. Fib, low voltage    All EKG's are interpreted by the Emergency Department Physician who either signs or Co-signs this chart in the absence of a cardiologist.    EMERGENCY DEPARTMENT COURSE:    Patient arrives emergency department in no acute distress, vital signs are remarkable for elevated blood pressure of 156/114. Neuro exam is somewhat limited by baseline hard of hearing. There is mild dysarthria, aphasia, as well as LE weakness (unsure of baseline weakness).   Stroke team at bedside on arrival, will get CTa Head and neck per stroke team and obtain stroke

## 2020-02-09 NOTE — ED PROVIDER NOTES
connections:     Talks on phone: Not on file     Gets together: Not on file     Attends [employer] service: Not on file     Active member of club or organization: Not on file     Attends meetings of clubs or organizations: Not on file     Relationship status: Not on file    Intimate partner violence:     Fear of current or ex partner: Not on file     Emotionally abused: Not on file     Physically abused: Not on file     Forced sexual activity: Not on file   Other Topics Concern    Not on file   Social History Narrative    Not on file       PHYSICAL EXAM   VITAL SIGNS: BP (!) 144/117   Pulse 104   Temp 97.9 °F (36.6 °C)   Resp 25   SpO2 96%   Constitutional: Well developed, well nourished, no acute distress  Eyes: Pupils equally round and reactive to light, sclera nonicteric  HENT: Atraumatic, patent airway  NECK: Normal range of motion, no JVD  Respiratory: No respiratory distress, normal breath sounds, no wheezing   Cardiovascular: irregular rhythm, regular rate, no murmurs   GI: Soft, nondistended, normal bowel sounds, nontender  Musculoskeletal: No edema, no acute deformities   Integument: Skin is warm and dry, no obvious rash   Vascular: Radial and DP pulses 2+ equal bilaterally  Neurologic: Mild right nasolabial fold flattening. Right pronator drift on exam.  She said the month was apple and that she was age 26. EKG   A fib  Rhythm at 81 beats per minute,    RADIOLOGY/PROCEDURES   XR SHOULDER RIGHT (MIN 2 VIEWS)   Final Result   No acute fracture or dislocation. Arthritic changes in the right shoulder   suggesting chronic rotator cuff tear with worsening since prior exam.         CT Head WO Contrast   Preliminary Result   No acute intracranial abnormality. Senescent changes including chronic   microvascular change. Little change from prior exam.             ED COURSE & MEDICAL DECISION MAKING   Pertinent Labs & Imaging studies reviewed and interpreted.  (See chart for details)  See chart for details of medications given during the ED stay. NIH Stroke Scale  Time: 10:48 AM  Person Administering Scale: Dakota Whatley  1a - Level of consciousness =   0  1b - LOC questions =   2  1c - LOC commands =   0  2 - Best gaze =   0  3 - Visual fields =   0  4 - Facial palsy =   1  5a - Motor R arm =   1  5b - Motor L arm =   0  6a - Motor R leg =   Baseline weakness  6b - Motor L leg =   Baseline weakness  7 - Limb Ataxia =   unable  8 - Sensory =   0  9 - Best Language =   2  10 - Dysarthria =   0  11 - Extinction & inattention =   0      Vitals:    02/09/20 1018 02/09/20 1031   BP: (!) 155/113 (!) 144/117   Pulse:  104   Resp:  25   Temp:  97.9 °F (36.6 °C)   SpO2:  96%     Differential diagnosis: Thrombotic Stroke, Embolic Stroke, Hemorrhagic Stroke, TIA, Hypoglycemia, Mass Lesions, Metabolic cause, Head Injury, Encephalopathy, Multiple Sclerosis, Seizure, other    MDM: Patient will be sent to [facility]. Kiara Spoke with the neurologist and also the ED doctor. Plan is for her to go there and get a CTA. FINAL IMPRESSION   1.  Cerebrovascular accident (CVA), unspecified mechanism ([facility] [address]        PLAN  xfer to st sherrie Fenton MD  02/09/20 3370

## 2020-02-09 NOTE — CONSULTS
Department of Endovascular Neurosurgery                                         Resident Consult Note    Reason for Consult: Expressive aphasia, recurrent falls. Requesting Physician:Freddy Parsons DO    Endovascular Neurosurgeon:   [x]Dr. Severo Rojas  []Dr. Keren Marroquin  []Dr. Steve Yee  []Dr. Fredy Cooley  []     History Obtained From:  patient, electronic medical record, staff    CHIEF COMPLAINT:        Expressive aphasia, recurrent falls    HISTORY OF PRESENT ILLNESS:       The patient is a 80 y.o. female who presents with  Expressive aphasia, recurrent falls. Patient started to have dysarthria and specifically aphasia with last known well unknown. Patient was fine before going to bed last night. .  Patient also had complains of recurrent falls. Patient had a CT brain done at [facility] that was negative. She was given aspirin 243. Patient says she took aspirin 81 and Eliquis this morning. Patient was transferred to [facility] for further evaluation and management. Glucose 180  Creatinine 0.90  -150. Past medical history hypertension, hyperlipidemia, A. fib, carotid stenosis, stroke.        PAST MEDICAL HISTORY :       Past Medical History:        Diagnosis Date    Acute kidney failure ([address] 06/28/2019    Arthritis     Atrial fibrillation (HCC)     Calculus of gallbladder and bile duct without cholecystitis without obstruction     Cerebral infarction ([address]     CHF (congestive heart failure) (HCC)     Chronic kidney disease, stage 3 ([address]     Fall     Gout     Gout     Hyperlipidemia     Hypertension     Hypokalemia 06/28/2019    Hypomagnesemia 06/28/2019    Hypothyroidism     Irritable bowel syndrome     Ischemia     muscle    Left carotid stenosis     Metabolic encephalopathy 45/00/1101    Nonrheumatic mitral (valve) insufficiency 06/28/2019    UTI (urinary tract infection)        Past Surgical History:        Procedure Laterality Date    CARPAL TUNNEL RELEASE  2012    Dr. Shiela Franks - right side    CATARACT REMOVAL WITH IMPLANT Bilateral     CHOLECYSTECTOMY      ERCP N/A 2/11/2019    Driss Birmingham MD - ERCP, Sphincterotomy, balloon dilatation and stent placement with removal of multiple stones    TOTAL KNEE ARTHROPLASTY Right     TOTAL KNEE ARTHROPLASTY Left     UPPER GASTROINTESTINAL ENDOSCOPY  07/31/2019    with stent removal by Dr. Alex Scott N/A 7/31/2019    EGD STENT REMOVAL performed by Bull Saxena MD at [address] [place] [address] History:   Social History     Socioeconomic History    Marital status:       Spouse name: Not on file    Number of children: Not on file    Years of education: Not on file    Highest education level: Not on file   Occupational History    Not on file   Social Needs    Financial resource strain: Not on file    Food insecurity:     Worry: Not on file     Inability: Not on file    Transportation needs:     Medical: Not on file     Non-medical: Not on file   Tobacco Use    Smoking status: Never Smoker    Smokeless tobacco: Never Used   Substance and Sexual Activity    Alcohol use: No    Drug use: No    Sexual activity: Not on file   Lifestyle    Physical activity:     Days per week: Not on file     Minutes per session: Not on file    Stress: Not on file   Relationships    Social connections:     Talks on phone: Not on file     Gets together: Not on file     Attends Roman Catholic service: Not on file     Active member of club or organization: Not on file     Attends meetings of clubs or organizations: Not on file     Relationship status: Not on file    Intimate partner violence:     Fear of current or ex partner: Not on file     Emotionally abused: Not on file     Physically abused: Not on file     Forced sexual activity: Not on file   Other Topics Concern    Not on file   Social History Narrative    Not on file       Family History:       Problem Relation Age of Onset    High Blood Pressure Mother     Heart Disease Father        Allergies:  Patient has no known allergies. Home Medications:  Prior to Admission medications    Medication Sig Start Date End Date Taking? Authorizing Provider   potassium chloride (MICRO-K) 10 MEQ extended release capsule Take 10 mEq by mouth 2 times daily    Historical Provider, MD   ferrous sulfate 325 (65 Fe) MG tablet Take 1 tablet by mouth daily (with breakfast) 11/5/19   Yulia Colin MD   apixaban (ELIQUIS) 5 MG TABS tablet Take 5 mg by mouth 2 times daily    Historical Provider, MD   atorvastatin (LIPITOR) 20 MG tablet Take 1 tablet by mouth nightly 6/21/19   Deirdre Darling PA-C   losartan (COZAAR) 25 MG tablet Take 1 tablet by mouth daily 6/22/19   Deirdre Darling PA-C   aspirin 81 MG EC tablet Take 1 tablet by mouth daily 4/11/19   Davi Salmeron MD   metoprolol tartrate (LOPRESSOR) 25 MG tablet Take 1 tablet by mouth 2 times daily 4/10/19   Davi Salmeron MD   levothyroxine (SYNTHROID) 112 MCG tablet Take 1 tablet by mouth Daily 11/29/18   Dash Del Valle MD   acetaminophen (TYLENOL) 500 MG tablet Take 500 mg by mouth every 6 hours as needed for Pain    Historical Provider, MD       Current Medications:   No current facility-administered medications for this encounter. REVIEW OF SYSTEMS:       CONSTITUTIONAL: negative for fatigue and malaise   EYES: negative for double vision and photophobia    HEENT: negative for tinnitus and sore throat   RESPIRATORY: negative for cough, shortness of breath   CARDIOVASCULAR: negative for chest pain, palpitations   GASTROINTESTINAL: negative for nausea, vomiting   GENITOURINARY: negative for incontinence   MUSCULOSKELETAL: negative for neck or back pain   NEUROLOGICAL: negative for seizures   PSYCHIATRIC: negative for fatigue     Review of systems otherwise negative.     PHYSICAL EXAM:       BP (!) 171/116   Pulse 83   Temp 97.8 °F (36.6 °C) (Axillary)   Resp 18   SpO2 95%     CONSTITUTIONAL: Well developed, well nourished, alert and oriented x 3, in no acute distress. GCS 15, nontoxic. + dysarthria, expressive  aphasia. EOMI. HEAD:  normocephalic, atraumatic    EYES:  PERRLA, EOMI.   ENT:  moist mucous membranes   NECK:  supple, symmetric, no midline tenderness to palpation    BACK:  without midline tenderness, step-offs or deformities    LUNGS:  Equal air entry bilaterally   CARDIOVASCULAR:  normal s1 / s2   ABDOMEN:  Soft, no rigidity   NEUROLOGIC:    Mental status   Alert and oriented; intact memory with no confusion, speech or language problems; no hallucinations or delusions     Cranial nerves   II - visual fields intact to confrontation                                                III, IV, VI - extra-ocular muscles full: no pupillary defect; no KIRSTY, no nystagmus, no ptosis                                                                      V - normal facial sensation                                                               VII - normal facial symmetry                                                             VIII - intact hearing                                                                             IX, X - symmetrical palate                                                                  XI - symmetrical shoulder shrug                                                       XII - midline tongue without atrophy or fasciculation     Motor function  Normal muscle bulk and tone; normal power 5/5,  BLE 2/5     Sensory function Intact to touch, , proprioception     Cerebellar Intact fine motor movement. No involuntary movements or tremors     Reflex function Intact 2+ DTR and symmetric with no pathologic reflex or Babinski sign     Gait                  Not tested           INITIAL NIH STROKE SCALE:    Time Performed:  13:15 PM     1a. Level of consciousness:  0 - alert; keenly responsive  1b. Level of consciousness questions:  2 - answers neither question correctly  1c.   Level of PENDING 02/09/2020    CALCIUM PENDING 02/09/2020    GFRAA PENDING 02/09/2020    LABGLOM PENDING 02/09/2020    GLUCOSE PENDING 02/09/2020    GLUCOSE 88 04/23/2012       Radiology Review:  As above      ASSESSMENT AND PLAN:       Patient Active Problem List   Diagnosis    Hypothyroidism    Hyperlipidemia    Hypertension    Gout    Arthritis    Left carotid artery stenosis    Cerebral infarction, unspecified ([facility]    Chronic kidney disease, stage 3 (HCC)    Chronic atrial fibrillation    Severe mitral regurgitation by prior echocardiogram    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 ([facility]       80 y.o. female who presents with dysarthria, expressive aphasia. Last known well unknown. No IV TPA was administered since she is on Eliquis at home and took this morning.      - Discussed with Dr. Carlos Medrano  - CTA H/N  - CT Brain Perfusion   - MRI Brain WO   - ECHO,    - continue asa 18/TVGWDNE   - Folic acid 1mg BID   - lipitor 20mg nightly    - Fasting Lipid panel   - PT, OT, Speech eval    - Hydrate with cc bolus then IVF NS @ 75cc/hr    - Telemetry    - Neuro checks per protocol  - We recommend SBP <200  - Blood glucose goal less than 180  - Please avoid dextrose containing solutions    Additional recommendations may follow    Please contact [facility] with any changes in patients neurologic status. Thank you for your consult.        Rene Friedman MD   2/9/2020  2:08 PM

## 2020-02-09 NOTE — FLOWSHEET NOTE
[facility] CARE DEPARTMENT - [address]     Emergency/Trauma Note    PATIENT NAME: Cortez Schaumann    Shift date: 2/9/20  Shift day: Sunday   Shift # 1    Room # 13/13   Name: Cortez Schaumann            Age: 80 y.o. Gender: female          Jew: [employer] [employer]:     Trauma/Incident type: Stroke Alert  Admit Date & Time: 2/9/2020  1:14 PM    PATIENT/EVENT DESCRIPTION:  Cortez Schaumann is a 80 y.o. female who arrived via transfer from [facility] as a stroke alert. Pt reportedly had many falls at [address] [place] and has a history of TIA accompanied by these symptoms. Pt to be admitted to 13/13. SPIRITUAL ASSESSMENT/INTERVENTION:     02/09/20 1433   Encounter Summary   Services provided to: Patient and family together   Referral/Consult From: Multi-disciplinary team   Support System Children   Continue Visiting   (2/9/20)   Complexity of Encounter High   Length of Encounter 45 minutes   Spiritual Assessment Completed Yes   Crisis   Type Stroke Alert   Assessment Calm; Approachable; Hopeful;Coping   Intervention Active listening;Explored feelings, thoughts, concerns;Nurtured hope;Sustaining presence/ Ministry of presence;Develop care plan;Complementary therapies;Contacted support as requested per patient/family request   Who? Nursing Home   Why? Have them fax a med list   At [address] [place] [address] team   Outcome Acceptance; Connection/belonging;Expressed gratitude;Comfort;Engaged in conversation;Expressed feelings/needs/concerns;Coping;Encouraged; Hopeful;Receptive   Spiritual/Sikh   Type Spiritual support     Physician requested that  call nursing [place], [place] [id number], for a medication list per RN.  requested they fax over med list.  Sneha Schreiber called Gaston Cerna, pt's son, and found he had just arrived to hospital.  He described pt as being \"quite Episcopal\" and [facility].    met them in room and they seem to have no further needs at this time. PATIENT BELONGINGS:  No belongings noted    ANY BELONGINGS OF SIGNIFICANT VALUE NOTED:  N/A    REGISTRATION STAFF NOTIFIED? Yes      WHAT IS YOUR SPIRITUAL CARE PLAN FOR THIS PATIENT?:   Chaplains will remain available to offer spiritual and emotional support as needed.     Electronically signed by Pradeep Brady, on 2/9/2020 at 2:34 PM.  Leonides Stack  [phone]

## 2020-02-09 NOTE — ED PROVIDER NOTES
[address]     Emergency Department     Faculty Attestation    I performed a history and physical examination of the patient and discussed management with the resident. I reviewed the residents note and agree with the documented findings including all diagnostic interpretations and plan of care. Any areas of disagreement are noted on the chart. I was personally present for the key portions of any procedures. I have documented in the chart those procedures where I was not present during the key portions. I have reviewed the emergency nurses triage note. I agree with the chief complaint, past medical history, past surgical history, allergies, medications, social and family history as documented unless otherwise noted below. Documentation of the HPI, Physical Exam and Medical Decision Making performed by margaux is based on my personal performance of the HPI, PE and MDM. For Physician Assistant/ Nurse Practitioner cases/documentation I have personally evaluated this patient and have completed at least one if not all key elements of the E/M (history, physical exam, and MDM). Additional findings are as noted. Primary Care Physician: Jil Escalera MD    History: This is a 80 y.o. female who presents to the Emergency Department with complaint of concern for stroke. Last seen normal last night. Had negative CT at [place]. Per transport team her aphasia and dysarthria has improved via transport. Patient is hard of hearing. Physical:     axillary temperature is 97.8 °F (36.6 °C). Her blood pressure is 156/114 (abnormal) and her pulse is 94. Her respiration is 24 and oxygen saturation is 97%.    80 y.o. female no acute distress, has mild expressive aphasia as well as dysarthria on examination. Arm strength is symmetric bilaterally there is no pronator drift.   Cardiac exam irregularly irregular with a normal rate, pulmonary clear bilaterally abdomen soft nontender nondistended. Impression: CVA, possible TIA given improving symptomatology    Plan: Stroke alert based on timeframe, proceed with CTA as per neuro team.  [facility] to neurology. EKG Interpretation  EKG Interpretation    Interpreted by emergency department physician    Rhythm: atrial fibrillation - controlled  Rate: normal  Axis: normal  Ectopy: none  Conduction: Low voltage QRS, QRS 72, QTc 427  ST Segments: no acute change  T Waves: no acute change  Q Waves: none    EKG  Impression: atrial fibrillation (chronic)    Lisa Campa MD      Interpreted by me      CRITICAL CARE: There was a high probability of clinically significant/life threatening deterioration in this patient's condition which required my urgent intervention. Total critical care time was 15 minutes. This excludes any time for separately reportable procedures.      Puma Denson MD, [facility]  Attending Emergency Physician         Lisa Campa MD  02/09/20 8549

## 2020-02-09 NOTE — ED NOTES
Pt stood with almost no assistance and pivoted to bedside commode     Mario Franz, MANJIT  02/09/20 9490

## 2020-02-09 NOTE — ED NOTES
Pt up to bedside commode. [facility] in department for transport.   Bed side report given to crew     Amira Cody RN  02/09/20 1200

## 2020-02-09 NOTE — ED NOTES
[facility] called with stroke team at [facility]. V's on line to speak with Dr. Leopoldo Harold.      Yovana Norman  02/09/20 7743

## 2020-02-10 PROBLEM — Z86.73 HISTORY OF CEREBRAL INFARCTION: Status: ACTIVE | Noted: 2020-02-10

## 2020-02-10 PROBLEM — R29.6 FALLING EPISODES: Status: ACTIVE | Noted: 2020-02-10

## 2020-02-10 LAB
ANION GAP SERPL CALCULATED.3IONS-SCNC: 14 MMOL/L (ref 9–17)
BUN BLDV-MCNC: 12 MG/DL (ref 8–23)
BUN/CREAT BLD: ABNORMAL (ref 9–20)
CALCIUM SERPL-MCNC: 9.2 MG/DL (ref 8.6–10.4)
CHLORIDE BLD-SCNC: 107 MMOL/L (ref 98–107)
CHOLESTEROL/HDL RATIO: 2.4
CHOLESTEROL: 98 MG/DL
CO2: 22 MMOL/L (ref 20–31)
CREAT SERPL-MCNC: 0.73 MG/DL (ref 0.5–0.9)
CULTURE: NORMAL
EKG ATRIAL RATE: 288 BPM
EKG ATRIAL RATE: 80 BPM
EKG Q-T INTERVAL: 364 MS
EKG Q-T INTERVAL: 366 MS
EKG QRS DURATION: 72 MS
EKG QRS DURATION: 78 MS
EKG QTC CALCULATION (BAZETT): 422 MS
EKG QTC CALCULATION (BAZETT): 427 MS
EKG R AXIS: 16 DEGREES
EKG R AXIS: 23 DEGREES
EKG T AXIS: -48 DEGREES
EKG T AXIS: 107 DEGREES
EKG VENTRICULAR RATE: 80 BPM
EKG VENTRICULAR RATE: 83 BPM
ESTIMATED AVERAGE GLUCOSE: 111 MG/DL
GFR AFRICAN AMERICAN: >60 ML/MIN
GFR NON-AFRICAN AMERICAN: >60 ML/MIN
GFR SERPL CREATININE-BSD FRML MDRD: ABNORMAL ML/MIN/{1.73_M2}
GFR SERPL CREATININE-BSD FRML MDRD: ABNORMAL ML/MIN/{1.73_M2}
GLUCOSE BLD-MCNC: 90 MG/DL (ref 70–99)
HBA1C MFR BLD: 5.5 % (ref 4–6)
HCT VFR BLD CALC: 37.3 % (ref 36.3–47.1)
HDLC SERPL-MCNC: 41 MG/DL
HEMOGLOBIN: 12 G/DL (ref 11.9–15.1)
LDL CHOLESTEROL: 43 MG/DL (ref 0–130)
Lab: NORMAL
MAGNESIUM: 1.5 MG/DL (ref 1.6–2.6)
MCH RBC QN AUTO: 32.5 PG (ref 25.2–33.5)
MCHC RBC AUTO-ENTMCNC: 32.2 G/DL (ref 28.4–34.8)
MCV RBC AUTO: 101.1 FL (ref 82.6–102.9)
NRBC AUTOMATED: 0 PER 100 WBC
PDW BLD-RTO: 13.5 % (ref 11.8–14.4)
PLATELET # BLD: 150 K/UL (ref 138–453)
PMV BLD AUTO: 11.3 FL (ref 8.1–13.5)
POTASSIUM SERPL-SCNC: 3.3 MMOL/L (ref 3.7–5.3)
RBC # BLD: 3.69 M/UL (ref 3.95–5.11)
SODIUM BLD-SCNC: 143 MMOL/L (ref 135–144)
SPECIMEN DESCRIPTION: NORMAL
TRIGL SERPL-MCNC: 70 MG/DL
TROPONIN INTERP: ABNORMAL
TROPONIN T: ABNORMAL NG/ML
TROPONIN, HIGH SENSITIVITY: 30 NG/L (ref 0–14)
VLDLC SERPL CALC-MCNC: NORMAL MG/DL (ref 1–30)
WBC # BLD: 6.3 K/UL (ref 3.5–11.3)

## 2020-02-10 PROCEDURE — 93010 ELECTROCARDIOGRAM REPORT: CPT | Performed by: INTERNAL MEDICINE

## 2020-02-10 PROCEDURE — 97166 OT EVAL MOD COMPLEX 45 MIN: CPT

## 2020-02-10 PROCEDURE — 97535 SELF CARE MNGMENT TRAINING: CPT

## 2020-02-10 PROCEDURE — 2580000003 HC RX 258: Performed by: STUDENT IN AN ORGANIZED HEALTH CARE EDUCATION/TRAINING PROGRAM

## 2020-02-10 PROCEDURE — 80061 LIPID PANEL: CPT

## 2020-02-10 PROCEDURE — 95816 EEG AWAKE AND DROWSY: CPT

## 2020-02-10 PROCEDURE — 83735 ASSAY OF MAGNESIUM: CPT

## 2020-02-10 PROCEDURE — 2060000000 HC ICU INTERMEDIATE R&B

## 2020-02-10 PROCEDURE — 83036 HEMOGLOBIN GLYCOSYLATED A1C: CPT

## 2020-02-10 PROCEDURE — 36415 COLL VENOUS BLD VENIPUNCTURE: CPT

## 2020-02-10 PROCEDURE — 92523 SPEECH SOUND LANG COMPREHEN: CPT

## 2020-02-10 PROCEDURE — 97162 PT EVAL MOD COMPLEX 30 MIN: CPT

## 2020-02-10 PROCEDURE — 80048 BASIC METABOLIC PNL TOTAL CA: CPT

## 2020-02-10 PROCEDURE — 95819 EEG AWAKE AND ASLEEP: CPT | Performed by: PSYCHIATRY & NEUROLOGY

## 2020-02-10 PROCEDURE — 99223 1ST HOSP IP/OBS HIGH 75: CPT | Performed by: PSYCHIATRY & NEUROLOGY

## 2020-02-10 PROCEDURE — 85027 COMPLETE CBC AUTOMATED: CPT

## 2020-02-10 PROCEDURE — 6370000000 HC RX 637 (ALT 250 FOR IP): Performed by: STUDENT IN AN ORGANIZED HEALTH CARE EDUCATION/TRAINING PROGRAM

## 2020-02-10 PROCEDURE — 93005 ELECTROCARDIOGRAM TRACING: CPT | Performed by: STUDENT IN AN ORGANIZED HEALTH CARE EDUCATION/TRAINING PROGRAM

## 2020-02-10 PROCEDURE — 84484 ASSAY OF TROPONIN QUANT: CPT

## 2020-02-10 PROCEDURE — 97530 THERAPEUTIC ACTIVITIES: CPT

## 2020-02-10 RX ADMIN — APIXABAN 5 MG: 5 TABLET, FILM COATED ORAL at 00:20

## 2020-02-10 RX ADMIN — APIXABAN 5 MG: 5 TABLET, FILM COATED ORAL at 20:06

## 2020-02-10 RX ADMIN — METOPROLOL TARTRATE 25 MG: 50 TABLET, FILM COATED ORAL at 20:08

## 2020-02-10 RX ADMIN — SODIUM CHLORIDE, PRESERVATIVE FREE 10 ML: 5 INJECTION INTRAVENOUS at 20:09

## 2020-02-10 RX ADMIN — METOPROLOL TARTRATE 25 MG: 50 TABLET, FILM COATED ORAL at 09:05

## 2020-02-10 RX ADMIN — LOSARTAN POTASSIUM 25 MG: 25 TABLET, FILM COATED ORAL at 09:05

## 2020-02-10 RX ADMIN — ATORVASTATIN CALCIUM 20 MG: 20 TABLET, FILM COATED ORAL at 20:06

## 2020-02-10 RX ADMIN — Medication 81 MG: at 09:05

## 2020-02-10 RX ADMIN — SODIUM CHLORIDE, PRESERVATIVE FREE 10 ML: 5 INJECTION INTRAVENOUS at 09:06

## 2020-02-10 RX ADMIN — LEVOTHYROXINE SODIUM 112 MCG: 112 TABLET ORAL at 09:05

## 2020-02-10 RX ADMIN — APIXABAN 5 MG: 5 TABLET, FILM COATED ORAL at 09:05

## 2020-02-10 ASSESSMENT — PAIN SCALES - GENERAL
PAINLEVEL_OUTOF10: 0

## 2020-02-10 ASSESSMENT — PAIN - FUNCTIONAL ASSESSMENT: PAIN_FUNCTIONAL_ASSESSMENT: 0-10

## 2020-02-10 NOTE — PLAN OF CARE
Problem: HEMODYNAMIC STATUS  Goal: Patient has stable vital signs and fluid balance  Outcome: Ongoing     Problem: ACTIVITY INTOLERANCE/IMPAIRED MOBILITY  Goal: Mobility/activity is maintained at optimum level for patient  Outcome: Ongoing     Problem: COMMUNICATION IMPAIRMENT  Goal: Ability to express needs and understand communication  Outcome: Ongoing     Problem: Swallowing - Impaired:  Goal: Able to swallow without choking  Description  Able to swallow without choking  Outcome: Ongoing  Goal: Absence of aspiration  Description  Absence of aspiration  Outcome: Ongoing     Problem: Falls - Risk of:  Goal: Will remain free from falls  Description  Will remain free from falls  2/10/2020 1002 by Kyler Ayala RN  Outcome: Ongoing  2/10/2020 0526 by Cece Davis RN  Outcome: Met This Shift  Goal: Absence of physical injury  Description  Absence of physical injury  2/10/2020 1002 by Kyler Ayala RN  Outcome: Ongoing  2/10/2020 0526 by Cece Davis RN  Outcome: Met This Shift     Problem: Musculor/Skeletal Functional Status  Goal: Highest potential functional level  Outcome: Ongoing

## 2020-02-10 NOTE — PROGRESS NOTES
Physical Therapy    Facility/Department: [facility] NEURO  Initial Assessment    NAME: Elza Luque  : 1930  MRN: [id number]    Date of Service: 2/10/2020  Chief Complaint   Patient presents with    Cerebrovascular Accident     Discharge Recommendations:    Further therapy recommended at discharge. PT Equipment Recommendations  Equipment Needed: No  Other: by has 4WW at baseline    Assessment   Body structures, Functions, Activity limitations: Decreased safe awareness;Decreased strength;Decreased balance;Decreased ROM; Decreased endurance;Decreased functional mobility   Assessment: Pt has obvious strength deficits in both UE and LE as per MMT. Sit to stand transfers completed Torsten with RW, poor control when sitting. Amb x40 ft CGA with RW requiring incresed time, no LOB. Pt would benefit from acute PT services at this time to address deficits. Prognosis: Good  Decision Making: Medium Complexity  PT Education: PT Role;Plan of Care;Transfer Training;General Safety;Gait Training  Patient Education: instructed to use call light and not to get up without assistance  REQUIRES PT FOLLOW UP: Yes       Patient Diagnosis(es): The encounter diagnosis was Cerebrovascular accident (CVA), unspecified mechanism ([address] has a past medical history of Acute kidney failure ([address] Arthritis, Atrial fibrillation ([address] Calculus of gallbladder and bile duct without cholecystitis without obstruction, Cerebral infarction ([address] CHF (congestive heart failure) ([address] Chronic kidney disease, stage 3 ([address] Fall, Gout, Gout, Hyperlipidemia, Hypertension, Hypokalemia, Hypomagnesemia, Hypothyroidism, Irritable bowel syndrome, Ischemia, Left carotid stenosis, Metabolic encephalopathy, Nonrheumatic mitral (valve) insufficiency, and UTI (urinary tract infection). has a past surgical history that includes Cataract removal with implant (Bilateral); Cholecystectomy; Carpal tunnel release (); ERCP (N/A, 2019);  Total knee in place, Call light within reach, Chair alarm in place, Patient at risk for falls, Gait belt, Nurse notified, Left in chair  Restraints  Initially in place: No      AM-PAC Score     AM-PAC Inpatient Mobility without Stair Climbing Raw Score : 15 (02/10/20 0941)  AM-PAC Inpatient without Stair Climbing T-Scale Score : 43.03 (02/10/20 0941)  Mobility Inpatient CMS 0-100% Score: 47.43 (02/10/20 0941)  Mobility Inpatient without Stair CMS G-Code Modifier : CK (02/10/20 0941)       Goals  Short term goals  Time Frame for Short term goals: 12 visits  Short term goal 1: Pt to complete bed mob Independently from flat position without use of bedrails for return to prior level  Short term goal 2: Transfer MOD I with RW for increased independence and safety  Short term goal 3: Amb x150ft with RW and supervision for safe return and navigation of living facility  Short term goal 4: Improve LE strength to 4+/5 to reduce risk of falls and injury       Therapy Time   Individual Concurrent Group Co-treatment   Time In 0910         Time Out 0937         Minutes 27         Timed Code Treatment Minutes: 15 Minutes       Marilyn Cones   This treatment/evaluation completed by signing SPT. Signing PT agrees with treatment and documentation.

## 2020-02-10 NOTE — CARE COORDINATION
Case Management Initial Discharge Plan  Lynne Barakat,             Met with:patient to discuss discharge plans. Information verified: address, contacts, phone number, , insurance Yes  PCP: Marcia Hernandes MD  Date of last visit: 2020    Insurance Provider: Amber Nails and medicare    Discharge Planning    Living Arrangements:    assisted Living  Support Systems:   Family and Friends    Home has 1 stories  0 stairs to climb to get into front door, 0stairs to climb to reach second floor  Location of bedroom/bathroom in home main    Patient able to perform ADL's:Assisted    Current Services (outpatient & in home) assisted living aids  DME equipment: walker  DME provider:       Potential Assistance Needed:       Patient agreeable to home care: No  [place] of choice provided:  n/a    Prior SNF/Rehab Placement and Facility:   Agreeable to SNF/Rehab: Yes  [place] of choice provided: yes   Evaluation: no    Expected Discharge date:     Patient expects to be discharged to: Follow Up Appointment: Best Day/ Time:      Transportation provider: ems/family  Transportation arrangements needed for discharge: Yes    Readmission Risk              Risk of Unplanned Readmission:        15             Does patient have a readmission risk score greater than 14?: Yes  If yes, follow-up appointment must be made within 7 days of discharge.      Goals of Care: keep me safe and find out what is wrong      Discharge Plan: return to assisted facility          Electronically signed by Murphy RN on 2/10/20 at 11:27 AM

## 2020-02-10 NOTE — PROGRESS NOTES
Smoking Cessation - topics covered   []  Health Risks  []  Benefits of Quitting   []  Smoking Cessation  [x]  Patient has no history of tobacco use per note in significant history. []  Patient is former smoker per note in significant history. Patient quit in   [x]  No need for tobacco cessation education. []  Booklet given  []  Patient verbalizes understanding. []  Patient denies need for tobacco cessation education. []  Unable to meet with patient today. Will follow up as able.   Juanita Rankin  9:56 AM

## 2020-02-10 NOTE — H&P
Department of Neurology                                         Resident H&P         History Obtained From:  patient, electronic medical record, staff    CHIEF COMPLAINT:        Expressive aphasia, recurrent falls    HISTORY OF PRESENT ILLNESS:       The patient is a 80 y.o. female who presents with  Expressive aphasia, recurrent falls. Patient started to have dysarthria and specifically aphasia with last known well unknown. Patient was fine before going to bed last night. .  Patient also had complains of recurrent falls. Patient had a CT brain done at [facility] that was negative. She was given aspirin 243. Patient says she took aspirin 81 and Eliquis on the morning of presentation. Patient was transferred to [facility] for further evaluation and management. Glucose 180  Creatinine 0.90  -150. Past medical history hypertension, hyperlipidemia, A. fib, carotid stenosis, stroke. CTA head and neck was consistent with right V4 occlusion with reconstitution, asymmetric decrease opacification of V4 of right VA secondary to proximal occlusion. Proximal P2 right PCA 50% focal stenosis, bilateral ICA 75% stenosis. TTP questionable decreased perfusion in the right parieto-occipital lobe no significant mismatch. Patient had MRI brain done that was negative for any acute infarct.      PAST MEDICAL HISTORY :       Past Medical History:        Diagnosis Date    Acute kidney failure ([address] 06/28/2019    Arthritis     Atrial fibrillation (HCC)     Calculus of gallbladder and bile duct without cholecystitis without obstruction     Cerebral infarction ([address]     CHF (congestive heart failure) (HCC)     Chronic kidney disease, stage 3 ([address]     Fall     Gout     Gout     Hyperlipidemia     Hypertension     Hypokalemia 06/28/2019    Hypomagnesemia 06/28/2019    Hypothyroidism     Irritable bowel syndrome     Ischemia     muscle    Left carotid stenosis     Continuous  atorvastatin (LIPITOR) tablet 20 mg, 20 mg, Oral, Nightly  apixaban (ELIQUIS) tablet 5 mg, 5 mg, Oral, BID  levothyroxine (SYNTHROID) tablet 112 mcg, 112 mcg, Oral, Daily  metoprolol tartrate (LOPRESSOR) tablet 25 mg, 25 mg, Oral, BID  losartan (COZAAR) tablet 25 mg, 25 mg, Oral, Daily    REVIEW OF SYSTEMS:       CONSTITUTIONAL: negative for fatigue and malaise   EYES: negative for double vision and photophobia    HEENT: negative for tinnitus and sore throat   RESPIRATORY: negative for cough, shortness of breath   CARDIOVASCULAR: negative for chest pain, palpitations   GASTROINTESTINAL: negative for nausea, vomiting   GENITOURINARY: negative for incontinence   MUSCULOSKELETAL: negative for neck or back pain   NEUROLOGICAL: negative for seizures   PSYCHIATRIC: negative for fatigue     Review of systems otherwise negative. PHYSICAL EXAM:       /87   Pulse 81   Temp 97.3 °F (36.3 °C) (Oral)   Resp 15   Ht 5' 2\" (1.575 m)   Wt 161 lb (73 kg)   SpO2 (!) 87%   BMI 29.45 kg/m²     CONSTITUTIONAL:  Well developed, well nourished, alert and oriented x 3, in no acute distress. GCS 15, nontoxic. + dysarthria, expressive  aphasia. EOMI.     HEAD:  normocephalic, atraumatic    EYES:  PERRLA, EOMI.   ENT:  moist mucous membranes   NECK:  supple, symmetric, no midline tenderness to palpation    BACK:  without midline tenderness, step-offs or deformities    LUNGS:  Equal air entry bilaterally   CARDIOVASCULAR:  normal s1 / s2   ABDOMEN:  Soft, no rigidity   NEUROLOGIC:    Mental status   Alert and oriented; intact memory with no confusion, dysarthria; no hallucinations or delusions     Cranial nerves   II - visual fields intact to confrontation                                                III, IV, VI - extra-ocular muscles full: no pupillary defect; no KIRSTY, no nystagmus, no ptosis,R 6th nerve palsy                                                                  V - normal facial sensation VII - normal facial symmetry                                                             VIII - intact hearing                                                                             IX, X - symmetrical palate                                                                  XI - symmetrical shoulder shrug                                                       XII - midline tongue without atrophy or fasciculation     Motor function  Normal muscle bulk and tone; normal power 5/5,  BLE 4/5     Sensory function Intact to touch,proprioception  Decreased sensation RUE/RLE   Cerebellar Intact fine motor movement. No involuntary movements or tremors     Reflex function Intact 2+ DTR and symmetric with no pathologic reflex or Babinski sign     Gait                  Not tested           INITIAL NIH STROKE SCALE:    Time Performed:  13:15 PM     1a. Level of consciousness:  0 - alert; keenly responsive  1b. Level of consciousness questions: 0  1c. Level of consciousness questions:  0 - performs both tasks correctly  2. Best Gaze:  0 - normal  3. Visual:  0 - no visual loss  4. Facial Palsy:  0 - normal symmetric movement  5a. Motor left arm:  0 - no drift, limb holds 90 (or 45) degrees for full 10 seconds  5b. Motor right arm:  0 - no drift, limb holds 90 (or 45) degrees for full 10 seconds  6a. Motor left le  6b. Motor right le  7. Limb Ataxia:  0 - absent  8. Sensory:  0 - normal; no sensory loss  9. Best Language:  0   10. Dysarthria:  1 - mild to moderate, patient slurs at least some words and at worst, can be understood with some difficulty  11.   Extinction and Inattention:  0 - no abnormality    TOTAL: 1     SKIN:  no rash      LABS AND IMAGING:     CBC with Differential:    Lab Results   Component Value Date    WBC 6.3 02/10/2020    RBC 3.69 02/10/2020    RBC 4.34 2012    HGB 12.0 02/10/2020    HCT 37.3 02/10/2020

## 2020-02-10 NOTE — PROGRESS NOTES
Speech Language Pathology  Facility/Department: 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      Date of Eval: 2/10/2020   Evaluating Therapist: Emily Mendosa    Primary Complaint: The patient is a 80 y.o. female who presents with  Expressive aphasia, recurrent falls. Patient started to have dysarthria and specifically aphasia with last known well unknown. Patient was fine before going to bed last night. .  Patient also had complains of recurrent falls. Patient had a CT brain done at [facility] that was negative. She was given aspirin 243. Patient says she took aspirin 81 and Eliquis on the morning of presentation. Pain:  Pain Assessment  Pain Assessment: 0-10  Pain Level: 0      Assessment:    Pt presents with moderate to severe cognitive deficits characterized by impaired word associations, sequencing, immediate recall, delayed recall, verbal reasoning, task insight, thought flexibility, and deductive reasoning. Pt. Presents with no dysarthria, no O/M deficits at this time. ST to follow up and provide treatment to address noted deficits. Education provided. Further therapy recommended at discharge. Recommendations:  Requires SLP Intervention: Yes  Duration/Frequency of Treatment: 3-5X Week  D/C Recommendations: Further therapy recommended at discharge.          Plan:   Goals:  Short-term Goals  Goal 1: Patient will complete thought organizational tasks with 90% accuracy. Goal 2: Patient will complete verbal sequencing tasks with 90% accuracy. Goal 3: Patient will recall 3-5 units with and without distractions with 90% accuracy. Goal 4: Patient will complete verbal reasoning tasks with 90% accuracy. Goal 5: Patient will complete deductive reasoning tasks with 90% accuracy. Subjective:     General  Chart Reviewed: Yes  Patient assessed for rehabilitation services?: Yes  Family / Caregiver Present: No  Social/Functional History  Lives With: Alone  Vision  Vision: Impaired  Vision Exceptions: Wears glasses for reading  Hearing  Hearing: [facility] to [facility]  Hearing Exceptions: Bilateral hearing aid           Objective:     Oral/Motor  Oral Motor: Within functional limits      Motor Speech  Motor Speech: Within Functional Limits         Cognition:      Orientation  Overall Orientation Status: Within Normal Limits  Memory  Memory: Exceptions to [facility](Immediate Recall 3/3, 2/5, 3/3 Delayed Recall 2/3, 0/3)  Problem Solving  Problem Solving: Exceptions to [facility]  Verbal Reasoning Skills: Severe(Antonyms 1/4, Inductive Reasoning 3/4, Similarities 2/4, Task Insight 1/3, Thought Flexibility 2/3, 2/3)  Abstract Reasoning  Abstract Reasoning: Exceptions to [facility]  Divergent Thinking: Mild (+7 Units)  Safety/Judgement  Safety/Judgement: Within Functional Limits  Word Associations: Moderate-severe (1/4)  Sequencing: Mild (3/4)  Deductive Reasoning: Severe (0/5)             Prognosis:  Speech Therapy Prognosis  Prognosis: Fair  Individuals consulted  Consulted and agree with results and recommendations: Patient    Education:  Patient Education: Yes  Patient Education Response: Verbalizes understanding          Therapy Time:   Individual Concurrent Group Co-treatment   Time In 1100         Time Out 1115         Minutes 15               Completed by Garrison Lees,  Clinician    Randy KEYES

## 2020-02-10 NOTE — PROGRESS NOTES
Walker  Activity: To/from bathroom  Assist Level: Minimal assistance  Functional Mobility Comments: Poor RW use, impulsive at times, CTA, fall risk  Toilet Transfers  Toilet - Technique: Ambulating  Equipment Used: Grab bars  Toilet Transfer: Contact guard assistance  ADL  Feeding: Modified independent ;Setup; Increased time to complete  Grooming: Stand by assistance;Setup; Increased time to complete(Pt stood sinkside during hand-washing, oral care, pt unable to open toothpaste)  UE Bathing: Setup; Increased time to complete;Minimal assistance  LE Bathing: Setup; Increased time to complete; Moderate assistance  UE Dressing: Setup; Increased time to complete;Minimal assistance  LE Dressing: Setup; Increased time to complete; Moderate assistance  Toileting: Setup; Increased time to complete; Moderate assistance  Additional Comments: Pt having wet stool coming out during func mob to toilet, pt sat for successful BM in toilet, pt performed altaf-care sitting on toilet, poor safety awareness from pt  Tone RUE  RUE Tone: Normotonic  Tone LUE  LUE Tone: Normotonic  Coordination  Movements Are Fluid And Coordinated: No  Coordination and Movement description: Decreased speed     Bed mobility  Supine to Sit: Unable to assess  Sit to Supine: Unable to assess  Scooting: Stand by assistance  Comment: Pt sitting in regualr chair upon arrival while standing up impulsively, pt retired sitting in recliner with alarm on  Transfers  Sit to stand: Stand by assistance  Stand to sit: Supervision     Cognition  Overall Cognitive Status: Exceptions  Following Commands:  Follows one step commands consistently  Safety Judgement: Decreased awareness of need for assistance  Insights: Decreased awareness of deficits  Initiation: Requires cues for some  Sequencing: Requires cues for some  Cognition Comment: Pt is extremely impulsive, poor safety awareness, friendly and cooperative with writer, CTA     Sensation  Overall Sensation Status: WFL      LUE AROM

## 2020-02-10 NOTE — PROGRESS NOTES
Dr. Rome Fleischer notified that patient is staring into space, not speaking, not answering questions. VS/Assessment.

## 2020-02-11 LAB
ANION GAP SERPL CALCULATED.3IONS-SCNC: 15 MMOL/L (ref 9–17)
BUN BLDV-MCNC: 15 MG/DL (ref 8–23)
BUN/CREAT BLD: ABNORMAL (ref 9–20)
CALCIUM SERPL-MCNC: 9.1 MG/DL (ref 8.6–10.4)
CHLORIDE BLD-SCNC: 107 MMOL/L (ref 98–107)
CO2: 23 MMOL/L (ref 20–31)
CREAT SERPL-MCNC: 1 MG/DL (ref 0.5–0.9)
GFR AFRICAN AMERICAN: >60 ML/MIN
GFR NON-AFRICAN AMERICAN: 52 ML/MIN
GFR SERPL CREATININE-BSD FRML MDRD: ABNORMAL ML/MIN/{1.73_M2}
GFR SERPL CREATININE-BSD FRML MDRD: ABNORMAL ML/MIN/{1.73_M2}
GLUCOSE BLD-MCNC: 86 MG/DL (ref 70–99)
HCT VFR BLD CALC: 41.5 % (ref 36.3–47.1)
HEMOGLOBIN: 12.7 G/DL (ref 11.9–15.1)
LV EF: 58 %
LVEF MODALITY: NORMAL
MAGNESIUM: 1.5 MG/DL (ref 1.6–2.6)
MCH RBC QN AUTO: 31.6 PG (ref 25.2–33.5)
MCHC RBC AUTO-ENTMCNC: 30.6 G/DL (ref 28.4–34.8)
MCV RBC AUTO: 103.2 FL (ref 82.6–102.9)
NRBC AUTOMATED: 0 PER 100 WBC
PDW BLD-RTO: 13.6 % (ref 11.8–14.4)
PLATELET # BLD: 153 K/UL (ref 138–453)
PMV BLD AUTO: 11.8 FL (ref 8.1–13.5)
POTASSIUM SERPL-SCNC: 3.3 MMOL/L (ref 3.7–5.3)
RBC # BLD: 4.02 M/UL (ref 3.95–5.11)
SODIUM BLD-SCNC: 145 MMOL/L (ref 135–144)
WBC # BLD: 7.4 K/UL (ref 3.5–11.3)

## 2020-02-11 PROCEDURE — 97116 GAIT TRAINING THERAPY: CPT

## 2020-02-11 PROCEDURE — 36415 COLL VENOUS BLD VENIPUNCTURE: CPT

## 2020-02-11 PROCEDURE — 83735 ASSAY OF MAGNESIUM: CPT

## 2020-02-11 PROCEDURE — 6360000002 HC RX W HCPCS: Performed by: FAMILY MEDICINE

## 2020-02-11 PROCEDURE — 6370000000 HC RX 637 (ALT 250 FOR IP): Performed by: STUDENT IN AN ORGANIZED HEALTH CARE EDUCATION/TRAINING PROGRAM

## 2020-02-11 PROCEDURE — 2580000003 HC RX 258: Performed by: STUDENT IN AN ORGANIZED HEALTH CARE EDUCATION/TRAINING PROGRAM

## 2020-02-11 PROCEDURE — 2580000003 HC RX 258: Performed by: FAMILY MEDICINE

## 2020-02-11 PROCEDURE — 80048 BASIC METABOLIC PNL TOTAL CA: CPT

## 2020-02-11 PROCEDURE — 99232 SBSQ HOSP IP/OBS MODERATE 35: CPT | Performed by: PSYCHIATRY & NEUROLOGY

## 2020-02-11 PROCEDURE — 93306 TTE W/DOPPLER COMPLETE: CPT

## 2020-02-11 PROCEDURE — 2060000000 HC ICU INTERMEDIATE R&B

## 2020-02-11 PROCEDURE — 85027 COMPLETE CBC AUTOMATED: CPT

## 2020-02-11 PROCEDURE — 97110 THERAPEUTIC EXERCISES: CPT

## 2020-02-11 RX ORDER — ACETAMINOPHEN 500 MG
500 TABLET ORAL EVERY 6 HOURS PRN
Status: DISCONTINUED | OUTPATIENT
Start: 2020-02-11 | End: 2020-02-12 | Stop reason: HOSPADM

## 2020-02-11 RX ORDER — LEVETIRACETAM 10 MG/ML
1000 INJECTION INTRAVASCULAR ONCE
Status: COMPLETED | OUTPATIENT
Start: 2020-02-11 | End: 2020-02-11

## 2020-02-11 RX ORDER — LABETALOL HYDROCHLORIDE 5 MG/ML
5 INJECTION, SOLUTION INTRAVENOUS EVERY 6 HOURS PRN
Status: DISCONTINUED | OUTPATIENT
Start: 2020-02-11 | End: 2020-02-12 | Stop reason: HOSPADM

## 2020-02-11 RX ADMIN — METOPROLOL TARTRATE 25 MG: 50 TABLET, FILM COATED ORAL at 09:26

## 2020-02-11 RX ADMIN — METOPROLOL TARTRATE 25 MG: 50 TABLET, FILM COATED ORAL at 21:18

## 2020-02-11 RX ADMIN — LOSARTAN POTASSIUM 25 MG: 25 TABLET, FILM COATED ORAL at 09:26

## 2020-02-11 RX ADMIN — Medication 81 MG: at 09:26

## 2020-02-11 RX ADMIN — ACETAMINOPHEN 500 MG: 500 TABLET ORAL at 18:36

## 2020-02-11 RX ADMIN — LEVOTHYROXINE SODIUM 112 MCG: 112 TABLET ORAL at 09:26

## 2020-02-11 RX ADMIN — ATORVASTATIN CALCIUM 20 MG: 20 TABLET, FILM COATED ORAL at 21:18

## 2020-02-11 RX ADMIN — LEVETIRACETAM 250 MG: 100 INJECTION, SOLUTION INTRAVENOUS at 21:30

## 2020-02-11 RX ADMIN — SODIUM CHLORIDE, PRESERVATIVE FREE 10 ML: 5 INJECTION INTRAVENOUS at 21:18

## 2020-02-11 RX ADMIN — ACETAMINOPHEN 500 MG: 500 TABLET ORAL at 01:48

## 2020-02-11 RX ADMIN — LEVETIRACETAM 1000 MG: 10 INJECTION INTRAVENOUS at 15:43

## 2020-02-11 RX ADMIN — APIXABAN 5 MG: 5 TABLET, FILM COATED ORAL at 21:18

## 2020-02-11 RX ADMIN — APIXABAN 5 MG: 5 TABLET, FILM COATED ORAL at 09:26

## 2020-02-11 ASSESSMENT — PAIN SCALES - GENERAL
PAINLEVEL_OUTOF10: 5
PAINLEVEL_OUTOF10: 0
PAINLEVEL_OUTOF10: 8
PAINLEVEL_OUTOF10: 4

## 2020-02-11 ASSESSMENT — PAIN DESCRIPTION - LOCATION: LOCATION: HAND

## 2020-02-11 ASSESSMENT — PAIN DESCRIPTION - PAIN TYPE: TYPE: ACUTE PAIN

## 2020-02-11 NOTE — PROGRESS NOTES
Physical Therapy  Facility/Department: [facility] NEURO  Daily Treatment Note  NAME: Rosendo Graff  : 1930  MRN: [id number]    Date of Service: 2020    Discharge Recommendations:  Patient would benefit from continued therapy after discharge   PT Equipment Recommendations  Equipment Needed: No  Other: by has 4WW at baseline    Assessment   Body structures, Functions, Activity limitations: Decreased safe awareness;Decreased strength;Decreased balance;Decreased ROM; Decreased endurance;Decreased functional mobility   Assessment: Pt performs STS transfers with Torsten. Pt able to perform 60ft of amb with RW and CGA, vc's for upright posture. No LOB noted during amb. Pt would benefit from acute PT services at this time to address deficits. Prognosis: Good  Decision Making: Medium Complexity  PT Education: PT Role;Plan of Care;Transfer Training;General Safety;Gait Training  REQUIRES PT FOLLOW UP: Yes  Activity Tolerance  Activity Tolerance: Patient limited by fatigue;Patient limited by endurance     Patient Diagnosis(es): The encounter diagnosis was Cerebrovascular accident (CVA), unspecified mechanism ([address] has a past medical history of Acute kidney failure ([address] Arthritis, Atrial fibrillation ([address] Calculus of gallbladder and bile duct without cholecystitis without obstruction, Cerebral infarction ([address] CHF (congestive heart failure) ([address] Chronic kidney disease, stage 3 ([address] Fall, Gout, Gout, Hyperlipidemia, Hypertension, Hypokalemia, Hypomagnesemia, Hypothyroidism, Irritable bowel syndrome, Ischemia, Left carotid stenosis, Metabolic encephalopathy, Nonrheumatic mitral (valve) insufficiency, and UTI (urinary tract infection). has a past surgical history that includes Cataract removal with implant (Bilateral); Cholecystectomy; Carpal tunnel release (); ERCP (N/A, 2019); Total knee arthroplasty (Right); Total knee arthroplasty (Left);  Upper gastrointestinal endoscopy (2019); and Upper position without use of bedrails for return to prior level  Short term goal 2: Transfer MOD I with RW for increased independence and safety  Short term goal 3: Amb x150ft with RW and supervision for safe return and navigation of living facility  Short term goal 4: Improve LE strength to 4+/5 to reduce risk of falls and injury    Plan    Plan  Times per week: 5-6x/wk  Current Treatment Recommendations: Strengthening, ROM, Equipment Evaluation, Education, & procurement, Balance Training, Functional Mobility Training, Transfer Training, Safety Education & Training, Home Exercise Program, Endurance Training, Gait Training, Patient/Caregiver Education & Training  Safety Devices  Type of devices: All fall risk precautions in place, Call light within reach, Chair alarm in place, Patient at risk for falls, Gait belt, Nurse notified, Left in chair(sitter present)  Restraints  Initially in place: No     Therapy Time   Individual Concurrent Group Co-treatment   Time In 1423         Time Out 1450         Minutes 27         Timed Code Treatment Minutes: 27 Minutes     Treatment performed by Student PTA under the supervision of co-signing PTA who agrees with all treatment and documentation.    BERHANE Slater  [place], [place]

## 2020-02-11 NOTE — PLAN OF CARE
Problem: HEMODYNAMIC STATUS  Goal: Patient has stable vital signs and fluid balance  Outcome: Ongoing     Problem: ACTIVITY INTOLERANCE/IMPAIRED MOBILITY  Goal: Mobility/activity is maintained at optimum level for patient  Outcome: Ongoing     Problem: COMMUNICATION IMPAIRMENT  Goal: Ability to express needs and understand communication  Outcome: Ongoing     Problem: Swallowing - Impaired:  Goal: Able to swallow without choking  Description  Able to swallow without choking  Outcome: Ongoing  Goal: Absence of aspiration  Description  Absence of aspiration  Outcome: Ongoing     Problem: Falls - Risk of:  Goal: Will remain free from falls  Description  Will remain free from falls  Outcome: Ongoing  Goal: Absence of physical injury  Description  Absence of physical injury  Outcome: Ongoing     Problem: Musculor/Skeletal Functional Status  Goal: Highest potential functional level  Outcome: Ongoing    Siderails up x 2  Hourly rounding  Call light in reach  Instructed to call for assist before attempting out of bed. Remains free from falls and accidental injury at this time   Floor free from obstacles  Bed is locked and in lowest position  Adequate lighting provided  Bed alarm on, Fall signs posted    Neuro assessment completed, fall precautions in place, aspirations precautions in place, assess for barriers in communication and mobility, interventions to assist in communication and mobility in place, encouraged to call for assistance, adaptive devices used as needed, assess emotional state and support offered, encouraged patient to communicate by available means, and support systems included in patient care.

## 2020-02-11 NOTE — PROGRESS NOTES
[facility]  Speech Language Pathology    Date: 2/11/2020  Patient Name: Harvey Osborne  YOB: 1930   AGE: 80 y.o. MRN: [id number]        Patient Not Available for Speech Therapy     Due to:  [] Testing  [] Hemodialysis  [] Cancelled by RN  [] Surgery   [] Intubation/Sedation/Pain Medication  [] Medical instability  [x] Other: Pt with nursing at first attempt. Pt unable to remain awake and alert despite max verbal cues at second attempt.     Next scheduled treatment: 2/12/2020    Completed by Baldo Serrano,  Clinician    Juan Jose Hernandez M.S., CCC/SLP
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
## 2020-02-11 NOTE — PROCEDURES
epileptiform process. Clinical correlation is warranted.         Lex Perez    D: 02/10/2020 20:41:28       T: 02/10/2020 21:22:09     [email or web address]  Job#: [id number]     Doc#: [id number]    CC:

## 2020-02-11 NOTE — CARE COORDINATION
[address] Transitions Interview     2020    Patient: Elza Luque Patient : 1930   MRN: [id number]  Reason for Admission: Cerebrovascular accident (CVA) determined by clinical assessment ([facility] [I63.9]  RARS: Readmission Risk Score: 16         Spoke with: Elza Bloodr    Met with Janes Hughes at bedside. She is planning on returning to [facility]. Explained CTN role if she were to discharge home. Readmission Risk  Patient Active Problem List   Diagnosis    Hypothyroidism    Hyperlipidemia    Hypertension    Gout    Arthritis    Left carotid artery stenosis    Cerebral infarction, unspecified ([address]    Chronic kidney disease, stage 3 (HCC)    Chronic atrial fibrillation    Severe mitral regurgitation by prior echocardiogram    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 ([address]    Cerebrovascular accident (CVA) determined by clinical assessment ([address]    Falling episodes    History of cerebral infarction       Inpatient Assessment  Care Transitions Summary    Care Transitions Inpatient Review  Medication Review  Are you able to afford your medications?:  Yes  How often do you have difficulty taking your medications?:  I always take them as prescribed. Housing Review  Who do you live with?:  Alone  Are you an active caregiver in your home?:  No  Social Support  Durable Medical Equipment  Functional Review  Hearing and Vision  Care Transitions Interventions                                 Follow Up  Future Appointments   Date Time Provider Tyler Arauz   2020 11:15 [facility]  There are no preventive care reminders to display for this patient.     Aries Beal RN

## 2020-02-11 NOTE — CARE COORDINATION
Spoke to Patient this afternoon regarding returning to [facility] as a skilled patient. She voices some concern that she is afraid that she will lose her room from long term. Called Dolly from [facility], she informs me that she will return to her same bed and just receive skilled in that room. Update patient and she is agreeable to go skilled. Yudith Avila states that she will be able to return once discharged. Tele sitter and sitter will need to be off for 24 hours prior to retrun.

## 2020-02-12 VITALS
SYSTOLIC BLOOD PRESSURE: 152 MMHG | RESPIRATION RATE: 28 BRPM | OXYGEN SATURATION: 97 % | DIASTOLIC BLOOD PRESSURE: 92 MMHG | BODY MASS INDEX: 29.63 KG/M2 | TEMPERATURE: 98 F | HEART RATE: 91 BPM | WEIGHT: 161 LBS | HEIGHT: 62 IN

## 2020-02-12 LAB
ANION GAP SERPL CALCULATED.3IONS-SCNC: 15 MMOL/L (ref 9–17)
BUN BLDV-MCNC: 12 MG/DL (ref 8–23)
BUN/CREAT BLD: ABNORMAL (ref 9–20)
CALCIUM SERPL-MCNC: 8.8 MG/DL (ref 8.6–10.4)
CHLORIDE BLD-SCNC: 109 MMOL/L (ref 98–107)
CO2: 22 MMOL/L (ref 20–31)
CREAT SERPL-MCNC: 0.86 MG/DL (ref 0.5–0.9)
GFR AFRICAN AMERICAN: >60 ML/MIN
GFR NON-AFRICAN AMERICAN: >60 ML/MIN
GFR SERPL CREATININE-BSD FRML MDRD: ABNORMAL ML/MIN/{1.73_M2}
GFR SERPL CREATININE-BSD FRML MDRD: ABNORMAL ML/MIN/{1.73_M2}
GLUCOSE BLD-MCNC: 91 MG/DL (ref 70–99)
HCT VFR BLD CALC: 39.5 % (ref 36.3–47.1)
HEMOGLOBIN: 12.2 G/DL (ref 11.9–15.1)
MAGNESIUM: 1.5 MG/DL (ref 1.6–2.6)
MCH RBC QN AUTO: 32 PG (ref 25.2–33.5)
MCHC RBC AUTO-ENTMCNC: 30.9 G/DL (ref 28.4–34.8)
MCV RBC AUTO: 103.7 FL (ref 82.6–102.9)
NRBC AUTOMATED: 0 PER 100 WBC
PDW BLD-RTO: 13.6 % (ref 11.8–14.4)
PLATELET # BLD: 157 K/UL (ref 138–453)
PMV BLD AUTO: 11.5 FL (ref 8.1–13.5)
POTASSIUM SERPL-SCNC: 3.4 MMOL/L (ref 3.7–5.3)
RBC # BLD: 3.81 M/UL (ref 3.95–5.11)
SODIUM BLD-SCNC: 146 MMOL/L (ref 135–144)
WBC # BLD: 6.4 K/UL (ref 3.5–11.3)

## 2020-02-12 PROCEDURE — 97535 SELF CARE MNGMENT TRAINING: CPT

## 2020-02-12 PROCEDURE — 6370000000 HC RX 637 (ALT 250 FOR IP): Performed by: STUDENT IN AN ORGANIZED HEALTH CARE EDUCATION/TRAINING PROGRAM

## 2020-02-12 PROCEDURE — 97130 THER IVNTJ EA ADDL 15 MIN: CPT

## 2020-02-12 PROCEDURE — 6360000002 HC RX W HCPCS: Performed by: FAMILY MEDICINE

## 2020-02-12 PROCEDURE — 2580000003 HC RX 258: Performed by: FAMILY MEDICINE

## 2020-02-12 PROCEDURE — 97110 THERAPEUTIC EXERCISES: CPT

## 2020-02-12 PROCEDURE — 80048 BASIC METABOLIC PNL TOTAL CA: CPT

## 2020-02-12 PROCEDURE — 36415 COLL VENOUS BLD VENIPUNCTURE: CPT

## 2020-02-12 PROCEDURE — 97129 THER IVNTJ 1ST 15 MIN: CPT

## 2020-02-12 PROCEDURE — 2580000003 HC RX 258: Performed by: STUDENT IN AN ORGANIZED HEALTH CARE EDUCATION/TRAINING PROGRAM

## 2020-02-12 PROCEDURE — 85027 COMPLETE CBC AUTOMATED: CPT

## 2020-02-12 PROCEDURE — 97116 GAIT TRAINING THERAPY: CPT

## 2020-02-12 PROCEDURE — 99232 SBSQ HOSP IP/OBS MODERATE 35: CPT | Performed by: PSYCHIATRY & NEUROLOGY

## 2020-02-12 PROCEDURE — 83735 ASSAY OF MAGNESIUM: CPT

## 2020-02-12 RX ORDER — LEVETIRACETAM 250 MG/1
250 TABLET ORAL 2 TIMES DAILY
Qty: 60 TABLET | Refills: 3 | Status: ON HOLD | OUTPATIENT
Start: 2020-02-12 | End: 2020-05-27 | Stop reason: HOSPADM

## 2020-02-12 RX ADMIN — LEVETIRACETAM 250 MG: 100 INJECTION, SOLUTION INTRAVENOUS at 10:08

## 2020-02-12 RX ADMIN — SODIUM CHLORIDE, PRESERVATIVE FREE 10 ML: 5 INJECTION INTRAVENOUS at 10:09

## 2020-02-12 RX ADMIN — Medication 81 MG: at 10:08

## 2020-02-12 RX ADMIN — ACETAMINOPHEN 500 MG: 500 TABLET ORAL at 13:25

## 2020-02-12 RX ADMIN — APIXABAN 5 MG: 5 TABLET, FILM COATED ORAL at 10:08

## 2020-02-12 RX ADMIN — LOSARTAN POTASSIUM 25 MG: 25 TABLET, FILM COATED ORAL at 10:08

## 2020-02-12 RX ADMIN — LEVOTHYROXINE SODIUM 112 MCG: 112 TABLET ORAL at 10:08

## 2020-02-12 RX ADMIN — METOPROLOL TARTRATE 25 MG: 50 TABLET, FILM COATED ORAL at 10:08

## 2020-02-12 ASSESSMENT — PAIN DESCRIPTION - PAIN TYPE: TYPE: ACUTE PAIN

## 2020-02-12 ASSESSMENT — PAIN SCALES - GENERAL
PAINLEVEL_OUTOF10: 5
PAINLEVEL_OUTOF10: 3

## 2020-02-12 ASSESSMENT — PAIN DESCRIPTION - LOCATION: LOCATION: HEAD

## 2020-02-12 ASSESSMENT — PAIN DESCRIPTION - DESCRIPTORS: DESCRIPTORS: HEADACHE

## 2020-02-12 NOTE — PROGRESS NOTES
gastrointestinal endoscopy (N/A, 7/31/2019). Restrictions  Restrictions/Precautions  Restrictions/Precautions: Fall Risk, General Precautions, Up as Tolerated  Required Braces or Orthoses?: No  Position Activity Restriction  Other position/activity restrictions: up with assist  Subjective   General  Patient assessed for rehabilitation services?: Yes  Family / Caregiver Present: No  Diagnosis: Fall, exp aphasia, small vessel ischemic changes  Vital Signs  Patient Currently in Pain: Denies   Orientation  Orientation  Overall Orientation Status: Within Functional Limits  Objective    ADL  Grooming: Stand by assistance;Setup; Increased time to complete(face washing completed seated at sink)  UE Dressing: Minimal assistance;Setup;Verbal cueing; Increased time to complete(to doff/don gown, pull over shirt and front opening bra req assistance with post pull down)  LE Dressing: Moderate assistance;Setup;Verbal cueing; Increased time to complete(to doff/don brief and pants, VALENTINE donned shoes sec to decreased time)  Toileting: Setup; Increased time to complete;Minimal assistance(to pull up breif and altaf/post hygeine standing)  Additional Comments: pt req increased time this day sec to decreased cogniton, mod verbal instructions on sequencing and initiation req throughout session  Balance  Sitting Balance: Supervision  Standing Balance: Contact guard assistance  Standing Balance  Time: pt tolerated approx 4-6 min   Activity: during ADLs and mobility  Comment: utilizing RW and grab bars  Functional Mobility  Functional - Mobility Device: Rolling Walker  Activity: To/from bathroom  Assist Level: Minimal assistance  Functional Mobility Comments: Pt req Min A this day sec to decreased cogniton req VALENTINE assistance with walker management; decreased balance noted but with 0 LOB  Toilet Transfers  Toilet - Technique: Ambulating  Equipment Used: Standard toilet  Toilet Transfer: Minimal assistance  Toilet Transfers Comments: Sec to low

## 2020-02-12 NOTE — PROGRESS NOTES
Physical Therapy  Facility/Department: [facility] NEURO  Daily Treatment Note  NAME: Jeffery Romo  : 1930  MRN: [id number]    Date of Service: 2020    Discharge Recommendations:  Patient would benefit from continued therapy after discharge   PT Equipment Recommendations  Equipment Needed: No  Other: by has 4WW at baseline    Assessment   Body structures, Functions, Activity limitations: Decreased safe awareness;Decreased strength;Decreased balance;Decreased ROM; Decreased endurance;Decreased functional mobility   Assessment: Pt requires Torsten when performing bed mob and transfers. Pt amb 60ft with RW and CGA, vc's given for upright posture. Pt with no LOB while amb. Pt would benefit from acute PT services at this time to address deficits. Prognosis: Good  Decision Making: Medium Complexity  PT Education: PT Role;Plan of Care;Transfer Training;General Safety;Gait Training  REQUIRES PT FOLLOW UP: Yes  Activity Tolerance  Activity Tolerance: Patient limited by fatigue;Patient limited by endurance     Patient Diagnosis(es): The encounter diagnosis was Cerebrovascular accident (CVA), unspecified mechanism ([address] has a past medical history of Acute kidney failure ([address] Arthritis, Atrial fibrillation ([address] Calculus of gallbladder and bile duct without cholecystitis without obstruction, Cerebral infarction ([address] CHF (congestive heart failure) ([address] Chronic kidney disease, stage 3 ([address] Fall, Gout, Gout, Hyperlipidemia, Hypertension, Hypokalemia, Hypomagnesemia, Hypothyroidism, Irritable bowel syndrome, Ischemia, Left carotid stenosis, Metabolic encephalopathy, Nonrheumatic mitral (valve) insufficiency, and UTI (urinary tract infection). has a past surgical history that includes Cataract removal with implant (Bilateral); Cholecystectomy; Carpal tunnel release (); ERCP (N/A, 2019); Total knee arthroplasty (Right); Total knee arthroplasty (Left);  Upper gastrointestinal endoscopy (2019); and Upper gastrointestinal endoscopy (N/A, 7/31/2019). Restrictions  Restrictions/Precautions  Restrictions/Precautions: General Precautions, Up as Tolerated, Fall Risk  Required Braces or Orthoses?: No  Position Activity Restriction  Other position/activity restrictions: SBP goal <200  Subjective   General  Chart Reviewed: Yes  Response To Previous Treatment: Patient with no complaints from previous session. Family / Caregiver Present: No  Subjective  Subjective: RN and pt agreeable to therapy. Pt asleep in bed upon arrival, states \"I've had a rough night\"  General Comment  Comments: Pt left in chair following therapy, call light within reach. Pain Screening  Patient Currently in Pain: No  Vital Signs  Patient Currently in Pain: No       Orientation  Orientation  Overall Orientation Status: Within Functional Limits  Cognition      Objective   Bed mobility  Supine to Sit: Minimal assistance  Scooting: Minimal assistance  Comment: HOB elevated and vc's for hand placement. Transfers  Sit to Stand: Minimal Assistance  Stand to sit: Minimal Assistance  Comment: verbal cues for hand placement with transfers, lacks controlled decent when sitting  Ambulation  Ambulation?: Yes  Ambulation 1  Surface: level tile  Device: Rolling Walker  Assistance: Contact guard assistance  Quality of Gait: fwd flexed posture, narrow ELENA, no LOB noted  Gait Deviations: Slow Coleen;Decreased step length;Decreased step height  Distance: 60ft  Comments: vc's given for upright posture  Stairs/Curb  Stairs?: No     Balance  Sitting - Static: Good  Sitting - Dynamic: Good  Standing - Static: Good  Standing - Dynamic: Fair;+  Comments: standing balance assessed with RW  Exercises  Seated LE exercise program: Long Arc Quads, hip abduction/adduction, heel/toe raises, and marches. Reps: [id number] AAROM with R LE during marches.      Goals  Short term goals  Time Frame for Short term goals: 12 visits  Short term goal 1: Pt to complete bed mob

## 2020-02-12 NOTE — PROGRESS NOTES
Pt left with transport. She left with all of her belongings and all questions were answered. Family was notified.

## 2020-02-12 NOTE — DISCHARGE SUMMARY
Neurology Discharge Summary     Patient Identification:  Maty Costa is a 80 y.o. female. :  1930  Admit Date:  2020  Discharge date and time: 20  Attending Provider: Ethlyn Babinski, MD     Account Number: [de-identified]                                   Admission Diagnoses:   Cerebrovascular accident (CVA) determined by clinical assessment [facility]) [I63.9]    Discharge Diagnoses: Active Problems:    Chronic atrial fibrillation    Cerebrovascular accident (CVA) determined by clinical assessment ([facility]    Falling episodes    History of cerebral infarction    Aphasia  Resolved Problems:    * No resolved hospital problems. *  Probable seizure disorder with speech arrest and staring  . History of cerebral infarction .  Atrial fibrillation  No evidence of acute ischemic process    Discharge Medications:    Current Discharge Medication List           Details   levETIRAcetam (KEPPRA) 250 MG tablet Take 1 tablet by mouth 2 times daily  Qty: 60 tablet, Refills: 3              Details   potassium chloride (MICRO-K) 10 MEQ extended release capsule Take 10 mEq by mouth 2 times daily      ferrous sulfate 325 (65 Fe) MG tablet Take 1 tablet by mouth daily (with breakfast)  Qty: 180 tablet, Refills: 1      apixaban (ELIQUIS) 5 MG TABS tablet Take 5 mg by mouth 2 times daily      atorvastatin (LIPITOR) 20 MG tablet Take 1 tablet by mouth nightly  Qty: 30 tablet, Refills: 3      losartan (COZAAR) 25 MG tablet Take 1 tablet by mouth daily  Qty: 30 tablet, Refills: 3      aspirin 81 MG EC tablet Take 1 tablet by mouth daily  Qty: 30 tablet, Refills: 3      metoprolol tartrate (LOPRESSOR) 25 MG tablet Take 1 tablet by mouth 2 times daily  Qty: 60 tablet, Refills: 3      levothyroxine (SYNTHROID) 112 MCG tablet Take 1 tablet by mouth Daily  Qty: 90 tablet, Refills: 3      acetaminophen (TYLENOL) 500 MG tablet Take 500 mg by mouth every 6 hours as needed for Pain           Current Discharge Medication List -EEG was consistent with mild left-sided slowing more frontotemporal with occasional sharps. Keppra 1 g,started on keppra 250 bid. - continue asa 81/eliquis              - Folic acid 1mg BID              - lipitor 20mg nightly   Patient was discharged in a stable condition. Significant Diagnostics:   As above    Disposition:   ECF    Patient Instructions: Activity: activity as tolerated  Diet: regular diet  Follow-up with pcp in 1 week. Follow up with neurology in 4 weeks. Restrictions: Driving No, Swimming No, Operating heavy machinery No, Compromising heights No      Greater than 35 minutes were spent in discharging the patient.     Lei López MD

## 2020-02-12 NOTE — DISCHARGE INSTR - COC
Continuity of Care Form    Patient Name: Yvonne Baez   :  1930  MRN:  [id number]    Admit date:  2020  Discharge date:  ***    Code Status Order: Full Code   Advance Directives:   [facility] Documentation     Date/Time Healthcare Directive Type of Healthcare Directive Copy in [address] Agent's Name Healthcare Agent's Phone Number    [phone]  No, patient does not have an advance directive for healthcare treatment -- -- -- -- --          Admitting Physician:  Kim Baumann MD  PCP: Boyd Cavazos MD    Discharging Nurse: [facility] Unit/Room#: 7941/9421-27  Discharging Unit Phone Number: ***    Emergency Contact:   Extended Emergency Contact Information  Primary Emergency Contact: Jennifer Scott  [place] Phone: [phone]  Relation: Child  Secondary Emergency Contact: Chelsie Murphy [address] Phone: [phone]  Relation: Child  Hearing or visual needs: None  Other needs: None  Preferred language: English   needed?  No    Past Surgical History:  Past Surgical History:   Procedure Laterality Date    CARPAL TUNNEL RELEASE      Dr. Mey Garvey - right side    CATARACT REMOVAL WITH IMPLANT Bilateral     CHOLECYSTECTOMY      ERCP N/A 2019    Driss Birmingham MD - ERCP, Sphincterotomy, balloon dilatation and stent placement with removal of multiple stones    TOTAL KNEE ARTHROPLASTY Right     TOTAL KNEE ARTHROPLASTY Left     UPPER GASTROINTESTINAL ENDOSCOPY  2019    with stent removal by Dr. Donnell Cerna 2019    EGD STENT REMOVAL performed by Olivia Sneed MD at [address] [place] [address]       Immunization History:   Immunization History   Administered Date(s) Administered    Influenza Vaccine, unspecified formulation 2015    Influenza Virus Vaccine 10/01/2011, 10/01/2016, 10/01/2017    Influenza, Quadv, IM, (6 mo and older Fluzone, Flulaval, Fluarix and 3 Liquids  Daily Fluid Restriction: no  Last Modified Barium Swallow with Video (Video Swallowing Test): not done    Treatments at the Time of Hospital Discharge:   Respiratory Treatments: none  Oxygen Therapy:  is not on home oxygen therapy. Ventilator:    - No ventilator support    Rehab Therapies: Physical Therapy, Occupational Therapy and Speech/Language Therapy  Weight Bearing Status/Restrictions: No weight bearing restirctions  Other Medical Equipment (for information only, NOT a DME order):  walker and bath bench  Other Treatments:      Patient's personal belongings (please select all that are sent with patient):  jim SARAVIA SIGNATURE:  Electronically signed by Rogerio [place] on 2/12/20 at 3:41 PM    CASE MANAGEMENT/SOCIAL WORK SECTION    Inpatient Status Date: ***    Readmission Risk Assessment Score:  Readmission Risk              Risk of Unplanned Readmission:        15           Discharging to Facility/ Agency   · Name: Patterson Epley  [address] [place] [address]         Phone: [phone]       Fax: [phone]          Dialysis Facility (if applicable)   · Name:  · Address:  · Dialysis Schedule:  · Phone:  · Fax:    / signature: Electronically signed by Laly Sánchez RN on 2/12/20 at 3:56 PM    PHYSICIAN SECTION    Prognosis: Fair    Condition at Discharge: Stable    Rehab Potential (if transferring to Rehab): Fair    Recommended Labs or Other Treatments After Discharge: ***    Physician Certification: I certify the above information and transfer of Nehemias Casiano  is necessary for the continuing treatment of the diagnosis listed and that she requires [facility] for less 30 days.      Update Admission H&P: No change in H&P    PHYSICIAN SIGNATURE:  Electronically signed by Lynn Ceballos MD on 2/12/20 at 3:52 PM

## 2020-02-12 NOTE — PROGRESS NOTES
Department of Neurology                                         Resident progress note       Subjective:   2/11/20:Patient seen and examined. No issues overnight. EEG consistent with occasional left frontotemporal sharps with mild slowing.  2/12/20: Patient seen and examined. No issues overnight. Patient was bolused with Keppra 1 g yesterday and started on Keppra to 250 mg twice daily. CHIEF COMPLAINT:        Expressive aphasia, recurrent falls    HISTORY OF PRESENT ILLNESS:       The patient is a 80 y.o. female who presents with  Expressive aphasia, recurrent falls. Patient started to have dysarthria and specifically aphasia with last known well unknown. Patient was fine before going to bed last night. .  Patient also had complains of recurrent falls. Patient had a CT brain done at [facility] that was negative. She was given aspirin 243. Patient says she took aspirin 81 and Eliquis on the morning of presentation. Patient was transferred to [facility] for further evaluation and management. Glucose 180  Creatinine 0.90  -150. Past medical history hypertension, hyperlipidemia, A. fib, carotid stenosis, stroke. CTA head and neck was consistent with right V4 occlusion with reconstitution, asymmetric decrease opacification of V4 of right VA secondary to proximal occlusion. Proximal P2 right PCA 50% focal stenosis, bilateral ICA 75% stenosis. TTP questionable decreased perfusion in the right parieto-occipital lobe no significant mismatch. Patient had MRI brain done that was negative for any acute infarct.      PAST MEDICAL HISTORY :       Past Medical History:        Diagnosis Date    Acute kidney failure ([facility] 06/28/2019    Arthritis     Atrial fibrillation (HCC)     Calculus of gallbladder and bile duct without cholecystitis without obstruction     Cerebral infarction ([address]     CHF (congestive heart failure) (HCC)     [facility], [facility]     Fall     Gout     Gout     Hyperlipidemia     Hypertension     Hypokalemia 06/28/2019    Hypomagnesemia 06/28/2019    Hypothyroidism     Irritable bowel syndrome     Ischemia     muscle    Left carotid stenosis     Metabolic encephalopathy 96/36/8823    Nonrheumatic mitral (valve) insufficiency 06/28/2019    UTI (urinary tract infection)        Past Surgical History:        Procedure Laterality Date    CARPAL TUNNEL RELEASE  2012    Dr. Jimbo Kang - right side    CATARACT REMOVAL WITH IMPLANT Bilateral     CHOLECYSTECTOMY      ERCP N/A 2/11/2019    Driss Birmingham MD - ERCP, Sphincterotomy, balloon dilatation and stent placement with removal of multiple stones    TOTAL KNEE ARTHROPLASTY Right     TOTAL KNEE ARTHROPLASTY Left     UPPER GASTROINTESTINAL ENDOSCOPY  07/31/2019    with stent removal by Dr. Pina Reyes N/A 7/31/2019    EGD STENT REMOVAL performed by Soila Mahmood MD at [place] [address] History:   Social History     Socioeconomic History    Marital status:       Spouse name: Not on file    Number of children: Not on file    Years of education: Not on file    Highest education level: Not on file   Occupational History    Not on file   Social Needs    Financial resource strain: Not on file    Food insecurity:     Worry: Not on file     Inability: Not on file    Transportation needs:     Medical: Not on file     Non-medical: Not on file   Tobacco Use    Smoking status: Never Smoker    Smokeless tobacco: Never Used   Substance and Sexual Activity    Alcohol use: No    Drug use: No    Sexual activity: Not on file   Lifestyle    Physical activity:     Days per week: Not on file     Minutes per session: Not on file    Stress: Not on file   Relationships    Social connections:     Talks on phone: Not on file     Gets together: Not on file     Attends Christian service: Not on file     Active member of club or organization: Not on file     Attends meetings of clubs or organizations: Not on file     Relationship status: Not on file    Intimate partner violence:     Fear of current or ex partner: Not on file     Emotionally abused: Not on file     Physically abused: Not on file     Forced sexual activity: Not on file   Other Topics Concern    Not on file   Social History Narrative    Not on file       Family History:       Problem Relation Age of Onset    High Blood Pressure Mother     Heart Disease Father        Allergies:  Patient has no known allergies. Home Medications:  Prior to Admission medications    Medication Sig Start Date End Date Taking?  Authorizing Provider   potassium chloride (MICRO-K) 10 MEQ extended release capsule Take 10 mEq by mouth 2 times daily   Yes Historical Provider, MD   ferrous sulfate 325 (65 Fe) MG tablet Take 1 tablet by mouth daily (with breakfast) 11/5/19  Yes Jg Bowden MD   apixaban (ELIQUIS) 5 MG TABS tablet Take 5 mg by mouth 2 times daily   Yes Historical Provider, MD   atorvastatin (LIPITOR) 20 MG tablet Take 1 tablet by mouth nightly 6/21/19  Yes Shane Darling PA-C   losartan (COZAAR) 25 MG tablet Take 1 tablet by mouth daily 6/22/19  Yes Alanis Darling PA-C   aspirin 81 MG EC tablet Take 1 tablet by mouth daily 4/11/19  Yes Deena Habermann, MD   metoprolol tartrate (LOPRESSOR) 25 MG tablet Take 1 tablet by mouth 2 times daily 4/10/19  Yes Deena Habermann, MD   levothyroxine (SYNTHROID) 112 MCG tablet Take 1 tablet by mouth Daily 11/29/18  Yes Karel Akins MD   acetaminophen (TYLENOL) 500 MG tablet Take 500 mg by mouth every 6 hours as needed for Pain    Historical Provider, MD       Current Medications:   Current Facility-Administered Medications: labetalol (NORMODYNE;TRANDATE) injection 5 mg, 5 mg, Intravenous, Q6H PRN  acetaminophen (TYLENOL) tablet 500 mg, 500 mg, Oral, Q6H PRN  levETIRAcetam (KEPPRA) 250 mg in sodium chloride 0.9 % 100 mL IVPB, 250 mg, Intravenous, rigidity   NEUROLOGIC:    Mental status   Alert and oriented; intact memory with no confusion, dysarthria; no hallucinations or delusions     Cranial nerves   II - visual fields intact to confrontation                                                III, IV, VI - extra-ocular muscles full: no pupillary defect; no KIRSTY, no nystagmus, no ptosis,R 6th nerve palsy                                                                  V - normal facial sensation                                                               VII - normal facial symmetry                                                             VIII - intact hearing                                                                             IX, X - symmetrical palate                                                                  XI - symmetrical shoulder shrug                                                       XII - midline tongue without atrophy or fasciculation     Motor function  Normal muscle bulk and tone; normal power 5/5,  BLE 4-/5     Sensory function Intact to touch,proprioception  Decreased sensation RUE/RLE   Cerebellar Intact fine motor movement. No involuntary movements or tremors     Reflex function Intact 2+ DTR and symmetric with no pathologic reflex or Babinski sign     Gait                  Not tested           INITIAL NIH STROKE SCALE:    Time Performed:  13:15 PM     1a. Level of consciousness:  0 - alert; keenly responsive  1b. Level of consciousness questions: 0  1c. Level of consciousness questions:  0 - performs both tasks correctly  2. Best Gaze:  0 - normal  3. Visual:  0 - no visual loss  4. Facial Palsy:  0 - normal symmetric movement  5a. Motor left arm:  0 - no drift, limb holds 90 (or 45) degrees for full 10 seconds  5b. Motor right arm:  0 - no drift, limb holds 90 (or 45) degrees for full 10 seconds  6a. Motor left le  6b. Motor right le  7. Limb Ataxia:  0 - absent  8.     Sensory:  0 - normal; no sensory loss  9. Best Language:  0   10. Dysarthria:  1 - mild to moderate, patient slurs at least some words and at worst, can be understood with some difficulty  11. Extinction and Inattention:  0 - no abnormality    TOTAL: 1     SKIN:  no rash      LABS AND IMAGING:     CBC with Differential:    Lab Results   Component Value Date    WBC 6.4 02/12/2020    RBC 3.81 02/12/2020    RBC 4.34 04/23/2012    HGB 12.2 02/12/2020    HCT 39.5 02/12/2020     02/12/2020     04/23/2012    .7 02/12/2020    MCH 32.0 02/12/2020    MCHC 30.9 02/12/2020    RDW 13.6 02/12/2020    LYMPHOPCT 9 02/09/2020    MONOPCT 8 02/09/2020    BASOPCT 1 02/09/2020    MONOSABS 0.65 02/09/2020    LYMPHSABS 0.78 02/09/2020    EOSABS 0.03 02/09/2020    BASOSABS 0.04 02/09/2020    DIFFTYPE NOT REPORTED 02/09/2020     BMP:    Lab Results   Component Value Date     02/12/2020    K 3.4 02/12/2020     02/12/2020    CO2 22 02/12/2020    BUN 12 02/12/2020    LABALBU 4.4 02/09/2020    LABALBU 4.5 04/23/2012    CREATININE 0.86 02/12/2020    CALCIUM 8.8 02/12/2020    GFRAA >60 02/12/2020    LABGLOM >60 02/12/2020    GLUCOSE 91 02/12/2020    GLUCOSE 88 04/23/2012       Radiology Review:  As above      ASSESSMENT AND PLAN:       Patient Active Problem List   Diagnosis    Hypothyroidism    Hyperlipidemia    Hypertension    Gout    Arthritis    Left carotid artery stenosis    Cerebral infarction, unspecified ([address]    Chronic kidney disease, stage 3 (HCC)    Chronic atrial fibrillation    Severe mitral regurgitation by prior echocardiogram    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 ([address]    Cerebrovascular accident (CVA) determined by clinical assessment ([address]    Falling episodes    History of cerebral infarction       80 y.o. female who presents with dysarthria, expressive aphasia. Last known well unknown. No IV TPA was administered since she is on Eliquis at home and took this morning. CTA head and neck was consistent with right V4 occlusion with reconstitution, asymmetric            decrease opacification of V4 of right VA secondary to proximal occlusion. Proximal P2                   right PCA 50% focal stenosis, bilateral ICA 75% stenosis. TTP questionable decreased perfusion in the right parieto-occipital lobe no significant            mismatch. Patient had MRI brain done that was negative for any acute infarct. - ECHO,EF 50-58%. Wall is akinetic from mid to apex. Negative bubble.              -will get an EEG to rule out seizure,due to transient speech deficits and negative MRI Brain. -EEG was consistent with mild left-sided slowing more frontotemporal with occasional sharps. Keppra 1 g,started on keppra 250 bid. - continue asa 51/LLALIEP   - Folic acid 1mg BID   - lipitor 20mg nightly    - Fasting Lipid panel   - PT, OT, Speech eval    - Hydrate with cc bolus then IVF NS @ 75cc/hr    - Telemetry    - Neuro checks per protocol  - We recommend SBP normotension  - Blood glucose goal less than 180  - Please avoid dextrose containing solutions  -DVT Prophylaxis  -ok to discharge.       Lei López MD   2/12/2020  3:00 PM

## 2020-02-12 NOTE — CARE COORDINATION
Spoke with admissions at [facility], she was away from her desk but will review chart and make sure pt is able to return today if medically stable. [address] with Geraldo Gordon at [facility], they are able to accept pt.  Will set up transport    [facility] transport set up will arrive in 15-20 minutes    AVS faxed to Erica Mccormick updated on  time    Discharge 1 [facility] Case Management Department  Written by: America Carter RN    Patient Name: Darlene Correa  Attending Provider: Marce Aleman MD  Admit Date: 2020  1:14 PM  MRN: [id number]  Account: [de-identified]                     : 1930  Discharge Date: 2020 1605      Disposition: SN[facility] of Jonnathan Leonard RN

## 2020-02-14 ENCOUNTER — OFFICE VISIT (OUTPATIENT)
Dept: GASTROENTEROLOGY | Age: 85
End: 2020-02-14
Payer: MEDICARE

## 2020-02-14 VITALS — RESPIRATION RATE: 12 BRPM | HEART RATE: 111 BPM | DIASTOLIC BLOOD PRESSURE: 88 MMHG | SYSTOLIC BLOOD PRESSURE: 136 MMHG

## 2020-02-14 PROBLEM — R11.0 NAUSEA: Status: ACTIVE | Noted: 2020-02-14

## 2020-02-14 PROCEDURE — 99214 OFFICE O/P EST MOD 30 MIN: CPT | Performed by: INTERNAL MEDICINE

## 2020-02-14 PROCEDURE — 4040F PNEUMOC VAC/ADMIN/RCVD: CPT | Performed by: INTERNAL MEDICINE

## 2020-02-14 PROCEDURE — 1090F PRES/ABSN URINE INCON ASSESS: CPT | Performed by: INTERNAL MEDICINE

## 2020-02-14 PROCEDURE — 1111F DSCHRG MED/CURRENT MED MERGE: CPT | Performed by: INTERNAL MEDICINE

## 2020-02-14 PROCEDURE — G8427 DOCREV CUR MEDS BY ELIG CLIN: HCPCS | Performed by: INTERNAL MEDICINE

## 2020-02-14 PROCEDURE — G8417 CALC BMI ABV UP PARAM F/U: HCPCS | Performed by: INTERNAL MEDICINE

## 2020-02-14 PROCEDURE — G8484 FLU IMMUNIZE NO ADMIN: HCPCS | Performed by: INTERNAL MEDICINE

## 2020-02-14 PROCEDURE — 1123F ACP DISCUSS/DSCN MKR DOCD: CPT | Performed by: INTERNAL MEDICINE

## 2020-02-14 PROCEDURE — 1036F TOBACCO NON-USER: CPT | Performed by: INTERNAL MEDICINE

## 2020-02-14 RX ORDER — ONDANSETRON 2 MG/ML
4 INJECTION INTRAMUSCULAR; INTRAVENOUS
Status: ON HOLD | COMMUNITY
Start: 2020-02-09 | End: 2020-05-27 | Stop reason: HOSPADM

## 2020-02-14 RX ORDER — CHOLESTYRAMINE 4 G/9G
1 POWDER, FOR SUSPENSION ORAL
Qty: 90 PACKET | Refills: 3 | Status: SHIPPED | OUTPATIENT
Start: 2020-02-14

## 2020-02-14 ASSESSMENT — ENCOUNTER SYMPTOMS
ABDOMINAL DISTENTION: 0
VOICE CHANGE: 0
BACK PAIN: 0
RECTAL PAIN: 0
ALLERGIC/IMMUNOLOGIC NEGATIVE: 1
ANAL BLEEDING: 0
CHOKING: 0
NAUSEA: 1
DIARRHEA: 1
TROUBLE SWALLOWING: 0
CONSTIPATION: 0
SORE THROAT: 0
ABDOMINAL PAIN: 0
SINUS PRESSURE: 0
BLOOD IN STOOL: 0
VOMITING: 0
COUGH: 1
WHEEZING: 0

## 2020-02-14 NOTE — PROGRESS NOTES
Subjective:      Patient ID: Maty Costa is a 80 y.o. female. HPI     Dr. Jerry Galeazzi, MD our mutual patient Maty Costa was seen  for   1. Diarrhea, unspecified type    2. Nausea    3. Weak     . Here for f/u with caregiver  Has some diarrhea now  freq 304 / day  No bleeding  Was recently admitted at [place] after fall  Patient has been complaining of some abdominal pains, off and on cramping  Also complains of abdominal bloating and gas  Has off and on nausea without any sig vomiting  Has some alternating  diarrhea  Has no weight loss  Has some anxiety issues    Past Medical, Family, and Social History reviewed and does contribute to the patient presenting condition. patient\"s PMH/PSH,SH,PSYCH hx, MEDs, ALLERGIES, and ROS was all reviewed and updated ion the appropriate sections  Review of Systems   Constitutional: Positive for fatigue. Negative for appetite change and unexpected weight change. HENT: Positive for dental problem. Negative for postnasal drip, sinus pressure, sore throat, trouble swallowing and voice change. Eyes: Positive for visual disturbance. Respiratory: Positive for cough. Negative for choking and wheezing. Cardiovascular: Positive for leg swelling. Negative for chest pain and palpitations. Gastrointestinal: Positive for diarrhea and nausea. Negative for abdominal distention, abdominal pain, anal bleeding, blood in stool, constipation, rectal pain and vomiting. Denies   Endocrine: Negative. Genitourinary: Negative. Negative for difficulty urinating. Musculoskeletal: Positive for arthralgias and gait problem. Negative for back pain and myalgias. Skin: Negative. Allergic/Immunologic: Negative. Negative for environmental allergies and food allergies. Neurological: Negative for dizziness, weakness, light-headedness, numbness and headaches. Hematological: Negative. Does not bruise/bleed easily. Psychiatric/Behavioral: Negative.   Negative for sleep disturbance. The patient is not nervous/anxious. Reviewed and agree  Objective:   Physical Exam  Nursing note reviewed. Constitutional:       Appearance: She is well-developed. Comments: Weak and anxious  On wheel chair   HENT:      Head: Normocephalic and atraumatic. Eyes:      Conjunctiva/sclera: Conjunctivae normal.      Pupils: Pupils are equal, round, and reactive to light. Neck:      Musculoskeletal: Normal range of motion and neck supple. Cardiovascular:      Heart sounds: Normal heart sounds. Pulmonary:      Effort: Pulmonary effort is normal.      Breath sounds: Normal breath sounds. Abdominal:      General: Bowel sounds are normal.      Palpations: Abdomen is soft. Comments: Mild  TENDER, NON DISTENTED  LIVER SPLEEN AND HERNIAS ARE NOT  PALPABLE  BOWEL SOUNDS ARE POSITIVE      Musculoskeletal: Normal range of motion. Skin:     General: Skin is warm. Neurological:      Mental Status: She is alert and oriented to person, place, and time.    Psychiatric:         Behavior: Behavior normal.         Assessment:      Patient Active Problem List   Diagnosis    Hypothyroidism    Hyperlipidemia    Hypertension    Gout    Arthritis    Diarrhea    Left carotid artery stenosis    Cerebral infarction, unspecified ([address]    Chronic kidney disease, stage 3 (HCC)    Chronic atrial fibrillation    Severe mitral regurgitation by prior echocardiogram    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 ([address]    Cerebrovascular accident (CVA) determined by clinical assessment ([address]    Falling episodes    History of cerebral infarction    Aphasia    Nausea           Plan:      Trial of Questran only when has diarrhea    The patient was instructed to start taking some OTC Probiotics products   These are available over the counter at the Pharmacy stores and Grocery stores  He was explained about the beneficial effects they have in the GI track  They will help to establish the good bacterial griselda and will help with the digestion and bowel movements  The patient has verbalized understanding and agreement to this plan      Pt was advised in detail about some life style and dietary modifications. She was advised about avoidance of caffeine, nicotine and chocolate. Pt was also told to stay away from any kind of fast foods, soda pops. She was also advised to avoid lots of spices, grease and fried food etc.     Instructions were also given about trying to arrange the timing, quality and quantity of food. Instructions were given about using ample amount of fiber including dietary and supplemental fiber either metamucil, bennafiber or citrucell etc.  Pt was advised about drinking ample amount of water without any colors or chemicals. Stress was given about regular exercise. Pt has verbalized understanding and agreement to these modifications.

## 2020-02-15 ENCOUNTER — HOSPITAL ENCOUNTER (EMERGENCY)
Age: 85
Discharge: HOME | End: 2020-02-15
Attending: EMERGENCY MEDICINE
Payer: MEDICARE

## 2020-02-15 ENCOUNTER — APPOINTMENT (OUTPATIENT)
Dept: CT IMAGING | Age: 85
End: 2020-02-15
Payer: MEDICARE

## 2020-02-15 VITALS
DIASTOLIC BLOOD PRESSURE: 101 MMHG | OXYGEN SATURATION: 100 % | SYSTOLIC BLOOD PRESSURE: 149 MMHG | BODY MASS INDEX: 31.41 KG/M2 | RESPIRATION RATE: 18 BRPM | HEIGHT: 60 IN | TEMPERATURE: 98.4 F | HEART RATE: 94 BPM | WEIGHT: 160 LBS

## 2020-02-15 LAB
-: ABNORMAL
ABSOLUTE EOS #: 0.08 K/UL (ref 0–0.44)
ABSOLUTE IMMATURE GRANULOCYTE: 0 K/UL (ref 0–0.3)
ABSOLUTE LYMPH #: 0.42 K/UL (ref 1.1–3.7)
ABSOLUTE MONO #: 0.23 K/UL (ref 0.1–1.2)
ALBUMIN SERPL-MCNC: 4.1 G/DL (ref 3.5–5.2)
ALBUMIN/GLOBULIN RATIO: 1.8 (ref 1–2.5)
ALP BLD-CCNC: 94 U/L (ref 35–104)
ALT SERPL-CCNC: 16 U/L (ref 5–33)
AMORPHOUS: ABNORMAL
ANION GAP SERPL CALCULATED.3IONS-SCNC: 15 MMOL/L (ref 9–17)
AST SERPL-CCNC: 32 U/L
ATYPICAL LYMPHOCYTE ABSOLUTE COUNT: 0.11 K/UL
ATYPICAL LYMPHOCYTES: 3 %
BACTERIA: ABNORMAL
BASOPHILS # BLD: 0 % (ref 0–2)
BASOPHILS ABSOLUTE: 0 K/UL (ref 0–0.2)
BILIRUB SERPL-MCNC: 0.69 MG/DL (ref 0.3–1.2)
BILIRUBIN DIRECT: <0.08 MG/DL
BILIRUBIN URINE: NEGATIVE
BILIRUBIN, INDIRECT: ABNORMAL MG/DL (ref 0–1)
BNP INTERPRETATION: ABNORMAL
BUN BLDV-MCNC: 16 MG/DL (ref 8–23)
BUN/CREAT BLD: 17 (ref 9–20)
CALCIUM SERPL-MCNC: 9.1 MG/DL (ref 8.6–10.4)
CASTS UA: ABNORMAL /LPF
CHLORIDE BLD-SCNC: 105 MMOL/L (ref 98–107)
CO2: 24 MMOL/L (ref 20–31)
COLOR: YELLOW
COMMENT UA: ABNORMAL
CREAT SERPL-MCNC: 0.94 MG/DL (ref 0.5–0.9)
CRYSTALS, UA: ABNORMAL /HPF
DIFFERENTIAL TYPE: ABNORMAL
EKG ATRIAL RATE: 416 BPM
EKG Q-T INTERVAL: 390 MS
EKG QRS DURATION: 86 MS
EKG QTC CALCULATION (BAZETT): 466 MS
EKG R AXIS: 7 DEGREES
EKG T AXIS: -68 DEGREES
EKG VENTRICULAR RATE: 86 BPM
EOSINOPHILS RELATIVE PERCENT: 2 % (ref 1–4)
EPITHELIAL CELLS UA: ABNORMAL /HPF (ref 0–25)
GFR AFRICAN AMERICAN: >60 ML/MIN
GFR NON-AFRICAN AMERICAN: 56 ML/MIN
GFR SERPL CREATININE-BSD FRML MDRD: ABNORMAL ML/MIN/{1.73_M2}
GFR SERPL CREATININE-BSD FRML MDRD: ABNORMAL ML/MIN/{1.73_M2}
GLOBULIN: ABNORMAL G/DL (ref 1.5–3.8)
GLUCOSE BLD-MCNC: 132 MG/DL (ref 70–99)
GLUCOSE URINE: NEGATIVE
HCT VFR BLD CALC: 41.1 % (ref 36.3–47.1)
HEMOGLOBIN: 13 G/DL (ref 11.9–15.1)
IMMATURE GRANULOCYTES: 0 %
INR BLD: 1.1 (ref 0.9–1.2)
KEPPRA: 13 UG/ML
KETONES, URINE: NEGATIVE
LEUKOCYTE ESTERASE, URINE: NEGATIVE
LIPASE: 45 U/L (ref 13–60)
LYMPHOCYTES # BLD: 11 % (ref 24–43)
MAGNESIUM: 1.7 MG/DL (ref 1.6–2.6)
MCH RBC QN AUTO: 32.4 PG (ref 25.2–33.5)
MCHC RBC AUTO-ENTMCNC: 31.6 G/DL (ref 28.4–34.8)
MCV RBC AUTO: 102.5 FL (ref 82.6–102.9)
MONOCYTES # BLD: 6 % (ref 3–12)
MORPHOLOGY: NORMAL
MUCUS: ABNORMAL
NITRITE, URINE: NEGATIVE
NRBC AUTOMATED: 0 PER 100 WBC
OTHER OBSERVATIONS UA: ABNORMAL
PARTIAL THROMBOPLASTIN TIME: 28.8 SEC (ref 23.2–34.4)
PDW BLD-RTO: 13.4 % (ref 11.8–14.4)
PH UA: 5 (ref 5–9)
PLATELET # BLD: ABNORMAL K/UL (ref 138–453)
PLATELET ESTIMATE: ABNORMAL
PLATELET, FLUORESCENCE: 123 K/UL (ref 138–453)
PLATELET, IMMATURE FRACTION: 5.5 % (ref 1.1–10.3)
PMV BLD AUTO: ABNORMAL FL (ref 8.1–13.5)
POTASSIUM SERPL-SCNC: 3.5 MMOL/L (ref 3.7–5.3)
PRO-BNP: 1739 PG/ML
PROTEIN UA: ABNORMAL
PROTHROMBIN TIME: 11.5 SEC (ref 9.7–12.2)
RBC # BLD: 4.01 M/UL (ref 3.95–5.11)
RBC # BLD: ABNORMAL 10*6/UL
RBC UA: ABNORMAL /HPF (ref 0–2)
RENAL EPITHELIAL, UA: ABNORMAL /HPF
SEG NEUTROPHILS: 78 % (ref 36–65)
SEGMENTED NEUTROPHILS ABSOLUTE COUNT: 2.96 K/UL (ref 1.5–8.1)
SODIUM BLD-SCNC: 144 MMOL/L (ref 135–144)
SPECIFIC GRAVITY UA: 1.02 (ref 1.01–1.02)
TOTAL PROTEIN: 6.4 G/DL (ref 6.4–8.3)
TRICHOMONAS: ABNORMAL
TROPONIN INTERP: ABNORMAL
TROPONIN T: ABNORMAL NG/ML
TROPONIN, HIGH SENSITIVITY: 29 NG/L (ref 0–14)
TURBIDITY: ABNORMAL
URINE HGB: NEGATIVE
UROBILINOGEN, URINE: NORMAL
WBC # BLD: 3.8 K/UL (ref 3.5–11.3)
WBC # BLD: ABNORMAL 10*3/UL
WBC UA: ABNORMAL /HPF (ref 0–5)
YEAST: ABNORMAL

## 2020-02-15 PROCEDURE — 70450 CT HEAD/BRAIN W/O DYE: CPT

## 2020-02-15 PROCEDURE — 96365 THER/PROPH/DIAG IV INF INIT: CPT

## 2020-02-15 PROCEDURE — 93005 ELECTROCARDIOGRAM TRACING: CPT | Performed by: EMERGENCY MEDICINE

## 2020-02-15 PROCEDURE — 2580000003 HC RX 258: Performed by: EMERGENCY MEDICINE

## 2020-02-15 PROCEDURE — 6370000000 HC RX 637 (ALT 250 FOR IP): Performed by: EMERGENCY MEDICINE

## 2020-02-15 PROCEDURE — 80048 BASIC METABOLIC PNL TOTAL CA: CPT

## 2020-02-15 PROCEDURE — 83690 ASSAY OF LIPASE: CPT

## 2020-02-15 PROCEDURE — 84484 ASSAY OF TROPONIN QUANT: CPT

## 2020-02-15 PROCEDURE — 85025 COMPLETE CBC W/AUTO DIFF WBC: CPT

## 2020-02-15 PROCEDURE — 83880 ASSAY OF NATRIURETIC PEPTIDE: CPT

## 2020-02-15 PROCEDURE — 99285 EMERGENCY DEPT VISIT HI MDM: CPT

## 2020-02-15 PROCEDURE — 85730 THROMBOPLASTIN TIME PARTIAL: CPT

## 2020-02-15 PROCEDURE — 81001 URINALYSIS AUTO W/SCOPE: CPT

## 2020-02-15 PROCEDURE — 80076 HEPATIC FUNCTION PANEL: CPT

## 2020-02-15 PROCEDURE — 85610 PROTHROMBIN TIME: CPT

## 2020-02-15 PROCEDURE — 85055 RETICULATED PLATELET ASSAY: CPT

## 2020-02-15 PROCEDURE — 83735 ASSAY OF MAGNESIUM: CPT

## 2020-02-15 PROCEDURE — 80177 DRUG SCRN QUAN LEVETIRACETAM: CPT

## 2020-02-15 PROCEDURE — 94664 DEMO&/EVAL PT USE INHALER: CPT

## 2020-02-15 PROCEDURE — 36415 COLL VENOUS BLD VENIPUNCTURE: CPT

## 2020-02-15 PROCEDURE — 93010 ELECTROCARDIOGRAM REPORT: CPT | Performed by: INTERNAL MEDICINE

## 2020-02-15 PROCEDURE — 6360000002 HC RX W HCPCS: Performed by: EMERGENCY MEDICINE

## 2020-02-15 RX ORDER — METOPROLOL TARTRATE 50 MG/1
25 TABLET, FILM COATED ORAL ONCE
Status: COMPLETED | OUTPATIENT
Start: 2020-02-15 | End: 2020-02-15

## 2020-02-15 RX ORDER — LOSARTAN POTASSIUM 25 MG/1
25 TABLET ORAL DAILY
Status: DISCONTINUED | OUTPATIENT
Start: 2020-02-15 | End: 2020-02-15 | Stop reason: HOSPADM

## 2020-02-15 RX ORDER — 0.9 % SODIUM CHLORIDE 0.9 %
250 INTRAVENOUS SOLUTION INTRAVENOUS ONCE
Status: COMPLETED | OUTPATIENT
Start: 2020-02-15 | End: 2020-02-15

## 2020-02-15 RX ORDER — MAGNESIUM SULFATE 1 G/100ML
1 INJECTION INTRAVENOUS ONCE
Status: COMPLETED | OUTPATIENT
Start: 2020-02-15 | End: 2020-02-15

## 2020-02-15 RX ORDER — IPRATROPIUM BROMIDE AND ALBUTEROL SULFATE 2.5; .5 MG/3ML; MG/3ML
1 SOLUTION RESPIRATORY (INHALATION)
Status: DISCONTINUED | OUTPATIENT
Start: 2020-02-15 | End: 2020-02-15

## 2020-02-15 RX ORDER — IPRATROPIUM BROMIDE AND ALBUTEROL SULFATE 2.5; .5 MG/3ML; MG/3ML
1 SOLUTION RESPIRATORY (INHALATION) ONCE
Status: COMPLETED | OUTPATIENT
Start: 2020-02-15 | End: 2020-02-15

## 2020-02-15 RX ORDER — ONDANSETRON 4 MG/1
4 TABLET, FILM COATED ORAL EVERY 6 HOURS PRN
COMMUNITY

## 2020-02-15 RX ORDER — POLYETHYLENE GLYCOL 3350 17 G/17G
17 POWDER, FOR SOLUTION ORAL DAILY PRN
COMMUNITY

## 2020-02-15 RX ADMIN — MAGNESIUM SULFATE HEPTAHYDRATE 1 G: 1 INJECTION, SOLUTION INTRAVENOUS at 11:20

## 2020-02-15 RX ADMIN — LOSARTAN POTASSIUM 25 MG: 25 TABLET, FILM COATED ORAL at 12:15

## 2020-02-15 RX ADMIN — POTASSIUM BICARBONATE 20 MEQ: 782 TABLET, EFFERVESCENT ORAL at 11:20

## 2020-02-15 RX ADMIN — IPRATROPIUM BROMIDE AND ALBUTEROL SULFATE 1 AMPULE: .5; 3 SOLUTION RESPIRATORY (INHALATION) at 14:00

## 2020-02-15 RX ADMIN — METOPROLOL TARTRATE 25 MG: 50 TABLET ORAL at 12:11

## 2020-02-15 RX ADMIN — SODIUM CHLORIDE 250 ML: 9 INJECTION, SOLUTION INTRAVENOUS at 11:20

## 2020-02-15 ASSESSMENT — ENCOUNTER SYMPTOMS
ABDOMINAL PAIN: 0
SHORTNESS OF BREATH: 0
EYE REDNESS: 0

## 2020-02-15 NOTE — ED NOTES
Bed: 03  Expected date:   Expected time:   Means of arrival:   Comments:  EMS       Tesfaye Murphy RN  02/15/20 [id number]

## 2020-02-15 NOTE — ED PROVIDER NOTES
daily    LEVOTHYROXINE (SYNTHROID) 112 MCG TABLET    Take 1 tablet by mouth Daily    LOSARTAN (COZAAR) 25 MG TABLET    Take 1 tablet by mouth daily    METOPROLOL TARTRATE (LOPRESSOR) 25 MG TABLET    Take 1 tablet by mouth 2 times daily    ONDANSETRON (ZOFRAN) 4 MG TABLET    Take 4 mg by mouth every 6 hours as needed for Nausea or Vomiting    ONDANSETRON (ZOFRAN) 4 MG/2ML INJECTION    Infuse 4 mg intravenously    POLYETHYLENE GLYCOL (GLYCOLAX) PACKET    Take 17 g by mouth daily as needed for Constipation    POTASSIUM CHLORIDE (MICRO-K) 10 MEQ EXTENDED RELEASE CAPSULE    Take 10 mEq by mouth 2 times daily       ALLERGIES     Patient has no known allergies. FAMILY HISTORY       Family History   Problem Relation Age of Onset    High Blood Pressure Mother     Heart Disease Father           SOCIAL HISTORY       Social History     Socioeconomic History    Marital status:       Spouse name: None    Number of children: None    Years of education: None    Highest education level: None   Occupational History    None   Social Needs    Financial resource strain: None    Food insecurity:     Worry: None     Inability: None    Transportation needs:     Medical: None     Non-medical: None   Tobacco Use    Smoking status: Never Smoker    Smokeless tobacco: Never Used   Substance and Sexual Activity    Alcohol use: No    Drug use: No    Sexual activity: None   Lifestyle    Physical activity:     Days per week: None     Minutes per session: None    Stress: None   Relationships    Social connections:     Talks on phone: None     Gets together: None     Attends [employer] service: None     Active member of club or organization: None     Attends meetings of clubs or organizations: None     Relationship status: None    Intimate partner violence:     Fear of current or ex partner: None     Emotionally abused: None     Physically abused: None     Forced sexual activity: None   Other Topics Concern    None Bilateral lower extremity weakness which is baseline for her. Skin:     General: Skin is warm and dry. Coloration: Skin is pale. Findings: No rash. Neurological:      Mental Status: She is alert. Sensory: No sensory deficit. Comments: Mild a fascia. There is no facial aches motor strength is 5/5 in both upper extremities. She cannot lift either leg above the cart against gravity. DIAGNOSTIC RESULTS     EKG: All EKG's are interpreted by the Emergency Department Physician who either signs orCo-signs this chart in the absence of a cardiologist.    Atrial fibrillation with a rate of 86 beats a minute. Normal axis. Low voltage QRS complex. Inferolateral T wave inversions. RADIOLOGY:   Non-plain film images such as CT, Ultrasound and MRI are read by the radiologist. Plain radiographic images are visualized and preliminarily interpreted by the emergency physician with the below findings:    Interpretation per the Radiologist below, ifavailable at the time of this note:    CT Head WO Contrast   Final Result   No convincing evidence for acute intracranial pathology. Chronic small vessel disease.                ED BEDSIDE ULTRASOUND:   Performed by ED Physician - none    LABS:  Labs Reviewed   BASIC METABOLIC PANEL - Abnormal; Notable for the following components:       Result Value    Glucose 132 (*)     CREATININE 0.94 (*)     Potassium 3.5 (*)     GFR Non- 56 (*)     All other components within normal limits   BRAIN NATRIURETIC PEPTIDE - Abnormal; Notable for the following components:    Pro-BNP 1,739 (*)     All other components within normal limits   HEPATIC FUNCTION PANEL - Abnormal; Notable for the following components:    AST 32 (*)     All other components within normal limits   TROPONIN - Abnormal; Notable for the following components:    Troponin, High Sensitivity 29 (*)     All other components within normal limits   CBC WITH AUTO DIFFERENTIAL - Abnormal; Notable for the following components:    Seg Neutrophils 78 (*)     Lymphocytes 11 (*)     Absolute Lymph # 0.42 (*)     All other components within normal limits   URINE RT REFLEX TO CULTURE - Abnormal; Notable for the following components:    Turbidity UA SLIGHTLY CLOUDY (*)     Specific Gravity, UA 1.025 (*)     Protein, UA 2+ (*)     All other components within normal limits   IMMATURE PLATELET FRACTION - Abnormal; Notable for the following components:    Platelet, Fluorescence 123 (*)     All other components within normal limits   MICROSCOPIC URINALYSIS - Abnormal; Notable for the following components:    Bacteria, UA 1+ (*)     Amorphous, UA 2+ (*)     All other components within normal limits   LIPASE   APTT   PROTIME-INR   MAGNESIUM   LEVETIRACETAM LEVEL       All other labs were within normal range ornot returned as of this dictation. EMERGENCY DEPARTMENT COURSE and DIFFERENTIAL DIAGNOSIS/MDM:   Vitals:    Vitals:    02/15/20 1211 02/15/20 1405 02/15/20 1407 02/15/20 1436   BP: (!) 147/108 (!) 146/88 (!) 143/92 (!) 149/101   Pulse: 94      Resp:       Temp:       TempSrc:       SpO2:       Weight:       Height:                Patient does not have any new neurologic deficit. Her pupils are constricted but they do react to light and equal.  CT brain does not show acute bleed. Her neurologic deficits are old including her speech impediment. She does have some minor electrolyte disorders which were corrected in the ED. She seems a little behind in her fluids and is given a 250 mL bolus of normal saline. Patient went to the bathroom and when she got back she was wheezing a little and was administered a DuoNeb aerosol treatment for that. She is felt stable for discharge to her extended care facility and is to follow-up with her primary care physician early next week. MDM    CONSULTS:  None    PROCEDURES:  Unlessotherwise noted below, none     Procedures    FINAL IMPRESSION      1.  General weakness DISPOSITION/PLAN   DISPOSITION Decision To Discharge 02/15/2020 12:38:44 PM      PATIENT REFERRED TO:  Clifford Lewis MD  [facility]  [address] [id number] [phone]    In 2 days        DISCHARGE MEDICATIONS:         Problem List:  Patient Active Problem List   Diagnosis Code    Hypothyroidism E03.9    Hyperlipidemia E78.5    Hypertension I10    Gout M10.9    Arthritis M19.90    Diarrhea R19.7    Left carotid artery stenosis I65.22    Cerebral infarction, unspecified ([facility] [address] I63.9    Chronic kidney disease, stage 3 (HCC) N18.3    Chronic atrial fibrillation I48.20    Severe mitral regurgitation by prior echocardiogram I34.0    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 ([facility]) I50.32, I38    Cerebrovascular accident (CVA) determined by clinical assessment ([facility] I63.9    Falling episodes R29.6    History of cerebral infarction Z86.73    Aphasia R47.01    Nausea R11.0           Summation      Patient Course: Discharged. ED Medicationsadministered this visit:    Medications   losartan (COZAAR) tablet 25 mg (25 mg Oral Given 2/15/20 1215)   potassium bicarb-citric acid (EFFER-K) effervescent tablet 20 mEq (20 mEq Oral Given 2/15/20 1120)   magnesium sulfate 1 g in dextrose 5% 100 mL IVPB (0 g Intravenous Stopped 2/15/20 1204)   0.9 % sodium chloride bolus (0 mLs Intravenous Stopped 2/15/20 1154)   metoprolol tartrate (LOPRESSOR) tablet 25 mg (25 mg Oral Given 2/15/20 1211)   ipratropium-albuterol (DUONEB) nebulizer solution 1 ampule (1 ampule Inhalation Given 2/15/20 1400)       New Prescriptions from this visit:    New Prescriptions    No medications on file       Follow-up:  Clifford Lewis MD  [address] [address] [phone]    In 2 days          Final Impression:   1.  General weakness               (Please note that portions of this note were completed with a voice recognitionprogram.  Efforts were made to edit the dictations but occasionally words are mis-transcribed.)    Alejandro Linares MD (electronically signed)  Attending Emergency Physician            Alejandro Linares MD  02/15/20 1987

## 2020-03-06 ENCOUNTER — HOSPITAL ENCOUNTER (OUTPATIENT)
Age: 85
Setting detail: SPECIMEN
Discharge: HOME | End: 2020-03-06
Payer: MEDICARE

## 2020-03-06 LAB
ALBUMIN SERPL-MCNC: 3.8 G/DL (ref 3.5–5.2)
ALBUMIN/GLOBULIN RATIO: 1.8 (ref 1–2.5)
ALP BLD-CCNC: 102 U/L (ref 35–104)
ALT SERPL-CCNC: 9 U/L (ref 5–33)
ANION GAP SERPL CALCULATED.3IONS-SCNC: 13 MMOL/L (ref 9–17)
AST SERPL-CCNC: 14 U/L
BILIRUB SERPL-MCNC: 0.65 MG/DL (ref 0.3–1.2)
BUN BLDV-MCNC: 17 MG/DL (ref 8–23)
BUN/CREAT BLD: 21 (ref 9–20)
CALCIUM SERPL-MCNC: 8.8 MG/DL (ref 8.6–10.4)
CHLORIDE BLD-SCNC: 107 MMOL/L (ref 98–107)
CO2: 22 MMOL/L (ref 20–31)
CREAT SERPL-MCNC: 0.82 MG/DL (ref 0.5–0.9)
GFR AFRICAN AMERICAN: >60 ML/MIN
GFR NON-AFRICAN AMERICAN: >60 ML/MIN
GFR SERPL CREATININE-BSD FRML MDRD: ABNORMAL ML/MIN/{1.73_M2}
GFR SERPL CREATININE-BSD FRML MDRD: ABNORMAL ML/MIN/{1.73_M2}
GLUCOSE BLD-MCNC: 82 MG/DL (ref 70–99)
HCT VFR BLD CALC: 39.7 % (ref 36.3–47.1)
HEMOGLOBIN: 12.1 G/DL (ref 11.9–15.1)
MCH RBC QN AUTO: 32.2 PG (ref 25.2–33.5)
MCHC RBC AUTO-ENTMCNC: 30.5 G/DL (ref 28.4–34.8)
MCV RBC AUTO: 105.6 FL (ref 82.6–102.9)
NRBC AUTOMATED: 0 PER 100 WBC
PDW BLD-RTO: 14 % (ref 11.8–14.4)
PLATELET # BLD: 150 K/UL (ref 138–453)
PMV BLD AUTO: 12 FL (ref 8.1–13.5)
POTASSIUM SERPL-SCNC: 4.1 MMOL/L (ref 3.7–5.3)
RBC # BLD: 3.76 M/UL (ref 3.95–5.11)
SODIUM BLD-SCNC: 142 MMOL/L (ref 135–144)
TOTAL PROTEIN: 5.9 G/DL (ref 6.4–8.3)
WBC # BLD: 5 K/UL (ref 3.5–11.3)

## 2020-03-06 PROCEDURE — 85027 COMPLETE CBC AUTOMATED: CPT

## 2020-03-06 PROCEDURE — 80053 COMPREHEN METABOLIC PANEL: CPT

## 2020-03-06 PROCEDURE — P9604 ONE-WAY ALLOW PRORATED TRIP: HCPCS

## 2020-03-06 PROCEDURE — 36415 COLL VENOUS BLD VENIPUNCTURE: CPT

## 2020-05-21 ENCOUNTER — HOSPITAL ENCOUNTER (OUTPATIENT)
Age: 85
Setting detail: SPECIMEN
Discharge: HOME | End: 2020-05-21
Payer: MEDICARE

## 2020-05-21 LAB
ALBUMIN SERPL-MCNC: 3.8 G/DL (ref 3.5–5.2)
ALBUMIN/GLOBULIN RATIO: 1.9 (ref 1–2.5)
ALP BLD-CCNC: 108 U/L (ref 35–104)
ALT SERPL-CCNC: 12 U/L (ref 5–33)
ANION GAP SERPL CALCULATED.3IONS-SCNC: 10 MMOL/L (ref 9–17)
AST SERPL-CCNC: 17 U/L
BILIRUB SERPL-MCNC: 0.39 MG/DL (ref 0.3–1.2)
BUN BLDV-MCNC: 19 MG/DL (ref 8–23)
BUN/CREAT BLD: 21 (ref 9–20)
CALCIUM SERPL-MCNC: 9.4 MG/DL (ref 8.6–10.4)
CHLORIDE BLD-SCNC: 108 MMOL/L (ref 98–107)
CHOLESTEROL/HDL RATIO: 2.5
CHOLESTEROL: 98 MG/DL
CO2: 26 MMOL/L (ref 20–31)
CREAT SERPL-MCNC: 0.9 MG/DL (ref 0.5–0.9)
GFR AFRICAN AMERICAN: >60 ML/MIN
GFR NON-AFRICAN AMERICAN: 59 ML/MIN
GFR SERPL CREATININE-BSD FRML MDRD: ABNORMAL ML/MIN/{1.73_M2}
GFR SERPL CREATININE-BSD FRML MDRD: ABNORMAL ML/MIN/{1.73_M2}
GLUCOSE BLD-MCNC: 83 MG/DL (ref 70–99)
HDLC SERPL-MCNC: 40 MG/DL
LDL CHOLESTEROL: 47 MG/DL (ref 0–130)
POTASSIUM SERPL-SCNC: 5.2 MMOL/L (ref 3.7–5.3)
SODIUM BLD-SCNC: 144 MMOL/L (ref 135–144)
TOTAL PROTEIN: 5.8 G/DL (ref 6.4–8.3)
TRIGL SERPL-MCNC: 55 MG/DL
VLDLC SERPL CALC-MCNC: ABNORMAL MG/DL (ref 1–30)

## 2020-05-21 PROCEDURE — 36415 COLL VENOUS BLD VENIPUNCTURE: CPT

## 2020-05-21 PROCEDURE — 80061 LIPID PANEL: CPT

## 2020-05-21 PROCEDURE — P9603 ONE-WAY ALLOW PRORATED MILES: HCPCS

## 2020-05-21 PROCEDURE — 80053 COMPREHEN METABOLIC PANEL: CPT

## 2020-05-25 ENCOUNTER — APPOINTMENT (OUTPATIENT)
Dept: CT IMAGING | Age: 85
End: 2020-05-25
Payer: MEDICARE

## 2020-05-25 ENCOUNTER — HOSPITAL ENCOUNTER (INPATIENT)
Age: 85
LOS: 2 days | Discharge: NURSING, REHAB OR LONG-TERM CARE | DRG: 069 | End: 2020-05-27
Attending: PSYCHIATRY & NEUROLOGY | Admitting: PSYCHIATRY & NEUROLOGY
Payer: MEDICARE

## 2020-05-25 ENCOUNTER — HOSPITAL ENCOUNTER (EMERGENCY)
Age: 85
Discharge: ANOTHER HOSPITAL | End: 2020-05-25
Attending: EMERGENCY MEDICINE
Payer: MEDICARE

## 2020-05-25 VITALS
OXYGEN SATURATION: 98 % | SYSTOLIC BLOOD PRESSURE: 148 MMHG | HEART RATE: 96 BPM | TEMPERATURE: 98.5 F | RESPIRATION RATE: 16 BRPM | DIASTOLIC BLOOD PRESSURE: 94 MMHG

## 2020-05-25 LAB
-: ABNORMAL
ABSOLUTE EOS #: 0.15 K/UL (ref 0–0.44)
ABSOLUTE IMMATURE GRANULOCYTE: <0.03 K/UL (ref 0–0.3)
ABSOLUTE LYMPH #: 0.94 K/UL (ref 1.1–3.7)
ABSOLUTE MONO #: 0.66 K/UL (ref 0.1–1.2)
ALBUMIN SERPL-MCNC: 4.3 G/DL (ref 3.5–5.2)
ALBUMIN/GLOBULIN RATIO: 1.8 (ref 1–2.5)
ALP BLD-CCNC: 119 U/L (ref 35–104)
ALT SERPL-CCNC: 14 U/L (ref 5–33)
AMORPHOUS: ABNORMAL
ANION GAP SERPL CALCULATED.3IONS-SCNC: 14 MMOL/L (ref 9–17)
AST SERPL-CCNC: 22 U/L
BACTERIA: ABNORMAL
BASOPHILS # BLD: 1 % (ref 0–2)
BASOPHILS ABSOLUTE: 0.04 K/UL (ref 0–0.2)
BILIRUB SERPL-MCNC: 0.56 MG/DL (ref 0.3–1.2)
BILIRUBIN URINE: NEGATIVE
BUN BLDV-MCNC: 24 MG/DL (ref 8–23)
BUN/CREAT BLD: 26 (ref 9–20)
CALCIUM SERPL-MCNC: 9.7 MG/DL (ref 8.6–10.4)
CASTS UA: ABNORMAL /LPF
CHLORIDE BLD-SCNC: 102 MMOL/L (ref 98–107)
CHOLESTEROL/HDL RATIO: 2.8
CHOLESTEROL: 110 MG/DL
CO2: 23 MMOL/L (ref 20–31)
COLOR: YELLOW
COMMENT UA: ABNORMAL
CREAT SERPL-MCNC: 0.94 MG/DL (ref 0.5–0.9)
CRYSTALS, UA: ABNORMAL /HPF
DIFFERENTIAL TYPE: ABNORMAL
EKG ATRIAL RATE: 312 BPM
EKG Q-T INTERVAL: 328 MS
EKG QRS DURATION: 70 MS
EKG QTC CALCULATION (BAZETT): 449 MS
EKG R AXIS: 38 DEGREES
EKG T AXIS: -46 DEGREES
EKG VENTRICULAR RATE: 113 BPM
EOSINOPHILS RELATIVE PERCENT: 2 % (ref 1–4)
EPITHELIAL CELLS UA: ABNORMAL /HPF (ref 0–25)
GFR AFRICAN AMERICAN: >60 ML/MIN
GFR NON-AFRICAN AMERICAN: 56 ML/MIN
GFR SERPL CREATININE-BSD FRML MDRD: ABNORMAL ML/MIN/{1.73_M2}
GFR SERPL CREATININE-BSD FRML MDRD: ABNORMAL ML/MIN/{1.73_M2}
GLUCOSE BLD-MCNC: 102 MG/DL (ref 70–99)
GLUCOSE URINE: NEGATIVE
HCT VFR BLD CALC: 46.4 % (ref 36.3–47.1)
HDLC SERPL-MCNC: 40 MG/DL
HEMOGLOBIN: 14.4 G/DL (ref 11.9–15.1)
IMMATURE GRANULOCYTES: 0 %
INR BLD: 1.3
KETONES, URINE: NEGATIVE
LACTIC ACID, WHOLE BLOOD: ABNORMAL MMOL/L (ref 0.7–2.1)
LACTIC ACID: 2.6 MMOL/L (ref 0.5–2.2)
LDL CHOLESTEROL: 55 MG/DL (ref 0–130)
LEUKOCYTE ESTERASE, URINE: ABNORMAL
LYMPHOCYTES # BLD: 14 % (ref 24–43)
MCH RBC QN AUTO: 32.3 PG (ref 25.2–33.5)
MCHC RBC AUTO-ENTMCNC: 31 G/DL (ref 28.4–34.8)
MCV RBC AUTO: 104 FL (ref 82.6–102.9)
MONOCYTES # BLD: 10 % (ref 3–12)
MUCUS: ABNORMAL
NITRITE, URINE: POSITIVE
NRBC AUTOMATED: 0 PER 100 WBC
OTHER OBSERVATIONS UA: ABNORMAL
PARTIAL THROMBOPLASTIN TIME: 33.2 SEC (ref 24–36)
PDW BLD-RTO: 12.8 % (ref 11.8–14.4)
PH UA: 5.5 (ref 5–9)
PLATELET # BLD: 173 K/UL (ref 138–453)
PLATELET ESTIMATE: ABNORMAL
PMV BLD AUTO: 11.3 FL (ref 8.1–13.5)
POTASSIUM SERPL-SCNC: 4.5 MMOL/L (ref 3.7–5.3)
PROTEIN UA: ABNORMAL
PROTHROMBIN TIME: 16.1 SEC (ref 11.8–14.6)
RBC # BLD: 4.46 M/UL (ref 3.95–5.11)
RBC # BLD: ABNORMAL 10*6/UL
RBC UA: ABNORMAL /HPF (ref 0–2)
RENAL EPITHELIAL, UA: ABNORMAL /HPF
SEG NEUTROPHILS: 73 % (ref 36–65)
SEGMENTED NEUTROPHILS ABSOLUTE COUNT: 4.79 K/UL (ref 1.5–8.1)
SODIUM BLD-SCNC: 139 MMOL/L (ref 135–144)
SPECIFIC GRAVITY UA: 1.02 (ref 1.01–1.02)
TOTAL PROTEIN: 6.7 G/DL (ref 6.4–8.3)
TRICHOMONAS: ABNORMAL
TRIGL SERPL-MCNC: 74 MG/DL
TROPONIN INTERP: ABNORMAL
TROPONIN T: ABNORMAL NG/ML
TROPONIN, HIGH SENSITIVITY: 21 NG/L (ref 0–14)
TROPONIN, HIGH SENSITIVITY: 24 NG/L (ref 0–14)
TROPONIN, HIGH SENSITIVITY: 31 NG/L (ref 0–14)
TURBIDITY: CLEAR
URINE HGB: ABNORMAL
UROBILINOGEN, URINE: NORMAL
VLDLC SERPL CALC-MCNC: ABNORMAL MG/DL (ref 1–30)
WBC # BLD: 6.6 K/UL (ref 3.5–11.3)
WBC # BLD: ABNORMAL 10*3/UL
WBC UA: ABNORMAL /HPF (ref 0–5)
YEAST: ABNORMAL

## 2020-05-25 PROCEDURE — 85025 COMPLETE CBC W/AUTO DIFF WBC: CPT

## 2020-05-25 PROCEDURE — 96374 THER/PROPH/DIAG INJ IV PUSH: CPT

## 2020-05-25 PROCEDURE — 6370000000 HC RX 637 (ALT 250 FOR IP): Performed by: STUDENT IN AN ORGANIZED HEALTH CARE EDUCATION/TRAINING PROGRAM

## 2020-05-25 PROCEDURE — 2580000003 HC RX 258: Performed by: EMERGENCY MEDICINE

## 2020-05-25 PROCEDURE — 81001 URINALYSIS AUTO W/SCOPE: CPT

## 2020-05-25 PROCEDURE — 83605 ASSAY OF LACTIC ACID: CPT

## 2020-05-25 PROCEDURE — 6360000004 HC RX CONTRAST MEDICATION: Performed by: EMERGENCY MEDICINE

## 2020-05-25 PROCEDURE — 87077 CULTURE AEROBIC IDENTIFY: CPT

## 2020-05-25 PROCEDURE — 99223 1ST HOSP IP/OBS HIGH 75: CPT | Performed by: PSYCHIATRY & NEUROLOGY

## 2020-05-25 PROCEDURE — 85610 PROTHROMBIN TIME: CPT

## 2020-05-25 PROCEDURE — 87040 BLOOD CULTURE FOR BACTERIA: CPT

## 2020-05-25 PROCEDURE — 92523 SPEECH SOUND LANG COMPREHEN: CPT

## 2020-05-25 PROCEDURE — 70498 CT ANGIOGRAPHY NECK: CPT

## 2020-05-25 PROCEDURE — 85730 THROMBOPLASTIN TIME PARTIAL: CPT

## 2020-05-25 PROCEDURE — 99285 EMERGENCY DEPT VISIT HI MDM: CPT

## 2020-05-25 PROCEDURE — 80061 LIPID PANEL: CPT

## 2020-05-25 PROCEDURE — 99222 1ST HOSP IP/OBS MODERATE 55: CPT | Performed by: PSYCHIATRY & NEUROLOGY

## 2020-05-25 PROCEDURE — 93010 ELECTROCARDIOGRAM REPORT: CPT | Performed by: INTERNAL MEDICINE

## 2020-05-25 PROCEDURE — 80053 COMPREHEN METABOLIC PANEL: CPT

## 2020-05-25 PROCEDURE — 87186 SC STD MICRODIL/AGAR DIL: CPT

## 2020-05-25 PROCEDURE — 83036 HEMOGLOBIN GLYCOSYLATED A1C: CPT

## 2020-05-25 PROCEDURE — 84484 ASSAY OF TROPONIN QUANT: CPT

## 2020-05-25 PROCEDURE — 70450 CT HEAD/BRAIN W/O DYE: CPT

## 2020-05-25 PROCEDURE — 92610 EVALUATE SWALLOWING FUNCTION: CPT

## 2020-05-25 PROCEDURE — 93005 ELECTROCARDIOGRAM TRACING: CPT | Performed by: EMERGENCY MEDICINE

## 2020-05-25 PROCEDURE — 2060000000 HC ICU INTERMEDIATE R&B

## 2020-05-25 PROCEDURE — 2580000003 HC RX 258: Performed by: STUDENT IN AN ORGANIZED HEALTH CARE EDUCATION/TRAINING PROGRAM

## 2020-05-25 PROCEDURE — 36415 COLL VENOUS BLD VENIPUNCTURE: CPT

## 2020-05-25 PROCEDURE — 87086 URINE CULTURE/COLONY COUNT: CPT

## 2020-05-25 PROCEDURE — 6360000002 HC RX W HCPCS: Performed by: EMERGENCY MEDICINE

## 2020-05-25 RX ORDER — DOCUSATE SODIUM 100 MG/1
100 CAPSULE, LIQUID FILLED ORAL DAILY
COMMUNITY

## 2020-05-25 RX ORDER — ATORVASTATIN CALCIUM 20 MG/1
20 TABLET, FILM COATED ORAL NIGHTLY
Status: DISCONTINUED | OUTPATIENT
Start: 2020-05-25 | End: 2020-05-27 | Stop reason: HOSPADM

## 2020-05-25 RX ORDER — SODIUM CHLORIDE 0.9 % (FLUSH) 0.9 %
10 SYRINGE (ML) INJECTION PRN
Status: DISCONTINUED | OUTPATIENT
Start: 2020-05-25 | End: 2020-05-27 | Stop reason: HOSPADM

## 2020-05-25 RX ORDER — LANOLIN ALCOHOL/MO/W.PET/CERES
325 CREAM (GRAM) TOPICAL
Status: DISCONTINUED | OUTPATIENT
Start: 2020-05-26 | End: 2020-05-27 | Stop reason: HOSPADM

## 2020-05-25 RX ORDER — LEVETIRACETAM 250 MG/1
250 TABLET ORAL 2 TIMES DAILY
Status: DISCONTINUED | OUTPATIENT
Start: 2020-05-25 | End: 2020-05-25

## 2020-05-25 RX ORDER — POLYETHYLENE GLYCOL 3350 17 G/17G
17 POWDER, FOR SOLUTION ORAL DAILY PRN
Status: DISCONTINUED | OUTPATIENT
Start: 2020-05-25 | End: 2020-05-27 | Stop reason: HOSPADM

## 2020-05-25 RX ORDER — 0.9 % SODIUM CHLORIDE 0.9 %
500 INTRAVENOUS SOLUTION INTRAVENOUS ONCE
Status: COMPLETED | OUTPATIENT
Start: 2020-05-25 | End: 2020-05-25

## 2020-05-25 RX ORDER — POLYETHYLENE GLYCOL 3350 17 G/17G
17 POWDER, FOR SOLUTION ORAL DAILY PRN
Status: DISCONTINUED | OUTPATIENT
Start: 2020-05-25 | End: 2020-05-25

## 2020-05-25 RX ORDER — ASPIRIN 81 MG/1
81 TABLET ORAL DAILY
Status: DISCONTINUED | OUTPATIENT
Start: 2020-05-25 | End: 2020-05-27 | Stop reason: HOSPADM

## 2020-05-25 RX ORDER — PROMETHAZINE HYDROCHLORIDE 25 MG/1
12.5 TABLET ORAL EVERY 6 HOURS PRN
Status: DISCONTINUED | OUTPATIENT
Start: 2020-05-25 | End: 2020-05-25

## 2020-05-25 RX ORDER — LEVETIRACETAM 500 MG/1
500 TABLET ORAL 2 TIMES DAILY
Status: DISCONTINUED | OUTPATIENT
Start: 2020-05-25 | End: 2020-05-27 | Stop reason: HOSPADM

## 2020-05-25 RX ORDER — CHOLESTYRAMINE LIGHT 4 G/5.7G
4 POWDER, FOR SUSPENSION ORAL 3 TIMES DAILY
Status: DISCONTINUED | OUTPATIENT
Start: 2020-05-25 | End: 2020-05-27 | Stop reason: HOSPADM

## 2020-05-25 RX ORDER — ONDANSETRON 4 MG/1
4 TABLET, FILM COATED ORAL EVERY 6 HOURS PRN
Status: DISCONTINUED | OUTPATIENT
Start: 2020-05-25 | End: 2020-05-27 | Stop reason: HOSPADM

## 2020-05-25 RX ORDER — SODIUM CHLORIDE 0.9 % (FLUSH) 0.9 %
10 SYRINGE (ML) INJECTION EVERY 12 HOURS SCHEDULED
Status: DISCONTINUED | OUTPATIENT
Start: 2020-05-25 | End: 2020-05-27 | Stop reason: HOSPADM

## 2020-05-25 RX ORDER — LEVOTHYROXINE SODIUM 112 UG/1
112 TABLET ORAL DAILY
Status: DISCONTINUED | OUTPATIENT
Start: 2020-05-25 | End: 2020-05-27 | Stop reason: HOSPADM

## 2020-05-25 RX ORDER — LABETALOL HYDROCHLORIDE 5 MG/ML
10 INJECTION, SOLUTION INTRAVENOUS EVERY 10 MIN PRN
Status: DISCONTINUED | OUTPATIENT
Start: 2020-05-25 | End: 2020-05-26

## 2020-05-25 RX ORDER — POTASSIUM CHLORIDE 750 MG/1
10 CAPSULE, EXTENDED RELEASE ORAL 2 TIMES DAILY
Status: DISCONTINUED | OUTPATIENT
Start: 2020-05-25 | End: 2020-05-27 | Stop reason: HOSPADM

## 2020-05-25 RX ORDER — CHOLESTYRAMINE 4 G/9G
1 POWDER, FOR SUSPENSION ORAL
Status: DISCONTINUED | OUTPATIENT
Start: 2020-05-25 | End: 2020-05-25 | Stop reason: CLARIF

## 2020-05-25 RX ORDER — ONDANSETRON 2 MG/ML
4 INJECTION INTRAMUSCULAR; INTRAVENOUS EVERY 6 HOURS PRN
Status: DISCONTINUED | OUTPATIENT
Start: 2020-05-25 | End: 2020-05-25

## 2020-05-25 RX ORDER — DOCUSATE SODIUM 100 MG/1
100 CAPSULE, LIQUID FILLED ORAL DAILY
Status: DISCONTINUED | OUTPATIENT
Start: 2020-05-25 | End: 2020-05-27 | Stop reason: HOSPADM

## 2020-05-25 RX ADMIN — SODIUM CHLORIDE 500 ML: 9 INJECTION, SOLUTION INTRAVENOUS at 07:00

## 2020-05-25 RX ADMIN — POTASSIUM CHLORIDE 10 MEQ: 10 CAPSULE, COATED, EXTENDED RELEASE ORAL at 20:41

## 2020-05-25 RX ADMIN — Medication 81 MG: at 13:04

## 2020-05-25 RX ADMIN — METOPROLOL TARTRATE 25 MG: 25 TABLET ORAL at 13:04

## 2020-05-25 RX ADMIN — SODIUM CHLORIDE, PRESERVATIVE FREE 10 ML: 5 INJECTION INTRAVENOUS at 20:41

## 2020-05-25 RX ADMIN — CEFTRIAXONE 1 G: 1 INJECTION, POWDER, FOR SOLUTION INTRAMUSCULAR; INTRAVENOUS at 07:01

## 2020-05-25 RX ADMIN — IOPAMIDOL 75 ML: 755 INJECTION, SOLUTION INTRAVENOUS at 06:54

## 2020-05-25 RX ADMIN — METOPROLOL TARTRATE 25 MG: 25 TABLET ORAL at 20:41

## 2020-05-25 RX ADMIN — LEVETIRACETAM 500 MG: 500 TABLET, FILM COATED ORAL at 20:41

## 2020-05-25 RX ADMIN — SODIUM CHLORIDE, PRESERVATIVE FREE 10 ML: 5 INJECTION INTRAVENOUS at 13:05

## 2020-05-25 RX ADMIN — LEVOTHYROXINE SODIUM 112 MCG: 112 TABLET ORAL at 13:04

## 2020-05-25 RX ADMIN — ATORVASTATIN CALCIUM 20 MG: 20 TABLET, FILM COATED ORAL at 20:41

## 2020-05-25 RX ADMIN — POTASSIUM CHLORIDE 10 MEQ: 10 CAPSULE, COATED, EXTENDED RELEASE ORAL at 13:04

## 2020-05-25 RX ADMIN — APIXABAN 5 MG: 5 TABLET, FILM COATED ORAL at 13:05

## 2020-05-25 RX ADMIN — LEVETIRACETAM 250 MG: 250 TABLET, FILM COATED ORAL at 13:04

## 2020-05-25 RX ADMIN — DOCUSATE SODIUM 100 MG: 100 CAPSULE, LIQUID FILLED ORAL at 13:05

## 2020-05-25 RX ADMIN — APIXABAN 5 MG: 5 TABLET, FILM COATED ORAL at 20:41

## 2020-05-25 ASSESSMENT — PAIN SCALES - GENERAL
PAINLEVEL_OUTOF10: 0
PAINLEVEL_OUTOF10: 0

## 2020-05-25 ASSESSMENT — ENCOUNTER SYMPTOMS
BACK PAIN: 0
VOMITING: 0
WHEEZING: 0
ABDOMINAL PAIN: 0
COLOR CHANGE: 0
SHORTNESS OF BREATH: 0
NAUSEA: 0

## 2020-05-25 NOTE — PROGRESS NOTES
Speech Language Pathology  Facility/Department: [address]  Initial Speech/Language/Cognitive Assessment    NAME: Piotr Khalil  : 1930   MRN: [id number]  ADMISSION DATE: 2020  ADMITTING DIAGNOSIS: has Hypothyroidism; Hyperlipidemia; Hypertension; Gout; Arthritis; Diarrhea; Left carotid artery stenosis; Cerebral infarction, unspecified ([address] Chronic kidney disease, stage 3 ([address] Chronic atrial fibrillation; Severe mitral regurgitation by prior echocardiogram; 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 ([address] Cerebrovascular accident (CVA) determined by clinical assessment ([facility] [address] Falling episodes; History of cerebral infarction; Aphasia; and Nausea on their problem list.      Date of Eval: 2020   Evaluating Therapist: KESHIA Lainez    RECENT RESULTS  CT OF HEAD/MRI:   Impression   No acute intracranial abnormality.  Specifically, no acute intracranial   hemorrhage or mass effect.       Chronic small vessel ischemic disease. Primary Complaint:  Piotr Khalil is a 80 y.o. female who presents with strokelike symptoms. New dysarthria as well as a facial droop on the right side. Last known well is 9:30 PM when the patient went to sleep. Woke up around 4:30 - 5 pm at the nursing home facility that she is act and was found to have the dysarthria and facial droop. The nursing home facility also had some concern for right-sided weakness, patient did not have any drift of the bilateral upper or lower extremities. does not report any sensory deficits either. Patient reports baseline issues with her right lower extremity which she states are not new. History of atrial fibrillation, already on anticoagulation as well as aspirin. Denies any chest pain shortness of breath difficulty breathing. Denies any nausea vomiting. History of previous stroke as well.     Pain:0/10       Assessment:    Pt presents with no apparent new cognitive deficits at this time.  Pt. Unable to repeat 5 units 3/5, however reports she would not have been able to do this prior. Pt. Reports she has some difficulty hearing, however when pt. Hears questions she is able to answer correctly. No dysarthria noted, no oral motor deficits noted. Pt. Reports speech is baseline. No further ST is recommended. Verbal and written education provided. Recommendations:  Requires SLP Intervention: No  Duration/Frequency of Treatment: no need           Patient/family involved in developing goals and treatment plan: yes    Subjective:   Previous level of function and limitations: assisted living  General  Chart Reviewed: Yes  Social/Functional History  Lives With: (assisted living)  Hearing  Hearing: Exceptions to [facility]           Objective:     Oral/Motor  Oral Motor: Within functional limits      Motor Speech  Motor Speech:  Within Functional Limits         Cognition:      Orientation  Overall Orientation Status: Within Normal Limits  Attention  Attention: Within Functional Limits  Memory  Memory: Within Funtional Limits  Immediate recall 3/3, 3/5  Problem Solving  Problem Solving: Within Functional Limits  Abstract Reasoning  Abstract Reasoning: Within Functional Limits  Safety/Judgement  Safety/Judgement: Within Functional Limits    Prognosis:  Speech Therapy Prognosis  Prognosis: Good    Education:  Patient Education: yes  Patient Education Response: Verbalizes understanding          Therapy Time:   Individual Concurrent Group Co-treatment   Time In 1210         Time Out 1222         Minutes 12                 Sheri Arshad SLP  5/25/2020 12:40 PM

## 2020-05-25 NOTE — PLAN OF CARE
Pt resting in bed comfortably. Bed locked in lowest position. Side rails up x2. Call light within reach. Will continue to monitor.

## 2020-05-25 NOTE — H&P
Diagnosis Date    Acute kidney failure ([facility] [address] 06/28/2019    Anemia     Aphasia     Arthritis     Atrial fibrillation (HCC)     Bile duct calculus     Calculus of gallbladder and bile duct without cholecystitis without obstruction     Cerebral infarction ([facility] [facility]     CHF (congestive heart failure) (HCC)     Chronic kidney disease, stage 3 (HCC)     CVA (cerebral vascular accident) ([facility] [facility]     Fall     Gout     Gout     Gout     Hyperlipidemia     Hypertension     Hypokalemia 06/28/2019    Hypokalemia     Hypomagnesemia 06/28/2019    Hypomagnesemia     Hypothyroidism     IBS (irritable bowel syndrome)     Irritable bowel syndrome     Ischemia     muscle    Left carotid stenosis     Metabolic encephalopathy 90/81/9467    Mitral valve insufficiency     Nonrheumatic mitral (valve) insufficiency 06/28/2019    Seizures (HCC)     TIA (transient ischemic attack)     UTI (urinary tract infection)         Past Surgical History:     Past Surgical History:   Procedure Laterality Date    CARPAL TUNNEL RELEASE  2012    Dr. Sofya Mitchell - right side    CATARACT REMOVAL WITH IMPLANT Bilateral     CHOLECYSTECTOMY      ERCP N/A 2/11/2019    Driss Birmingham MD - ERCP, Sphincterotomy, balloon dilatation and stent placement with removal of multiple stones    TOTAL KNEE ARTHROPLASTY Right     TOTAL KNEE ARTHROPLASTY Left     UPPER GASTROINTESTINAL ENDOSCOPY  07/31/2019    with stent removal by Dr. Jewel Cardenas 7/31/2019    EGD STENT REMOVAL performed by Celina Hernandez MD at [address] [place] [address]        Medications Prior to Admission:     Prior to Admission medications    Medication Sig Start Date End Date Taking?  Authorizing Provider   docusate sodium (COLACE) 100 MG capsule Take 100 mg by mouth daily    Historical Provider, MD   polyethylene glycol (GLYCOLAX) packet Take 17 g by mouth daily as needed for Constipation    Historical Provider, MD   ondansetron (ZOFRAN) 4 MG tablet Take 4 mg by mouth every 6 hours as needed for Nausea or Vomiting    Historical Provider, MD   ondansetron (ZOFRAN) 4 MG/2ML injection Infuse 4 mg intravenously 2/9/20   Historical Provider, MD   cholestyramine (QUESTRAN) 4 g packet Take 1 packet by mouth 3 times daily (with meals) 2/14/20   Gabe Callejas MD   levETIRAcetam (KEPPRA) 250 MG tablet Take 1 tablet by mouth 2 times daily 2/12/20   Juan Daniel Miranda MD   potassium chloride (MICRO-K) 10 MEQ extended release capsule Take 10 mEq by mouth 2 times daily    Historical Provider, MD   ferrous sulfate 325 (65 Fe) MG tablet Take 1 tablet by mouth daily (with breakfast) 11/5/19   Gabe Callejas MD   apixaban (ELIQUIS) 5 MG TABS tablet Take 5 mg by mouth 2 times daily    Historical Provider, MD   atorvastatin (LIPITOR) 20 MG tablet Take 1 tablet by mouth nightly 6/21/19   Cat Darling PA-C   losartan (COZAAR) 25 MG tablet Take 1 tablet by mouth daily 6/22/19   Cat Darling PA-C   aspirin 81 MG EC tablet Take 1 tablet by mouth daily 4/11/19   Audi Lloyd MD   metoprolol tartrate (LOPRESSOR) 25 MG tablet Take 1 tablet by mouth 2 times daily 4/10/19   Audi Lloyd MD   levothyroxine (SYNTHROID) 112 MCG tablet Take 1 tablet by mouth Daily 11/29/18   Malgorzata Irizarry MD   acetaminophen (TYLENOL) 500 MG tablet Take 500 mg by mouth 2 times daily     Historical Provider, MD        Allergies:     Patient has no known allergies. Social History:     Tobacco:    reports that she has never smoked. She has never used smokeless tobacco.  Alcohol:      reports no history of alcohol use. Drug Use:  reports no history of drug use.     Family History:     Family History   Problem Relation Age of Onset    High Blood Pressure Mother     Heart Disease Father        Review of Systems:     ROS:  Constitutional  Negative for fever and chills    HEENT  Negative for ear discharge, ear pain, nosebleed    Eyes  Negative for photophobia, pain and discharge    Respiratory  Negative Protime 16.1 (H) 11.8 - 14.6 sec    INR 1.3    APTT    Collection Time: 05/25/20  6:15 AM   Result Value Ref Range    PTT 33.2 24.0 - 36.0 sec   Troponin    Collection Time: 05/25/20  6:15 AM   Result Value Ref Range    Troponin, High Sensitivity 31 (H) 0 - 14 ng/L    Troponin T NOT REPORTED <0.03 ng/mL    Troponin Interp NOT REPORTED    Urinalysis Reflex to Culture    Collection Time: 05/25/20  6:25 AM   Result Value Ref Range    Color, UA YELLOW YELLOW    Turbidity UA CLEAR CLEAR    Glucose, Ur NEGATIVE NEGATIVE    Bilirubin Urine NEGATIVE NEGATIVE    Ketones, Urine NEGATIVE NEGATIVE    Specific Gravity, UA 1.025 (H) 1.010 - 1.020    Urine Hgb TRACE (A) NEGATIVE    pH, UA 5.5 5.0 - 9.0    Protein, UA 2+ (A) NEGATIVE    Urobilinogen, Urine Normal Normal    Nitrite, Urine POSITIVE (A) NEGATIVE    Leukocyte Esterase, Urine MODERATE (A) NEGATIVE    Urinalysis Comments NOT REPORTED    Microscopic Urinalysis    Collection Time: 05/25/20  6:25 AM   Result Value Ref Range    -          WBC, UA 10 TO 20 0 - 5 /HPF    RBC, UA 0 TO 2 0 - 2 /HPF    Casts UA NOT REPORTED /LPF    Crystals, UA NOT REPORTED None /HPF    Epithelial Cells UA 5 TO 10 0 - 25 /HPF    Renal Epithelial, UA NOT REPORTED 0 /HPF    Bacteria, UA 2+ (A) None    Mucus, UA NOT REPORTED None    Trichomonas, UA NOT REPORTED None    Amorphous, UA NOT REPORTED None    Other Observations UA NOT REPORTED NOT REQ.     Yeast, UA NOT REPORTED None   EKG 12 Lead    Collection Time: 05/25/20  6:38 AM   Result Value Ref Range    Ventricular Rate 113 BPM    Atrial Rate 312 BPM    QRS Duration 70 ms    Q-T Interval 328 ms    QTc Calculation (Bazett) 449 ms    R Axis 38 degrees    T Axis -46 degrees   Lactic acid, plasma    Collection Time: 05/25/20  7:15 AM   Result Value Ref Range    Lactic Acid 2.6 (H) 0.5 - 2.2 mmol/L    Lactic Acid, Whole Blood NOT REPORTED 0.7 - 2.1 mmol/L         Assessment :      Primary Problem  Expressive aphasia    There are no active hospital

## 2020-05-25 NOTE — ED PROVIDER NOTES
[facility] ED  Emergency Department Encounter  EmergencyMedicine Attending     Pt Name:Mitzy Ferguson  MRN: [id number]  Birthdate 5/2/1930  Date of evaluation: 5/25/20  PCP:  Axel Millard MD    [address] [place] [address]       Chief Complaint   Patient presents with    Altered Mental Status     from [facility], per report, pt woke up around 430 am to use bathroom and staff noted right sided weakness and facial droop. Last known well around 930 pm last night       HISTORY OF PRESENT ILLNESS  (Location/Symptom, Timing/Onset, Context/Setting, Quality, Duration, Modifying Factors, Severity.)      Albino Bridges is a 80 y.o. female who presents with strokelike symptoms. New dysarthria as well as a facial droop on the right side. Last known well is 9:30 PM when the patient went to sleep. Woke up around 4:30 - 5 pm at the nursing home facility that she is act and was found to have the dysarthria and facial droop. The nursing home facility also had some concern for right-sided weakness, patient did not have any drift of the bilateral upper or lower extremities. does not report any sensory deficits either. Patient reports baseline issues with her right lower extremity which she states are not new. History of atrial fibrillation, already on anticoagulation as well as aspirin. Denies any chest pain shortness of breath difficulty breathing. Denies any nausea vomiting. History of previous stroke as well.     PAST MEDICAL / SURGICAL / SOCIAL / FAMILY HISTORY      has a past medical history of Acute kidney failure ([address] Arthritis, Atrial fibrillation ([address] Calculus of gallbladder and bile duct without cholecystitis without obstruction, Cerebral infarction ([address] CHF (congestive heart failure) ([address] Chronic kidney disease, stage 3 ([address] Fall, Gout, Gout, Hyperlipidemia, Hypertension, Hypokalemia, Hypomagnesemia, Hypothyroidism, Irritable bowel syndrome, Ischemia, Left carotid stenosis, Metabolic encephalopathy, Nonrheumatic mitral (valve) insufficiency, and UTI (urinary tract infection). has a past surgical history that includes Cataract removal with implant (Bilateral); Cholecystectomy; Carpal tunnel release (2012); ERCP (N/A, 2/11/2019); Total knee arthroplasty (Right); Total knee arthroplasty (Left); Upper gastrointestinal endoscopy (07/31/2019); and Upper gastrointestinal endoscopy (N/A, 7/31/2019). Social History     Socioeconomic History    Marital status:      Spouse name: Not on file    Number of children: Not on file    Years of education: Not on file    Highest education level: Not on file   Occupational History    Not on file   Social Needs    Financial resource strain: Not on file    Food insecurity     Worry: Not on file     Inability: Not on file    Transportation needs     Medical: Not on file     Non-medical: Not on file   Tobacco Use    Smoking status: Never Smoker    Smokeless tobacco: Never Used   Substance and Sexual Activity    Alcohol use: No    Drug use: No    Sexual activity: Not on file   Lifestyle    Physical activity     Days per week: Not on file     Minutes per session: Not on file    Stress: Not on file   Relationships    Social connections     Talks on phone: Not on file     Gets together: Not on file     Attends Latter-day service: Not on file     Active member of club or organization: Not on file     Attends meetings of clubs or organizations: Not on file     Relationship status: Not on file    Intimate partner violence     Fear of current or ex partner: Not on file     Emotionally abused: Not on file     Physically abused: Not on file     Forced sexual activity: Not on file   Other Topics Concern    Not on file   Social History Narrative    Not on file       Family History   Problem Relation Age of Onset    High Blood Pressure Mother     Heart Disease Father        Allergies:  Patient has no known allergies.     Home Medications:  Prior to Negative for abdominal pain, nausea and vomiting. Genitourinary: Negative for dysuria. Musculoskeletal: Negative for back pain. Skin: Negative for color change. Neurological: Positive for facial asymmetry and speech difficulty. Negative for weakness, numbness and headaches. Psychiatric/Behavioral: Negative for agitation. PHYSICAL EXAM   (up to 7 for level 4, 8 or more for level 5)      INITIAL VITALS:   BP (!) 182/119   Pulse 114   Temp 98.5 °F (36.9 °C) (Tympanic)   Resp 22   SpO2 93%     Physical Exam  Vitals signs reviewed. Constitutional:       General: She is not in acute distress. Appearance: She is well-developed. HENT:      Head: Normocephalic and atraumatic. Right Ear: There is no impacted cerumen. Left Ear: There is no impacted cerumen. Mouth/Throat:      Pharynx: No oropharyngeal exudate or posterior oropharyngeal erythema. Cardiovascular:      Rate and Rhythm: Normal rate. Rhythm irregular. Heart sounds: Normal heart sounds. No murmur. No friction rub. No gallop. Pulmonary:      Effort: Pulmonary effort is normal. No respiratory distress. Breath sounds: Normal breath sounds. No wheezing or rales. Abdominal:      General: There is no distension. Palpations: Abdomen is soft. Tenderness: There is no abdominal tenderness. There is no guarding or rebound. Musculoskeletal:         General: No tenderness. Skin:     General: Skin is warm. Findings: No erythema. Neurological:      Mental Status: She is alert. Comments: Dysarthric speech. Right-sided facial droop. Able to ambulate to the bedside commode. No drift of the bilateral upper extremities. No sensory deficits of the bilateral upper or lower extremities. No sensory deficits of the face. Baseline weakness of the right lower extremity compared to the left which patient states is her baseline and unchanged from her baseline. No drift of the left lower extremity. Was able to stand up and go to the bedside commode.     Oriented to person, place, age, month, date         DIFFERENTIAL  DIAGNOSIS     PLAN (Jimbo Sotomayor / Monico Me / EKG):  Orders Placed This Encounter   Procedures    Culture, Urine    Culture, Blood 1    Culture, Blood 1    CT Head WO Contrast    CTA HEAD NECK W CONTRAST    CBC auto differential    Comprehensive Metabolic Panel    Protime-INR    APTT    Troponin    Urinalysis Reflex to Culture    Microscopic Urinalysis    Lactic acid, plasma    EKG 12 Lead       MEDICATIONS ORDERED:  Orders Placed This Encounter   Medications    0.9 % sodium chloride bolus    iopamidol (ISOVUE-370) 76 % injection 75 mL    cefTRIAXone (ROCEPHIN) 1 g in sterile water 10 mL IV syringe       DDX: Acute stroke versus hemorrhagic stroke versus ischemic stroke versus atypical ACS versus A. fib with RVR     DIAGNOSTIC RESULTS / EMERGENCY DEPARTMENT COURSE / MDM   :  Results for orders placed or performed during the hospital encounter of 05/25/20   CBC auto differential   Result Value Ref Range    WBC 6.6 3.5 - 11.3 k/uL    RBC 4.46 3.95 - 5.11 m/uL    Hemoglobin 14.4 11.9 - 15.1 g/dL    Hematocrit 46.4 36.3 - 47.1 %    .0 (H) 82.6 - 102.9 fL    MCH 32.3 25.2 - 33.5 pg    MCHC 31.0 28.4 - 34.8 g/dL    RDW 12.8 11.8 - 14.4 %    Platelets 920 205 - 127 k/uL    MPV 11.3 8.1 - 13.5 fL    NRBC Automated 0.0 0.0 per 100 WBC    Differential Type NOT REPORTED     Seg Neutrophils 73 (H) 36 - 65 %    Lymphocytes 14 (L) 24 - 43 %    Monocytes 10 3 - 12 %    Eosinophils % 2 1 - 4 %    Basophils 1 0 - 2 %    Immature Granulocytes 0 0 %    Segs Absolute 4.79 1.50 - 8.10 k/uL    Absolute Lymph # 0.94 (L) 1.10 - 3.70 k/uL    Absolute Mono # 0.66 0.10 - 1.20 k/uL    Absolute Eos # 0.15 0.00 - 0.44 k/uL    Basophils Absolute 0.04 0.00 - 0.20 k/uL    Absolute Immature Granulocyte <0.03 0.00 - 0.30 k/uL    WBC Morphology NOT REPORTED     RBC Morphology NOT REPORTED     Platelet Estimate NOT mis-transcribed.)        Ira Elder MD  05/25/20 0789

## 2020-05-25 NOTE — VIRTUAL HEALTH
cholestyramine (QUESTRAN) 4 g packet Take 1 packet by mouth 3 times daily (with meals) 2/14/20   Adia Arreola MD   levETIRAcetam (KEPPRA) 250 MG tablet Take 1 tablet by mouth 2 times daily 2/12/20   Brody Callaway MD   potassium chloride (MICRO-K) 10 MEQ extended release capsule Take 10 mEq by mouth 2 times daily    Historical Provider, MD   ferrous sulfate 325 (65 Fe) MG tablet Take 1 tablet by mouth daily (with breakfast) 11/5/19   Adia Arreola MD   apixaban (ELIQUIS) 5 MG TABS tablet Take 5 mg by mouth 2 times daily    Historical Provider, MD   atorvastatin (LIPITOR) 20 MG tablet Take 1 tablet by mouth nightly 6/21/19   Sheryl Darling PA-C   losartan (COZAAR) 25 MG tablet Take 1 tablet by mouth daily 6/22/19   Sheryl Darling PA-C   aspirin 81 MG EC tablet Take 1 tablet by mouth daily 4/11/19   Karsten Doyle MD   metoprolol tartrate (LOPRESSOR) 25 MG tablet Take 1 tablet by mouth 2 times daily 4/10/19   Karsten Doyle MD   levothyroxine (SYNTHROID) 112 MCG tablet Take 1 tablet by mouth Daily 11/29/18   Jerry Donaldson MD   acetaminophen (TYLENOL) 500 MG tablet Take 500 mg by mouth every 6 hours as needed for Pain    Historical Provider, MD    Scheduled Meds:  Continuous Infusions:  PRN Meds:.  Past Medical History   has a past medical history of Acute kidney failure ([facility] [address] Arthritis, Atrial fibrillation ([address] Calculus of gallbladder and bile duct without cholecystitis without obstruction, Cerebral infarction ([address] CHF (congestive heart failure) ([address] Chronic kidney disease, stage 3 ([address] Fall, Gout, Gout, Hyperlipidemia, Hypertension, Hypokalemia, Hypomagnesemia, Hypothyroidism, Irritable bowel syndrome, Ischemia, Left carotid stenosis, Metabolic encephalopathy, Nonrheumatic mitral (valve) insufficiency, and UTI (urinary tract infection). Social History  Social History     Socioeconomic History    Marital status:       Spouse name: Not on file    Number of children: Not on file  Years of education: Not on file    Highest education level: Not on file   Occupational History    Not on file   Social Needs    Financial resource strain: Not on file    Food insecurity     Worry: Not on file     Inability: Not on file    Transportation needs     Medical: Not on file     Non-medical: Not on file   Tobacco Use    Smoking status: Never Smoker    Smokeless tobacco: Never Used   Substance and Sexual Activity    Alcohol use: No    Drug use: No    Sexual activity: Not on file   Lifestyle    Physical activity     Days per week: Not on file     Minutes per session: Not on file    Stress: Not on file   Relationships    Social connections     Talks on phone: Not on file     Gets together: Not on file     Attends Muslim service: Not on file     Active member of club or organization: Not on file     Attends meetings of clubs or organizations: Not on file     Relationship status: Not on file    Intimate partner violence     Fear of current or ex partner: Not on file     Emotionally abused: Not on file     Physically abused: Not on file     Forced sexual activity: Not on file   Other Topics Concern    Not on file   Social History Narrative    Not on file     Family History      Problem Relation Age of Onset    High Blood Pressure Mother     Heart Disease Father        OBJECTIVE  BP (!) 182/119   Pulse 114   Temp 98.5 °F (36.9 °C) (Tympanic)   Resp 22   SpO2 93%     Patient is comfortably lying on her bed   very dysarthric, can't express herself well  No drift on arms  Can't raise both leg, right worse than left, patient stated she has not been able to raise it for a long time, but can't tell me how long  Awake, alert, follow all commands, difficuty hearing    Pre-Morbid mRS: 2    Imaging:  Images were personally reviewed including:  CT brain without contrast: no acute changes  CTA imaging: bilateral ICA stenosis same as in 2/2020, 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 Similar event happened in 2/2020. Now, patient also found to have UTI. Recommendations:  1.   2. Recommend Inpatient Neurology Consult for further assessment and evaluation   3. This is not a tPA candidate as she is on eliquis and is out of window  4. Transfer to [facility], preferably neurology service for stroke workup  5. Please get CTA head/neck before transfer to make sure there is no endovascular intervention needed, this is relayed to DR. Navarro  6. con't eliquis  7. con't aspirin  8. CTA head/neck is the same as in 2/2020  9. MRI brain w/o contrast  10. Patient has UTI, start ceftriaxone, her symptoms could be a neurological manifestation of UTI considering her chronic ischemic brain and therefore UTI needs to be treated. Discussed with ED Physician    At least 59 min of Telemedicine and time in conversation directly with ED staff and physician for the patient who is in imminent and life threatening deterioration without further treatment and evaluation. This Virtual Visit was conducted with patient's (and/or legal guardian's) consent, to provide telestroke consultation and necessary medical care.   Time spent examining patient, reviewing the images personally, reviewing the chart, perform high complexity decision making and speaking with the nursing staff regarding recommendations        Modesto Jara MD   Stroke, Neurocritical Care And/or [address] [place] [address] Stroke [address]  Electronically signed 5/25/2020 at 6:35 AM

## 2020-05-25 NOTE — ED PROVIDER NOTES
Emergency Department Encounter  Location: [address] [place] [address] ED    Patient: Chao Lima  MRN: [id number]  : 1930  Date of evaluation: 2020  ED Provider: Caitlyn Boothe DO      Chao Lima was checked out to me by Dr. Zechariah Bonilla. Please see his/her initial documentation for details of the patient's initial ED presentation, physical exam and completed studies. In brief, Chao Lima is a 80 y.o. female that presented to the emergency department with complaint of strokelike symptoms. Patient lives in a assisted living nursing home. She reports that she went to bed last night around 930 and woke up around 430 or 5 AM with facial droop and slurred speech. Secondary to this she was sent to the hospital for evaluation.     I have reviewed and interpreted all of the currently available lab results and diagnostics from this visit:  Results for orders placed or performed during the hospital encounter of 20   CBC auto differential   Result Value Ref Range    WBC 6.6 3.5 - 11.3 k/uL    RBC 4.46 3.95 - 5.11 m/uL    Hemoglobin 14.4 11.9 - 15.1 g/dL    Hematocrit 46.4 36.3 - 47.1 %    .0 (H) 82.6 - 102.9 fL    MCH 32.3 25.2 - 33.5 pg    MCHC 31.0 28.4 - 34.8 g/dL    RDW 12.8 11.8 - 14.4 %    Platelets 289 456 - 990 k/uL    MPV 11.3 8.1 - 13.5 fL    NRBC Automated 0.0 0.0 per 100 WBC    Differential Type NOT REPORTED     Seg Neutrophils 73 (H) 36 - 65 %    Lymphocytes 14 (L) 24 - 43 %    Monocytes 10 3 - 12 %    Eosinophils % 2 1 - 4 %    Basophils 1 0 - 2 %    Immature Granulocytes 0 0 %    Segs Absolute 4.79 1.50 - 8.10 k/uL    Absolute Lymph # 0.94 (L) 1.10 - 3.70 k/uL    Absolute Mono # 0.66 0.10 - 1.20 k/uL    Absolute Eos # 0.15 0.00 - 0.44 k/uL    Basophils Absolute 0.04 0.00 - 0.20 k/uL    Absolute Immature Granulocyte <0.03 0.00 - 0.30 k/uL    WBC Morphology NOT REPORTED     RBC Morphology NOT REPORTED     Platelet Estimate NOT REPORTED    Comprehensive Metabolic Panel   Result REPORTED NOT REQ. Yeast, UA NOT REPORTED None   EKG 12 Lead   Result Value Ref Range    Ventricular Rate 113 BPM    Atrial Rate 312 BPM    QRS Duration 70 ms    Q-T Interval 328 ms    QTc Calculation (Bazett) 449 ms    R Axis 38 degrees    T Axis -46 degrees     Ct Head Wo Contrast    Result Date: 5/25/2020  EXAMINATION: CT OF THE HEAD WITHOUT CONTRAST  5/25/2020 5:56 am TECHNIQUE: CT of the head was performed without the administration of intravenous contrast. Dose modulation, iterative reconstruction, and/or weight based adjustment of the mA/kV was utilized to reduce the radiation dose to as low as reasonably achievable. COMPARISON: 02/15/2020. HISTORY: ORDERING SYSTEM PROVIDED HISTORY: facial droop TECHNOLOGIST PROVIDED HISTORY: facial droop FINDINGS: BRAIN/VENTRICLES: There is no acute intracranial hemorrhage, mass effect or midline shift. No abnormal extra-axial fluid collection. The gray-white differentiation is maintained without evidence of an acute infarct. There is prominence of the ventricles and sulci due to global parenchymal volume loss. There are nonspecific areas of hypoattenuation within the periventricular and subcortical white matter, which likely represent chronic microvascular ischemic change. ORBITS: The visualized portion of the orbits demonstrate no acute abnormality. SINUSES: Chronic fluid and sclerosis in the right mastoid air cells. The visualized paranasal sinuses and mastoid left air cells demonstrate no acute abnormality. SOFT TISSUES/SKULL: No acute abnormality of the visualized skull or soft tissues. No acute intracranial abnormality. Specifically, no acute intracranial hemorrhage or mass effect. Chronic small vessel ischemic disease. Critical results were called by Dr. Barbour Days Sessions to [address] [place] [address] on 5/25/2020 at 06:03. Final ED Course and MDM: In brief, Mary Padilla is a 80 y.o. female whose care was signed out to me by the outgoing provider.  In brief, the

## 2020-05-25 NOTE — PROGRESS NOTES
effective compensatory strategies, and safe eating environment. Impression:  Patient presents with probable safe swallow for Regular diet with thin liquids as evidenced by no overt s/s of aspiration noted with consistencies tested. + cough X2 with ice water and pt. Reported it was too cold for her. Switched to more room temp water with no overt s/s of aspiration noted. Recommend small sips and bites, only feed when alert and awake and upright at 90 degrees for all PO intake. Recommend close monitoring for overt/clinical s/s of aspiration and D/C PO intake and complete Modified Barium Swallow Study should they occur. Results and recommendations reported to RN. Dysphagia Outcome Severity Scale: Level 6: Within functional limits/Modified independence     Treatment Plan  Requires SLP Intervention: No  Duration/Frequency of Treatment: no need          Recommended Diet and Intervention  Diet Solids Recommendation: Regular  Liquid Consistency Recommendation: Thin  Recommended Form of Meds: Meds in puree     Therapeutic Interventions: Diet tolerance monitoring    Compensatory Swallowing Strategies  Compensatory Swallowing Strategies: Alternate solids and liquids;Small bites/sips;Eat/Feed slowly;Upright as possible for all oral intake    Treatment/Goals  Dysphagia Goals: The patient will tolerate recommended diet without observed clinical signs of aspiration    General  Chart Reviewed: Yes  Behavior/Cognition: Alert; Cooperative  Respiratory Status: Room air  Communication Observation: Functional  Follows Directions: Complex  Dentition: Adequate  Patient Positioning: Upright in bed  Baseline Vocal Quality: Normal  Volitional Cough: Strong  Consistencies Administered: Reg solid; Dysphagia Soft and Bite-Sized (Dysphagia III); Dysphagia Pureed (Dysphagia I); Nectar - cup; Thin - straw           Vision/Hearing  Hearing  Hearing: Exceptions to [facility]    Oral Motor Deficits  Oral/Motor  Oral Motor:  Within functional limits    Oral Phase Dysfunction  Oral Phase  Oral Phase: WFL     Indicators of Pharyngeal Phase Dysfunction   Pharyngeal Phase  Pharyngeal Phase: WFL  Pharyngeal Phase   Pharyngeal: Cough noted with ice water X2 (pt. reported water was too cold)  No overt s/s of aspiration with room temp water, thick liquids, pudding and solids    Prognosis  Prognosis  Prognosis for safe diet advancement: good    Education  Patient Education: yes  Patient Education Response: Verbalizes understanding             Therapy Time  SLP Individual Minutes  Time In: 1200  Time Out: 1210  Minutes: [address] KESHIA Nguyen  5/25/2020 12:35 PM

## 2020-05-26 ENCOUNTER — APPOINTMENT (OUTPATIENT)
Dept: MRI IMAGING | Age: 85
DRG: 069 | End: 2020-05-26
Attending: PSYCHIATRY & NEUROLOGY
Payer: MEDICARE

## 2020-05-26 ENCOUNTER — APPOINTMENT (OUTPATIENT)
Dept: GENERAL RADIOLOGY | Age: 85
DRG: 069 | End: 2020-05-26
Attending: PSYCHIATRY & NEUROLOGY
Payer: MEDICARE

## 2020-05-26 PROBLEM — R11.14 BILIOUS VOMITING: Status: ACTIVE | Noted: 2020-05-26

## 2020-05-26 PROBLEM — Z98.890 S/P ERCP: Status: ACTIVE | Noted: 2020-05-26

## 2020-05-26 LAB
CULTURE: ABNORMAL
ESTIMATED AVERAGE GLUCOSE: 117 MG/DL
HBA1C MFR BLD: 5.7 % (ref 4–6)
HCT VFR BLD CALC: 44.8 % (ref 36.3–47.1)
HEMOGLOBIN: 14.8 G/DL (ref 11.9–15.1)
LACTIC ACID, SEPSIS WHOLE BLOOD: 2 MMOL/L (ref 0.5–1.9)
LACTIC ACID, SEPSIS WHOLE BLOOD: 2.1 MMOL/L (ref 0.5–1.9)
LACTIC ACID, SEPSIS: ABNORMAL MMOL/L (ref 0.5–1.9)
LACTIC ACID, SEPSIS: ABNORMAL MMOL/L (ref 0.5–1.9)
Lab: ABNORMAL
MAGNESIUM: 1.7 MG/DL (ref 1.6–2.6)
MCH RBC QN AUTO: 32.7 PG (ref 25.2–33.5)
MCHC RBC AUTO-ENTMCNC: 33 G/DL (ref 28.4–34.8)
MCV RBC AUTO: 98.9 FL (ref 82.6–102.9)
NRBC AUTOMATED: 0 PER 100 WBC
PDW BLD-RTO: 12.8 % (ref 11.8–14.4)
PLATELET # BLD: 181 K/UL (ref 138–453)
PMV BLD AUTO: 11.7 FL (ref 8.1–13.5)
PROCALCITONIN: 0.05 NG/ML
RBC # BLD: 4.53 M/UL (ref 3.95–5.11)
SPECIMEN DESCRIPTION: ABNORMAL
WBC # BLD: 10.5 K/UL (ref 3.5–11.3)

## 2020-05-26 PROCEDURE — 97535 SELF CARE MNGMENT TRAINING: CPT

## 2020-05-26 PROCEDURE — 97162 PT EVAL MOD COMPLEX 30 MIN: CPT

## 2020-05-26 PROCEDURE — 71045 X-RAY EXAM CHEST 1 VIEW: CPT

## 2020-05-26 PROCEDURE — 97530 THERAPEUTIC ACTIVITIES: CPT

## 2020-05-26 PROCEDURE — 99233 SBSQ HOSP IP/OBS HIGH 50: CPT | Performed by: PSYCHIATRY & NEUROLOGY

## 2020-05-26 PROCEDURE — 6370000000 HC RX 637 (ALT 250 FOR IP): Performed by: STUDENT IN AN ORGANIZED HEALTH CARE EDUCATION/TRAINING PROGRAM

## 2020-05-26 PROCEDURE — 2060000000 HC ICU INTERMEDIATE R&B

## 2020-05-26 PROCEDURE — 2500000003 HC RX 250 WO HCPCS: Performed by: STUDENT IN AN ORGANIZED HEALTH CARE EDUCATION/TRAINING PROGRAM

## 2020-05-26 PROCEDURE — 6360000002 HC RX W HCPCS: Performed by: STUDENT IN AN ORGANIZED HEALTH CARE EDUCATION/TRAINING PROGRAM

## 2020-05-26 PROCEDURE — 84145 PROCALCITONIN (PCT): CPT

## 2020-05-26 PROCEDURE — 2580000003 HC RX 258: Performed by: STUDENT IN AN ORGANIZED HEALTH CARE EDUCATION/TRAINING PROGRAM

## 2020-05-26 PROCEDURE — 83605 ASSAY OF LACTIC ACID: CPT

## 2020-05-26 PROCEDURE — 83735 ASSAY OF MAGNESIUM: CPT

## 2020-05-26 PROCEDURE — 85027 COMPLETE CBC AUTOMATED: CPT

## 2020-05-26 PROCEDURE — 70551 MRI BRAIN STEM W/O DYE: CPT

## 2020-05-26 PROCEDURE — 6370000000 HC RX 637 (ALT 250 FOR IP): Performed by: INTERNAL MEDICINE

## 2020-05-26 PROCEDURE — 99221 1ST HOSP IP/OBS SF/LOW 40: CPT | Performed by: NURSE PRACTITIONER

## 2020-05-26 PROCEDURE — 36415 COLL VENOUS BLD VENIPUNCTURE: CPT

## 2020-05-26 PROCEDURE — 97166 OT EVAL MOD COMPLEX 45 MIN: CPT

## 2020-05-26 PROCEDURE — 6360000002 HC RX W HCPCS: Performed by: NURSE PRACTITIONER

## 2020-05-26 RX ORDER — TRAZODONE HYDROCHLORIDE 50 MG/1
50 TABLET ORAL NIGHTLY
Status: DISCONTINUED | OUTPATIENT
Start: 2020-05-26 | End: 2020-05-27 | Stop reason: HOSPADM

## 2020-05-26 RX ORDER — PROMETHAZINE HYDROCHLORIDE 25 MG/ML
12.5 INJECTION, SOLUTION INTRAMUSCULAR; INTRAVENOUS ONCE
Status: COMPLETED | OUTPATIENT
Start: 2020-05-26 | End: 2020-05-26

## 2020-05-26 RX ORDER — METOPROLOL TARTRATE 5 MG/5ML
5 INJECTION INTRAVENOUS ONCE
Status: COMPLETED | OUTPATIENT
Start: 2020-05-26 | End: 2020-05-26

## 2020-05-26 RX ORDER — METOPROLOL TARTRATE 50 MG/1
50 TABLET, FILM COATED ORAL 2 TIMES DAILY
Status: DISCONTINUED | OUTPATIENT
Start: 2020-05-26 | End: 2020-05-27

## 2020-05-26 RX ORDER — PROMETHAZINE HYDROCHLORIDE 25 MG/ML
12.5 INJECTION, SOLUTION INTRAMUSCULAR; INTRAVENOUS EVERY 6 HOURS PRN
Status: DISCONTINUED | OUTPATIENT
Start: 2020-05-26 | End: 2020-05-27 | Stop reason: HOSPADM

## 2020-05-26 RX ORDER — METOPROLOL TARTRATE 5 MG/5ML
5 INJECTION INTRAVENOUS
Status: DISCONTINUED | OUTPATIENT
Start: 2020-05-26 | End: 2020-05-27 | Stop reason: HOSPADM

## 2020-05-26 RX ORDER — ONDANSETRON 2 MG/ML
2 INJECTION INTRAMUSCULAR; INTRAVENOUS ONCE
Status: COMPLETED | OUTPATIENT
Start: 2020-05-26 | End: 2020-05-26

## 2020-05-26 RX ORDER — SODIUM CHLORIDE 0.9 % (FLUSH) 0.9 %
10 SYRINGE (ML) INJECTION EVERY 12 HOURS SCHEDULED
Status: DISCONTINUED | OUTPATIENT
Start: 2020-05-26 | End: 2020-05-27 | Stop reason: HOSPADM

## 2020-05-26 RX ORDER — MAGNESIUM SULFATE 1 G/100ML
1 INJECTION INTRAVENOUS ONCE
Status: DISCONTINUED | OUTPATIENT
Start: 2020-05-26 | End: 2020-05-26

## 2020-05-26 RX ORDER — MAGNESIUM SULFATE 1 G/100ML
2 INJECTION INTRAVENOUS ONCE
Status: COMPLETED | OUTPATIENT
Start: 2020-05-26 | End: 2020-05-26

## 2020-05-26 RX ADMIN — MAGNESIUM SULFATE HEPTAHYDRATE 2 G: 1 INJECTION, SOLUTION INTRAVENOUS at 04:54

## 2020-05-26 RX ADMIN — METOPROLOL TARTRATE 5 MG: 5 INJECTION, SOLUTION INTRAVENOUS at 02:36

## 2020-05-26 RX ADMIN — ATORVASTATIN CALCIUM 20 MG: 20 TABLET, FILM COATED ORAL at 21:59

## 2020-05-26 RX ADMIN — POTASSIUM CHLORIDE 10 MEQ: 10 CAPSULE, COATED, EXTENDED RELEASE ORAL at 22:03

## 2020-05-26 RX ADMIN — FERROUS SULFATE TAB EC 325 MG (65 MG FE EQUIVALENT) 325 MG: 325 (65 FE) TABLET DELAYED RESPONSE at 08:45

## 2020-05-26 RX ADMIN — LEVETIRACETAM 500 MG: 500 TABLET, FILM COATED ORAL at 09:30

## 2020-05-26 RX ADMIN — LEVOTHYROXINE SODIUM 112 MCG: 112 TABLET ORAL at 07:30

## 2020-05-26 RX ADMIN — METOPROLOL TARTRATE 5 MG: 5 INJECTION, SOLUTION INTRAVENOUS at 05:26

## 2020-05-26 RX ADMIN — TRAZODONE HYDROCHLORIDE 50 MG: 50 TABLET ORAL at 22:03

## 2020-05-26 RX ADMIN — Medication 81 MG: at 09:00

## 2020-05-26 RX ADMIN — CHOLESTYRAMINE 4 G: 4 POWDER, FOR SUSPENSION ORAL at 09:45

## 2020-05-26 RX ADMIN — APIXABAN 5 MG: 5 TABLET, FILM COATED ORAL at 22:00

## 2020-05-26 RX ADMIN — POTASSIUM CHLORIDE 10 MEQ: 10 CAPSULE, COATED, EXTENDED RELEASE ORAL at 09:45

## 2020-05-26 RX ADMIN — SODIUM CHLORIDE, PRESERVATIVE FREE 10 ML: 5 INJECTION INTRAVENOUS at 22:24

## 2020-05-26 RX ADMIN — METOPROLOL TARTRATE 50 MG: 25 TABLET ORAL at 11:40

## 2020-05-26 RX ADMIN — PROMETHAZINE HYDROCHLORIDE 12.5 MG: 25 INJECTION, SOLUTION INTRAMUSCULAR; INTRAVENOUS at 12:16

## 2020-05-26 RX ADMIN — ONDANSETRON 2 MG: 2 INJECTION INTRAMUSCULAR; INTRAVENOUS at 05:26

## 2020-05-26 RX ADMIN — SODIUM CHLORIDE, PRESERVATIVE FREE 10 ML: 5 INJECTION INTRAVENOUS at 10:00

## 2020-05-26 RX ADMIN — APIXABAN 5 MG: 5 TABLET, FILM COATED ORAL at 09:00

## 2020-05-26 RX ADMIN — CEFTRIAXONE SODIUM 1 G: 1 INJECTION, POWDER, FOR SOLUTION INTRAMUSCULAR; INTRAVENOUS at 09:15

## 2020-05-26 RX ADMIN — METOPROLOL TARTRATE 50 MG: 25 TABLET ORAL at 22:01

## 2020-05-26 RX ADMIN — METOPROLOL TARTRATE 5 MG: 5 INJECTION, SOLUTION INTRAVENOUS at 01:09

## 2020-05-26 RX ADMIN — LEVETIRACETAM 500 MG: 500 TABLET, FILM COATED ORAL at 22:02

## 2020-05-26 RX ADMIN — CHOLESTYRAMINE 4 G: 4 POWDER, FOR SUSPENSION ORAL at 21:59

## 2020-05-26 ASSESSMENT — PAIN SCALES - GENERAL: PAINLEVEL_OUTOF10: 0

## 2020-05-26 NOTE — CONSULTS
left hemispheric delta and theta slowing and showed mild left hemispheric slowing mixed with occasional sharp waves     Pertinent findings: CRT: 0.94, Lactic Acid: 2.6--2.0, Trop: 31--24--21, UA positive for nitrites, leukocytosis, and 2+ bacteria    CT head: negative for acute changes  CTA: with stable ICA stenosis, PCA stenosis, and right VA occlusion and     Our service was consulted for further management of Afib, UTI, and new onset emesis. On my evaluation, patient in Afib with RVR with rate between 130s-160s despite receiving home dose beta blockers and 3 additional doses of IV lopressor, there is also QT prolongation noted on baseline EKG. Patient is actively vomiting and patient having difficulty clearing secretions/emesis. During my exam, patient is making eye contact and smiling, but not answering my questions or following commands. She is moving all extremities with purpose and there is a right side facial droop. Per RN, patient was following commands and answering questions earlier. Patient has been afebrile, is maintaining oxygenation, does not appear to be in distress.     Past Medical History:     Past Medical History:   Diagnosis Date    Acute kidney failure ([address] 06/28/2019    Anemia     Aphasia     Arthritis     Atrial fibrillation (HCC)     Bile duct calculus     Calculus of gallbladder and bile duct without cholecystitis without obstruction     Cerebral infarction ([address]     CHF (congestive heart failure) (HCC)     Chronic kidney disease, stage 3 (HCC)     CVA (cerebral vascular accident) ([address]     Fall     Gout     Gout     Gout     Hyperlipidemia     Hypertension     Hypokalemia 06/28/2019    Hypokalemia     Hypomagnesemia 06/28/2019    Hypomagnesemia     Hypothyroidism     IBS (irritable bowel syndrome)     Irritable bowel syndrome     Ischemia     muscle    Left carotid stenosis     Metabolic encephalopathy 41/81/6056    Mitral valve insufficiency     round, and reactive to light. Neck:      Musculoskeletal: Normal range of motion and neck supple. Cardiovascular:      Rate and Rhythm: Tachycardia present. Rhythm irregular. Pulses: Normal pulses. Heart sounds: Normal heart sounds. No murmur. No friction rub. No gallop. Pulmonary:      Effort: Pulmonary effort is normal. No respiratory distress. Breath sounds: Normal breath sounds. No wheezing, rhonchi or rales. Abdominal:      General: Bowel sounds are normal.      Palpations: Abdomen is soft. Comments: emesis   Skin:     General: Skin is warm and dry. Capillary Refill: Capillary refill takes less than 2 seconds. Coloration: Skin is pale. Neurological:      Mental Status: She is alert.       Comments: Aphasic  Right side facial droop         Investigations:      Laboratory Testing:  Recent Results (from the past 24 hour(s))   CBC auto differential    Collection Time: 05/25/20  6:15 AM   Result Value Ref Range    WBC 6.6 3.5 - 11.3 k/uL    RBC 4.46 3.95 - 5.11 m/uL    Hemoglobin 14.4 11.9 - 15.1 g/dL    Hematocrit 46.4 36.3 - 47.1 %    .0 (H) 82.6 - 102.9 fL    MCH 32.3 25.2 - 33.5 pg    MCHC 31.0 28.4 - 34.8 g/dL    RDW 12.8 11.8 - 14.4 %    Platelets 714 886 - 859 k/uL    MPV 11.3 8.1 - 13.5 fL    NRBC Automated 0.0 0.0 per 100 WBC    Differential Type NOT REPORTED     Seg Neutrophils 73 (H) 36 - 65 %    Lymphocytes 14 (L) 24 - 43 %    Monocytes 10 3 - 12 %    Eosinophils % 2 1 - 4 %    Basophils 1 0 - 2 %    Immature Granulocytes 0 0 %    Segs Absolute 4.79 1.50 - 8.10 k/uL    Absolute Lymph # 0.94 (L) 1.10 - 3.70 k/uL    Absolute Mono # 0.66 0.10 - 1.20 k/uL    Absolute Eos # 0.15 0.00 - 0.44 k/uL    Basophils Absolute 0.04 0.00 - 0.20 k/uL    Absolute Immature Granulocyte <0.03 0.00 - 0.30 k/uL    WBC Morphology NOT REPORTED     RBC Morphology NOT REPORTED     Platelet Estimate NOT REPORTED    Comprehensive Metabolic Panel    Collection Time: 05/25/20  6:15 AM Result Value Ref Range    Glucose 102 (H) 70 - 99 mg/dL    BUN 24 (H) 8 - 23 mg/dL    CREATININE 0.94 (H) 0.50 - 0.90 mg/dL    Bun/Cre Ratio 26 (H) 9 - 20    Calcium 9.7 8.6 - 10.4 mg/dL    Sodium 139 135 - 144 mmol/L    Potassium 4.5 3.7 - 5.3 mmol/L    Chloride 102 98 - 107 mmol/L    CO2 23 20 - 31 mmol/L    Anion Gap 14 9 - 17 mmol/L    Alkaline Phosphatase 119 (H) 35 - 104 U/L    ALT 14 5 - 33 U/L    AST 22 <32 U/L    Total Bilirubin 0.56 0.3 - 1.2 mg/dL    Total Protein 6.7 6.4 - 8.3 g/dL    Alb 4.3 3.5 - 5.2 g/dL    Albumin/Globulin Ratio 1.8 1.0 - 2.5    GFR Non- 56 (L) >60 mL/min    GFR African American >60 >60 mL/min    GFR Comment          GFR Staging         Protime-INR    Collection Time: 05/25/20  6:15 AM   Result Value Ref Range    Protime 16.1 (H) 11.8 - 14.6 sec    INR 1.3    APTT    Collection Time: 05/25/20  6:15 AM   Result Value Ref Range    PTT 33.2 24.0 - 36.0 sec   Troponin    Collection Time: 05/25/20  6:15 AM   Result Value Ref Range    Troponin, High Sensitivity 31 (H) 0 - 14 ng/L    Troponin T NOT REPORTED <0.03 ng/mL    Troponin Interp NOT REPORTED    Urinalysis Reflex to Culture    Collection Time: 05/25/20  6:25 AM   Result Value Ref Range    Color, UA YELLOW YELLOW    Turbidity UA CLEAR CLEAR    Glucose, Ur NEGATIVE NEGATIVE    Bilirubin Urine NEGATIVE NEGATIVE    Ketones, Urine NEGATIVE NEGATIVE    Specific Gravity, UA 1.025 (H) 1.010 - 1.020    Urine Hgb TRACE (A) NEGATIVE    pH, UA 5.5 5.0 - 9.0    Protein, UA 2+ (A) NEGATIVE    Urobilinogen, Urine Normal Normal    Nitrite, Urine POSITIVE (A) NEGATIVE    Leukocyte Esterase, Urine MODERATE (A) NEGATIVE    Urinalysis Comments NOT REPORTED    Microscopic Urinalysis    Collection Time: 05/25/20  6:25 AM   Result Value Ref Range    -          WBC, UA 10 TO 20 0 - 5 /HPF    RBC, UA 0 TO 2 0 - 2 /HPF    Casts UA NOT REPORTED /LPF    Crystals, UA NOT REPORTED None /HPF    Epithelial Cells UA 5 TO 10 0 - 25 /HPF    Renal Collection Time: 05/26/20  4:35 AM   Result Value Ref Range    WBC 10.5 3.5 - 11.3 k/uL    RBC 4.53 3.95 - 5.11 m/uL    Hemoglobin 14.8 11.9 - 15.1 g/dL    Hematocrit 44.8 36.3 - 47.1 %    MCV 98.9 82.6 - 102.9 fL    MCH 32.7 25.2 - 33.5 pg    MCHC 33.0 28.4 - 34.8 g/dL    RDW 12.8 11.8 - 14.4 %    Platelets 997 910 - 243 k/uL    MPV 11.7 8.1 - 13.5 fL    NRBC Automated 0.0 0.0 per 100 WBC   Lactate, Sepsis    Collection Time: 05/26/20  4:35 AM   Result Value Ref Range    Lactic Acid, Sepsis NOT REPORTED 0.5 - 1.9 mmol/L    Lactic Acid, Sepsis, Whole Blood 2.0 (H) 0.5 - 1.9 mmol/L       Imaging/Diagonstics:  Ct Head Wo Contrast    Result Date: 5/25/2020  No acute intracranial abnormality. Specifically, no acute intracranial hemorrhage or mass effect. Chronic small vessel ischemic disease. Critical results were called by Dr. Yfn Naqvi to [address] [place] [address] on 5/25/2020 at 06:03. Xr Chest Portable    Result Date: 5/26/2020  No focal airspace disease. Cardiomegaly. Cta Head Neck W Contrast    Addendum Date: 5/25/2020    ADDENDUM: 3D reconstruction images were obtained and reviewed. No new findings. No changes to the original report. Result Date: 5/25/2020  75% focal stenosis at the origins of the bilateral cervical internal carotid arteries Occlusion in the V1 segment and partial occlusion in mid V3 segment of the right vertebral artery, with asymmetrically decreased contrast opacification of the V2 and proximal V3 segments, improved since the prior study. Focal fusiform dilatation of the distal V2 segment of left vertebral artery measuring up to 6 mm, stable. 90% focal stenosis in the distal V4 segment of the right vertebral artery, stable. Fluid collection measuring up to 5 cm in the right axilla, may be related to right shoulder joint effusion. Follow-up with targeted ultrasound may be beneficial for further evaluation.        Assessment :      Hospital Problems           Last Modified POA

## 2020-05-26 NOTE — PROGRESS NOTES
tricuspid regurg with negative bubble study. The patient had 2 EEGs, 4/2019 110 with an EEG which showed left hemispheric delta and theta slowing. EEG of 2/2020 showed mild left hemispheric slowing mixed with occasional sharp waves. The patient was started on Keppra to 50 mg twice daily.      Past Medical History:     Past Medical History:   Diagnosis Date    Acute kidney failure ([facility] 06/28/2019    Anemia     Aphasia     Arthritis     Atrial fibrillation (HCC)     Bile duct calculus     Calculus of gallbladder and bile duct without cholecystitis without obstruction     Cerebral infarction ([address]     CHF (congestive heart failure) (HCC)     [facility], [facility]     [facility]A [facility]cerebral vascular accident) ([facility]     Fall     Gout     Gout     Gout     Hyperlipidemia     Hypertension     Hypokalemia 06/28/2019    Hypokalemia     Hypomagnesemia 06/28/2019    Hypomagnesemia     Hypothyroidism     IBS (irritable bowel syndrome)     Irritable bowel syndrome     Ischemia     muscle    Left carotid stenosis     Metabolic encephalopathy 50/96/7438    Mitral valve insufficiency     Nonrheumatic mitral (valve) insufficiency 06/28/2019    Seizures [facility]     TIA (transient ischemic attack)     UTI (urinary tract infection)         Past Surgical History:     Past Surgical History:   Procedure Laterality Date    CARPAL TUNNEL RELEASE  2012    Dr. Sofya Mitchell - right side    CATARACT REMOVAL WITH IMPLANT Bilateral     CHOLECYSTECTOMY      ERCP N/A 2/11/2019    Driss Birmingham MD - ERCP, Sphincterotomy, balloon dilatation and stent placement with removal of multiple stones    TOTAL KNEE ARTHROPLASTY Right     TOTAL KNEE ARTHROPLASTY Left     UPPER GASTROINTESTINAL ENDOSCOPY  07/31/2019    with stent removal by Dr. Jewel Cardenas 7/31/2019    EGD STENT REMOVAL performed by Celina Hernandez MD at [address] [place] [address]        Medications Prior to Admission:     Prior to Admission medications    Medication Sig Start Date End Date Taking? Authorizing Provider   docusate sodium (COLACE) 100 MG capsule Take 100 mg by mouth daily    Historical Provider, MD   polyethylene glycol (GLYCOLAX) packet Take 17 g by mouth daily as needed for Constipation    Historical Provider, MD   ondansetron (ZOFRAN) 4 MG tablet Take 4 mg by mouth every 6 hours as needed for Nausea or Vomiting    Historical Provider, MD   ondansetron (ZOFRAN) 4 MG/2ML injection Infuse 4 mg intravenously 2/9/20   Historical Provider, MD   cholestyramine (QUESTRAN) 4 g packet Take 1 packet by mouth 3 times daily (with meals) 2/14/20   Anna Hawkins MD   levETIRAcetam (KEPPRA) 250 MG tablet Take 1 tablet by mouth 2 times daily 2/12/20   Nata Delong MD   potassium chloride (MICRO-K) 10 MEQ extended release capsule Take 10 mEq by mouth 2 times daily    Historical Provider, MD   ferrous sulfate 325 (65 Fe) MG tablet Take 1 tablet by mouth daily (with breakfast) 11/5/19   Anna Hawkins MD   apixaban (ELIQUIS) 5 MG TABS tablet Take 5 mg by mouth 2 times daily    Historical Provider, MD   atorvastatin (LIPITOR) 20 MG tablet Take 1 tablet by mouth nightly 6/21/19   Maci Darling PA-C   losartan (COZAAR) 25 MG tablet Take 1 tablet by mouth daily 6/22/19   Maci Darling PA-C   aspirin 81 MG EC tablet Take 1 tablet by mouth daily 4/11/19   Jude Comer MD   metoprolol tartrate (LOPRESSOR) 25 MG tablet Take 1 tablet by mouth 2 times daily 4/10/19   Jude Comer MD   levothyroxine (SYNTHROID) 112 MCG tablet Take 1 tablet by mouth Daily 11/29/18   Axel Millard MD   acetaminophen (TYLENOL) 500 MG tablet Take 500 mg by mouth 2 times daily     Historical Provider, MD        Allergies:     Patient has no known allergies. Social History:     Tobacco:    reports that she has never smoked. She has never used smokeless tobacco.  Alcohol:      reports no history of alcohol use.   Drug Use:  reports no history of drug grade 4/5 upper extremities, 3 out of 5 in the lower extremities in bilateral proximal and distal muscle groups of both upper and lower extremities with normal bulk, normal tone and no involuntary movements, no tremor   SENSORY FUNCTION:  Normal touch, normal pin, normal vibration, normal proprioception   CEREBELLAR FUNCTION:  Intact fine motor control over upper limbs   REFLEX FUNCTION:  Bilateral Babinski sign positive   STATION and GAIT  Not tested       Investigations:      Laboratory Testing:  Recent Results (from the past 24 hour(s))   Lipid panel - fasting    Collection Time: 05/25/20 11:25 AM   Result Value Ref Range    Cholesterol 110 <200 mg/dL    HDL 40 (L) >40 mg/dL    LDL Cholesterol 55 0 - 130 mg/dL    Chol/HDL Ratio 2.8 <5    Triglycerides 74 <150 mg/dL    VLDL NOT REPORTED 1 - 30 mg/dL   Troponin    Collection Time: 05/25/20 11:25 AM   Result Value Ref Range    Troponin, High Sensitivity 24 (H) 0 - 14 ng/L    Troponin T NOT REPORTED <0.03 ng/mL    Troponin Interp NOT REPORTED    Troponin    Collection Time: 05/25/20  7:06 PM   Result Value Ref Range    Troponin, High Sensitivity 21 (H) 0 - 14 ng/L    Troponin T NOT REPORTED <0.03 ng/mL    Troponin Interp NOT REPORTED    Lactate, Sepsis    Collection Time: 05/26/20  3:10 AM   Result Value Ref Range    Lactic Acid, Sepsis NOT REPORTED 0.5 - 1.9 mmol/L    Lactic Acid, Sepsis, Whole Blood 2.1 (H) 0.5 - 1.9 mmol/L   Procalcitonin    Collection Time: 05/26/20  3:10 AM   Result Value Ref Range    Procalcitonin 0.05 <0.09 ng/mL   Magnesium    Collection Time: 05/26/20  3:10 AM   Result Value Ref Range    Magnesium 1.7 1.6 - 2.6 mg/dL   CBC    Collection Time: 05/26/20  4:35 AM   Result Value Ref Range    WBC 10.5 3.5 - 11.3 k/uL    RBC 4.53 3.95 - 5.11 m/uL    Hemoglobin 14.8 11.9 - 15.1 g/dL    Hematocrit 44.8 36.3 - 47.1 %    MCV 98.9 82.6 - 102.9 fL    MCH 32.7 25.2 - 33.5 pg    MCHC 33.0 28.4 - 34.8 g/dL    RDW 12.8 11.8 - 14.4 %    Platelets 877 899 - 453 k/uL    MPV 11.7 8.1 - 13.5 fL    NRBC Automated 0.0 0.0 per 100 WBC   Lactate, Sepsis    Collection Time: 05/26/20  4:35 AM   Result Value Ref Range    Lactic Acid, Sepsis NOT REPORTED 0.5 - 1.9 mmol/L    Lactic Acid, Sepsis, Whole Blood 2.0 (H) 0.5 - 1.9 mmol/L         Assessment :      Primary Problem  Expressive aphasia    Active Hospital Problems    Diagnosis Date Noted    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 (HCC) [I50.32, I38]      Priority: High    Chronic atrial fibrillation [I48.20]      Priority: High    Bilious vomiting [R11.14] 05/26/2020    S/P ERCP [Z98.890] 05/26/2020    Aphasia [R47.01]     Chronic kidney disease, stage 3 (HCC) [N18.3]     VERONICA (acute kidney injury) ([facility] [N17.9] 02/13/2019    Acute cystitis without hematuria [N30.00] 02/08/2019    Hypothyroidism [E03.9]     Hypertension [I10]        Plan:     The patient mentioned above with past medical history of atrial fibrillation on Eliquis and aspirin, diastolic congestive heart failure, chronic kidney disease stage III, hyperlipidemia, hypertension, hypothyroidism, bilateral carotid artery stenosis, was transferred from [facility] for expressive aphasia work-up. DD;  TIA  Seizures    1. CT head was negative for acute changes/CTA showed stable bilateral ICAs 75% stenosis, right PCA 50% stenosis and right VA occlusion  2. Pending MRI of the brain  3. Continue aspirin and Eliquis for history of A. fib  4. 5.7 hemoglobin A1c. LDL is 55,  Last echo 2/2020 showed ejection fraction 58%, moderate to severe tricuspid regurg with negative bubble study. 5. Patient is on Lipitor 20 mg.  6. On Rocephin for UTI  -     The patient developed A. fib with rapid ventricular response, IV Lopressor was given, cardiology and inter medicine was consulted, The patient developed intractable nausea, Zofran and Phenergan were given, IM on board\  7. Increase Keppra to 500 mg twice daily  8.  PT/OT/SLP    We will

## 2020-05-26 NOTE — PROGRESS NOTES
(Right); Total knee arthroplasty (Left); Upper gastrointestinal endoscopy (07/31/2019); and Upper gastrointestinal endoscopy (N/A, 7/31/2019). Restrictions  Restrictions/Precautions  Restrictions/Precautions: General Precautions, Fall Risk, Up as Tolerated  Required Braces or Orthoses?: No  Vision/Hearing        Subjective  General  Chart Reviewed: Yes  Patient assessed for rehabilitation services?: Yes  Response To Previous Treatment: Not applicable  Family / Caregiver Present: No  Follows Commands: Within Functional Limits  Subjective  Subjective: RN and pt agreeable to PT. Pt alert in bed upon arrival.   Pain Screening  Patient Currently in Pain: Denies  Vital Signs  Patient Currently in Pain: Denies  Pre Treatment Pain Screening  Intervention List: Patient able to continue with treatment    Orientation  Orientation  Orientation Level: (responds to name, inconsistently keeping eyes open. , delayed response to commands.)  Social/Functional History  Social/Functional History  Lives With: Alone  Home Layout: One level  Home Access: Stairs to enter with rails  Entrance Stairs - Number of Steps: 2  Entrance Stairs - Rails: Left  ADL Assistance: Needs assistance(Dtr assists with ADL's)  Homemaking Assistance: Needs assistance(Pt's children assist according to pt)  Active : Yes  Mode of Transportation: Car  Type of occupation: Pt states being employed but unable to describe what she does for a living  Additional Comments: Pt is a very questionable historian  Cognition        Objective          AROM RLE (degrees)  RLE AROM: WFL  AROM LLE (degrees)  LLE AROM : WFL  Strength Other  Other: intermittantly follows commands, demosntrated LAQ, unable to MMT. Sensation  Overall Sensation Status: WFL(Pt denies any numbness or tingling)  Bed mobility  Supine to Sit: Moderate assistance  Sit to Supine: Maximum assistance  Scooting: Moderate assistance  Transfers  Sit to Stand:  Moderate Assistance  Stand to sit:

## 2020-05-26 NOTE — CONSULTS
diastolic congestive heart failure, CKD stage III, hyperlipidemia, hypertension. Previous cardiac history  Last echo on 2/2020 shows EF 50%, inferior wall appears akinetic from mid apex, diastolic dysfunction, negative bubble study, mild to moderate MR, moderate  to severe TR. Currently the patient remains stable, vitals 110/59, in A. fib with heart rate in 70s. Patient denies any chest pain, palpitations,  shortness of breath or any other symptoms at this point of time. Troponin down trended from 31-->21        Past Medical History:   has a past medical history of Acute kidney failure ([address] Anemia, Aphasia, Arthritis, Atrial fibrillation (HCC), Bile duct calculus, Calculus of gallbladder and bile duct without cholecystitis without obstruction, Cerebral infarction ([address] CHF (congestive heart failure) ([address] Chronic kidney disease, stage 3 ([address] CVA (cerebral vascular accident) ([address] Fall, Gout, Gout, Gout, Hyperlipidemia, Hypertension, Hypokalemia, Hypokalemia, Hypomagnesemia, Hypomagnesemia, Hypothyroidism, IBS (irritable bowel syndrome), Irritable bowel syndrome, Ischemia, Left carotid stenosis, Metabolic encephalopathy, Mitral valve insufficiency, Nonrheumatic mitral (valve) insufficiency, Seizures ([address] TIA (transient ischemic attack), and UTI (urinary tract infection). Past Surgical History:   has a past surgical history that includes Cataract removal with implant (Bilateral); Cholecystectomy; Carpal tunnel release (2012); ERCP (N/A, 2/11/2019); Total knee arthroplasty (Right); Total knee arthroplasty (Left); Upper gastrointestinal endoscopy (07/31/2019); and Upper gastrointestinal endoscopy (N/A, 7/31/2019). Home Medications:    Prior to Admission medications    Medication Sig Start Date End Date Taking?  Authorizing Provider   docusate sodium (COLACE) 100 MG capsule Take 100 mg by mouth daily    Historical Provider, MD   polyethylene glycol (GLYCOLAX) packet Take 17 g by mouth daily as needed for Constipation    Historical Provider, MD   ondansetron (ZOFRAN) 4 MG tablet Take 4 mg by mouth every 6 hours as needed for Nausea or Vomiting    Historical Provider, MD   ondansetron (ZOFRAN) 4 MG/2ML injection Infuse 4 mg intravenously 2/9/20   Historical Provider, MD   cholestyramine (QUESTRAN) 4 g packet Take 1 packet by mouth 3 times daily (with meals) 2/14/20   Demetrius Joe MD   levETIRAcetam (KEPPRA) 250 MG tablet Take 1 tablet by mouth 2 times daily 2/12/20   Sudhir Mendes MD   potassium chloride (MICRO-K) 10 MEQ extended release capsule Take 10 mEq by mouth 2 times daily    Historical Provider, MD   ferrous sulfate 325 (65 Fe) MG tablet Take 1 tablet by mouth daily (with breakfast) 11/5/19   Demetrius Joe MD   apixaban (ELIQUIS) 5 MG TABS tablet Take 5 mg by mouth 2 times daily    Historical Provider, MD   atorvastatin (LIPITOR) 20 MG tablet Take 1 tablet by mouth nightly 6/21/19   Cory Darling PA-C   losartan (COZAAR) 25 MG tablet Take 1 tablet by mouth daily 6/22/19   Cory Darling PA-C   aspirin 81 MG EC tablet Take 1 tablet by mouth daily 4/11/19   Andria Franks MD   metoprolol tartrate (LOPRESSOR) 25 MG tablet Take 1 tablet by mouth 2 times daily 4/10/19   Andria Franks MD   levothyroxine (SYNTHROID) 112 MCG tablet Take 1 tablet by mouth Daily 11/29/18   Ashley Hutchinson MD   acetaminophen (TYLENOL) 500 MG tablet Take 500 mg by mouth 2 times daily     Historical Provider, MD      Current Facility-Administered Medications: traZODone (DESYREL) tablet 50 mg, 50 mg, Oral, Nightly  metoprolol (LOPRESSOR) injection 5 mg, 5 mg, Intravenous, Q2H PRN  sodium chloride flush 0.9 % injection 10 mL, 10 mL, Intravenous, 2 times per day  metoprolol tartrate (LOPRESSOR) tablet 50 mg, 50 mg, Oral, BID  promethazine (PHENERGAN) injection 12.5 mg, 12.5 mg, Intramuscular, Once  sodium chloride flush 0.9 % injection 10 mL, 10 mL, Intravenous, 2 times per day  sodium chloride flush 0.9 % injection

## 2020-05-27 VITALS
DIASTOLIC BLOOD PRESSURE: 97 MMHG | WEIGHT: 151.5 LBS | HEIGHT: 60 IN | HEART RATE: 84 BPM | OXYGEN SATURATION: 98 % | BODY MASS INDEX: 29.74 KG/M2 | RESPIRATION RATE: 15 BRPM | TEMPERATURE: 97.6 F | SYSTOLIC BLOOD PRESSURE: 165 MMHG

## 2020-05-27 LAB
ANION GAP SERPL CALCULATED.3IONS-SCNC: 15 MMOL/L (ref 9–17)
BUN BLDV-MCNC: 21 MG/DL (ref 8–23)
BUN/CREAT BLD: NORMAL (ref 9–20)
CALCIUM SERPL-MCNC: 9.7 MG/DL (ref 8.6–10.4)
CHLORIDE BLD-SCNC: 105 MMOL/L (ref 98–107)
CO2: 22 MMOL/L (ref 20–31)
CREAT SERPL-MCNC: 0.83 MG/DL (ref 0.5–0.9)
GFR AFRICAN AMERICAN: >60 ML/MIN
GFR NON-AFRICAN AMERICAN: >60 ML/MIN
GFR SERPL CREATININE-BSD FRML MDRD: NORMAL ML/MIN/{1.73_M2}
GFR SERPL CREATININE-BSD FRML MDRD: NORMAL ML/MIN/{1.73_M2}
GLUCOSE BLD-MCNC: 85 MG/DL (ref 70–99)
POTASSIUM SERPL-SCNC: 4.8 MMOL/L (ref 3.7–5.3)
SARS-COV-2, PCR: NORMAL
SARS-COV-2, RAPID: NOT DETECTED
SARS-COV-2: NORMAL
SODIUM BLD-SCNC: 142 MMOL/L (ref 135–144)
SOURCE: NORMAL

## 2020-05-27 PROCEDURE — U0002 COVID-19 LAB TEST NON-CDC: HCPCS

## 2020-05-27 PROCEDURE — 36415 COLL VENOUS BLD VENIPUNCTURE: CPT

## 2020-05-27 PROCEDURE — 6360000002 HC RX W HCPCS: Performed by: STUDENT IN AN ORGANIZED HEALTH CARE EDUCATION/TRAINING PROGRAM

## 2020-05-27 PROCEDURE — 99239 HOSP IP/OBS DSCHRG MGMT >30: CPT | Performed by: PSYCHIATRY & NEUROLOGY

## 2020-05-27 PROCEDURE — 99231 SBSQ HOSP IP/OBS SF/LOW 25: CPT | Performed by: FAMILY MEDICINE

## 2020-05-27 PROCEDURE — 2580000003 HC RX 258: Performed by: STUDENT IN AN ORGANIZED HEALTH CARE EDUCATION/TRAINING PROGRAM

## 2020-05-27 PROCEDURE — 6370000000 HC RX 637 (ALT 250 FOR IP): Performed by: INTERNAL MEDICINE

## 2020-05-27 PROCEDURE — 6370000000 HC RX 637 (ALT 250 FOR IP): Performed by: NURSE PRACTITIONER

## 2020-05-27 PROCEDURE — 80048 BASIC METABOLIC PNL TOTAL CA: CPT

## 2020-05-27 PROCEDURE — 6370000000 HC RX 637 (ALT 250 FOR IP): Performed by: STUDENT IN AN ORGANIZED HEALTH CARE EDUCATION/TRAINING PROGRAM

## 2020-05-27 PROCEDURE — 6360000002 HC RX W HCPCS: Performed by: FAMILY MEDICINE

## 2020-05-27 RX ORDER — LEVETIRACETAM 500 MG/1
500 TABLET ORAL 2 TIMES DAILY
Qty: 60 TABLET | Refills: 3 | Status: SHIPPED | OUTPATIENT
Start: 2020-05-27

## 2020-05-27 RX ORDER — CIPROFLOXACIN 500 MG/1
500 TABLET, FILM COATED ORAL 2 TIMES DAILY
Qty: 10 TABLET | Refills: 0 | Status: SHIPPED | OUTPATIENT
Start: 2020-05-27 | End: 2020-06-01

## 2020-05-27 RX ORDER — METOPROLOL TARTRATE 75 MG/1
75 TABLET, FILM COATED ORAL 2 TIMES DAILY
Qty: 60 TABLET | Refills: 3 | Status: ON HOLD | OUTPATIENT
Start: 2020-05-27 | End: 2021-01-01

## 2020-05-27 RX ORDER — NITROFURANTOIN MACROCRYSTALS 50 MG/1
50 CAPSULE ORAL 4 TIMES DAILY
Qty: 12 CAPSULE | Refills: 0 | Status: SHIPPED | OUTPATIENT
Start: 2020-05-27 | End: 2020-05-27 | Stop reason: HOSPADM

## 2020-05-27 RX ADMIN — APIXABAN 5 MG: 5 TABLET, FILM COATED ORAL at 08:30

## 2020-05-27 RX ADMIN — Medication 81 MG: at 09:42

## 2020-05-27 RX ADMIN — CHOLESTYRAMINE 4 G: 4 POWDER, FOR SUSPENSION ORAL at 15:15

## 2020-05-27 RX ADMIN — METOPROLOL TARTRATE 50 MG: 25 TABLET ORAL at 09:42

## 2020-05-27 RX ADMIN — LEVOTHYROXINE SODIUM 112 MCG: 112 TABLET ORAL at 07:30

## 2020-05-27 RX ADMIN — PROMETHAZINE HYDROCHLORIDE 12.5 MG: 25 INJECTION INTRAMUSCULAR; INTRAVENOUS at 08:00

## 2020-05-27 RX ADMIN — LEVETIRACETAM 500 MG: 500 TABLET, FILM COATED ORAL at 09:00

## 2020-05-27 RX ADMIN — POTASSIUM CHLORIDE 10 MEQ: 10 CAPSULE, COATED, EXTENDED RELEASE ORAL at 09:42

## 2020-05-27 RX ADMIN — SODIUM CHLORIDE, PRESERVATIVE FREE 10 ML: 5 INJECTION INTRAVENOUS at 09:42

## 2020-05-27 RX ADMIN — CEFTRIAXONE SODIUM 1 G: 1 INJECTION, POWDER, FOR SOLUTION INTRAMUSCULAR; INTRAVENOUS at 06:49

## 2020-05-27 RX ADMIN — METOPROLOL TARTRATE 25 MG: 25 TABLET, FILM COATED ORAL at 10:30

## 2020-05-27 RX ADMIN — FERROUS SULFATE TAB EC 325 MG (65 MG FE EQUIVALENT) 325 MG: 325 (65 FE) TABLET DELAYED RESPONSE at 09:42

## 2020-05-27 RX ADMIN — CHOLESTYRAMINE 4 G: 4 POWDER, FOR SUSPENSION ORAL at 09:41

## 2020-05-27 ASSESSMENT — PAIN SCALES - GENERAL
PAINLEVEL_OUTOF10: 0
PAINLEVEL_OUTOF10: 0

## 2020-05-27 NOTE — CARE COORDINATION
Spoke to Sid Dunn in admissions, she informs me that the patient will need a negative covid test prior to returning to facility. She will also fax over patient code status paper. Informed her of a possibility of D/C today. Will notify Dr. Arik Ch.

## 2020-05-27 NOTE — DISCHARGE SUMMARY
04/23/2012     BMP:    Lab Results   Component Value Date    GLUCOSE 85 05/27/2020    GLUCOSE 88 04/23/2012     05/27/2020    K 4.8 05/27/2020     05/27/2020    CO2 22 05/27/2020    ANIONGAP 15 05/27/2020    BUN 21 05/27/2020    CREATININE 0.83 05/27/2020    BUNCRER NOT REPORTED 05/27/2020    CALCIUM 9.7 05/27/2020    LABGLOM >60 05/27/2020    GFRAA >60 05/27/2020    GFR      05/27/2020    GFR NOT REPORTED 05/27/2020     HFP:    Lab Results   Component Value Date    PROT 6.7 05/25/2020     CMP:    Lab Results   Component Value Date    GLUCOSE 85 05/27/2020    GLUCOSE 88 04/23/2012     05/27/2020    K 4.8 05/27/2020     05/27/2020    CO2 22 05/27/2020    BUN 21 05/27/2020    CREATININE 0.83 05/27/2020    ANIONGAP 15 05/27/2020    ALKPHOS 119 05/25/2020    ALT 14 05/25/2020    AST 22 05/25/2020    BILITOT 0.56 05/25/2020    LABALBU 4.3 05/25/2020    LABALBU 4.5 04/23/2012    ALBUMIN 1.8 05/25/2020    LABGLOM >60 05/27/2020    GFRAA >60 05/27/2020    GFR      05/27/2020    GFR NOT REPORTED 05/27/2020    PROT 6.7 05/25/2020    CALCIUM 9.7 05/27/2020     PT/INR:    Lab Results   Component Value Date    PROTIME 16.1 05/25/2020    INR 1.3 05/25/2020     PTT:   Lab Results   Component Value Date    APTT 33.2 05/25/2020     FLP:    Lab Results   Component Value Date    CHOL 110 05/25/2020    TRIG 74 05/25/2020    HDL 40 05/25/2020     U/A:    Lab Results   Component Value Date    COLORU YELLOW 05/25/2020    TURBIDITY CLEAR 05/25/2020    SPECGRAV 1.025 05/25/2020    HGBUR TRACE 05/25/2020    PHUR 5.5 05/25/2020    PROTEINU 2+ 05/25/2020    GLUCOSEU NEGATIVE 05/25/2020    KETUA NEGATIVE 05/25/2020    BILIRUBINUR NEGATIVE 05/25/2020    UROBILINOGEN Normal 05/25/2020    NITRU POSITIVE 05/25/2020    LEUKOCYTESUR MODERATE 05/25/2020     TSH:    Lab Results   Component Value Date    TSH 0.62 09/03/2019       Radiology:    Ct Head Wo Contrast    Result Date: 5/25/2020  EXAMINATION: CT OF THE HEAD WITHOUT CONTRAST  5/25/2020 5:56 am TECHNIQUE: CT of the head was performed without the administration of intravenous contrast. Dose modulation, iterative reconstruction, and/or weight based adjustment of the mA/kV was utilized to reduce the radiation dose to as low as reasonably achievable. COMPARISON: 02/15/2020. HISTORY: ORDERING SYSTEM PROVIDED HISTORY: facial droop TECHNOLOGIST PROVIDED HISTORY: facial droop FINDINGS: BRAIN/VENTRICLES: There is no acute intracranial hemorrhage, mass effect or midline shift. No abnormal extra-axial fluid collection. The gray-white differentiation is maintained without evidence of an acute infarct. There is prominence of the ventricles and sulci due to global parenchymal volume loss. There are nonspecific areas of hypoattenuation within the periventricular and subcortical white matter, which likely represent chronic microvascular ischemic change. ORBITS: The visualized portion of the orbits demonstrate no acute abnormality. SINUSES: Chronic fluid and sclerosis in the right mastoid air cells. The visualized paranasal sinuses and mastoid left air cells demonstrate no acute abnormality. SOFT TISSUES/SKULL: No acute abnormality of the visualized skull or soft tissues. No acute intracranial abnormality. Specifically, no acute intracranial hemorrhage or mass effect. Chronic small vessel ischemic disease. Critical results were called by Dr. Yfn Naqvi to [address] [place] [address] on 5/25/2020 at 06:03. Xr Chest Portable    Result Date: 5/26/2020  EXAMINATION: ONE XRAY VIEW OF THE CHEST 5/26/2020 4:03 am COMPARISON: June 17, 2019. HISTORY: ORDERING SYSTEM PROVIDED HISTORY: aspiration TECHNOLOGIST PROVIDED HISTORY: aspiration Reason for Exam: aspiration    upright port FINDINGS: Frontal portable view of the chest.  Normal lung volume. No focal airspace disease. Normal pulmonary vasculature. No pleural effusion or pneumothorax. Atherosclerotic thoracic aorta. Cardiomegaly.   Right superior decreased contrast opacification of the V2 and proximal V3 segments, improved since the prior study. Focal fusiform dilatation of the distal V2 segment of left vertebral artery measuring up to 6 mm, stable. 90% focal stenosis in the distal V4 segment of the right vertebral artery, stable. Fluid collection measuring up to 5 cm in the right axilla, may be related to right shoulder joint effusion. Follow-up with targeted ultrasound may be beneficial for further evaluation. Mri Brain Without Contrast    Result Date: 5/26/2020  EXAMINATION: MRI OF THE BRAIN WITHOUT CONTRAST  5/26/2020 1:56 pm TECHNIQUE: Multiplanar multisequence MRI of the brain was performed without the administration of intravenous contrast. COMPARISON: 02/09/2020 HISTORY: ORDERING SYSTEM PROVIDED HISTORY: expressive aphasia TECHNOLOGIST PROVIDED HISTORY: expressive aphasia Initial exam. FINDINGS: INTRACRANIAL STRUCTURES/VENTRICLES: This study is compromised by patient motion artifact. No areas of restricted diffusion to suggest an acute infarct. The cerebral and cerebellar parenchyma demonstrate volume loss. Scattered and confluent areas of increased T2 signal are noted supratentorially, compatible with chronic microvascular white matter ischemic disease. No abnormal extra-axial fluid collections. The ventricles are proportional to the cerebral sulci. ORBITS: The orbits are grossly unremarkable. SINUSES: The paranasal sinuses are well-aerated. There is a right mastoid effusion, stable. The left mastoid air cells are clear. BONES/SOFT TISSUES: The bone marrow signal intensity is grossly normal in appearance. 1. Motion degraded examination. No evidence of an acute infarct. 2. Cerebral parenchymal volume loss with chronic microvascular white matter ischemic disease.          Consultations:    Consults:     Final Specialist Recommendations/Findings:   IP CONSULT TO INTERNAL MEDICINE  IP CONSULT TO CARDIOLOGY      The patient was seen and

## 2020-05-27 NOTE — DISCHARGE INSTR - COC
Assisted  Dressing  Dependent  Toileting  Dependent  Feeding  Independent  Med Admin  Assisted  Med Delivery   whole and prefers mixed with sometimes wants them in applesauce    Wound Care Documentation and Therapy:        Elimination:  Continence:   · Bowel: {YES / YL:04877}  · Bladder: {YES / FN:19382}  Urinary Catheter: None   Colostomy/Ileostomy/Ileal Conduit: {YES / MO:90164}       Date of Last BM: ***    Intake/Output Summary (Last 24 hours) at 5/27/2020 1141  Last data filed at 5/27/2020 0649  Gross per 24 hour   Intake 290 ml   Output --   Net 290 ml     I/O last 3 completed shifts: In: 290 [P.O.:240; IV Piggyback:50]  Out: -     Safety Concerns:     Sundowners [place] and At Risk for Falls    Impairments/Disabilities:      Hearing    Nutrition Therapy:  Current Nutrition Therapy:   - Oral Diet:  General    Routes of Feeding: Oral  Liquids: {Slp liquid thickness:94523}  Daily Fluid Restriction: no  Last Modified Barium Swallow with Video (Video Swallowing Test): not done    Treatments at the Time of Hospital Discharge:   Respiratory Treatments: ***  Oxygen Therapy:  is not on home oxygen therapy.   Ventilator:    - No ventilator support    Rehab Therapies: {THERAPEUTIC INTERVENTION:3720022862}  Weight Bearing Status/Restrictions: No weight bearing restirctions  Other Medical Equipment (for information only, NOT a DME order):  {EQUIPMENT:987946918}  Other Treatments: ***    Patient's personal belongings (please select all that are sent with patient):  Dentures not with patient     RN SIGNATURE:  Electronically signed by Suzanna Bowman RN on 5/27/20 at 4:40 PM EDT    CASE MANAGEMENT/SOCIAL WORK SECTION    Inpatient Status Date: ***    Readmission Risk Assessment Score:  Readmission Risk              Risk of Unplanned Readmission:        23           Discharging to Facility/ [facility].           [address] [id number]       Phone: [phone]       Fax: [phone]

## 2020-05-27 NOTE — PROGRESS NOTES
hemispheric delta and theta slowing. EEG of 2/2020 showed mild left hemispheric slowing mixed with occasional sharp waves. The patient was started on Keppra to 50 mg twice daily. Past Medical History:     Past Medical History:   Diagnosis Date    Acute kidney failure ([address] 06/28/2019    Anemia     Aphasia     Arthritis     Atrial fibrillation (HCC)     Bile duct calculus     Calculus of gallbladder and bile duct without cholecystitis without obstruction     Cerebral infarction ([address]     CHF (congestive heart failure) (HCC)     Chronic kidney disease, stage 3 (HCC)     CVA (cerebral vascular accident) ([address]     Fall     Gout     Gout     Gout     Hyperlipidemia     Hypertension     Hypokalemia 06/28/2019    Hypokalemia     Hypomagnesemia 06/28/2019    Hypomagnesemia     Hypothyroidism     IBS (irritable bowel syndrome)     Irritable bowel syndrome     Ischemia     muscle    Left carotid stenosis     Metabolic encephalopathy 64/01/0087    Mitral valve insufficiency     Nonrheumatic mitral (valve) insufficiency 06/28/2019    Seizures (HCC)     TIA (transient ischemic attack)     UTI (urinary tract infection)         Past Surgical History:     Past Surgical History:   Procedure Laterality Date    CARPAL TUNNEL RELEASE  2012    Dr. Janneth Calero - right side    CATARACT REMOVAL WITH IMPLANT Bilateral     CHOLECYSTECTOMY      ERCP N/A 2/11/2019    Driss Birmingham MD - ERCP, Sphincterotomy, balloon dilatation and stent placement with removal of multiple stones    TOTAL KNEE ARTHROPLASTY Right     TOTAL KNEE ARTHROPLASTY Left     UPPER GASTROINTESTINAL ENDOSCOPY  07/31/2019    with stent removal by Dr. Rodriguez Batter 7/31/2019    EGD STENT REMOVAL performed by Presley Tyler MD at [facility]        Medications Prior to Admission:     Prior to Admission medications    Medication Sig Start Date End Date Taking?  Authorizing Provider   docusate sodium (COLACE) 100 both upper and lower extremities with normal bulk, normal tone and no involuntary movements, no tremor   SENSORY FUNCTION:  Normal touch, normal pin, normal vibration, normal proprioception   CEREBELLAR FUNCTION:  Intact fine motor control over upper limbs   REFLEX FUNCTION:  Bilateral Babinski sign positive   STATION and GAIT  Not tested       Investigations:      Laboratory Testing:  Recent Results (from the past 24 hour(s))   BASIC METABOLIC PANEL    Collection Time: 05/27/20 10:10 AM   Result Value Ref Range    Glucose 85 70 - 99 mg/dL    BUN 21 8 - 23 mg/dL    CREATININE 0.83 0.50 - 0.90 mg/dL    Bun/Cre Ratio NOT REPORTED 9 - 20    Calcium 9.7 8.6 - 10.4 mg/dL    Sodium 142 135 - 144 mmol/L    Potassium 4.8 3.7 - 5.3 mmol/L    Chloride 105 98 - 107 mmol/L    CO2 22 20 - 31 mmol/L    Anion Gap 15 9 - 17 mmol/L    GFR Non-African American >60 >60 mL/min    GFR African American >60 >60 mL/min    GFR Comment          GFR Staging NOT REPORTED    COVID-19    Collection Time: 05/27/20 11:46 AM   Result Value Ref Range    SARS-CoV-2          SARS-CoV-2, Rapid Not Detected Not Detected    Source . 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 ([facility] [I50.32, I38]      Priority: High    Chronic atrial fibrillation [I48.20]      Priority: High    Bilious vomiting [R11.14] 05/26/2020    S/P ERCP [Z98.890] 05/26/2020    TIA (transient ischemic attack) [G45.9]     Aphasia [R47.01]     Chronic kidney disease, stage 3 (HCC) [N18.3]     VERONICA (acute kidney injury) ([facility] [N17.9] 02/13/2019    Acute cystitis without hematuria [N30.00] 02/08/2019    Hypothyroidism [E03.9]     Hypertension [I10]        Plan:     The patient mentioned above with past medical history of atrial fibrillation on Eliquis and aspirin, diastolic congestive heart

## 2020-05-30 LAB
CULTURE: NORMAL
CULTURE: NORMAL
Lab: NORMAL
Lab: NORMAL
SPECIMEN DESCRIPTION: NORMAL
SPECIMEN DESCRIPTION: NORMAL

## 2020-06-23 ENCOUNTER — HOSPITAL ENCOUNTER (OUTPATIENT)
Age: 85
Setting detail: SPECIMEN
Discharge: HOME | End: 2020-06-23
Payer: MEDICARE

## 2020-06-23 LAB — KEPPRA: 30 UG/ML

## 2020-06-23 PROCEDURE — 80177 DRUG SCRN QUAN LEVETIRACETAM: CPT

## 2020-06-23 PROCEDURE — P9603 ONE-WAY ALLOW PRORATED MILES: HCPCS

## 2020-06-23 PROCEDURE — 36415 COLL VENOUS BLD VENIPUNCTURE: CPT

## 2020-06-30 ENCOUNTER — OFFICE VISIT (OUTPATIENT)
Dept: CARDIOLOGY | Age: 85
End: 2020-06-30
Payer: MEDICARE

## 2020-06-30 VITALS
BODY MASS INDEX: 30.78 KG/M2 | HEART RATE: 83 BPM | HEIGHT: 60 IN | RESPIRATION RATE: 18 BRPM | DIASTOLIC BLOOD PRESSURE: 76 MMHG | OXYGEN SATURATION: 95 % | SYSTOLIC BLOOD PRESSURE: 128 MMHG | WEIGHT: 156.8 LBS

## 2020-06-30 PROCEDURE — G8427 DOCREV CUR MEDS BY ELIG CLIN: HCPCS | Performed by: FAMILY MEDICINE

## 2020-06-30 PROCEDURE — G8417 CALC BMI ABV UP PARAM F/U: HCPCS | Performed by: FAMILY MEDICINE

## 2020-06-30 PROCEDURE — 1090F PRES/ABSN URINE INCON ASSESS: CPT | Performed by: FAMILY MEDICINE

## 2020-06-30 PROCEDURE — 1123F ACP DISCUSS/DSCN MKR DOCD: CPT | Performed by: FAMILY MEDICINE

## 2020-06-30 PROCEDURE — 4040F PNEUMOC VAC/ADMIN/RCVD: CPT | Performed by: FAMILY MEDICINE

## 2020-06-30 PROCEDURE — 99211 OFF/OP EST MAY X REQ PHY/QHP: CPT | Performed by: FAMILY MEDICINE

## 2020-06-30 PROCEDURE — 1036F TOBACCO NON-USER: CPT | Performed by: FAMILY MEDICINE

## 2020-06-30 PROCEDURE — 99214 OFFICE O/P EST MOD 30 MIN: CPT | Performed by: FAMILY MEDICINE

## 2020-06-30 NOTE — PROGRESS NOTES
Wayne Yousif am scribing for and in the presence of Suman Cespedes. Tristen CALLES, MS, F.A.C.C. Patient: Chana El  : 1930  Date of Admission: (Not on file)  Primary Care Physician: Svetlana Alvarado  Today's Date: 2020    REASON FOR CONSULTATION: Establish Cardiologist (HX:Chronic AFib,CHF, HLD, HTN, Severe mitral regurg TiA Pt is here to est care today for hospital follow up on  for stroke she fell at home once discharged. She is doing well now. Denies:CP,SOB,lightheaded/dizziness,palpitations)    HPI: Ms. Misael Wheat is a 80 y.o. female who was admitted to the hospital in the past with a fall and was found down for some time. In the ER she was found to have evidence of rhabdomyolysis. She also appears to have a history of chronic atrial fibrillation leading to the consultation. However she recently had a stoke in May of 2020 with difficulty speaking and facial droop and is now here to reestablish care. Fortunately she reports largely a full recovery from this without deficits. Ms. Misael Wheat reports doing fairly well since her hospital stay. She is no pain today. When she came back from the hospital her nurse reported that she had four falls in that same day of being in the nursing home buts says she has had no falls since then. Ms. Misael Wheat denies any current or recent chest pain, shortness of breath, abdominal pain, bleeding problems, problems with his medications or any other concerns at this time.     Past Medical History:   Diagnosis Date    Acute kidney failure ([address] 2019    Anemia     Aphasia     Arthritis     Atrial fibrillation (HCC)     Bile duct calculus     Calculus of gallbladder and bile duct without cholecystitis without obstruction     Cerebral infarction ([address]     CHF (congestive heart failure) (HCC)     Chronic kidney disease, stage 3 (HCC)     CVA (cerebral vascular accident) ([address]     Fall     Gout     Gout     Gout     Hyperlipidemia     infarction, unspecified ([facility] 04/09/2019    Left carotid artery stenosis     Diarrhea 03/11/2019    VERONICA (acute kidney injury) ([facility] 02/13/2019    Acute cystitis without hematuria 02/08/2019    Hypothyroidism     Hyperlipidemia     Hypertension     Gout     Arthritis         PLAN:   Recent Stroke in May of 2020:    Antiplatelet Agent: Continue Aspirin 81 mg daily.  Beta Blocker: Continue Metoprolol tartrate (Lopressor) 75 mg bid.  Cholesterol Reduction Therapy: Continue Atorvastatin (Lipitor) 20 mg daily. Chronic Atrial Fibrillation: Rate Control  Beta Blocker: Continue metoprolol tartrate (Lopressor) 75 mg BID    [id number] Score for Atrial Fibrillation Stroke Risk   Risk   Factors  Component Value   C CHF Yes 1   H HTN Yes 1   A2 Age >= 76 Yes,  (80 y.o.) 2   D DM No 0   S2 Prior Stroke/TIA Yes 2   V Vascular Disease No 0   A Age 74-69 No,  (80 y.o.) 0   Sc Sex female 1    [id number]  Score  7   Score last updated 0/22/49 3:89 PM EDT  Click here for a link to the UpToDate guideline \"Atrial Fibrillation: Anticoagulation therapy to prevent embolization  Disclaimer: Risk Score calculation is dependent on accuracy of patient problem list and past encounter diagnosis. Stroke Risk: Her CHADS2-VASc score is 7/9 (9.6% stroke risk)  Anticoagulation: Continue Apixaban (Eliquis) 5 mg every 12 hours. I also reminded her to watch for signs of blood in her stool or black tarry stools and stop the medication immediately if this develops as this could be life threatening.  Chronic diastolic heart failure: New York Heart Association Class: IIa (Mild symptoms only in normal activities). Currently well controlled with no active signs of heart failure    Beta Blocker: Continue Metoprolol tartrate (Lopressor) 75 mg bid.  ACE Inibitor/ARB: Continue losartan (Cozaar) 25 mg daily.  Diuretics: Not indicated at this time.    Heart failure counseling: I told them to continue wearing lower extremity compression stockings and I advised them to try and keep their legs up whenever possible and to limit salt in their diet. · Moderate Mitral Regurgitation: asymptomatic  · Beta Blocker: Continue Metoprolol tartrate (Lopressor) 75 mg bid. · ACE Inibitor/ARB: Continue losartan (Cozaar) 25 mg daily.   Essential Hypertension: Controlled  · Beta Blocker: Continue Metoprolol tartrate (Lopressor) 75 mg bid. · ACE Inibitor/ARB: Continue losartan (Cozaar) 25 mg daily. · Hyperlipidemia: Mixed, LDL done on 5/25/2020 was 55 mg/dL  · Cholesterol Reduction Therapy: Continue Atorvastatin (Lipitor) 20 mg daily. Once again, thank you for allowing me to participate in this patients care. Please do not hesitate to contact me if I could be of any further assistance. FOLLOW UP:  I told Ms. Barakat to call my office if she had any problems, but otherwise told her to Return in about 3 months (around 9/30/2020). However, I would be happy to see her sooner should the need arise. Sincerely,  Cas Ramon MD, MS, F.A.C.C. [facility] Cardiology Specialist    [address] [facility], [address] [place] [address]  Phone: [phone], Fax: [phone]     I believe that the risk of significant morbidity and mortality related to the patient's current medical conditions are: Intermediate.

## 2020-06-30 NOTE — PATIENT INSTRUCTIONS
SURVEY:    You may be receiving a survey from [employer] regarding your visit today. Please complete the survey to enable us to provide the highest quality of care to you and your family. If you cannot score us a very good on any question, please call the office to discuss how we could have made your experience a very good one. Thank you.

## 2020-07-09 ENCOUNTER — HOSPITAL ENCOUNTER (OUTPATIENT)
Age: 85
Setting detail: SPECIMEN
Discharge: HOME | End: 2020-07-09
Payer: MEDICARE

## 2020-07-09 PROCEDURE — U0003 INFECTIOUS AGENT DETECTION BY NUCLEIC ACID (DNA OR RNA); SEVERE ACUTE RESPIRATORY SYNDROME CORONAVIRUS 2 (SARS-COV-2) (CORONAVIRUS DISEASE [COVID-19]), AMPLIFIED PROBE TECHNIQUE, MAKING USE OF HIGH THROUGHPUT TECHNOLOGIES AS DESCRIBED BY CMS-2020-01-R: HCPCS

## 2020-07-16 LAB — SARS-COV-2, NAA: NOT DETECTED

## 2020-07-17 ENCOUNTER — HOSPITAL ENCOUNTER (OUTPATIENT)
Age: 85
Setting detail: SPECIMEN
Discharge: HOME | End: 2020-07-17
Payer: MEDICARE

## 2020-07-17 LAB
-: NORMAL
AMORPHOUS: NORMAL
ANION GAP SERPL CALCULATED.3IONS-SCNC: 11 MMOL/L (ref 9–17)
BACTERIA: NORMAL
BILIRUBIN URINE: NEGATIVE
BUN BLDV-MCNC: 23 MG/DL (ref 8–23)
BUN/CREAT BLD: 23 (ref 9–20)
CALCIUM SERPL-MCNC: 9.4 MG/DL (ref 8.6–10.4)
CASTS UA: NORMAL /LPF
CHLORIDE BLD-SCNC: 107 MMOL/L (ref 98–107)
CO2: 23 MMOL/L (ref 20–31)
COLOR: YELLOW
COMMENT UA: ABNORMAL
CREAT SERPL-MCNC: 1.01 MG/DL (ref 0.5–0.9)
CRYSTALS, UA: NORMAL /HPF
EPITHELIAL CELLS UA: NORMAL /HPF (ref 0–25)
GFR AFRICAN AMERICAN: >60 ML/MIN
GFR NON-AFRICAN AMERICAN: 51 ML/MIN
GFR SERPL CREATININE-BSD FRML MDRD: ABNORMAL ML/MIN/{1.73_M2}
GFR SERPL CREATININE-BSD FRML MDRD: ABNORMAL ML/MIN/{1.73_M2}
GLUCOSE BLD-MCNC: 86 MG/DL (ref 70–99)
GLUCOSE URINE: NEGATIVE
HCT VFR BLD CALC: 38.7 % (ref 36.3–47.1)
HEMOGLOBIN: 12.3 G/DL (ref 11.9–15.1)
KETONES, URINE: NEGATIVE
LEUKOCYTE ESTERASE, URINE: NEGATIVE
MCH RBC QN AUTO: 32.5 PG (ref 25.2–33.5)
MCHC RBC AUTO-ENTMCNC: 31.8 G/DL (ref 28.4–34.8)
MCV RBC AUTO: 102.1 FL (ref 82.6–102.9)
MUCUS: NORMAL
NITRITE, URINE: NEGATIVE
NRBC AUTOMATED: 0 PER 100 WBC
OTHER OBSERVATIONS UA: NORMAL
PDW BLD-RTO: 12.8 % (ref 11.8–14.4)
PH UA: 5 (ref 5–9)
PLATELET # BLD: 172 K/UL (ref 138–453)
PMV BLD AUTO: 11.5 FL (ref 8.1–13.5)
POTASSIUM SERPL-SCNC: 4 MMOL/L (ref 3.7–5.3)
PROTEIN UA: ABNORMAL
RBC # BLD: 3.79 M/UL (ref 3.95–5.11)
RBC UA: NORMAL /HPF (ref 0–2)
RENAL EPITHELIAL, UA: NORMAL /HPF
SODIUM BLD-SCNC: 141 MMOL/L (ref 135–144)
SPECIFIC GRAVITY UA: 1.02 (ref 1.01–1.02)
TRICHOMONAS: NORMAL
TURBIDITY: CLEAR
URINE HGB: NEGATIVE
UROBILINOGEN, URINE: NORMAL
WBC # BLD: 6.1 K/UL (ref 3.5–11.3)
WBC UA: NORMAL /HPF (ref 0–5)
YEAST: NORMAL

## 2020-07-17 PROCEDURE — 81001 URINALYSIS AUTO W/SCOPE: CPT

## 2020-07-17 PROCEDURE — 36415 COLL VENOUS BLD VENIPUNCTURE: CPT

## 2020-07-17 PROCEDURE — 80048 BASIC METABOLIC PNL TOTAL CA: CPT

## 2020-07-17 PROCEDURE — 85027 COMPLETE CBC AUTOMATED: CPT

## 2020-07-17 PROCEDURE — P9603 ONE-WAY ALLOW PRORATED MILES: HCPCS

## 2020-07-20 ENCOUNTER — OFFICE VISIT (OUTPATIENT)
Dept: NEUROLOGY | Age: 85
End: 2020-07-20
Payer: MEDICARE

## 2020-07-20 VITALS
TEMPERATURE: 97.9 F | HEIGHT: 60 IN | OXYGEN SATURATION: 95 % | SYSTOLIC BLOOD PRESSURE: 113 MMHG | DIASTOLIC BLOOD PRESSURE: 77 MMHG | WEIGHT: 158 LBS | HEART RATE: 70 BPM | BODY MASS INDEX: 31.02 KG/M2

## 2020-07-20 PROCEDURE — 1111F DSCHRG MED/CURRENT MED MERGE: CPT | Performed by: INTERNAL MEDICINE

## 2020-07-20 PROCEDURE — 1123F ACP DISCUSS/DSCN MKR DOCD: CPT | Performed by: INTERNAL MEDICINE

## 2020-07-20 PROCEDURE — 1036F TOBACCO NON-USER: CPT | Performed by: INTERNAL MEDICINE

## 2020-07-20 PROCEDURE — 99214 OFFICE O/P EST MOD 30 MIN: CPT | Performed by: INTERNAL MEDICINE

## 2020-07-20 PROCEDURE — 4040F PNEUMOC VAC/ADMIN/RCVD: CPT | Performed by: INTERNAL MEDICINE

## 2020-07-20 PROCEDURE — G8427 DOCREV CUR MEDS BY ELIG CLIN: HCPCS | Performed by: INTERNAL MEDICINE

## 2020-07-20 PROCEDURE — G8417 CALC BMI ABV UP PARAM F/U: HCPCS | Performed by: INTERNAL MEDICINE

## 2020-07-20 PROCEDURE — 1090F PRES/ABSN URINE INCON ASSESS: CPT | Performed by: INTERNAL MEDICINE

## 2020-07-20 RX ORDER — SALIVA STIMULANT COMB. NO.3
1 SPRAY, NON-AEROSOL (ML) MUCOUS MEMBRANE PRN
COMMUNITY

## 2020-07-20 NOTE — PROGRESS NOTES
[address] [place] [address], [facility] [address]  [address] [id number]  Dept: [phone]  Dept Fax: [phone]    NEUROLOGY FOLLOW UP NOTE                                              PATIENT NAME: Ayan Grider   PATIENT MRN: [id number]  FOLLOW UP TODAY: 7/20/2020      INITIAL & INTERVAL HISTORY:     Ayan Grider is a 80 y.o. female here for follow up of her TIAs, seizures. After discharge on 5/27/2020 patient refer that she is he having some episodes of slurred speech and possible weakness falls. No new right-sided weakness. She lives at [facility] ([phone])     Upon calling [facility] nursing facility was able to talk with the nurse and the floor and she was telling me that for a couple of days she has been having some problem with the swallowing and in the last 2 weeks she has 4 falls. She is still getting PT and OT in the facility but there has been no weakness or numbness or any speech difficulty recently. I was able to let her know about the new change in her Keppra dose. Hx: Patient has been evaluated for multiple episodes of right-sided weakness, dysarthria on 4/9/2019, 2/10/2020, 5/25/2020. Patient is known to have CT head and neck showing bilateral ICA stenosis with 75%, PCA stenosis of 50% and right vertebral occlusion. Patient only on medical therapy due to risk factors. Multiple MRIs done different admissions which were unremarkable for any acute stroke but EEGs done on 4/9/2019 showed occasional left hemispheric delta and theta suggestive of underlying neuronal dysfunction and February 10, 2020 showing some dysfunctions and sharp waves.   Patient was started at that time on February 10, 2020 on Keppra to 50 mg p.o. twice daily but then on the second admission on 5/27/2020 was increased to 500 mg p.o. twice daily    PMH    has a past medical history of Acute kidney failure ([address] Anemia, Aphasia, Arthritis, Atrial tablet Take 1 tablet by mouth Daily 90 tablet 3    acetaminophen (TYLENOL) 500 MG tablet Take 500 mg by mouth 2 times daily        No current facility-administered medications for this visit. PREVIOUS WORKUP:    1. Brain MRI without contrast  Impression    1. Motion degraded examination.  No evidence of an acute infarct. 2. Cerebral parenchymal volume loss with chronic microvascular white matter    ischemic disease.           2. CTA head and neck  Impression    75% focal stenosis at the origins of the bilateral cervical internal carotid    arteries         Occlusion in the V1 segment and partial occlusion in mid V3 segment of the    right vertebral artery, with asymmetrically decreased contrast opacification    of the V2 and proximal V3 segments, improved since the prior study.         Focal fusiform dilatation of the distal V2 segment of left vertebral artery    measuring up to 6 mm, stable.         90% focal stenosis in the distal V4 segment of the right vertebral artery,    stable.         Fluid collection measuring up to 5 cm in the right axilla, may be related to    right shoulder joint effusion.  Follow-up with targeted ultrasound may be    beneficial for further evaluation.           3. CT head without  Impression    No acute intracranial abnormality.  Specifically, no acute intracranial    hemorrhage or mass effect.         Chronic small vessel ischemic disease.         Critical results were called by Dr. Rivas Andrew Sessions to [address] [place] [address] on    5/25/2020 at 06:03.           4. EEG (2/10/2020)  EEG mild left slowing more frontal temporal with occasional sharp wave    5. LTME (4/8/2019)  Day 1 - 4/8/2019, starting at 11:23     Interictal EEG Samples: In the awake state, the background activity consisted of 8-9 Hz of alpha activity of 20-40 µV which was better formed over the right hemisphere. There were frequent runs of 3-5 Hz of polymorphic delta activity over the left hemisphere.   During seizures, weakness, headache, dysarthria, aphasia   Endo/heme/allergies  Negative for polydipsia, environmental allergy    Psychiatric/behavioral  Negative for suicidal ideation. Patient is not anxious      VITALS  /77 (Site: Right Upper Arm, Position: Sitting, Cuff Size: Medium Adult)   Pulse 70   Temp 97.9 °F (36.6 °C) (Temporal)   Ht 5' (1.524 m)   Wt 158 lb (71.7 kg)   SpO2 95%   BMI 30.86 kg/m²     PHYSICAL EXAMINATION:     Physical Exam     General appearance: cooperative  Skin: no rash or skin lesions. HEENT: normocephalic  Optic Fundi: deferred  Neck: supple, no cervcical adenopathy or carotid bruit  Lungs: clear to auscultation  Heart: Regular rate and rhythm, normal S1, S2. No murmurs, clicks or gallops.   Peripheral pulses: radial pulses palpable  Abdominal: BS present, soft, NT, ND  Extremities: no edema    NEUROLOGICAL EXAMINATION:      GENERAL  Appears comfortable and in no distress   HEENT  NC/ AT   HEART  S1 and S2 heard; palpation of pulses: radial pulse    NECK  Supple and no bruits heard   MENTAL STATUS:  Alert, oriented x3, good mood, no dysarthria or aphasia   CRANIAL NERVES: II     -      Visual fields intact to confrontation  III,IV,VI -  PERR, EOMs full, no ptosis  V     -     Normal facial sensation   VII    -     Normal facial symmetry  VIII   -     Intact hearing   IX,X -     Symmetrical palate  XI    -     Symmetrical shoulder shrug  XII   -     Midline tongue, no atrophy    MOTOR FUNCTION: RUE: Significant for good strength of grade 5/5 in proximal and distal muscle groups   LUE: Significant for good strength of grade 5/5 in proximal and distal muscle groups   RLE: Significant for good strength of grade 4/5 in proximal and distal muscle groups   LLE: Significant for good strength of grade 4/5 in proximal and distal muscle groups      Normal bulk, normal tone and no involuntary movements, no tremor   SENSORY FUNCTION:  Normal touch, normal pin   CEREBELLAR FUNCTION:  Intact

## 2020-09-21 NOTE — PATIENT INSTRUCTIONS
PLAN:      1. We will plan to start tapering down Keppra. a. Continue 500 mg twice daily for 1 week      then  b. 250 mg in the morning and 500 mg in the afternoon for 1 week      then   c. 250 mg twice a day and get EEG    2. Seizure precautions  3. Recommend to continue PT, OT and a falls precaution since patient is on Eliquis  4. Continue all medical management  5. We will follow-up after tapering down and EEG once patient reach 250mg PO BID  6.  Follow-up with primary as appointed

## 2020-09-21 NOTE — PROGRESS NOTES
[address] [place] [address]  [facility] [address] [id number]  Dept: [phone]  Dept Fax: [phone]    NEUROLOGY FOLLOW UP NOTE                                              PATIENT NAME: Marsha Henao   PATIENT MRN: [id number]  FOLLOW UP TODAY: 9/21/2020      INITIAL & INTERVAL HISTORY:     Marsha Henao is a 80 y.o. female here for follow up of her TIAs, seizures. Patient referred in this visit that overall she feels somewhat better but still with weakness on bilateral lower extremity but more on the right lower extremity. Her speech seems somewhat slurred but when asking the patient she refer the she talks like that. Refer no side effects with the medication like any mood changes. Patient refer that she feels sometimes depressed because now with the COVID situation unable to see family. I was able to call [facility] again and nurse who was taking care of the patient refer that patient still weak and needs about 2 or 3 people to move her around although she still uses the walker. Referred that mentally the patient is not agitated or extremely confused but sometimes is more forgetful. They also noticed that the speech was a little bit more slurred. Facility told me that patient gets routine labs and visits from doctors who do evaluations and patient and abdomen nothing has been found in terms of infections. Patient gets physical therapy and Occupational Therapy intermittently since patient can start some therapies but then able to be stopped and then restarted again. Hx: Patient has been evaluated for multiple episodes of right-sided weakness, dysarthria on 4/9/2019, 2/10/2020, 5/25/2020. Patient is known to have CT head and neck showing bilateral ICA stenosis with 75%, PCA stenosis of 50% and right vertebral occlusion. Patient only on medical therapy due to risk factors.   Multiple MRIs done different admissions which Current Outpatient Medications   Medication Sig Dispense Refill    Artificial Saliva (BIOTENE DRY MOUTH MOISTURIZING) SOLN liquid Take 1 drop by mouth as needed      metoprolol tartrate 75 MG TABS Take 75 mg by mouth 2 times daily 60 tablet 3    levETIRAcetam (KEPPRA) 500 MG tablet Take 1 tablet by mouth 2 times daily 60 tablet 3    docusate sodium (COLACE) 100 MG capsule Take 100 mg by mouth daily      polyethylene glycol (GLYCOLAX) packet Take 17 g by mouth daily as needed for Constipation      ondansetron (ZOFRAN) 4 MG tablet Take 4 mg by mouth every 6 hours as needed for Nausea or Vomiting      cholestyramine (QUESTRAN) 4 g packet Take 1 packet by mouth 3 times daily (with meals) 90 packet 3    potassium chloride (MICRO-K) 10 MEQ extended release capsule Take 10 mEq by mouth 2 times daily      ferrous sulfate 325 (65 Fe) MG tablet Take 1 tablet by mouth daily (with breakfast) 180 tablet 1    apixaban (ELIQUIS) 5 MG TABS tablet Take 5 mg by mouth 2 times daily      atorvastatin (LIPITOR) 20 MG tablet Take 1 tablet by mouth nightly 30 tablet 3    losartan (COZAAR) 25 MG tablet Take 1 tablet by mouth daily 30 tablet 3    aspirin 81 MG EC tablet Take 1 tablet by mouth daily 30 tablet 3    levothyroxine (SYNTHROID) 112 MCG tablet Take 1 tablet by mouth Daily 90 tablet 3    acetaminophen (TYLENOL) 500 MG tablet Take 500 mg by mouth 2 times daily        No current facility-administered medications for this visit. PREVIOUS WORKUP:    1. Brain MRI without contrast (5/26/2020)  Impression    1. Motion degraded examination.  No evidence of an acute infarct. 2. Cerebral parenchymal volume loss with chronic microvascular white matter    ischemic disease.           2.  CTA head and neck (5/25/2020)  Impression    75% focal stenosis at the origins of the bilateral cervical internal carotid    arteries         Occlusion in the V1 segment and partial occlusion in mid V3 segment of the    right vertebral artery, with asymmetrically decreased contrast opacification    of the V2 and proximal V3 segments, improved since the prior study.         Focal fusiform dilatation of the distal V2 segment of left vertebral artery    measuring up to 6 mm, stable.         90% focal stenosis in the distal V4 segment of the right vertebral artery,    stable.         Fluid collection measuring up to 5 cm in the right axilla, may be related to    right shoulder joint effusion.  Follow-up with targeted ultrasound may be    beneficial for further evaluation.           3. CT head without (5/25/2020)  Impression    No acute intracranial abnormality.  Specifically, no acute intracranial    hemorrhage or mass effect.         Chronic small vessel ischemic disease.         Critical results were called by Dr. Miroslava Colin Sessions to [address] [place] [address] on    5/25/2020 at 06:03.           4. EEG (2/10/2020)  EEG mild left slowing more frontal temporal with occasional sharp wave    5. Brain MRI WO (2/9/2020)  Impression    Multifocal small-vessel ischemic change bilaterally with multiple small prior    infarcts.         No acute infarct         Moderate right mastoid opacification           6. LTME (4/8/2019)  Day 1 - 4/8/2019, starting at 11:23     Interictal EEG Samples: In the awake state, the background activity consisted of 8-9 Hz of alpha activity of 20-40 µV which was better formed over the right hemisphere. There were frequent runs of 3-5 Hz of polymorphic delta activity over the left hemisphere. During periods of behavioral sleep, vertex sharp waves and symmetric spindles were seen. No abnormalities were activated by sleep. The EKG channel revealed no abnormalities.     Ictal EEG Recording / Patient Events: During this period the patient had no events or seizures.     Summary: During this day of recording no events were recorded.  The interictal EEG was abnormal due to frequent left hemispheric delta and theta slowing suggestive of underlying neuronal dysfunction. This finding can also be secondary to postictal slowing. Monitoring was continued in order to record the patients typical events. The EKG channel revealed no abnormalities.     Day 2 - 4/9/2019, reviewed through 7:30 am     Interictal EEG Samples: Previously seen left hemispheric slowing appeared less frequent.     Ictal EEG Recording / Patient Events: During this period the patient had no events or seizures.     Summary: During this day of recording no events were recorded. The interictal EEG was abnormal due to occasional left hemispheric delta and theta slowing suggestive of underlying neuronal dysfunction. This finding can also be secondary to postictal slowing. Monitoring was continued in order to record the patients typical events. The EKG channel revealed no abnormalities. LABS & TESTS:      Lab Results   Component Value Date    WBC 6.6 09/15/2020    HGB 13.1 09/15/2020    HCT 43.5 09/15/2020    .8 (H) 09/15/2020     09/15/2020     REVIEW OF SYSTEMS:     Constitutional  Negative for fever and chills    HEENT  Negative for ear discharge, ear pain, nosebleed    Eyes  Negative for photophobia, pain and discharge    Respiratory  Negative for hemoptysis and sputum    Cardiovascular  Negative for orthopnea, claudication and PND    Gastrointestinal  Negative for abdominal pain, diarrhea, blood in stool    Musculoskeletal  Negative for joint pain, negative for myalgia    Skin  Negative for rash or itching    Neurological Negative for seizures, headache, aphasia  Positive for weakness and some slurred speech   Endo/heme/allergies  Negative for polydipsia, environmental allergy    Psychiatric/behavioral  Negative for suicidal ideation.  Patient is not anxious      VITALS  /80 (Site: Left Upper Arm, Position: Sitting, Cuff Size: Medium Adult)   Pulse 75     PHYSICAL EXAMINATION:     Physical Exam     General appearance: cooperative  Skin: no rash or skin lesions. HEENT: normocephalic  Optic Fundi: deferred  Neck: supple, no cervcical adenopathy or carotid bruit  Lungs: clear to auscultation  Heart: Regular rate and rhythm, normal S1, S2. No murmurs, clicks or gallops.   Peripheral pulses: radial pulses palpable  Abdominal: BS present, soft, NT, ND  Extremities: no edema    NEUROLOGICAL EXAMINATION:      GENERAL  Appears comfortable and in no distress   HEENT  NC/ AT   HEART  S1 and S2 heard; palpation of pulses: radial pulse    NECK  Supple and no bruits heard   MENTAL STATUS:  Alert, oriented x3, good mood, aphasia  Patient is showing some slurred speech although patient refer that this is how she talks   CRANIAL NERVES: II     -      Visual fields intact to confrontation  III,IV,VI -  PERR, EOMs full, mild ptosis on left eye but for patient is normal  V     -     Normal facial sensation   VII    -     Normal facial symmetry  VIII   -     Intact hearing   IX,X -     Symmetrical palate  XI    -     Symmetrical shoulder shrug  XII   -     Midline tongue, no atrophy    MOTOR FUNCTION: RUE: Significant for good strength of grade 5/5 in proximal and distal muscle groups   LUE: Significant for good strength of grade 5/5 in proximal and distal muscle groups   RLE: Significant for good strength of grade 3/5 in proximal and distal muscle groups   LLE: Significant for good strength of grade 4-/5 in proximal and distal muscle groups      Normal bulk, normal tone and no involuntary movements, no tremor   SENSORY FUNCTION:  Normal touch, normal pin   CEREBELLAR FUNCTION:  Intact fine motor control over upper limbs   REFLEX FUNCTION:  Symmetric in upper and lower extremities, no Babinski sign   STATION and GAIT  deferred, wheelchair-bound     ASSESSMENT:       Case of a 71-year-old female patient with history of A. fib on Eliquis, CHF, CKD stage III, bilateral ICA stenosis, PCA stenosis at right vertebral occlusion in follow-up for seizure disorder    // Seizures //   Patient with abnormal EEG on previous admissions and currently getting levetiracetam 500 mg p.o. in the morning and 750 mg p.o. at night. Upon evaluation the patient patient still looks chronic the same. Weakness of the lower extremities but more on the right. Some difficulty with the speech although patient refer that this is normal for her. Upon calling facility patient seems like more forgetful there but the weakness is the same. Evaluation for infection apparently has been negative on evaluation in the facility. Will recommend to start tapering down Keppra and getting an EEG and if it is abnormal we could switch the patient to Lamictal    PLAN:      1. We will plan to start tapering down Keppra. a. Continue 500 mg twice daily for 1 week      then  b. 250 mg in the morning and 500 mg in the afternoon for 1 week      then   c. 250 mg twice a day and get EEG    2. Seizure precautions  3. Recommend to continue PT, OT and a falls precaution since patient is on Eliquis  4. Continue all medical management  5. We will follow-up after tapering down and EEG once patient reach 250mg PO BID  6. Follow-up with primary as appointed    Ms. Barakat received counseling on the following healthy behaviors: medical compliance, smoking cessation, blood pressure control, regular follow up with primary doctor.       Electronically signed by Jeri Barksdale MD on 9/21/2020 at 2:49 PM

## 2020-09-23 NOTE — TELEPHONE ENCOUNTER
Good Morning    This is correct. Patient left the office the plan was continue Keppra. After patient left we were able to go home place along with attending and based on what they told as we wanted to change plan and told the nurse in the unit about new plan and that is why we faxed new plan with progress note to nurse can have the most updated plan. So the most recent is a progress note not the AVS of the visit summary.     Plan is to taper down keppra    Thank you

## 2020-10-08 NOTE — TELEPHONE ENCOUNTER
10/08 LVM to cb and cf appt,location,id,ins,copay and mask-cb/ message left with the transportation phone number

## 2020-10-09 PROBLEM — K58.0 IRRITABLE BOWEL SYNDROME WITH DIARRHEA: Status: ACTIVE | Noted: 2020-01-01

## 2020-10-09 PROBLEM — R14.0 ABDOMINAL BLOATING: Status: ACTIVE | Noted: 2020-01-01

## 2020-10-09 NOTE — PROGRESS NOTES
GI OFFICE FOLLOW UP    Jarad Barrientos is a 80 y.o. female evaluated via on 10/9/2020. Consent:  She and/or health care decision maker is aware that that she may receive a bill for this telephone service, depending on her insurance coverage, and has provided verbal consent to proceed: YES      INTERVAL HISTORY:   No referring provider defined for this encounter. Chief Complaint   Patient presents with    Diarrhea     no issues, last office apt was 8 months ago       1. Diarrhea, unspecified type    2. Irritable bowel syndrome with diarrhea    3. Abdominal bloating      This patient seen my office today as a follow-up accompanied by her caregiver  She is a resident of nursing home has been followed in my office with history for diarrhea she is 80years old    Last time she was given Questran  She says her diarrhea is gone now  Has mild abdominal bloating  Denies any bleeding  Denies any melanotic stools    No nausea vomiting hematemesis    Seems to be eating okay  Nursing home staff tells me that she does not have any issues of GI concerning at this time      HISTORY OF PRESENT ILLNESS: Ms.Ruth Xin Pope is a 80 y.o. female with a past history remarkable for , referred for evaluation of   Chief Complaint   Patient presents with    Diarrhea     no issues, last office apt was 8 months ago   . Past Medical,Family, and Social History reviewed and does contribute to the patient presenting condition. Patient's PMH/PSH,SH,PSYCH Hx, MEDs, ALLERGIES, and ROS were all reviewed and updated in the appropriate sections.     PAST MEDICAL HISTORY:  Past Medical History:   Diagnosis Date    Acute kidney failure ([facility] [address] 06/28/2019    Anemia     Aphasia     Arthritis     Atrial fibrillation (HCC)     Bile duct calculus     Calculus of gallbladder and bile duct without cholecystitis without obstruction     Rfl:     ondansetron (ZOFRAN) 4 MG tablet, Take 4 mg by mouth every 6 hours as needed for Nausea or Vomiting, Disp: , Rfl:     cholestyramine (QUESTRAN) 4 g packet, Take 1 packet by mouth 3 times daily (with meals), Disp: 90 packet, Rfl: 3    potassium chloride (MICRO-K) 10 MEQ extended release capsule, Take 10 mEq by mouth 2 times daily, Disp: , Rfl:     ferrous sulfate 325 (65 Fe) MG tablet, Take 1 tablet by mouth daily (with breakfast), Disp: 180 tablet, Rfl: 1    apixaban (ELIQUIS) 5 MG TABS tablet, Take 5 mg by mouth 2 times daily, Disp: , Rfl:     atorvastatin (LIPITOR) 20 MG tablet, Take 1 tablet by mouth nightly, Disp: 30 tablet, Rfl: 3    losartan (COZAAR) 25 MG tablet, Take 1 tablet by mouth daily, Disp: 30 tablet, Rfl: 3    aspirin 81 MG EC tablet, Take 1 tablet by mouth daily, Disp: 30 tablet, Rfl: 3    levothyroxine (SYNTHROID) 112 MCG tablet, Take 1 tablet by mouth Daily, Disp: 90 tablet, Rfl: 3    acetaminophen (TYLENOL) 500 MG tablet, Take 500 mg by mouth 2 times daily , Disp: , Rfl:     ALLERGIES:   No Known Allergies    FAMILY HISTORY:       Problem Relation Age of Onset    High Blood Pressure Mother     Heart Disease Father          SOCIAL HISTORY:   Social History     Socioeconomic History    Marital status:       Spouse name: Not on file    Number of children: Not on file    Years of education: Not on file    Highest education level: Not on file   Occupational History    Not on file   Social Needs    Financial resource strain: Not on file    Food insecurity     Worry: Not on file     Inability: Not on file    Transportation needs     Medical: Not on file     Non-medical: Not on file   Tobacco Use    Smoking status: Never Smoker    Smokeless tobacco: Never Used   Substance and Sexual Activity    Alcohol use: No    Drug use: No    Sexual activity: Not on file   Lifestyle    Physical activity     Days per week: Not on file     Minutes per session: Not on file    weight on file to calculate BMI. Physical Exam  Nursing note reviewed. Constitutional:       Appearance: She is well-developed. Comments: Anxious     On wheel chair    HENT:      Head: Normocephalic and atraumatic. Eyes:      Conjunctiva/sclera: Conjunctivae normal.      Pupils: Pupils are equal, round, and reactive to light. Neck:      Musculoskeletal: Normal range of motion and neck supple. Cardiovascular:      Heart sounds: Normal heart sounds. Pulmonary:      Effort: Pulmonary effort is normal.      Breath sounds: Normal breath sounds. Abdominal:      General: Bowel sounds are normal.      Palpations: Abdomen is soft. Comments: NON TENDER, NON DISTENTED  LIVER SPLEEN AND HERNIAS ARE NOT  PALPABLE  BOWEL SOUNDS ARE POSITIVE      Musculoskeletal: Normal range of motion. Skin:     General: Skin is warm. Neurological:      Mental Status: She is alert and oriented to person, place, and time. Psychiatric:         Behavior: Behavior normal.           LABORATORY DATA: Reviewed  Lab Results   Component Value Date    WBC 6.6 09/15/2020    HGB 13.1 09/15/2020    HCT 43.5 09/15/2020    .8 (H) 09/15/2020     09/15/2020     (H) 09/15/2020    K 4.5 09/15/2020     (H) 09/15/2020    CO2 25 09/15/2020    BUN 19 09/15/2020    CREATININE 0.93 (H) 09/15/2020    LABPROT 7.2 04/23/2012    LABALBU 3.9 09/15/2020    BILITOT 0.42 09/15/2020    ALKPHOS 108 (H) 09/15/2020    AST 21 09/15/2020    ALT 21 09/15/2020    INR 1.3 05/25/2020         Lab Results   Component Value Date    RBC 4.11 09/15/2020    HGB 13.1 09/15/2020    .8 (H) 09/15/2020    MCH 31.9 09/15/2020    MCHC 30.1 09/15/2020    RDW 13.0 09/15/2020    MPV 11.7 09/15/2020    BASOPCT 1 05/25/2020    LYMPHSABS 0.94 (L) 05/25/2020    MONOSABS 0.66 05/25/2020    NEUTROABS 4.79 05/25/2020    EOSABS 0.15 05/25/2020    BASOSABS 0.04 05/25/2020         DIAGNOSTIC TESTING:     No results found. Assessment  1.  Diarrhea, unspecified type    2. Irritable bowel syndrome with diarrhea    3. Abdominal bloating        Plan    Cont Questran      Titrate to 1-2 bowel movements a day    Do not give Questran if she has constipation    Probiotics    High-fiber    Pt was advised in detail about some life style and dietary modifications. She was advised about avoidance of caffeine, nicotine and chocolate. Pt was also told to stay away from any kind of fast foods, soda pops. She was also advised to avoid lots of spices, grease and fried food etc.     Instructions were also given about trying to arrange the timing, quality and quantity of food. Instructions were given about using ample amount of fiber including dietary and supplemental fiber either metamucil, bennafiber or citrucell etc.  Pt was advised about drinking ample amount of water without any colors or chemicals. Stress was given about regular exercise. Pt has verbalized understanding and agreement to these modifications. Discussed with her caregiver    Was asked to call me if there is any problem or issues    See her back in 5 6 months again otherwise  I communicated with the patient and/or health care decision maker about   Details of this discussion including any medical advice provided:YES      I affirm this is a Patient Initiated Episode with an Established Patient who has not had a related appointment within my department in the past 7 days or scheduled within the next 24 hours. Total Time: minutes: 21-30 minutes    Note: not billable if this call serves to triage the patient into an appointment for the relevant concern      Thank you for allowing me to participate in the care of Ms. Barakat. For any further questions please do not hesitate to contact me. I have reviewed and agree with the ROS entered by the MA/LPN.          Steven Dill MD, [facility]  Board Certified in Gastroenterology and [address] [place] [address] Gastroenterology  Office #: [phone]

## 2020-10-16 NOTE — PROCEDURES
[address] [place], [place] [address]                          ELECTROENCEPHALOGRAM REPORT    PATIENT NAME: Martín Teran                   :        1930  MED REC NO:   [id number]                              ROOM:  ACCOUNT NO:   [de-identified]                           ADMIT DATE: 10/06/2020  PROVIDER:     Nick Moreno    DATE OF EEG:  10/06/2020    BRIEF HISTORY:  The patient is a 25-year-old lady with history of  seizures after decreasing her anticonvulsant medications. SUMMARY:  This EEG was recorded with standard International 10-20  montages. The predominant background rhythm comprises of symmetrically distributed theta  activity of 6.0 Hz throughout the recording. There is no focal slowing  or evidence of focal or generalized epileptic activity. Cardiac rhythm  is normal.  There is no photic driving. Hyperventilation was not performed. INTERPRETATION:  Generalized slowing with no evidence of epileptiform  activity.         Cooper Garcia    D: 10/15/2020 19:23:54       T: 10/15/2020 19:32:28     [email or web address]  Job#: [id number]     Doc#: [id number]    CC:  Gary Myrick MD

## 2020-10-30 NOTE — PATIENT INSTRUCTIONS
SURVEY:    You may be receiving a survey from [employer] regarding your visit today. Please complete the survey to enable us to provide the highest quality of care to you and your family. If you cannot score us a very good on any question, please call the office to discuss how we could have made your experience a very good one. Thank you.

## 2020-10-30 NOTE — PROGRESS NOTES
Destinee Lennon am scribing for and in the presence of Matthew Nguyen. Tristen CALLES, MS, F.A.C.C. Patient: Karlos Sunshine  : 1930  Date of Admission: (Not on file)  Primary Care Physician: Flash Alfred  Today's Date: 10/30/2020    REASON FOR CONSULTATION: 3 Month Follow-Up (Hx:CHF,CVA,TIA, Chr AFib,Mod. Mitral Regurg, HTN,HLD. She has been doing a little better, not as many forgetful spells. Alot of urinary issues, just did urine sample. Does not complain of anything.)    HPI: Ms. Kay Nguyen is a 80 y.o. female who was admitted to the hospital in the past with a fall and was found down for some time. In the ER she was found to have evidence of rhabdomyolysis. She also appears to have a history of chronic atrial fibrillation leading to the consultation. However she recently had a stoke in May of 2020 with difficulty speaking and facial droop and is now here to reestablish care. Fortunately she reports largely a full recovery from this without deficits. Since I last saw Ms. Barakat she reports she has been feeling better, not as many forgetful spells. She is no pain today. She has been getting around okay, she ambulates with a walker around the facility. Ms. Kay Nguyen denies any current or recent chest pain, pressure or tightness. She denies any shortness of breath, lightheaded/dizziness or palpitations. She denies any abdominal pain, bleeding problems, bowel issues problems with his medications or any other concerns at this time.     Past Medical History:   Diagnosis Date    Acute kidney failure ([address] 2019    Anemia     Aphasia     Arthritis     Atrial fibrillation (HCC)     Bile duct calculus     Calculus of gallbladder and bile duct without cholecystitis without obstruction     Cerebral infarction ([address]     CHF (congestive heart failure) (HCC)     Chronic kidney disease, stage 3     CVA (cerebral vascular accident) ([address]     Fall     Gout     Gout     Gout     Hyperlipidemia     Hypertension     Hypokalemia 06/28/2019    Hypokalemia     Hypomagnesemia 06/28/2019    Hypomagnesemia     Hypothyroidism     IBS (irritable bowel syndrome)     Irritable bowel syndrome     Ischemia     muscle    Left carotid stenosis     Metabolic encephalopathy 63/59/7367    Mitral valve insufficiency     Nonrheumatic mitral (valve) insufficiency 06/28/2019    Seizures (HCC)     TIA (transient ischemic attack)     UTI (urinary tract infection)        CURRENT ALLERGIES: Patient has no known allergies. REVIEW OF SYSTEMS: 14 systems were reviewed. Pertinent positives and negatives as above, all else negative. Past Surgical History:   Procedure Laterality Date    CARPAL TUNNEL RELEASE  2012    Dr. Hewitt Alpers - right side    CATARACT REMOVAL WITH IMPLANT Bilateral     CHOLECYSTECTOMY      ERCP N/A 2/11/2019    Driss Birmingham MD - ERCP, Sphincterotomy, balloon dilatation and stent placement with removal of multiple stones    TOTAL KNEE ARTHROPLASTY Right     TOTAL KNEE ARTHROPLASTY Left     UPPER GASTROINTESTINAL ENDOSCOPY  07/31/2019    with stent removal by Dr. Maira Pelaez 7/31/2019    EGD STENT REMOVAL performed by Caren Luz MD at [place] [id number] History:  Social History     Tobacco Use    Smoking status: Never Smoker    Smokeless tobacco: Never Used   Substance Use Topics    Alcohol use: No    Drug use: No        MEDICATIONS:  No current facility-administered medications for this visit. CURRENT INPATIENT MEDICATIONS:  Prior to Admission medications    Medication Sig Start Date End Date Taking?  Authorizing Provider   levETIRAcetam (KEPPRA) 750 MG tablet  8/17/20  Yes Historical Provider, MD   sertraline (ZOLOFT) 50 MG tablet  8/26/20  Yes Historical Provider, MD   Artificial Saliva (BIOTENE DRY MOUTH MOISTURIZING) SOLN liquid Take 1 drop by mouth as needed   Yes Historical Provider, MD   metoprolol tartrate 75 MG TABS Take 75 mg by mouth 2 times daily 5/27/20  Yes Sasha Kruger MD   levETIRAcetam (KEPPRA) 500 MG tablet Take 1 tablet by mouth 2 times daily 5/27/20  Yes Sasha Kruger MD   docusate sodium (COLACE) 100 MG capsule Take 100 mg by mouth daily   Yes Historical Provider, MD   polyethylene glycol (GLYCOLAX) packet Take 17 g by mouth daily as needed for Constipation   Yes Historical Provider, MD   ondansetron (ZOFRAN) 4 MG tablet Take 4 mg by mouth every 6 hours as needed for Nausea or Vomiting   Yes Historical Provider, MD   cholestyramine (QUESTRAN) 4 g packet Take 1 packet by mouth 3 times daily (with meals) 2/14/20  Yes Hal Romberg, MD   potassium chloride (MICRO-K) 10 MEQ extended release capsule Take 10 mEq by mouth 2 times daily   Yes Historical Provider, MD   ferrous sulfate 325 (65 Fe) MG tablet Take 1 tablet by mouth daily (with breakfast) 11/5/19  Yes Hal Romberg, MD   apixaban (ELIQUIS) 5 MG TABS tablet Take 5 mg by mouth 2 times daily   Yes Historical Provider, MD   atorvastatin (LIPITOR) 20 MG tablet Take 1 tablet by mouth nightly 6/21/19  Yes Shane Darling PA-C   losartan (COZAAR) 25 MG tablet Take 1 tablet by mouth daily 6/22/19  Yes Reece Darling PA-C   aspirin 81 MG EC tablet Take 1 tablet by mouth daily 4/11/19  Yes Josue Reyna MD   levothyroxine (SYNTHROID) 112 MCG tablet Take 1 tablet by mouth Daily 11/29/18  Yes Tessy Garcia MD   acetaminophen (TYLENOL) 500 MG tablet Take 500 mg by mouth 2 times daily    Yes Historical Provider, MD       FAMILY HISTORY: family history includes Heart Disease in her father; High Blood Pressure in her mother.        [ INSTRUCTIONS:  \"[x]\" Indicates a positive item  \"[]\" Indicates a negative item   Vital Signs: (As obtained by patient/caregiver or practitioner observation)    Blood pressure- 150/56 Heart rate-    Respiratory rate-    Temperature-  Pulse oximetry-   Weight-157.6 lbs    Constitutional: [x] Appears well-developed and well-nourished [x] No apparent distress      [] Abnormal-   Mental status  [x] Alert and awake  [x] Oriented to person/place/time [x]Able to follow commands      Eyes:  EOM    [x]  Normal  [] Abnormal-  Sclera  [x]  Normal  [] Abnormal -         Discharge [x]  None visible  [] Abnormal -    HENT:   [x] Normocephalic, atraumatic.   [] Abnormal   [] Mouth/Throat: Mucous membranes are moist.     External Ears [x] Normal  [] Abnormal-     Neck: [x] No visualized mass     Pulmonary/Chest: [x] Respiratory effort normal.  [x] No visualized signs of difficulty breathing or respiratory distress        [] Abnormal-     Neurological:        [x] No Facial Asymmetry (Cranial nerve 7 motor function) (limited exam to video visit)          [] No gaze palsy        [] Abnormal-         Skin:        [x] No significant exanthematous lesions or discoloration noted on facial skin         [] Abnormal-            Psychiatric:       [x] Normal Affect [] No Hallucinations        [] Abnormal-     Other pertinent observable physical exam findings: None      MOST RECENT LABS ON RECORD:   Lab Results   Component Value Date    WBC 6.6 09/15/2020    HGB 13.1 09/15/2020    HCT 43.5 09/15/2020     09/15/2020    CHOL 108 09/15/2020    TRIG 87 09/15/2020    HDL 40 (L) 09/15/2020    ALT 21 09/15/2020    AST 21 09/15/2020     (H) 09/15/2020    K 4.5 09/15/2020     (H) 09/15/2020    CREATININE 0.93 (H) 09/15/2020    BUN 19 09/15/2020    CO2 25 09/15/2020    TSH 3.31 09/15/2020    INR 1.3 05/25/2020    LABA1C 5.7 05/25/2020       ASSESSMENT:  Patient Active Problem List    Diagnosis Date Noted    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 (HCC)      Priority: High    Chronic atrial fibrillation      Priority: High    Severe mitral regurgitation by prior echocardiogram      Priority: High    Abdominal bloating 10/09/2020    Irritable bowel syndrome with diarrhea 10/09/2020    Bilious vomiting 05/26/2020    (Eliquis) 5 mg every 12 hours. I also reminded her to watch for signs of blood in her stool or black tarry stools and stop the medication immediately if this develops as this could be life threatening.  Chronic diastolic heart failure: New York Heart Association Class: IIa (Mild symptoms only in normal activities). Currently well controlled with no active signs of heart failure    Beta Blocker: Continue Metoprolol tartrate (Lopressor) 75 mg bid.  ACE Inibitor/ARB: Continue losartan (Cozaar) 25 mg daily.  Diuretics: Not indicated at this time.  Heart failure counseling: I told them to continue wearing lower extremity compression stockings and I advised them to try and keep their legs up whenever possible and to limit salt in their diet. · Moderate Mitral Regurgitation: asymptomatic  · Beta Blocker: Continue Metoprolol tartrate (Lopressor) 75 mg bid. · ACE Inibitor/ARB: Continue losartan (Cozaar) 25 mg daily.   Essential Hypertension: Controlled. Although it was elevated today, I asked her nurse to fax me her BP's as we may go up on her cozaar if her BP is regularly elevated. · Beta Blocker: Continue Metoprolol tartrate (Lopressor) 75 mg bid. · ACE Inibitor/ARB: Continue losartan (Cozaar) 25 mg daily. · Hyperlipidemia: Mixed, LDL done on 5/25/2020 was 55 mg/dL  · Cholesterol Reduction Therapy: Continue Atorvastatin (Lipitor) 20 mg daily. Once again, thank you for allowing me to participate in this patients care. Please do not hesitate to contact me if I could be of any further assistance. FOLLOW UP:  I told Ms. Barakat to call my office if she had any problems, but otherwise told her to Return in about 6 months (around 4/30/2021). However, I would be happy to see her sooner should the need arise. Sincerely,  Estephania Lizarraga. Tristen CALLES, MS, F.A.C.C.   [facility] Cardiology Specialist    [address], [place], [address] [place] [address]  Phone: [phone], Fax: [phone]     I

## 2020-11-03 PROBLEM — I63.9 CEREBRAL INFARCTION, UNSPECIFIED (HCC): Status: RESOLVED | Noted: 2019-04-09 | Resolved: 2020-01-01

## 2021-01-01 ENCOUNTER — APPOINTMENT (OUTPATIENT)
Dept: GENERAL RADIOLOGY | Age: 86
DRG: 178 | End: 2021-01-01
Payer: MEDICARE

## 2021-01-01 ENCOUNTER — APPOINTMENT (OUTPATIENT)
Dept: CT IMAGING | Age: 86
End: 2021-01-01
Payer: MEDICARE

## 2021-01-01 ENCOUNTER — HOSPITAL ENCOUNTER (OUTPATIENT)
Age: 86
Setting detail: SPECIMEN
Discharge: HOME | End: 2021-03-09
Payer: MEDICARE

## 2021-01-01 ENCOUNTER — APPOINTMENT (OUTPATIENT)
Dept: CT IMAGING | Age: 86
DRG: 178 | End: 2021-01-01
Payer: MEDICARE

## 2021-01-01 ENCOUNTER — HOSPITAL ENCOUNTER (OUTPATIENT)
Age: 86
Setting detail: SPECIMEN
Discharge: HOME | End: 2021-03-08
Payer: MEDICARE

## 2021-01-01 ENCOUNTER — HOSPITAL ENCOUNTER (OUTPATIENT)
Age: 86
Setting detail: SPECIMEN
Discharge: HOME | End: 2021-03-18
Payer: MEDICARE

## 2021-01-01 ENCOUNTER — HOSPITAL ENCOUNTER (EMERGENCY)
Age: 86
Discharge: ANOTHER HOSPITAL | End: 2021-04-04
Attending: FAMILY MEDICINE
Payer: MEDICARE

## 2021-01-01 ENCOUNTER — HOSPITAL ENCOUNTER (OUTPATIENT)
Age: 86
Setting detail: SPECIMEN
Discharge: HOME | End: 2021-06-11
Payer: COMMERCIAL

## 2021-01-01 ENCOUNTER — HOSPITAL ENCOUNTER (INPATIENT)
Age: 86
LOS: 2 days | Discharge: NURSING, REHAB OR LONG-TERM CARE | DRG: 064 | End: 2021-04-06
Attending: INTERNAL MEDICINE | Admitting: INTERNAL MEDICINE
Payer: MEDICARE

## 2021-01-01 ENCOUNTER — HOSPITAL ENCOUNTER (INPATIENT)
Age: 86
LOS: 5 days | Discharge: NURSING, REHAB OR LONG-TERM CARE | DRG: 178 | End: 2021-01-19
Attending: FAMILY MEDICINE | Admitting: INTERNAL MEDICINE
Payer: MEDICARE

## 2021-01-01 ENCOUNTER — OFFICE VISIT (OUTPATIENT)
Dept: NEUROLOGY | Age: 86
End: 2021-01-01
Payer: MEDICARE

## 2021-01-01 ENCOUNTER — APPOINTMENT (OUTPATIENT)
Dept: GENERAL RADIOLOGY | Age: 86
DRG: 064 | End: 2021-01-01
Attending: INTERNAL MEDICINE
Payer: MEDICARE

## 2021-01-01 ENCOUNTER — APPOINTMENT (OUTPATIENT)
Dept: GENERAL RADIOLOGY | Age: 86
End: 2021-01-01
Payer: MEDICARE

## 2021-01-01 ENCOUNTER — APPOINTMENT (OUTPATIENT)
Dept: MRI IMAGING | Age: 86
DRG: 064 | End: 2021-01-01
Attending: INTERNAL MEDICINE
Payer: MEDICARE

## 2021-01-01 ENCOUNTER — APPOINTMENT (OUTPATIENT)
Dept: NON INVASIVE DIAGNOSTICS | Age: 86
DRG: 178 | End: 2021-01-01
Payer: MEDICARE

## 2021-01-01 VITALS
HEART RATE: 87 BPM | DIASTOLIC BLOOD PRESSURE: 74 MMHG | RESPIRATION RATE: 22 BRPM | SYSTOLIC BLOOD PRESSURE: 130 MMHG | TEMPERATURE: 97.3 F | WEIGHT: 152 LBS | BODY MASS INDEX: 29.84 KG/M2 | OXYGEN SATURATION: 97 % | HEIGHT: 60 IN

## 2021-01-01 VITALS
HEART RATE: 77 BPM | BODY MASS INDEX: 28.47 KG/M2 | OXYGEN SATURATION: 90 % | WEIGHT: 145 LBS | SYSTOLIC BLOOD PRESSURE: 107 MMHG | DIASTOLIC BLOOD PRESSURE: 70 MMHG | HEIGHT: 60 IN

## 2021-01-01 VITALS
SYSTOLIC BLOOD PRESSURE: 154 MMHG | DIASTOLIC BLOOD PRESSURE: 82 MMHG | BODY MASS INDEX: 28.47 KG/M2 | TEMPERATURE: 98.1 F | WEIGHT: 145 LBS | HEART RATE: 87 BPM | OXYGEN SATURATION: 96 % | HEIGHT: 60 IN | RESPIRATION RATE: 16 BRPM

## 2021-01-01 VITALS
OXYGEN SATURATION: 95 % | SYSTOLIC BLOOD PRESSURE: 129 MMHG | WEIGHT: 165.79 LBS | BODY MASS INDEX: 32.55 KG/M2 | RESPIRATION RATE: 18 BRPM | HEIGHT: 60 IN | TEMPERATURE: 97.3 F | DIASTOLIC BLOOD PRESSURE: 82 MMHG | HEART RATE: 75 BPM

## 2021-01-01 DIAGNOSIS — R19.7 DIARRHEA, UNSPECIFIED TYPE: Primary | ICD-10-CM

## 2021-01-01 DIAGNOSIS — I10 ESSENTIAL HYPERTENSION: ICD-10-CM

## 2021-01-01 DIAGNOSIS — Z86.73 HISTORY OF CEREBRAL INFARCTION: ICD-10-CM

## 2021-01-01 DIAGNOSIS — E78.2 MIXED HYPERLIPIDEMIA: ICD-10-CM

## 2021-01-01 DIAGNOSIS — R47.1 DYSARTHRIA: ICD-10-CM

## 2021-01-01 DIAGNOSIS — R56.9 SEIZURE-LIKE ACTIVITY (HCC): ICD-10-CM

## 2021-01-01 DIAGNOSIS — I65.23 BILATERAL CAROTID ARTERY STENOSIS: ICD-10-CM

## 2021-01-01 DIAGNOSIS — R77.8 ELEVATED TROPONIN: ICD-10-CM

## 2021-01-01 DIAGNOSIS — J96.01 ACUTE RESPIRATORY FAILURE WITH HYPOXIA (HCC): ICD-10-CM

## 2021-01-01 DIAGNOSIS — N39.0 ACUTE URINARY TRACT INFECTION: ICD-10-CM

## 2021-01-01 DIAGNOSIS — I65.01 STENOSIS OF RIGHT VERTEBRAL ARTERY: ICD-10-CM

## 2021-01-01 DIAGNOSIS — I48.20 CHRONIC A-FIB (HCC): ICD-10-CM

## 2021-01-01 DIAGNOSIS — I63.9 ACUTE CVA (CEREBROVASCULAR ACCIDENT) (HCC): ICD-10-CM

## 2021-01-01 DIAGNOSIS — S09.90XA CLOSED HEAD INJURY, INITIAL ENCOUNTER: ICD-10-CM

## 2021-01-01 DIAGNOSIS — R53.83 LETHARGY: ICD-10-CM

## 2021-01-01 DIAGNOSIS — K57.32 DIVERTICULITIS OF COLON: ICD-10-CM

## 2021-01-01 DIAGNOSIS — U07.1 COVID-19: Primary | ICD-10-CM

## 2021-01-01 DIAGNOSIS — I63.9 CARDIOEMBOLIC STROKE (HCC): Primary | ICD-10-CM

## 2021-01-01 DIAGNOSIS — R56.9 SEIZURE (HCC): ICD-10-CM
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
## 2021-01-01 LAB
-: ABNORMAL
-: NORMAL
-: NORMAL
ABSOLUTE EOS #: 0 K/UL (ref 0–0.44)
ABSOLUTE EOS #: 0.23 K/UL (ref 0–0.44)
ABSOLUTE EOS #: 0.24 K/UL (ref 0–0.44)
ABSOLUTE IMMATURE GRANULOCYTE: 0 K/UL (ref 0–0.3)
ABSOLUTE IMMATURE GRANULOCYTE: 0.03 K/UL (ref 0–0.3)
ABSOLUTE IMMATURE GRANULOCYTE: 0.07 K/UL (ref 0–0.3)
ABSOLUTE IMMATURE GRANULOCYTE: 0.08 K/UL (ref 0–0.3)
ABSOLUTE IMMATURE GRANULOCYTE: <0.03 K/UL (ref 0–0.3)
ABSOLUTE LYMPH #: 0.34 K/UL (ref 1.1–3.7)
ABSOLUTE LYMPH #: 0.35 K/UL (ref 1.1–3.7)
ABSOLUTE LYMPH #: 0.43 K/UL (ref 1.1–3.7)
ABSOLUTE LYMPH #: 0.44 K/UL (ref 1.1–3.7)
ABSOLUTE LYMPH #: 0.56 K/UL (ref 1.1–3.7)
ABSOLUTE LYMPH #: 0.8 K/UL (ref 1.1–3.7)
ABSOLUTE LYMPH #: 0.84 K/UL (ref 1.1–3.7)
ABSOLUTE LYMPH #: 0.97 K/UL (ref 1.1–3.7)
ABSOLUTE MONO #: 0.13 K/UL (ref 0.1–1.2)
ABSOLUTE MONO #: 0.23 K/UL (ref 0.1–1.2)
ABSOLUTE MONO #: 0.28 K/UL (ref 0.1–1.2)
ABSOLUTE MONO #: 0.3 K/UL (ref 0.1–1.2)
ABSOLUTE MONO #: 0.43 K/UL (ref 0.1–1.2)
ABSOLUTE MONO #: 0.54 K/UL (ref 0.1–1.2)
ABSOLUTE MONO #: 0.64 K/UL (ref 0.1–1.2)
ABSOLUTE MONO #: 0.71 K/UL (ref 0.1–1.2)
ALBUMIN SERPL-MCNC: 3 G/DL (ref 3.5–5.2)
ALBUMIN SERPL-MCNC: 3.1 G/DL (ref 3.5–5.2)
ALBUMIN SERPL-MCNC: 3.2 G/DL (ref 3.5–5.2)
ALBUMIN SERPL-MCNC: 3.2 G/DL (ref 3.5–5.2)
ALBUMIN SERPL-MCNC: 3.3 G/DL (ref 3.5–5.2)
ALBUMIN SERPL-MCNC: 3.4 G/DL (ref 3.5–5.2)
ALBUMIN SERPL-MCNC: 3.5 G/DL (ref 3.5–5.2)
ALBUMIN SERPL-MCNC: 3.6 G/DL (ref 3.5–5.2)
ALBUMIN SERPL-MCNC: 3.7 G/DL (ref 3.5–5.2)
ALBUMIN SERPL-MCNC: 4 G/DL (ref 3.5–5.2)
ALBUMIN/GLOBULIN RATIO: 1.3 (ref 1–2.5)
ALBUMIN/GLOBULIN RATIO: 1.4 (ref 1–2.5)
ALBUMIN/GLOBULIN RATIO: 1.5 (ref 1–2.5)
ALBUMIN/GLOBULIN RATIO: 1.5 (ref 1–2.5)
ALBUMIN/GLOBULIN RATIO: 1.9 (ref 1–2.5)
ALBUMIN/GLOBULIN RATIO: 2 (ref 1–2.5)
ALP BLD-CCNC: 73 U/L (ref 35–104)
ALP BLD-CCNC: 73 U/L (ref 35–104)
ALP BLD-CCNC: 74 U/L (ref 35–104)
ALP BLD-CCNC: 75 U/L (ref 35–104)
ALP BLD-CCNC: 77 U/L (ref 35–104)
ALP BLD-CCNC: 81 U/L (ref 35–104)
ALP BLD-CCNC: 82 U/L (ref 35–104)
ALP BLD-CCNC: 86 U/L (ref 35–104)
ALP BLD-CCNC: 87 U/L (ref 35–104)
ALP BLD-CCNC: 91 U/L (ref 35–104)
ALT SERPL-CCNC: 10 U/L (ref 5–33)
ALT SERPL-CCNC: 11 U/L (ref 5–33)
ALT SERPL-CCNC: 12 U/L (ref 5–33)
ALT SERPL-CCNC: 15 U/L (ref 5–33)
ALT SERPL-CCNC: 15 U/L (ref 5–33)
ALT SERPL-CCNC: 7 U/L (ref 5–33)
ALT SERPL-CCNC: 9 U/L (ref 5–33)
AMORPHOUS: ABNORMAL
ANION GAP SERPL CALCULATED.3IONS-SCNC: 11 MMOL/L (ref 9–17)
ANION GAP SERPL CALCULATED.3IONS-SCNC: 11 MMOL/L (ref 9–17)
ANION GAP SERPL CALCULATED.3IONS-SCNC: 13 MMOL/L (ref 9–17)
ANION GAP SERPL CALCULATED.3IONS-SCNC: 14 MMOL/L (ref 9–17)
ANION GAP SERPL CALCULATED.3IONS-SCNC: 8 MMOL/L (ref 9–17)
ANION GAP SERPL CALCULATED.3IONS-SCNC: 8 MMOL/L (ref 9–17)
ANION GAP SERPL CALCULATED.3IONS-SCNC: 9 MMOL/L (ref 9–17)
AST SERPL-CCNC: 16 U/L
AST SERPL-CCNC: 16 U/L
AST SERPL-CCNC: 17 U/L
AST SERPL-CCNC: 22 U/L
AST SERPL-CCNC: 22 U/L
AST SERPL-CCNC: 23 U/L
AST SERPL-CCNC: 24 U/L
AST SERPL-CCNC: 24 U/L
BACTERIA: ABNORMAL
BASOPHILS # BLD: 0 % (ref 0–2)
BASOPHILS # BLD: 1 % (ref 0–2)
BASOPHILS # BLD: 1 % (ref 0–2)
BASOPHILS ABSOLUTE: 0 K/UL (ref 0–0.2)
BASOPHILS ABSOLUTE: 0.05 K/UL (ref 0–0.2)
BASOPHILS ABSOLUTE: 0.05 K/UL (ref 0–0.2)
BILIRUB SERPL-MCNC: 0.25 MG/DL (ref 0.3–1.2)
BILIRUB SERPL-MCNC: 0.26 MG/DL (ref 0.3–1.2)
BILIRUB SERPL-MCNC: 0.28 MG/DL (ref 0.3–1.2)
BILIRUB SERPL-MCNC: 0.28 MG/DL (ref 0.3–1.2)
BILIRUB SERPL-MCNC: 0.29 MG/DL (ref 0.3–1.2)
BILIRUB SERPL-MCNC: 0.35 MG/DL (ref 0.3–1.2)
BILIRUB SERPL-MCNC: 0.36 MG/DL (ref 0.3–1.2)
BILIRUB SERPL-MCNC: 0.38 MG/DL (ref 0.3–1.2)
BILIRUB SERPL-MCNC: 0.42 MG/DL (ref 0.3–1.2)
BILIRUB SERPL-MCNC: 0.55 MG/DL (ref 0.3–1.2)
BILIRUBIN DIRECT: <0.08 MG/DL
BILIRUBIN DIRECT: <0.08 MG/DL
BILIRUBIN URINE: NEGATIVE
BILIRUBIN, INDIRECT: ABNORMAL MG/DL (ref 0–1)
BILIRUBIN, INDIRECT: ABNORMAL MG/DL (ref 0–1)
BNP INTERPRETATION: ABNORMAL
BUN BLDV-MCNC: 15 MG/DL (ref 8–23)
BUN BLDV-MCNC: 18 MG/DL (ref 8–23)
BUN BLDV-MCNC: 19 MG/DL (ref 8–23)
BUN BLDV-MCNC: 21 MG/DL (ref 8–23)
BUN BLDV-MCNC: 22 MG/DL (ref 8–23)
BUN BLDV-MCNC: 24 MG/DL (ref 8–23)
BUN BLDV-MCNC: 27 MG/DL (ref 8–23)
BUN BLDV-MCNC: 27 MG/DL (ref 8–23)
BUN/CREAT BLD: 18 (ref 9–20)
BUN/CREAT BLD: 18 (ref 9–20)
BUN/CREAT BLD: 20 (ref 9–20)
BUN/CREAT BLD: 21 (ref 9–20)
BUN/CREAT BLD: 22 (ref 9–20)
BUN/CREAT BLD: 28 (ref 9–20)
BUN/CREAT BLD: 29 (ref 9–20)
BUN/CREAT BLD: 30 (ref 9–20)
BUN/CREAT BLD: 33 (ref 9–20)
BUN/CREAT BLD: ABNORMAL (ref 9–20)
CALCIUM SERPL-MCNC: 8.1 MG/DL (ref 8.6–10.4)
CALCIUM SERPL-MCNC: 8.2 MG/DL (ref 8.6–10.4)
CALCIUM SERPL-MCNC: 8.3 MG/DL (ref 8.6–10.4)
CALCIUM SERPL-MCNC: 8.3 MG/DL (ref 8.6–10.4)
CALCIUM SERPL-MCNC: 8.7 MG/DL (ref 8.6–10.4)
CALCIUM SERPL-MCNC: 8.8 MG/DL (ref 8.6–10.4)
CALCIUM SERPL-MCNC: 9 MG/DL (ref 8.6–10.4)
CALCIUM SERPL-MCNC: 9.3 MG/DL (ref 8.6–10.4)
CALCIUM SERPL-MCNC: 9.5 MG/DL (ref 8.6–10.4)
CALCIUM SERPL-MCNC: 9.7 MG/DL (ref 8.6–10.4)
CASTS UA: ABNORMAL /LPF
CHLORIDE BLD-SCNC: 100 MMOL/L (ref 98–107)
CHLORIDE BLD-SCNC: 106 MMOL/L (ref 98–107)
CHLORIDE BLD-SCNC: 107 MMOL/L (ref 98–107)
CHLORIDE BLD-SCNC: 108 MMOL/L (ref 98–107)
CHLORIDE BLD-SCNC: 111 MMOL/L (ref 98–107)
CHLORIDE BLD-SCNC: 114 MMOL/L (ref 98–107)
CHLORIDE BLD-SCNC: 114 MMOL/L (ref 98–107)
CHLORIDE BLD-SCNC: 99 MMOL/L (ref 98–107)
CHOLESTEROL/HDL RATIO: 2.5
CHOLESTEROL/HDL RATIO: 2.6
CHOLESTEROL: 86 MG/DL
CHOLESTEROL: 90 MG/DL
CO2: 20 MMOL/L (ref 20–31)
CO2: 20 MMOL/L (ref 20–31)
CO2: 22 MMOL/L (ref 20–31)
CO2: 23 MMOL/L (ref 20–31)
CO2: 23 MMOL/L (ref 20–31)
CO2: 25 MMOL/L (ref 20–31)
CO2: 25 MMOL/L (ref 20–31)
CO2: 27 MMOL/L (ref 20–31)
COLOR: YELLOW
COMMENT UA: ABNORMAL
CREAT SERPL-MCNC: 0.76 MG/DL (ref 0.5–0.9)
CREAT SERPL-MCNC: 0.79 MG/DL (ref 0.5–0.9)
CREAT SERPL-MCNC: 0.82 MG/DL (ref 0.5–0.9)
CREAT SERPL-MCNC: 0.84 MG/DL (ref 0.5–0.9)
CREAT SERPL-MCNC: 0.89 MG/DL (ref 0.5–0.9)
CREAT SERPL-MCNC: 0.98 MG/DL (ref 0.5–0.9)
CREAT SERPL-MCNC: 0.99 MG/DL (ref 0.5–0.9)
CREAT SERPL-MCNC: 1.03 MG/DL (ref 0.5–0.9)
CREAT SERPL-MCNC: 1.09 MG/DL (ref 0.5–0.9)
CREAT SERPL-MCNC: 1.23 MG/DL (ref 0.5–0.9)
CRYSTALS, UA: ABNORMAL /HPF
D-DIMER QUANTITATIVE: 0.52 MG/L FEU (ref 0–0.59)
D-DIMER QUANTITATIVE: 0.55 MG/L FEU (ref 0–0.59)
D-DIMER QUANTITATIVE: 0.63 MG/L FEU (ref 0–0.59)
D-DIMER QUANTITATIVE: 0.66 MG/L FEU (ref 0–0.59)
D-DIMER QUANTITATIVE: 0.73 MG/L FEU (ref 0–0.59)
D-DIMER QUANTITATIVE: 0.73 MG/L FEU (ref 0–0.59)
DIFFERENTIAL TYPE: ABNORMAL
EKG ATRIAL RATE: 88 BPM
EKG Q-T INTERVAL: 392 MS
EKG QRS DURATION: 78 MS
EKG QTC CALCULATION (BAZETT): 474 MS
EKG R AXIS: 7 DEGREES
EKG T AXIS: -11 DEGREES
EKG VENTRICULAR RATE: 88 BPM
EOSINOPHILS RELATIVE PERCENT: 0 % (ref 1–4)
EOSINOPHILS RELATIVE PERCENT: 4 % (ref 1–4)
EOSINOPHILS RELATIVE PERCENT: 4 % (ref 1–4)
EPITHELIAL CELLS UA: ABNORMAL /HPF (ref 0–25)
FERRITIN: 587 UG/L (ref 13–150)
FERRITIN: 600 UG/L (ref 13–150)
FERRITIN: 638 UG/L (ref 13–150)
FERRITIN: 673 UG/L (ref 13–150)
FERRITIN: 686 UG/L (ref 13–150)
FERRITIN: 727 UG/L (ref 13–150)
FIBRINOGEN: 255 MG/DL (ref 179–518)
FIBRINOGEN: 291 MG/DL (ref 179–518)
FIBRINOGEN: 312 MG/DL (ref 179–518)
FIBRINOGEN: 407 MG/DL (ref 179–518)
FIBRINOGEN: 428 MG/DL (ref 179–518)
FIBRINOGEN: 438 MG/DL (ref 179–518)
GFR AFRICAN AMERICAN: 50 ML/MIN
GFR AFRICAN AMERICAN: 57 ML/MIN
GFR AFRICAN AMERICAN: >60 ML/MIN
GFR NON-AFRICAN AMERICAN: 41 ML/MIN
GFR NON-AFRICAN AMERICAN: 47 ML/MIN
GFR NON-AFRICAN AMERICAN: 50 ML/MIN
GFR NON-AFRICAN AMERICAN: 53 ML/MIN
GFR NON-AFRICAN AMERICAN: 53 ML/MIN
GFR NON-AFRICAN AMERICAN: 60 ML/MIN
GFR NON-AFRICAN AMERICAN: >60 ML/MIN
GFR SERPL CREATININE-BSD FRML MDRD: ABNORMAL ML/MIN/{1.73_M2}
GLOBULIN: ABNORMAL G/DL (ref 1.5–3.8)
GLOBULIN: ABNORMAL G/DL (ref 1.5–3.8)
GLUCOSE BLD-MCNC: 105 MG/DL (ref 74–100)
GLUCOSE BLD-MCNC: 110 MG/DL (ref 70–99)
GLUCOSE BLD-MCNC: 112 MG/DL (ref 70–99)
GLUCOSE BLD-MCNC: 121 MG/DL (ref 70–99)
GLUCOSE BLD-MCNC: 132 MG/DL (ref 70–99)
GLUCOSE BLD-MCNC: 138 MG/DL (ref 70–99)
GLUCOSE BLD-MCNC: 144 MG/DL (ref 70–99)
GLUCOSE BLD-MCNC: 171 MG/DL (ref 70–99)
GLUCOSE BLD-MCNC: 72 MG/DL (ref 70–99)
GLUCOSE BLD-MCNC: 78 MG/DL (ref 70–99)
GLUCOSE BLD-MCNC: 85 MG/DL (ref 70–99)
GLUCOSE BLD-MCNC: 90 MG/DL (ref 74–100)
GLUCOSE URINE: NEGATIVE
HCT VFR BLD CALC: 36 % (ref 36.3–47.1)
HCT VFR BLD CALC: 36.1 % (ref 36.3–47.1)
HCT VFR BLD CALC: 37 % (ref 36.3–47.1)
HCT VFR BLD CALC: 37 % (ref 36.3–47.1)
HCT VFR BLD CALC: 38.1 % (ref 36.3–47.1)
HCT VFR BLD CALC: 38.7 % (ref 36.3–47.1)
HCT VFR BLD CALC: 39.2 % (ref 36.3–47.1)
HCT VFR BLD CALC: 39.8 % (ref 36.3–47.1)
HCT VFR BLD CALC: 40.1 % (ref 36.3–47.1)
HCT VFR BLD CALC: 42.6 % (ref 36.3–47.1)
HDLC SERPL-MCNC: 34 MG/DL
HDLC SERPL-MCNC: 34 MG/DL
HEMOGLOBIN: 11.5 G/DL (ref 11.9–15.1)
HEMOGLOBIN: 11.6 G/DL (ref 11.9–15.1)
HEMOGLOBIN: 11.6 G/DL (ref 11.9–15.1)
HEMOGLOBIN: 11.8 G/DL (ref 11.9–15.1)
HEMOGLOBIN: 12.4 G/DL (ref 11.9–15.1)
HEMOGLOBIN: 12.4 G/DL (ref 11.9–15.1)
HEMOGLOBIN: 12.8 G/DL (ref 11.9–15.1)
HEMOGLOBIN: 13.3 G/DL (ref 11.9–15.1)
IMMATURE GRANULOCYTES: 0 %
IMMATURE GRANULOCYTES: 1 %
INR BLD: 1.4
INR BLD: 1.7
INR BLD: 1.7
INR BLD: 2
INR BLD: 2.2
INR BLD: 2.4
KEPPRA: <2 UG/ML
KETONES, URINE: NEGATIVE
LACTATE DEHYDROGENASE: 254 U/L (ref 135–214)
LDL CHOLESTEROL: 37 MG/DL (ref 0–130)
LDL CHOLESTEROL: 42 MG/DL (ref 0–130)
LEUKOCYTE ESTERASE, URINE: NEGATIVE
LV EF: 55 %
LVEF MODALITY: NORMAL
LYMPHOCYTES # BLD: 11 % (ref 24–43)
LYMPHOCYTES # BLD: 13 % (ref 24–43)
LYMPHOCYTES # BLD: 13 % (ref 24–43)
LYMPHOCYTES # BLD: 18 % (ref 24–43)
LYMPHOCYTES # BLD: 4 % (ref 24–43)
LYMPHOCYTES # BLD: 6 % (ref 24–43)
LYMPHOCYTES # BLD: 6 % (ref 24–43)
LYMPHOCYTES # BLD: 9 % (ref 24–43)
MAGNESIUM: 1.3 MG/DL (ref 1.6–2.6)
MAGNESIUM: 1.6 MG/DL (ref 1.6–2.6)
MAGNESIUM: 2.2 MG/DL (ref 1.6–2.6)
MCH RBC QN AUTO: 31.1 PG (ref 25.2–33.5)
MCH RBC QN AUTO: 31.3 PG (ref 25.2–33.5)
MCH RBC QN AUTO: 31.4 PG (ref 25.2–33.5)
MCH RBC QN AUTO: 31.5 PG (ref 25.2–33.5)
MCH RBC QN AUTO: 31.6 PG (ref 25.2–33.5)
MCH RBC QN AUTO: 31.7 PG (ref 25.2–33.5)
MCH RBC QN AUTO: 31.7 PG (ref 25.2–33.5)
MCH RBC QN AUTO: 31.8 PG (ref 25.2–33.5)
MCH RBC QN AUTO: 31.9 PG (ref 25.2–33.5)
MCH RBC QN AUTO: 32.1 PG (ref 25.2–33.5)
MCHC RBC AUTO-ENTMCNC: 29.7 G/DL (ref 28.4–34.8)
MCHC RBC AUTO-ENTMCNC: 31 G/DL (ref 28.4–34.8)
MCHC RBC AUTO-ENTMCNC: 31.1 G/DL (ref 28.4–34.8)
MCHC RBC AUTO-ENTMCNC: 31.1 G/DL (ref 28.4–34.8)
MCHC RBC AUTO-ENTMCNC: 31.2 G/DL (ref 28.4–34.8)
MCHC RBC AUTO-ENTMCNC: 31.2 G/DL (ref 28.4–34.8)
MCHC RBC AUTO-ENTMCNC: 31.6 G/DL (ref 28.4–34.8)
MCHC RBC AUTO-ENTMCNC: 31.9 G/DL (ref 28.4–34.8)
MCHC RBC AUTO-ENTMCNC: 32.1 G/DL (ref 28.4–34.8)
MCHC RBC AUTO-ENTMCNC: 32.2 G/DL (ref 28.4–34.8)
MCV RBC AUTO: 100 FL (ref 82.6–102.9)
MCV RBC AUTO: 100 FL (ref 82.6–102.9)
MCV RBC AUTO: 100.3 FL (ref 82.6–102.9)
MCV RBC AUTO: 101.1 FL (ref 82.6–102.9)
MCV RBC AUTO: 101.2 FL (ref 82.6–102.9)
MCV RBC AUTO: 102.3 FL (ref 82.6–102.9)
MCV RBC AUTO: 102.4 FL (ref 82.6–102.9)
MCV RBC AUTO: 105.4 FL (ref 82.6–102.9)
MCV RBC AUTO: 97.8 FL (ref 82.6–102.9)
MCV RBC AUTO: 99.5 FL (ref 82.6–102.9)
MONOCYTES # BLD: 11 % (ref 3–12)
MONOCYTES # BLD: 11 % (ref 3–12)
MONOCYTES # BLD: 12 % (ref 3–12)
MONOCYTES # BLD: 3 % (ref 3–12)
MONOCYTES # BLD: 3 % (ref 3–12)
MONOCYTES # BLD: 4 % (ref 3–12)
MONOCYTES # BLD: 5 % (ref 3–12)
MONOCYTES # BLD: 5 % (ref 3–12)
MORPHOLOGY: ABNORMAL
MORPHOLOGY: NORMAL
MUCUS: ABNORMAL
NITRITE, URINE: POSITIVE
NRBC AUTOMATED: 0 PER 100 WBC
OTHER OBSERVATIONS UA: ABNORMAL
PARTIAL THROMBOPLASTIN TIME: 35.1 SEC (ref 23.9–33.8)
PARTIAL THROMBOPLASTIN TIME: 36.7 SEC (ref 23.9–33.8)
PARTIAL THROMBOPLASTIN TIME: 36.9 SEC (ref 23.9–33.8)
PARTIAL THROMBOPLASTIN TIME: 38.6 SEC (ref 23.9–33.8)
PARTIAL THROMBOPLASTIN TIME: 39.6 SEC (ref 23.9–33.8)
PDW BLD-RTO: 13.2 % (ref 11.8–14.4)
PDW BLD-RTO: 13.2 % (ref 11.8–14.4)
PDW BLD-RTO: 13.3 % (ref 11.8–14.4)
PDW BLD-RTO: 13.4 % (ref 11.8–14.4)
PDW BLD-RTO: 13.4 % (ref 11.8–14.4)
PDW BLD-RTO: 13.5 % (ref 11.8–14.4)
PDW BLD-RTO: 14.1 % (ref 11.8–14.4)
PDW BLD-RTO: 14.2 % (ref 11.8–14.4)
PDW BLD-RTO: 14.2 % (ref 11.8–14.4)
PDW BLD-RTO: 14.3 % (ref 11.8–14.4)
PH UA: 5.5 (ref 5–9)
PLATELET # BLD: 121 K/UL (ref 138–453)
PLATELET # BLD: 151 K/UL (ref 138–453)
PLATELET # BLD: 153 K/UL (ref 138–453)
PLATELET # BLD: 168 K/UL (ref 138–453)
PLATELET # BLD: 168 K/UL (ref 138–453)
PLATELET # BLD: 171 K/UL (ref 138–453)
PLATELET # BLD: 174 K/UL (ref 138–453)
PLATELET # BLD: 178 K/UL (ref 138–453)
PLATELET # BLD: 196 K/UL (ref 138–453)
PLATELET # BLD: NORMAL K/UL (ref 138–453)
PLATELET ESTIMATE: ABNORMAL
PLATELET, FLUORESCENCE: 126 K/UL (ref 138–453)
PLATELET, IMMATURE FRACTION: 5 % (ref 1.1–10.3)
PMV BLD AUTO: 11 FL (ref 8.1–13.5)
PMV BLD AUTO: 11.1 FL (ref 8.1–13.5)
PMV BLD AUTO: 11.2 FL (ref 8.1–13.5)
PMV BLD AUTO: 11.2 FL (ref 8.1–13.5)
PMV BLD AUTO: 11.3 FL (ref 8.1–13.5)
PMV BLD AUTO: 11.4 FL (ref 8.1–13.5)
PMV BLD AUTO: 11.7 FL (ref 8.1–13.5)
PMV BLD AUTO: 11.8 FL (ref 8.1–13.5)
PMV BLD AUTO: 11.8 FL (ref 8.1–13.5)
PMV BLD AUTO: NORMAL FL (ref 8.1–13.5)
POTASSIUM SERPL-SCNC: 3.3 MMOL/L (ref 3.7–5.3)
POTASSIUM SERPL-SCNC: 3.4 MMOL/L (ref 3.7–5.3)
POTASSIUM SERPL-SCNC: 3.5 MMOL/L (ref 3.7–5.3)
POTASSIUM SERPL-SCNC: 3.6 MMOL/L (ref 3.7–5.3)
POTASSIUM SERPL-SCNC: 3.7 MMOL/L (ref 3.7–5.3)
POTASSIUM SERPL-SCNC: 4 MMOL/L (ref 3.7–5.3)
POTASSIUM SERPL-SCNC: 4.2 MMOL/L (ref 3.7–5.3)
POTASSIUM SERPL-SCNC: 4.4 MMOL/L (ref 3.7–5.3)
POTASSIUM SERPL-SCNC: 4.4 MMOL/L (ref 3.7–5.3)
POTASSIUM SERPL-SCNC: 4.5 MMOL/L (ref 3.7–5.3)
PRO-BNP: 3527 PG/ML
PROTEIN UA: ABNORMAL
PROTHROMBIN TIME: 17.4 SEC (ref 11.5–14.2)
PROTHROMBIN TIME: 19.3 SEC (ref 11.5–14.2)
PROTHROMBIN TIME: 19.7 SEC (ref 11.5–14.2)
PROTHROMBIN TIME: 22.4 SEC (ref 11.5–14.2)
PROTHROMBIN TIME: 23.9 SEC (ref 11.5–14.2)
PROTHROMBIN TIME: 25.8 SEC (ref 11.5–14.2)
RBC # BLD: 3.61 M/UL (ref 3.95–5.11)
RBC # BLD: 3.66 M/UL (ref 3.95–5.11)
RBC # BLD: 3.67 M/UL (ref 3.95–5.11)
RBC # BLD: 3.68 M/UL (ref 3.95–5.11)
RBC # BLD: 3.7 M/UL (ref 3.95–5.11)
RBC # BLD: 3.72 M/UL (ref 3.95–5.11)
RBC # BLD: 3.89 M/UL (ref 3.95–5.11)
RBC # BLD: 3.91 M/UL (ref 3.95–5.11)
RBC # BLD: 4.03 M/UL (ref 3.95–5.11)
RBC # BLD: 4.21 M/UL (ref 3.95–5.11)
RBC # BLD: ABNORMAL 10*6/UL
RBC UA: ABNORMAL /HPF (ref 0–2)
REASON FOR REJECTION: NORMAL
REASON FOR REJECTION: NORMAL
RENAL EPITHELIAL, UA: ABNORMAL /HPF
SARS-COV-2, RAPID: DETECTED
SARS-COV-2, RAPID: NOT DETECTED
SARS-COV-2: ABNORMAL
SARS-COV-2: ABNORMAL
SEG NEUTROPHILS: 65 % (ref 36–65)
SEG NEUTROPHILS: 70 % (ref 36–65)
SEG NEUTROPHILS: 80 % (ref 36–65)
SEG NEUTROPHILS: 84 % (ref 36–65)
SEG NEUTROPHILS: 85 % (ref 36–65)
SEG NEUTROPHILS: 89 % (ref 36–65)
SEG NEUTROPHILS: 89 % (ref 36–65)
SEG NEUTROPHILS: 91 % (ref 36–65)
SEGMENTED NEUTROPHILS ABSOLUTE COUNT: 3.42 K/UL (ref 1.5–8.1)
SEGMENTED NEUTROPHILS ABSOLUTE COUNT: 3.61 K/UL (ref 1.5–8.1)
SEGMENTED NEUTROPHILS ABSOLUTE COUNT: 3.92 K/UL (ref 1.5–8.1)
SEGMENTED NEUTROPHILS ABSOLUTE COUNT: 4.56 K/UL (ref 1.5–8.1)
SEGMENTED NEUTROPHILS ABSOLUTE COUNT: 5.25 K/UL (ref 1.5–8.1)
SEGMENTED NEUTROPHILS ABSOLUTE COUNT: 6.32 K/UL (ref 1.5–8.1)
SEGMENTED NEUTROPHILS ABSOLUTE COUNT: 6.45 K/UL (ref 1.5–8.1)
SEGMENTED NEUTROPHILS ABSOLUTE COUNT: 7.83 K/UL (ref 1.5–8.1)
SODIUM BLD-SCNC: 132 MMOL/L (ref 135–144)
SODIUM BLD-SCNC: 137 MMOL/L (ref 135–144)
SODIUM BLD-SCNC: 138 MMOL/L (ref 135–144)
SODIUM BLD-SCNC: 139 MMOL/L (ref 135–144)
SODIUM BLD-SCNC: 141 MMOL/L (ref 135–144)
SODIUM BLD-SCNC: 141 MMOL/L (ref 135–144)
SODIUM BLD-SCNC: 142 MMOL/L (ref 135–144)
SODIUM BLD-SCNC: 142 MMOL/L (ref 135–144)
SODIUM BLD-SCNC: 144 MMOL/L (ref 135–144)
SODIUM BLD-SCNC: 145 MMOL/L (ref 135–144)
SOURCE: ABNORMAL
SPECIFIC GRAVITY UA: 1.02 (ref 1.01–1.02)
SPECIMEN DESCRIPTION: NORMAL
TOTAL PROTEIN: 5.2 G/DL (ref 6.4–8.3)
TOTAL PROTEIN: 5.3 G/DL (ref 6.4–8.3)
TOTAL PROTEIN: 5.5 G/DL (ref 6.4–8.3)
TOTAL PROTEIN: 5.6 G/DL (ref 6.4–8.3)
TOTAL PROTEIN: 5.6 G/DL (ref 6.4–8.3)
TOTAL PROTEIN: 5.7 G/DL (ref 6.4–8.3)
TOTAL PROTEIN: 5.7 G/DL (ref 6.4–8.3)
TOTAL PROTEIN: 6 G/DL (ref 6.4–8.3)
TOTAL PROTEIN: 6.1 G/DL (ref 6.4–8.3)
TOTAL PROTEIN: 6.1 G/DL (ref 6.4–8.3)
TRICHOMONAS: ABNORMAL
TRIGL SERPL-MCNC: 72 MG/DL
TRIGL SERPL-MCNC: 73 MG/DL
TROPONIN INTERP: ABNORMAL
TROPONIN INTERP: ABNORMAL
TROPONIN T: ABNORMAL NG/ML
TROPONIN T: ABNORMAL NG/ML
TROPONIN, HIGH SENSITIVITY: 38 NG/L (ref 0–14)
TROPONIN, HIGH SENSITIVITY: 39 NG/L (ref 0–14)
TSH SERPL DL<=0.05 MIU/L-ACNC: 0.73 MIU/L (ref 0.3–5)
TURBIDITY: CLEAR
URINE HGB: ABNORMAL
UROBILINOGEN, URINE: NORMAL
VLDLC SERPL CALC-MCNC: ABNORMAL MG/DL (ref 1–30)
VLDLC SERPL CALC-MCNC: ABNORMAL MG/DL (ref 1–30)
WBC # BLD: 4.3 K/UL (ref 3.5–11.3)
WBC # BLD: 4.9 K/UL (ref 3.5–11.3)
WBC # BLD: 5.3 K/UL (ref 3.5–11.3)
WBC # BLD: 5.9 K/UL (ref 3.5–11.3)
WBC # BLD: 6.2 K/UL (ref 3.5–11.3)
WBC # BLD: 6.3 K/UL (ref 3.5–11.3)
WBC # BLD: 6.4 K/UL (ref 3.5–11.3)
WBC # BLD: 7.1 K/UL (ref 3.5–11.3)
WBC # BLD: 7.6 K/UL (ref 3.5–11.3)
WBC # BLD: 8.6 K/UL (ref 3.5–11.3)
WBC # BLD: ABNORMAL 10*3/UL
WBC UA: ABNORMAL /HPF (ref 0–5)
YEAST: ABNORMAL
ZZ NTE CLEAN UP: ORDERED TEST: NORMAL
ZZ NTE CLEAN UP: ORDERED TEST: NORMAL
ZZ NTE WITH NAME CLEAN UP: SPECIMEN SOURCE: NORMAL
ZZ NTE WITH NAME CLEAN UP: SPECIMEN SOURCE: NORMAL

## 2021-01-01 PROCEDURE — 99223 1ST HOSP IP/OBS HIGH 75: CPT | Performed by: PSYCHIATRY & NEUROLOGY

## 2021-01-01 PROCEDURE — 80053 COMPREHEN METABOLIC PANEL: CPT

## 2021-01-01 PROCEDURE — 97110 THERAPEUTIC EXERCISES: CPT

## 2021-01-01 PROCEDURE — 82728 ASSAY OF FERRITIN: CPT

## 2021-01-01 PROCEDURE — 70498 CT ANGIOGRAPHY NECK: CPT

## 2021-01-01 PROCEDURE — 85610 PROTHROMBIN TIME: CPT

## 2021-01-01 PROCEDURE — 85027 COMPLETE CBC AUTOMATED: CPT

## 2021-01-01 PROCEDURE — 2500000003 HC RX 250 WO HCPCS: Performed by: NURSE PRACTITIONER

## 2021-01-01 PROCEDURE — 85384 FIBRINOGEN ACTIVITY: CPT

## 2021-01-01 PROCEDURE — 83735 ASSAY OF MAGNESIUM: CPT

## 2021-01-01 PROCEDURE — APPSS30 APP SPLIT SHARED TIME 16-30 MINUTES: Performed by: PHYSICIAN ASSISTANT

## 2021-01-01 PROCEDURE — 6370000000 HC RX 637 (ALT 250 FOR IP): Performed by: PHYSICIAN ASSISTANT

## 2021-01-01 PROCEDURE — 6360000002 HC RX W HCPCS: Performed by: NURSE PRACTITIONER

## 2021-01-01 PROCEDURE — 6360000002 HC RX W HCPCS: Performed by: PHYSICIAN ASSISTANT

## 2021-01-01 PROCEDURE — 2580000003 HC RX 258: Performed by: NURSE PRACTITIONER

## 2021-01-01 PROCEDURE — 85379 FIBRIN DEGRADATION QUANT: CPT

## 2021-01-01 PROCEDURE — 2500000003 HC RX 250 WO HCPCS: Performed by: INTERNAL MEDICINE

## 2021-01-01 PROCEDURE — 93306 TTE W/DOPPLER COMPLETE: CPT

## 2021-01-01 PROCEDURE — 2500000003 HC RX 250 WO HCPCS: Performed by: FAMILY MEDICINE

## 2021-01-01 PROCEDURE — 97116 GAIT TRAINING THERAPY: CPT

## 2021-01-01 PROCEDURE — 85025 COMPLETE CBC W/AUTO DIFF WBC: CPT

## 2021-01-01 PROCEDURE — 97166 OT EVAL MOD COMPLEX 45 MIN: CPT

## 2021-01-01 PROCEDURE — 85055 RETICULATED PLATELET ASSAY: CPT

## 2021-01-01 PROCEDURE — 6370000000 HC RX 637 (ALT 250 FOR IP): Performed by: NURSE PRACTITIONER

## 2021-01-01 PROCEDURE — 1200000000 HC SEMI PRIVATE

## 2021-01-01 PROCEDURE — 92611 MOTION FLUOROSCOPY/SWALLOW: CPT

## 2021-01-01 PROCEDURE — 83880 ASSAY OF NATRIURETIC PEPTIDE: CPT

## 2021-01-01 PROCEDURE — 94761 N-INVAS EAR/PLS OXIMETRY MLT: CPT

## 2021-01-01 PROCEDURE — 97535 SELF CARE MNGMENT TRAINING: CPT

## 2021-01-01 PROCEDURE — 80177 DRUG SCRN QUAN LEVETIRACETAM: CPT

## 2021-01-01 PROCEDURE — 99215 OFFICE O/P EST HI 40 MIN: CPT | Performed by: INTERNAL MEDICINE

## 2021-01-01 PROCEDURE — 94669 MECHANICAL CHEST WALL OSCILL: CPT

## 2021-01-01 PROCEDURE — 36415 COLL VENOUS BLD VENIPUNCTURE: CPT

## 2021-01-01 PROCEDURE — 99223 1ST HOSP IP/OBS HIGH 75: CPT | Performed by: INTERNAL MEDICINE

## 2021-01-01 PROCEDURE — 81001 URINALYSIS AUTO W/SCOPE: CPT

## 2021-01-01 PROCEDURE — 6370000000 HC RX 637 (ALT 250 FOR IP): Performed by: CLINICAL NURSE SPECIALIST

## 2021-01-01 PROCEDURE — 95816 EEG AWAKE AND DROWSY: CPT

## 2021-01-01 PROCEDURE — 2580000003 HC RX 258

## 2021-01-01 PROCEDURE — 97530 THERAPEUTIC ACTIVITIES: CPT

## 2021-01-01 PROCEDURE — 99233 SBSQ HOSP IP/OBS HIGH 50: CPT | Performed by: INTERNAL MEDICINE

## 2021-01-01 PROCEDURE — 95819 EEG AWAKE AND ASLEEP: CPT | Performed by: PSYCHIATRY & NEUROLOGY

## 2021-01-01 PROCEDURE — 2580000003 HC RX 258: Performed by: INTERNAL MEDICINE

## 2021-01-01 PROCEDURE — 70450 CT HEAD/BRAIN W/O DYE: CPT

## 2021-01-01 PROCEDURE — 85730 THROMBOPLASTIN TIME PARTIAL: CPT

## 2021-01-01 PROCEDURE — 6360000004 HC RX CONTRAST MEDICATION: Performed by: FAMILY MEDICINE

## 2021-01-01 PROCEDURE — 84443 ASSAY THYROID STIM HORMONE: CPT

## 2021-01-01 PROCEDURE — 2580000003 HC RX 258: Performed by: PHYSICIAN ASSISTANT

## 2021-01-01 PROCEDURE — P9604 ONE-WAY ALLOW PRORATED TRIP: HCPCS

## 2021-01-01 PROCEDURE — 93010 ELECTROCARDIOGRAM REPORT: CPT | Performed by: INTERNAL MEDICINE

## 2021-01-01 PROCEDURE — 6360000004 HC RX CONTRAST MEDICATION: Performed by: PHYSICIAN ASSISTANT

## 2021-01-01 PROCEDURE — 84484 ASSAY OF TROPONIN QUANT: CPT

## 2021-01-01 PROCEDURE — G8427 DOCREV CUR MEDS BY ELIG CLIN: HCPCS | Performed by: INTERNAL MEDICINE

## 2021-01-01 PROCEDURE — 92523 SPEECH SOUND LANG COMPREHEN: CPT

## 2021-01-01 PROCEDURE — 94664 DEMO&/EVAL PT USE INHALER: CPT

## 2021-01-01 PROCEDURE — 99223 1ST HOSP IP/OBS HIGH 75: CPT | Performed by: NURSE PRACTITIONER

## 2021-01-01 PROCEDURE — 80076 HEPATIC FUNCTION PANEL: CPT

## 2021-01-01 PROCEDURE — 80048 BASIC METABOLIC PNL TOTAL CA: CPT

## 2021-01-01 PROCEDURE — 2700000000 HC OXYGEN THERAPY PER DAY

## 2021-01-01 PROCEDURE — 1111F DSCHRG MED/CURRENT MED MERGE: CPT | Performed by: INTERNAL MEDICINE

## 2021-01-01 PROCEDURE — 2060000000 HC ICU INTERMEDIATE R&B

## 2021-01-01 PROCEDURE — 82947 ASSAY GLUCOSE BLOOD QUANT: CPT

## 2021-01-01 PROCEDURE — 83615 LACTATE (LD) (LDH) ENZYME: CPT

## 2021-01-01 PROCEDURE — 71045 X-RAY EXAM CHEST 1 VIEW: CPT

## 2021-01-01 PROCEDURE — 1036F TOBACCO NON-USER: CPT | Performed by: INTERNAL MEDICINE

## 2021-01-01 PROCEDURE — 74230 X-RAY XM SWLNG FUNCJ C+: CPT

## 2021-01-01 PROCEDURE — 99284 EMERGENCY DEPT VISIT MOD MDM: CPT

## 2021-01-01 PROCEDURE — 2580000003 HC RX 258: Performed by: CLINICAL NURSE SPECIALIST

## 2021-01-01 PROCEDURE — 72125 CT NECK SPINE W/O DYE: CPT

## 2021-01-01 PROCEDURE — 6360000002 HC RX W HCPCS: Performed by: INTERNAL MEDICINE

## 2021-01-01 PROCEDURE — 97129 THER IVNTJ 1ST 15 MIN: CPT

## 2021-01-01 PROCEDURE — P9603 ONE-WAY ALLOW PRORATED MILES: HCPCS

## 2021-01-01 PROCEDURE — U0002 COVID-19 LAB TEST NON-CDC: HCPCS

## 2021-01-01 PROCEDURE — 87635 SARS-COV-2 COVID-19 AMP PRB: CPT

## 2021-01-01 PROCEDURE — 96374 THER/PROPH/DIAG INJ IV PUSH: CPT

## 2021-01-01 PROCEDURE — 80061 LIPID PANEL: CPT

## 2021-01-01 PROCEDURE — 74177 CT ABD & PELVIS W/CONTRAST: CPT

## 2021-01-01 PROCEDURE — G8417 CALC BMI ABV UP PARAM F/U: HCPCS | Performed by: INTERNAL MEDICINE

## 2021-01-01 PROCEDURE — 70553 MRI BRAIN STEM W/O & W/DYE: CPT

## 2021-01-01 PROCEDURE — 97162 PT EVAL MOD COMPLEX 30 MIN: CPT

## 2021-01-01 PROCEDURE — XW033E5 INTRODUCTION OF REMDESIVIR ANTI-INFECTIVE INTO PERIPHERAL VEIN, PERCUTANEOUS APPROACH, NEW TECHNOLOGY GROUP 5: ICD-10-PCS | Performed by: INTERNAL MEDICINE

## 2021-01-01 PROCEDURE — 99239 HOSP IP/OBS DSCHRG MGMT >30: CPT | Performed by: PHYSICIAN ASSISTANT

## 2021-01-01 PROCEDURE — 99232 SBSQ HOSP IP/OBS MODERATE 35: CPT | Performed by: PSYCHIATRY & NEUROLOGY

## 2021-01-01 PROCEDURE — 1123F ACP DISCUSS/DSCN MKR DOCD: CPT | Performed by: INTERNAL MEDICINE

## 2021-01-01 PROCEDURE — 99449 NTRPROF PH1/NTRNET/EHR 31/>: CPT | Performed by: PSYCHIATRY & NEUROLOGY

## 2021-01-01 PROCEDURE — 80051 ELECTROLYTE PANEL: CPT

## 2021-01-01 PROCEDURE — 1090F PRES/ABSN URINE INCON ASSESS: CPT | Performed by: INTERNAL MEDICINE

## 2021-01-01 PROCEDURE — 93005 ELECTROCARDIOGRAM TRACING: CPT | Performed by: FAMILY MEDICINE

## 2021-01-01 PROCEDURE — 72170 X-RAY EXAM OF PELVIS: CPT

## 2021-01-01 PROCEDURE — 99232 SBSQ HOSP IP/OBS MODERATE 35: CPT | Performed by: INTERNAL MEDICINE

## 2021-01-01 PROCEDURE — 4040F PNEUMOC VAC/ADMIN/RCVD: CPT | Performed by: INTERNAL MEDICINE

## 2021-01-01 PROCEDURE — A9576 INJ PROHANCE MULTIPACK: HCPCS | Performed by: FAMILY MEDICINE

## 2021-01-01 RX ORDER — FERROUS SULFATE 325(65) MG
325 TABLET ORAL
Status: DISCONTINUED | OUTPATIENT
Start: 2021-01-01 | End: 2021-01-01 | Stop reason: HOSPADM

## 2021-01-01 RX ORDER — ALBUTEROL SULFATE 2.5 MG/3ML
2.5 SOLUTION RESPIRATORY (INHALATION) EVERY 6 HOURS PRN
Status: DISCONTINUED | OUTPATIENT
Start: 2021-01-01 | End: 2021-01-01 | Stop reason: HOSPADM

## 2021-01-01 RX ORDER — VITAMIN B COMPLEX
2000 TABLET ORAL DAILY
Status: DISCONTINUED | OUTPATIENT
Start: 2021-01-01 | End: 2021-01-01 | Stop reason: HOSPADM

## 2021-01-01 RX ORDER — POTASSIUM CHLORIDE 20 MEQ/1
10 TABLET, EXTENDED RELEASE ORAL 2 TIMES DAILY
Status: DISCONTINUED | OUTPATIENT
Start: 2021-01-01 | End: 2021-01-01

## 2021-01-01 RX ORDER — IVERMECTIN 3 MG/1
12 TABLET ORAL ONCE
Status: COMPLETED | OUTPATIENT
Start: 2021-01-01 | End: 2021-01-01

## 2021-01-01 RX ORDER — CIPROFLOXACIN 2 MG/ML
400 INJECTION, SOLUTION INTRAVENOUS EVERY 12 HOURS
Status: DISCONTINUED | OUTPATIENT
Start: 2021-01-01 | End: 2021-01-01 | Stop reason: HOSPADM

## 2021-01-01 RX ORDER — ALBUTEROL SULFATE 90 UG/1
2 AEROSOL, METERED RESPIRATORY (INHALATION) EVERY 4 HOURS PRN
Status: DISCONTINUED | OUTPATIENT
Start: 2021-01-01 | End: 2021-01-01 | Stop reason: HOSPADM

## 2021-01-01 RX ORDER — ASCORBIC ACID 500 MG
1000 TABLET ORAL 2 TIMES DAILY
Status: DISCONTINUED | OUTPATIENT
Start: 2021-01-01 | End: 2021-01-01 | Stop reason: HOSPADM

## 2021-01-01 RX ORDER — DEXTROSE AND SODIUM CHLORIDE 5; .45 G/100ML; G/100ML
INJECTION, SOLUTION INTRAVENOUS CONTINUOUS
Status: DISCONTINUED | OUTPATIENT
Start: 2021-01-01 | End: 2021-01-01

## 2021-01-01 RX ORDER — MAGNESIUM SULFATE IN WATER 40 MG/ML
2 INJECTION, SOLUTION INTRAVENOUS ONCE
Status: COMPLETED | OUTPATIENT
Start: 2021-01-01 | End: 2021-01-01

## 2021-01-01 RX ORDER — SODIUM CHLORIDE 0.9 % (FLUSH) 0.9 %
10 SYRINGE (ML) INJECTION PRN
Status: DISCONTINUED | OUTPATIENT
Start: 2021-01-01 | End: 2021-01-01 | Stop reason: HOSPADM

## 2021-01-01 RX ORDER — SODIUM CHLORIDE 450 MG/100ML
INJECTION, SOLUTION INTRAVENOUS CONTINUOUS
Status: DISCONTINUED | OUTPATIENT
Start: 2021-01-01 | End: 2021-01-01

## 2021-01-01 RX ORDER — LEVOTHYROXINE SODIUM 112 UG/1
112 TABLET ORAL DAILY
Status: DISCONTINUED | OUTPATIENT
Start: 2021-01-01 | End: 2021-01-01 | Stop reason: HOSPADM

## 2021-01-01 RX ORDER — ACETAMINOPHEN 325 MG/1
650 TABLET ORAL ONCE
Status: COMPLETED | OUTPATIENT
Start: 2021-01-01 | End: 2021-01-01

## 2021-01-01 RX ORDER — POTASSIUM CHLORIDE 20 MEQ/1
20 TABLET, EXTENDED RELEASE ORAL 2 TIMES DAILY
Qty: 60 TABLET | Refills: 3 | Status: ON HOLD | DISCHARGE
Start: 2021-01-01 | End: 2021-01-01 | Stop reason: HOSPADM

## 2021-01-01 RX ORDER — ONDANSETRON 2 MG/ML
4 INJECTION INTRAMUSCULAR; INTRAVENOUS EVERY 6 HOURS PRN
Status: DISCONTINUED | OUTPATIENT
Start: 2021-01-01 | End: 2021-01-01 | Stop reason: HOSPADM

## 2021-01-01 RX ORDER — ACETAMINOPHEN 500 MG
500 TABLET ORAL 2 TIMES DAILY
Status: DISCONTINUED | OUTPATIENT
Start: 2021-01-01 | End: 2021-01-01 | Stop reason: HOSPADM

## 2021-01-01 RX ORDER — CIPROFLOXACIN 500 MG/1
500 TABLET, FILM COATED ORAL 2 TIMES DAILY
Qty: 14 TABLET | Refills: 0 | DISCHARGE
Start: 2021-01-01 | End: 2021-01-01

## 2021-01-01 RX ORDER — CHOLESTYRAMINE 4 G/9G
1 POWDER, FOR SUSPENSION ORAL
Status: DISCONTINUED | OUTPATIENT
Start: 2021-01-01 | End: 2021-01-01 | Stop reason: HOSPADM

## 2021-01-01 RX ORDER — SODIUM CHLORIDE 9 MG/ML
25 INJECTION, SOLUTION INTRAVENOUS PRN
Status: DISCONTINUED | OUTPATIENT
Start: 2021-01-01 | End: 2021-01-01 | Stop reason: HOSPADM

## 2021-01-01 RX ORDER — FUROSEMIDE 10 MG/ML
40 INJECTION INTRAMUSCULAR; INTRAVENOUS ONCE
Status: COMPLETED | OUTPATIENT
Start: 2021-01-01 | End: 2021-01-01

## 2021-01-01 RX ORDER — ATORVASTATIN CALCIUM 40 MG/1
40 TABLET, FILM COATED ORAL NIGHTLY
DISCHARGE
Start: 2021-01-01

## 2021-01-01 RX ORDER — DOCUSATE SODIUM 100 MG/1
100 CAPSULE, LIQUID FILLED ORAL DAILY
Status: DISCONTINUED | OUTPATIENT
Start: 2021-01-01 | End: 2021-01-01 | Stop reason: HOSPADM

## 2021-01-01 RX ORDER — LOSARTAN POTASSIUM 50 MG/1
50 TABLET ORAL DAILY
Status: DISCONTINUED | OUTPATIENT
Start: 2021-01-01 | End: 2021-01-01 | Stop reason: HOSPADM

## 2021-01-01 RX ORDER — ONDANSETRON 4 MG/1
4 TABLET, FILM COATED ORAL EVERY 6 HOURS PRN
Status: DISCONTINUED | OUTPATIENT
Start: 2021-01-01 | End: 2021-01-01 | Stop reason: HOSPADM

## 2021-01-01 RX ORDER — DEXAMETHASONE 4 MG/1
6 TABLET ORAL DAILY
Status: DISCONTINUED | OUTPATIENT
Start: 2021-01-01 | End: 2021-01-01

## 2021-01-01 RX ORDER — POLYETHYLENE GLYCOL 3350 17 G/17G
17 POWDER, FOR SOLUTION ORAL DAILY PRN
Status: DISCONTINUED | OUTPATIENT
Start: 2021-01-01 | End: 2021-01-01 | Stop reason: HOSPADM

## 2021-01-01 RX ORDER — DEXAMETHASONE SODIUM PHOSPHATE 4 MG/ML
6 INJECTION, SOLUTION INTRA-ARTICULAR; INTRALESIONAL; INTRAMUSCULAR; INTRAVENOUS; SOFT TISSUE EVERY 24 HOURS
Status: DISCONTINUED | OUTPATIENT
Start: 2021-01-01 | End: 2021-01-01 | Stop reason: HOSPADM

## 2021-01-01 RX ORDER — ALBUTEROL SULFATE 2.5 MG/3ML
2.5 SOLUTION RESPIRATORY (INHALATION) EVERY 6 HOURS PRN
COMMUNITY

## 2021-01-01 RX ORDER — SALIVA STIMULANT COMB. NO.3
1 SPRAY, NON-AEROSOL (ML) MUCOUS MEMBRANE PRN
Status: DISCONTINUED | OUTPATIENT
Start: 2021-01-01 | End: 2021-01-01

## 2021-01-01 RX ORDER — SERTRALINE HYDROCHLORIDE 25 MG/1
50 TABLET, FILM COATED ORAL DAILY
Status: DISCONTINUED | OUTPATIENT
Start: 2021-01-01 | End: 2021-01-01 | Stop reason: HOSPADM

## 2021-01-01 RX ORDER — LANOLIN ALCOHOL/MO/W.PET/CERES
325 CREAM (GRAM) TOPICAL
Status: DISCONTINUED | OUTPATIENT
Start: 2021-01-01 | End: 2021-01-01 | Stop reason: HOSPADM

## 2021-01-01 RX ORDER — POTASSIUM CHLORIDE 20 MEQ/1
20 TABLET, EXTENDED RELEASE ORAL 2 TIMES DAILY
Status: DISCONTINUED | OUTPATIENT
Start: 2021-01-01 | End: 2021-01-01 | Stop reason: HOSPADM

## 2021-01-01 RX ORDER — POTASSIUM CHLORIDE 750 MG/1
20 TABLET, EXTENDED RELEASE ORAL 2 TIMES DAILY
Status: DISCONTINUED | OUTPATIENT
Start: 2021-01-01 | End: 2021-01-01 | Stop reason: HOSPADM

## 2021-01-01 RX ORDER — ZINC SULFATE 50(220)MG
100 CAPSULE ORAL DAILY
Status: DISCONTINUED | OUTPATIENT
Start: 2021-01-01 | End: 2021-01-01 | Stop reason: HOSPADM

## 2021-01-01 RX ORDER — ONDANSETRON 4 MG/1
4 TABLET, ORALLY DISINTEGRATING ORAL 3 TIMES DAILY
Qty: 21 TABLET | Refills: 0 | DISCHARGE
Start: 2021-01-01 | End: 2021-01-01

## 2021-01-01 RX ORDER — METRONIDAZOLE 500 MG/1
500 TABLET ORAL 3 TIMES DAILY
Qty: 21 TABLET | Refills: 0 | DISCHARGE
Start: 2021-01-01 | End: 2021-01-01

## 2021-01-01 RX ORDER — PROMETHAZINE HYDROCHLORIDE 12.5 MG/1
12.5 TABLET ORAL EVERY 6 HOURS PRN
Status: DISCONTINUED | OUTPATIENT
Start: 2021-01-01 | End: 2021-01-01 | Stop reason: HOSPADM

## 2021-01-01 RX ORDER — SODIUM CHLORIDE 9 MG/ML
INJECTION, SOLUTION INTRAVENOUS CONTINUOUS
Status: DISCONTINUED | OUTPATIENT
Start: 2021-01-01 | End: 2021-01-01

## 2021-01-01 RX ORDER — ONDANSETRON 2 MG/ML
4 INJECTION INTRAMUSCULAR; INTRAVENOUS ONCE
Status: COMPLETED | OUTPATIENT
Start: 2021-01-01 | End: 2021-01-01

## 2021-01-01 RX ORDER — LEVETIRACETAM 250 MG/1
250 TABLET ORAL 2 TIMES DAILY
Status: DISCONTINUED | OUTPATIENT
Start: 2021-01-01 | End: 2021-01-01 | Stop reason: HOSPADM

## 2021-01-01 RX ORDER — METOPROLOL TARTRATE 50 MG/1
75 TABLET, FILM COATED ORAL 2 TIMES DAILY
Status: DISCONTINUED | OUTPATIENT
Start: 2021-01-01 | End: 2021-01-01 | Stop reason: HOSPADM

## 2021-01-01 RX ORDER — ATORVASTATIN CALCIUM 20 MG/1
20 TABLET, FILM COATED ORAL NIGHTLY
Status: DISCONTINUED | OUTPATIENT
Start: 2021-01-01 | End: 2021-01-01 | Stop reason: HOSPADM

## 2021-01-01 RX ORDER — LOSARTAN POTASSIUM 50 MG/1
50 TABLET ORAL DAILY
Qty: 30 TABLET | Refills: 3 | DISCHARGE
Start: 2021-01-01

## 2021-01-01 RX ORDER — ZINC SULFATE 50(220)MG
100 CAPSULE ORAL DAILY
Qty: 14 CAPSULE | Refills: 0 | DISCHARGE
Start: 2021-01-01 | End: 2021-01-01

## 2021-01-01 RX ORDER — ASPIRIN 81 MG/1
81 TABLET ORAL DAILY
Status: DISCONTINUED | OUTPATIENT
Start: 2021-01-01 | End: 2021-01-01 | Stop reason: HOSPADM

## 2021-01-01 RX ORDER — ATORVASTATIN CALCIUM 40 MG/1
40 TABLET, FILM COATED ORAL NIGHTLY
Status: DISCONTINUED | OUTPATIENT
Start: 2021-01-01 | End: 2021-01-01 | Stop reason: HOSPADM

## 2021-01-01 RX ORDER — LEVETIRACETAM 500 MG/1
500 TABLET ORAL 2 TIMES DAILY
Status: DISCONTINUED | OUTPATIENT
Start: 2021-01-01 | End: 2021-01-01 | Stop reason: HOSPADM

## 2021-01-01 RX ORDER — SODIUM CHLORIDE 0.9 % (FLUSH) 0.9 %
10 SYRINGE (ML) INJECTION EVERY 12 HOURS SCHEDULED
Status: DISCONTINUED | OUTPATIENT
Start: 2021-01-01 | End: 2021-01-01 | Stop reason: HOSPADM

## 2021-01-01 RX ORDER — METOPROLOL TARTRATE 50 MG/1
1 TABLET, FILM COATED ORAL 2 TIMES DAILY
COMMUNITY
Start: 2021-01-01

## 2021-01-01 RX ORDER — METOPROLOL TARTRATE 5 MG/5ML
5 INJECTION INTRAVENOUS EVERY 6 HOURS PRN
Status: DISCONTINUED | OUTPATIENT
Start: 2021-01-01 | End: 2021-01-01 | Stop reason: HOSPADM

## 2021-01-01 RX ORDER — FLUORIDE TOOTHPASTE
15 TOOTHPASTE DENTAL DAILY PRN
COMMUNITY

## 2021-01-01 RX ORDER — 0.9 % SODIUM CHLORIDE 0.9 %
1000 INTRAVENOUS SOLUTION INTRAVENOUS ONCE
Status: COMPLETED | OUTPATIENT
Start: 2021-01-01 | End: 2021-01-01

## 2021-01-01 RX ORDER — CHOLESTYRAMINE LIGHT 4 G/5.7G
1 POWDER, FOR SUSPENSION ORAL
Status: DISCONTINUED | OUTPATIENT
Start: 2021-01-01 | End: 2021-01-01 | Stop reason: HOSPADM

## 2021-01-01 RX ORDER — 0.9 % SODIUM CHLORIDE 0.9 %
30 INTRAVENOUS SOLUTION INTRAVENOUS PRN
Status: DISCONTINUED | OUTPATIENT
Start: 2021-01-01 | End: 2021-01-01 | Stop reason: HOSPADM

## 2021-01-01 RX ORDER — LOSARTAN POTASSIUM 25 MG/1
25 TABLET ORAL DAILY
Status: DISCONTINUED | OUTPATIENT
Start: 2021-01-01 | End: 2021-01-01

## 2021-01-01 RX ORDER — ATORVASTATIN CALCIUM 20 MG/1
20 TABLET, FILM COATED ORAL NIGHTLY
Status: DISCONTINUED | OUTPATIENT
Start: 2021-01-01 | End: 2021-01-01

## 2021-01-01 RX ORDER — CHOLECALCIFEROL (VITAMIN D3) 50 MCG
2000 TABLET ORAL DAILY
Qty: 7 TABLET | Refills: 0 | DISCHARGE
Start: 2021-01-01 | End: 2021-01-01

## 2021-01-01 RX ADMIN — METRONIDAZOLE 500 MG: 500 INJECTION, SOLUTION INTRAVENOUS at 01:10

## 2021-01-01 RX ADMIN — CHOLESTYRAMINE 4 G: 4 POWDER, FOR SUSPENSION ORAL at 12:39

## 2021-01-01 RX ADMIN — DOCUSATE SODIUM 100 MG: 100 CAPSULE ORAL at 08:16

## 2021-01-01 RX ADMIN — CHOLESTYRAMINE 4 G: 4 POWDER, FOR SUSPENSION ORAL at 11:29

## 2021-01-01 RX ADMIN — APIXABAN 5 MG: 5 TABLET, FILM COATED ORAL at 09:26

## 2021-01-01 RX ADMIN — ATORVASTATIN CALCIUM 20 MG: 20 TABLET, FILM COATED ORAL at 20:34

## 2021-01-01 RX ADMIN — ACETAMINOPHEN 500 MG: 500 TABLET, COATED ORAL at 09:26

## 2021-01-01 RX ADMIN — POTASSIUM CHLORIDE 20 MEQ: 1500 TABLET, EXTENDED RELEASE ORAL at 11:35

## 2021-01-01 RX ADMIN — ASPIRIN 81 MG: 81 TABLET, COATED ORAL at 09:18

## 2021-01-01 RX ADMIN — METOPROLOL TARTRATE 75 MG: 25 TABLET, FILM COATED ORAL at 08:06

## 2021-01-01 RX ADMIN — LEVOTHYROXINE SODIUM 112 MCG: 112 TABLET ORAL at 11:36

## 2021-01-01 RX ADMIN — LEVETIRACETAM 250 MG: 250 TABLET ORAL at 20:34

## 2021-01-01 RX ADMIN — WATER 1 G: 1 INJECTION INTRAMUSCULAR; INTRAVENOUS; SUBCUTANEOUS at 20:36

## 2021-01-01 RX ADMIN — CHOLESTYRAMINE 4 G: 4 POWDER, FOR SUSPENSION ORAL at 16:39

## 2021-01-01 RX ADMIN — LOSARTAN POTASSIUM 50 MG: 50 TABLET, FILM COATED ORAL at 09:19

## 2021-01-01 RX ADMIN — ASPIRIN 81 MG: 81 TABLET, COATED ORAL at 08:30

## 2021-01-01 RX ADMIN — OXYCODONE HYDROCHLORIDE AND ACETAMINOPHEN 1000 MG: 500 TABLET ORAL at 08:07

## 2021-01-01 RX ADMIN — REMDESIVIR 200 MG: 100 INJECTION, POWDER, LYOPHILIZED, FOR SOLUTION INTRAVENOUS at 22:01

## 2021-01-01 RX ADMIN — METRONIDAZOLE 500 MG: 500 INJECTION, SOLUTION INTRAVENOUS at 16:39

## 2021-01-01 RX ADMIN — DEXAMETHASONE SODIUM PHOSPHATE 6 MG: 4 INJECTION, SOLUTION INTRAMUSCULAR; INTRAVENOUS at 20:36

## 2021-01-01 RX ADMIN — CHOLESTYRAMINE 4 G: 4 POWDER, FOR SUSPENSION ORAL at 09:26

## 2021-01-01 RX ADMIN — DOCUSATE SODIUM 100 MG: 100 CAPSULE ORAL at 09:26

## 2021-01-01 RX ADMIN — APIXABAN 5 MG: 5 TABLET, FILM COATED ORAL at 08:29

## 2021-01-01 RX ADMIN — FERROUS SULFATE TAB 325 MG (65 MG ELEMENTAL FE) 325 MG: 325 (65 FE) TAB at 09:26

## 2021-01-01 RX ADMIN — CHOLESTYRAMINE 4 G: 4 POWDER, FOR SUSPENSION ORAL at 08:29

## 2021-01-01 RX ADMIN — ATORVASTATIN CALCIUM 20 MG: 20 TABLET, FILM COATED ORAL at 11:35

## 2021-01-01 RX ADMIN — APIXABAN 5 MG: 5 TABLET, FILM COATED ORAL at 08:17

## 2021-01-01 RX ADMIN — ONDANSETRON 4 MG: 2 INJECTION INTRAMUSCULAR; INTRAVENOUS at 17:45

## 2021-01-01 RX ADMIN — IOPAMIDOL 75 ML: 755 INJECTION, SOLUTION INTRAVENOUS at 15:37

## 2021-01-01 RX ADMIN — METOPROLOL TARTRATE 75 MG: 25 TABLET, FILM COATED ORAL at 09:25

## 2021-01-01 RX ADMIN — DEXAMETHASONE SODIUM PHOSPHATE 6 MG: 4 INJECTION, SOLUTION INTRAMUSCULAR; INTRAVENOUS at 20:35

## 2021-01-01 RX ADMIN — ACETAMINOPHEN 500 MG: 500 TABLET ORAL at 11:21

## 2021-01-01 RX ADMIN — ZINC SULFATE 220 MG (50 MG) CAPSULE 100 MG: CAPSULE at 09:26

## 2021-01-01 RX ADMIN — ATORVASTATIN CALCIUM 20 MG: 20 TABLET, FILM COATED ORAL at 20:51

## 2021-01-01 RX ADMIN — CIPROFLOXACIN 400 MG: 2 INJECTION, SOLUTION INTRAVENOUS at 04:59

## 2021-01-01 RX ADMIN — LOSARTAN POTASSIUM 25 MG: 25 TABLET, FILM COATED ORAL at 08:06

## 2021-01-01 RX ADMIN — APIXABAN 5 MG: 5 TABLET, FILM COATED ORAL at 20:35

## 2021-01-01 RX ADMIN — SERTRALINE HYDROCHLORIDE 50 MG: 25 TABLET ORAL at 08:06

## 2021-01-01 RX ADMIN — ATORVASTATIN CALCIUM 20 MG: 20 TABLET, FILM COATED ORAL at 20:35

## 2021-01-01 RX ADMIN — ZINC SULFATE 220 MG (50 MG) CAPSULE 100 MG: CAPSULE at 08:16

## 2021-01-01 RX ADMIN — CHOLESTYRAMINE 4 G: 4 POWDER, FOR SUSPENSION ORAL at 11:44

## 2021-01-01 RX ADMIN — ATORVASTATIN CALCIUM 20 MG: 20 TABLET, FILM COATED ORAL at 20:54

## 2021-01-01 RX ADMIN — ACETAMINOPHEN 500 MG: 500 TABLET, COATED ORAL at 08:17

## 2021-01-01 RX ADMIN — CHOLESTYRAMINE 4 G: 4 POWDER, FOR SUSPENSION ORAL at 09:38

## 2021-01-01 RX ADMIN — POTASSIUM CHLORIDE 10 MEQ: 1500 TABLET, EXTENDED RELEASE ORAL at 08:07

## 2021-01-01 RX ADMIN — CHOLESTYRAMINE 4 G: 4 POWDER, FOR SUSPENSION ORAL at 12:36

## 2021-01-01 RX ADMIN — MAGNESIUM SULFATE HEPTAHYDRATE 2 G: 40 INJECTION, SOLUTION INTRAVENOUS at 10:37

## 2021-01-01 RX ADMIN — METRONIDAZOLE 500 MG: 500 INJECTION, SOLUTION INTRAVENOUS at 09:23

## 2021-01-01 RX ADMIN — DEXAMETHASONE SODIUM PHOSPHATE 6 MG: 4 INJECTION, SOLUTION INTRAMUSCULAR; INTRAVENOUS at 20:49

## 2021-01-01 RX ADMIN — ZINC SULFATE 220 MG (50 MG) CAPSULE 100 MG: CAPSULE at 09:18

## 2021-01-01 RX ADMIN — POTASSIUM CHLORIDE 10 MEQ: 1500 TABLET, EXTENDED RELEASE ORAL at 08:17

## 2021-01-01 RX ADMIN — REMDESIVIR 100 MG: 100 INJECTION, POWDER, LYOPHILIZED, FOR SOLUTION INTRAVENOUS at 20:49

## 2021-01-01 RX ADMIN — SODIUM CHLORIDE: 9 INJECTION, SOLUTION INTRAVENOUS at 11:26

## 2021-01-01 RX ADMIN — APIXABAN 5 MG: 5 TABLET, FILM COATED ORAL at 20:52

## 2021-01-01 RX ADMIN — POTASSIUM CHLORIDE 10 MEQ: 1500 TABLET, EXTENDED RELEASE ORAL at 09:19

## 2021-01-01 RX ADMIN — CHOLESTYRAMINE 4 G: 4 POWDER, FOR SUSPENSION ORAL at 08:05

## 2021-01-01 RX ADMIN — FERROUS SULFATE TAB 325 MG (65 MG ELEMENTAL FE) 325 MG: 325 (65 FE) TAB at 08:30

## 2021-01-01 RX ADMIN — DOCUSATE SODIUM 100 MG: 100 CAPSULE, LIQUID FILLED ORAL at 13:10

## 2021-01-01 RX ADMIN — ACETAMINOPHEN 500 MG: 500 TABLET, COATED ORAL at 08:30

## 2021-01-01 RX ADMIN — APIXABAN 5 MG: 5 TABLET, FILM COATED ORAL at 08:07

## 2021-01-01 RX ADMIN — IVERMECTIN 12 MG: 3 TABLET ORAL at 09:11

## 2021-01-01 RX ADMIN — METOPROLOL TARTRATE 75 MG: 25 TABLET, FILM COATED ORAL at 20:51

## 2021-01-01 RX ADMIN — LOSARTAN POTASSIUM 25 MG: 25 TABLET, FILM COATED ORAL at 08:17

## 2021-01-01 RX ADMIN — FERROUS SULFATE TAB 325 MG (65 MG ELEMENTAL FE) 325 MG: 325 (65 FE) TAB at 08:17

## 2021-01-01 RX ADMIN — ONDANSETRON 4 MG: 2 INJECTION INTRAMUSCULAR; INTRAVENOUS at 09:59

## 2021-01-01 RX ADMIN — LEVETIRACETAM 250 MG: 250 TABLET ORAL at 20:35

## 2021-01-01 RX ADMIN — ACETAMINOPHEN 500 MG: 500 TABLET ORAL at 23:31

## 2021-01-01 RX ADMIN — ACETAMINOPHEN 500 MG: 500 TABLET, COATED ORAL at 20:34

## 2021-01-01 RX ADMIN — SODIUM CHLORIDE: 9 INJECTION, SOLUTION INTRAVENOUS at 07:05

## 2021-01-01 RX ADMIN — LEVETIRACETAM 250 MG: 250 TABLET ORAL at 09:26

## 2021-01-01 RX ADMIN — FUROSEMIDE 40 MG: 10 INJECTION, SOLUTION INTRAMUSCULAR; INTRAVENOUS at 09:26

## 2021-01-01 RX ADMIN — Medication 2000 UNITS: at 08:30

## 2021-01-01 RX ADMIN — DOCUSATE SODIUM 100 MG: 100 CAPSULE ORAL at 08:06

## 2021-01-01 RX ADMIN — METRONIDAZOLE 500 MG: 500 INJECTION, SOLUTION INTRAVENOUS at 08:09

## 2021-01-01 RX ADMIN — REMDESIVIR 100 MG: 100 INJECTION, POWDER, LYOPHILIZED, FOR SOLUTION INTRAVENOUS at 20:53

## 2021-01-01 RX ADMIN — LOSARTAN POTASSIUM 25 MG: 25 TABLET, FILM COATED ORAL at 08:30

## 2021-01-01 RX ADMIN — ACETAMINOPHEN 500 MG: 500 TABLET, COATED ORAL at 08:06

## 2021-01-01 RX ADMIN — ASPIRIN 81 MG: 81 TABLET, COATED ORAL at 08:16

## 2021-01-01 RX ADMIN — IOPAMIDOL 75 ML: 755 INJECTION, SOLUTION INTRAVENOUS at 14:59

## 2021-01-01 RX ADMIN — POTASSIUM CHLORIDE 10 MEQ: 1500 TABLET, EXTENDED RELEASE ORAL at 20:54

## 2021-01-01 RX ADMIN — LEVOTHYROXINE SODIUM 112 MCG: 0.11 TABLET ORAL at 09:07

## 2021-01-01 RX ADMIN — METRONIDAZOLE 500 MG: 500 INJECTION, SOLUTION INTRAVENOUS at 17:39

## 2021-01-01 RX ADMIN — CIPROFLOXACIN 400 MG: 2 INJECTION, SOLUTION INTRAVENOUS at 16:02

## 2021-01-01 RX ADMIN — FUROSEMIDE 40 MG: 10 INJECTION, SOLUTION INTRAMUSCULAR; INTRAVENOUS at 09:07

## 2021-01-01 RX ADMIN — OXYCODONE HYDROCHLORIDE AND ACETAMINOPHEN 1000 MG: 500 TABLET ORAL at 20:34

## 2021-01-01 RX ADMIN — Medication 2000 UNITS: at 09:27

## 2021-01-01 RX ADMIN — WATER 1 G: 1 INJECTION INTRAMUSCULAR; INTRAVENOUS; SUBCUTANEOUS at 22:00

## 2021-01-01 RX ADMIN — DEXAMETHASONE SODIUM PHOSPHATE 6 MG: 4 INJECTION, SOLUTION INTRAMUSCULAR; INTRAVENOUS at 20:54

## 2021-01-01 RX ADMIN — WATER: 1 INJECTION INTRAMUSCULAR; INTRAVENOUS; SUBCUTANEOUS at 22:02

## 2021-01-01 RX ADMIN — POTASSIUM CHLORIDE 10 MEQ: 1500 TABLET, EXTENDED RELEASE ORAL at 08:30

## 2021-01-01 RX ADMIN — OXYCODONE HYDROCHLORIDE AND ACETAMINOPHEN 1000 MG: 500 TABLET ORAL at 20:35

## 2021-01-01 RX ADMIN — METOPROLOL TARTRATE 75 MG: 50 TABLET, FILM COATED ORAL at 23:30

## 2021-01-01 RX ADMIN — METRONIDAZOLE 500 MG: 500 INJECTION, SOLUTION INTRAVENOUS at 17:13

## 2021-01-01 RX ADMIN — ONDANSETRON 4 MG: 2 INJECTION INTRAMUSCULAR; INTRAVENOUS at 15:24

## 2021-01-01 RX ADMIN — SERTRALINE HYDROCHLORIDE 50 MG: 25 TABLET ORAL at 08:17

## 2021-01-01 RX ADMIN — SERTRALINE HYDROCHLORIDE 50 MG: 25 TABLET ORAL at 09:26

## 2021-01-01 RX ADMIN — CIPROFLOXACIN 400 MG: 2 INJECTION, SOLUTION INTRAVENOUS at 03:31

## 2021-01-01 RX ADMIN — METRONIDAZOLE 500 MG: 500 INJECTION, SOLUTION INTRAVENOUS at 01:53

## 2021-01-01 RX ADMIN — Medication 2000 UNITS: at 09:26

## 2021-01-01 RX ADMIN — DEXTROSE AND SODIUM CHLORIDE: 5; 450 INJECTION, SOLUTION INTRAVENOUS at 17:29

## 2021-01-01 RX ADMIN — ONDANSETRON 4 MG: 2 INJECTION INTRAMUSCULAR; INTRAVENOUS at 10:37

## 2021-01-01 RX ADMIN — METOPROLOL TARTRATE 75 MG: 25 TABLET, FILM COATED ORAL at 08:29

## 2021-01-01 RX ADMIN — POTASSIUM CHLORIDE 20 MEQ: 1500 TABLET, EXTENDED RELEASE ORAL at 23:31

## 2021-01-01 RX ADMIN — SERTRALINE HYDROCHLORIDE 50 MG: 25 TABLET ORAL at 09:18

## 2021-01-01 RX ADMIN — OXYCODONE HYDROCHLORIDE AND ACETAMINOPHEN 1000 MG: 500 TABLET ORAL at 08:17

## 2021-01-01 RX ADMIN — CHOLESTYRAMINE 4 G: 4 POWDER, FOR SUSPENSION ORAL at 08:16

## 2021-01-01 RX ADMIN — Medication 2000 UNITS: at 08:17

## 2021-01-01 RX ADMIN — CHOLESTYRAMINE 4 G: 4 POWDER, FOR SUSPENSION ORAL at 12:17

## 2021-01-01 RX ADMIN — SODIUM CHLORIDE, PRESERVATIVE FREE 10 ML: 5 INJECTION INTRAVENOUS at 23:32

## 2021-01-01 RX ADMIN — METRONIDAZOLE 500 MG: 500 INJECTION, SOLUTION INTRAVENOUS at 01:25

## 2021-01-01 RX ADMIN — Medication 81 MG: at 13:10

## 2021-01-01 RX ADMIN — METRONIDAZOLE 500 MG: 500 INJECTION, SOLUTION INTRAVENOUS at 09:19

## 2021-01-01 RX ADMIN — LEVOTHYROXINE SODIUM 112 MCG: 0.11 TABLET ORAL at 07:04

## 2021-01-01 RX ADMIN — METOPROLOL TARTRATE 75 MG: 25 TABLET, FILM COATED ORAL at 20:35

## 2021-01-01 RX ADMIN — METOPROLOL TARTRATE 75 MG: 25 TABLET, FILM COATED ORAL at 09:18

## 2021-01-01 RX ADMIN — METOPROLOL TARTRATE 75 MG: 50 TABLET, FILM COATED ORAL at 11:36

## 2021-01-01 RX ADMIN — OXYCODONE HYDROCHLORIDE AND ACETAMINOPHEN 1000 MG: 500 TABLET ORAL at 09:25

## 2021-01-01 RX ADMIN — LEVETIRACETAM 250 MG: 250 TABLET ORAL at 08:06

## 2021-01-01 RX ADMIN — SODIUM CHLORIDE: 9 INJECTION, SOLUTION INTRAVENOUS at 01:11

## 2021-01-01 RX ADMIN — DEXAMETHASONE SODIUM PHOSPHATE 6 MG: 4 INJECTION, SOLUTION INTRAMUSCULAR; INTRAVENOUS at 22:00

## 2021-01-01 RX ADMIN — SERTRALINE HYDROCHLORIDE 50 MG: 25 TABLET ORAL at 08:29

## 2021-01-01 RX ADMIN — REMDESIVIR 100 MG: 100 INJECTION, POWDER, LYOPHILIZED, FOR SOLUTION INTRAVENOUS at 20:36

## 2021-01-01 RX ADMIN — OXYCODONE HYDROCHLORIDE AND ACETAMINOPHEN 1000 MG: 500 TABLET ORAL at 20:53

## 2021-01-01 RX ADMIN — METOPROLOL TARTRATE 75 MG: 25 TABLET, FILM COATED ORAL at 08:16

## 2021-01-01 RX ADMIN — GADOTERIDOL 12 ML: 279.3 INJECTION, SOLUTION INTRAVENOUS at 10:33

## 2021-01-01 RX ADMIN — ACETAMINOPHEN 500 MG: 500 TABLET, COATED ORAL at 20:36

## 2021-01-01 RX ADMIN — DOCUSATE SODIUM 100 MG: 100 CAPSULE ORAL at 08:30

## 2021-01-01 RX ADMIN — ACETAMINOPHEN 500 MG: 500 TABLET, COATED ORAL at 21:00

## 2021-01-01 RX ADMIN — LEVETIRACETAM 250 MG: 250 TABLET ORAL at 20:51

## 2021-01-01 RX ADMIN — LEVETIRACETAM 250 MG: 250 TABLET ORAL at 08:17

## 2021-01-01 RX ADMIN — REMDESIVIR 100 MG: 100 INJECTION, POWDER, LYOPHILIZED, FOR SOLUTION INTRAVENOUS at 20:44

## 2021-01-01 RX ADMIN — OXYCODONE HYDROCHLORIDE AND ACETAMINOPHEN 1000 MG: 500 TABLET ORAL at 08:29

## 2021-01-01 RX ADMIN — SODIUM CHLORIDE: 4.5 INJECTION, SOLUTION INTRAVENOUS at 11:14

## 2021-01-01 RX ADMIN — SODIUM CHLORIDE, PRESERVATIVE FREE 10 ML: 5 INJECTION INTRAVENOUS at 11:15

## 2021-01-01 RX ADMIN — CHOLESTYRAMINE 4 G: 4 POWDER, FOR SUSPENSION ORAL at 16:36

## 2021-01-01 RX ADMIN — ZINC SULFATE 220 MG (50 MG) CAPSULE 100 MG: CAPSULE at 08:30

## 2021-01-01 RX ADMIN — LEVETIRACETAM 250 MG: 250 TABLET ORAL at 08:30

## 2021-01-01 RX ADMIN — SODIUM CHLORIDE: 9 INJECTION, SOLUTION INTRAVENOUS at 23:23

## 2021-01-01 RX ADMIN — METOPROLOL TARTRATE 75 MG: 25 TABLET, FILM COATED ORAL at 20:53

## 2021-01-01 RX ADMIN — ZINC SULFATE 220 MG (50 MG) CAPSULE 100 MG: CAPSULE at 08:07

## 2021-01-01 RX ADMIN — ACETAMINOPHEN 500 MG: 500 TABLET, COATED ORAL at 09:18

## 2021-01-01 RX ADMIN — SODIUM CHLORIDE 1000 ML: 9 INJECTION, SOLUTION INTRAVENOUS at 15:04

## 2021-01-01 RX ADMIN — LEVOTHYROXINE SODIUM 112 MCG: 0.11 TABLET ORAL at 06:43

## 2021-01-01 RX ADMIN — APIXABAN 5 MG: 5 TABLET, FILM COATED ORAL at 23:31

## 2021-01-01 RX ADMIN — FERROUS SULFATE TAB 325 MG (65 MG ELEMENTAL FE) 325 MG: 325 (65 FE) TAB at 09:27

## 2021-01-01 RX ADMIN — APIXABAN 5 MG: 5 TABLET, FILM COATED ORAL at 09:19

## 2021-01-01 RX ADMIN — POLYETHYLENE GLYCOL (3350) 17 G: 17 POWDER, FOR SOLUTION ORAL at 20:49

## 2021-01-01 RX ADMIN — CHOLESTYRAMINE 4 G: 4 POWDER, FOR SUSPENSION ORAL at 11:37

## 2021-01-01 RX ADMIN — METOPROLOL TARTRATE 5 MG: 5 INJECTION INTRAVENOUS at 02:06

## 2021-01-01 RX ADMIN — POTASSIUM CHLORIDE 10 MEQ: 1500 TABLET, EXTENDED RELEASE ORAL at 20:34

## 2021-01-01 RX ADMIN — SERTRALINE 50 MG: 50 TABLET, FILM COATED ORAL at 13:10

## 2021-01-01 RX ADMIN — ATORVASTATIN CALCIUM 20 MG: 20 TABLET, FILM COATED ORAL at 23:31

## 2021-01-01 RX ADMIN — SODIUM CHLORIDE: 9 INJECTION, SOLUTION INTRAVENOUS at 20:42

## 2021-01-01 RX ADMIN — ACETAMINOPHEN 650 MG: 325 TABLET, FILM COATED ORAL at 16:43

## 2021-01-01 RX ADMIN — LEVOTHYROXINE SODIUM 112 MCG: 0.11 TABLET ORAL at 08:11

## 2021-01-01 RX ADMIN — METOPROLOL TARTRATE 75 MG: 25 TABLET, FILM COATED ORAL at 20:34

## 2021-01-01 RX ADMIN — ASPIRIN 81 MG: 81 TABLET, COATED ORAL at 09:26

## 2021-01-01 RX ADMIN — APIXABAN 5 MG: 5 TABLET, FILM COATED ORAL at 13:11

## 2021-01-01 RX ADMIN — CHOLESTYRAMINE 4 G: 4 POWDER, FOR SUSPENSION ORAL at 17:13

## 2021-01-01 RX ADMIN — LEVETIRACETAM 500 MG: 500 TABLET, FILM COATED ORAL at 23:31

## 2021-01-01 RX ADMIN — ACETAMINOPHEN 500 MG: 500 TABLET, COATED ORAL at 20:51

## 2021-01-01 RX ADMIN — CHOLESTYRAMINE 4 G: 4 POWDER, FOR SUSPENSION ORAL at 17:03

## 2021-01-01 RX ADMIN — CIPROFLOXACIN 400 MG: 2 INJECTION, SOLUTION INTRAVENOUS at 16:36

## 2021-01-01 RX ADMIN — FERROUS SULFATE TAB EC 325 MG (65 MG FE EQUIVALENT) 325 MG: 325 (65 FE) TABLET DELAYED RESPONSE at 13:11

## 2021-01-01 RX ADMIN — LEVETIRACETAM 500 MG: 500 TABLET, FILM COATED ORAL at 11:36

## 2021-01-01 RX ADMIN — OXYCODONE HYDROCHLORIDE AND ACETAMINOPHEN 1000 MG: 500 TABLET ORAL at 20:51

## 2021-01-01 RX ADMIN — DOCUSATE SODIUM 100 MG: 100 CAPSULE ORAL at 09:18

## 2021-01-01 RX ADMIN — APIXABAN 5 MG: 5 TABLET, FILM COATED ORAL at 20:34

## 2021-01-01 RX ADMIN — CIPROFLOXACIN 400 MG: 2 INJECTION, SOLUTION INTRAVENOUS at 15:49

## 2021-01-01 RX ADMIN — CIPROFLOXACIN 400 MG: 2 INJECTION, SOLUTION INTRAVENOUS at 15:22

## 2021-01-01 RX ADMIN — POTASSIUM CHLORIDE 20 MEQ: 750 TABLET, EXTENDED RELEASE ORAL at 09:26

## 2021-01-01 RX ADMIN — LEVOTHYROXINE SODIUM 112 MCG: 0.11 TABLET ORAL at 08:09

## 2021-01-01 RX ADMIN — ASPIRIN 81 MG: 81 TABLET, COATED ORAL at 08:06

## 2021-01-01 RX ADMIN — LOSARTAN POTASSIUM 50 MG: 50 TABLET, FILM COATED ORAL at 09:26

## 2021-01-01 RX ADMIN — OXYCODONE HYDROCHLORIDE AND ACETAMINOPHEN 1000 MG: 500 TABLET ORAL at 09:18

## 2021-01-01 RX ADMIN — CIPROFLOXACIN 400 MG: 2 INJECTION, SOLUTION INTRAVENOUS at 04:09

## 2021-01-01 RX ADMIN — APIXABAN 5 MG: 5 TABLET, FILM COATED ORAL at 20:54

## 2021-01-01 RX ADMIN — LEVETIRACETAM 250 MG: 250 TABLET ORAL at 20:53

## 2021-01-01 RX ADMIN — POTASSIUM CHLORIDE 10 MEQ: 1500 TABLET, EXTENDED RELEASE ORAL at 20:35

## 2021-01-01 RX ADMIN — POTASSIUM CHLORIDE 10 MEQ: 1500 TABLET, EXTENDED RELEASE ORAL at 20:51

## 2021-01-01 RX ADMIN — Medication 2000 UNITS: at 08:06

## 2021-01-01 RX ADMIN — LEVETIRACETAM 250 MG: 250 TABLET ORAL at 09:18

## 2021-01-01 RX ADMIN — APIXABAN 5 MG: 5 TABLET, FILM COATED ORAL at 11:35

## 2021-01-01 RX ADMIN — FERROUS SULFATE TAB 325 MG (65 MG ELEMENTAL FE) 325 MG: 325 (65 FE) TAB at 08:06

## 2021-01-01 ASSESSMENT — ENCOUNTER SYMPTOMS
CONSTIPATION: 0
EYE REDNESS: 0
COUGH: 0
RHINORRHEA: 0
WHEEZING: 0
DIARRHEA: 0
EYES NEGATIVE: 1
CHEST TIGHTNESS: 0
SORE THROAT: 0
ABDOMINAL DISTENTION: 0
ABDOMINAL PAIN: 0
NAUSEA: 0
VOMITING: 0
ABDOMINAL PAIN: 0
BLOOD IN STOOL: 0
ANAL BLEEDING: 0
BLOOD IN STOOL: 0
NAUSEA: 1
SHORTNESS OF BREATH: 0
RESPIRATORY NEGATIVE: 1
EYE DISCHARGE: 0
VOMITING: 0
BACK PAIN: 0
DIARRHEA: 1
RECTAL PAIN: 0
CONSTIPATION: 0

## 2021-01-01 ASSESSMENT — PAIN SCALES - GENERAL
PAINLEVEL_OUTOF10: 0

## 2021-01-14 PROBLEM — U07.1 COVID-19: Status: ACTIVE | Noted: 2021-01-01

## 2021-01-14 PROBLEM — R09.02 HYPOXIA: Status: ACTIVE | Noted: 2021-01-01

## 2021-01-14 PROBLEM — U07.1 COVID-19 VIRUS INFECTION: Status: ACTIVE | Noted: 2021-01-01

## 2021-01-14 NOTE — ED PROVIDER NOTES
[facility] ED  eMERGENCY dEPARTMENT eNCOUnter      Pt Name: Cheryl Chowdhury  MRN: [id number]  [place] 5/2/1930  Date of evaluation: 1/14/2021  Provider: Wyatt Boswell Dr     Chief Complaint   Patient presents with    Head Injury     fell Saturday night and is now having nausea. On Eliquis    Nausea         HISTORY OF PRESENT ILLNESS   (Location/Symptom, Timing/Onset, Context/Setting,Quality, Duration, Modifying Factors, Severity)  Note limiting factors. Cheryl Chowdhury is a80 y.o. female who presents to the emergency department via EMS due to nausea that started today as well as generalized weakness. Patient and nursing staff reported that she fell last Saturday sustaining an injury to her head. They states she is on Eliquis. Reports that since the fall she has been able to walk until today and she is no longer wanting to walk and she has generalized weakness. Patient denies any chest pain or shortness of breath. She denies any headache or dizziness. She just reports that she is slightly nauseated and feels weak. She has no other complaints at this time. HPI    Nursing Notes werereviewed. REVIEW OF SYSTEMS    (2-9 systems for level 4, 10 or more for level 5)     Review of Systems   Constitutional: Positive for fatigue. Negative for chills, diaphoresis and fever. HENT: Negative for congestion, ear pain, rhinorrhea and sore throat. Eyes: Negative for discharge, redness and visual disturbance. Respiratory: Negative for cough, chest tightness, shortness of breath and wheezing. Cardiovascular: Negative for chest pain and palpitations. Gastrointestinal: Positive for nausea. Negative for abdominal pain, blood in stool, constipation, diarrhea and vomiting. Endocrine: Negative for polydipsia, polyphagia and polyuria. Genitourinary: Negative for decreased urine volume, difficulty urinating, dysuria, frequency and hematuria. Musculoskeletal: Negative for arthralgias, back pain and myalgias. Skin: Negative for pallor and rash. Allergic/Immunologic: Negative for food allergies and immunocompromised state. Neurological: Negative for dizziness, syncope, weakness and light-headedness. Hematological: Negative for adenopathy. Does not bruise/bleed easily. Psychiatric/Behavioral: Negative for behavioral problems and suicidal ideas. The patient is not nervous/anxious. Except as noted above the remainder of the review of systems was reviewed and negative.        PAST MEDICAL HISTORY     Past Medical History:   Diagnosis Date    Acute kidney failure ([address] 06/28/2019    Anemia     Aphasia     Arthritis     Atrial fibrillation ([address]     Bile duct calculus     Calculus of gallbladder and bile duct without cholecystitis without obstruction     Cerebral infarction ([address]     CHF (congestive heart failure) (HCC)     Chronic kidney disease, stage 3     CVA (cerebral vascular accident) ([address]     Fall     Gout     Gout     Gout     Hyperlipidemia     Hypertension     Hypokalemia 06/28/2019    Hypokalemia     Hypomagnesemia 06/28/2019    Hypomagnesemia     Hypothyroidism     IBS (irritable bowel syndrome)     Irritable bowel syndrome     Ischemia     muscle    Left carotid stenosis     Metabolic encephalopathy 83/05/2079    Mitral valve insufficiency     Nonrheumatic mitral (valve) insufficiency 06/28/2019    Seizures (HCC)     TIA (transient ischemic attack)     UTI (urinary tract infection)          SURGICALHISTORY       Past Surgical History:   Procedure Laterality Date    CARPAL TUNNEL RELEASE  2012    Dr. Deutsch Ap - right side    CATARACT REMOVAL WITH IMPLANT Bilateral     CHOLECYSTECTOMY      ERCP N/A 2/11/2019    Driss Birmingham MD - ERCP, Sphincterotomy, balloon dilatation and stent placement with removal of multiple stones    TOTAL KNEE ARTHROPLASTY Right     TOTAL KNEE ARTHROPLASTY Left  Highest education level: Not on file   Occupational History    Not on file   Social Needs    Financial resource strain: Not on file    Food insecurity     Worry: Not on file     Inability: Not on file    Transportation needs     Medical: Not on file     Non-medical: Not on file   Tobacco Use    Smoking status: Never Smoker    Smokeless tobacco: Never Used   Substance and Sexual Activity    Alcohol use: No    Drug use: No    Sexual activity: Not on file   Lifestyle    Physical activity     Days per week: Not on file     Minutes per session: Not on file    Stress: Not on file   Relationships    Social connections     Talks on phone: Not on file     Gets together: Not on file     Attends [employer] service: Not on file     Active member of club or organization: Not on file     Attends meetings of clubs or organizations: Not on file     Relationship status: Not on file    Intimate partner violence     Fear of current or ex partner: Not on file     Emotionally abused: Not on file     Physically abused: Not on file     Forced sexual activity: Not on file   Other Topics Concern    Not on file   Social History Narrative    Not on file       SCREENINGS      @[id number]([id number])@      PHYSICAL EXAM    (up to 7 for level 4, 8 or more for level 5)     ED Triage Vitals [01/14/21 1303]   BP Temp Temp src Pulse Resp SpO2 Height Weight   (!) 167/97 97.9 °F (36.6 °C) -- 94 20 90 % 5' (1.524 m) --       Physical Exam  Vitals signs and nursing note reviewed. Constitutional:       General: She is not in acute distress. Appearance: She is well-developed. She is ill-appearing. She is not diaphoretic. HENT:      Head: Normocephalic. Right Ear: External ear normal.      Left Ear: External ear normal.      Mouth/Throat:      Pharynx: No oropharyngeal exudate. Eyes:      General: No scleral icterus. Right eye: No discharge. Left eye: No discharge.       Conjunctiva/sclera: Conjunctivae normal. Pupils: Pupils are equal, round, and reactive to light. Comments: Patient has periorbital bruising bilaterally consistent with raccoons eyes. No conjunctival hemorrhage no hyphema no chemosis extract movements intact. Neck:      Musculoskeletal: Normal range of motion and neck supple. Thyroid: No thyromegaly. Trachea: No tracheal deviation. Cardiovascular:      Rate and Rhythm: Normal rate. Pulmonary:      Effort: Pulmonary effort is normal. No respiratory distress. Breath sounds: No stridor. Decreased breath sounds present. No wheezing or rhonchi. Abdominal:      General: Bowel sounds are normal. There is no distension. Palpations: Abdomen is soft. There is no mass. Tenderness: There is no abdominal tenderness. There is no guarding or rebound. Hernia: No hernia is present. Musculoskeletal: Normal range of motion. General: No tenderness or deformity. Lymphadenopathy:      Cervical: No cervical adenopathy. Skin:     General: Skin is warm and dry. Capillary Refill: Capillary refill takes 2 to 3 seconds. Findings: No erythema or rash. Neurological:      General: No focal deficit present. Mental Status: She is alert and oriented to person, place, and time. Cranial Nerves: No cranial nerve deficit. Motor: No abnormal muscle tone. Deep Tendon Reflexes: Reflexes are normal and symmetric.  Reflexes normal.   Psychiatric:         Mood and Affect: Mood normal.         Behavior: Behavior normal.         DIAGNOSTIC RESULTS     EKG: All EKG's are interpreted by the Emergency Department Physician who either signs orCo-signs this chart in the absence of a cardiologist.      RADIOLOGY:   Non-plainfilm images such as CT, Ultrasound and MRI are read by the radiologist. Plain radiographic images are visualized and preliminarily interpreted by the emergency physician with the below findings: Interpretationper the Radiologist below, if available at the time of this note:    CT ABDOMEN PELVIS W IV CONTRAST Additional Contrast? None   Preliminary Result   1. Significant sigmoid diverticulosis with segment of wall thickening   without serosal haziness. This may be related to low-grade diverticulitis. 2.  Status post cholecystectomy. Mild intra hepatic central biliary ductal   dilatation likely secondary to cholecystectomy. 3.  No bowel obstruction, appendicitis or urinary obstruction. 4.  Patient has findings most compatible with airspace disease at the lung   bases left greater than right. XR PELVIS (1-2 VIEWS)   Final Result   No acute osseous abnormality in the pelvis. Osteopenia degrades evaluation   for subtle nondisplaced fracture. Consider further evaluation with MRI as   clinically warranted. Moderate bilateral hip joint osteoarthritis. XR CHEST PORTABLE   Final Result   No x-ray evidence of acute trauma to the chest or acute cardiopulmonary   disease. CT Cervical Spine WO Contrast   Final Result   No acute bony abnormality. Advanced degenerative changes with unchanged grade 1 anterolisthesis C4 on C5   and C7 on T1.         CT Head WO Contrast   Final Result   No acute intracranial abnormality.                ED BEDSIDE ULTRASOUND:   Performed by ED Physician - none    LABS:  Labs Reviewed   BASIC METABOLIC PANEL - Abnormal; Notable for the following components:       Result Value    Glucose 144 (*)     BUN 24 (*)     CREATININE 1.09 (*)     Bun/Cre Ratio 22 (*)     Potassium 3.5 (*)     GFR Non- 47 (*)     GFR  57 (*)     All other components within normal limits   URINE RT REFLEX TO CULTURE - Abnormal; Notable for the following components:    Specific Gravity, UA 1.025 (*)     Urine Hgb 1+ (*)     Protein, UA 2+ (*)     Nitrite, Urine POSITIVE (*)     All other components within normal limits IMMATURE PLATELET FRACTION - Abnormal; Notable for the following components:    Platelet, Fluorescence 126 (*)     All other components within normal limits   COVID-19 - Abnormal; Notable for the following components:    SARS-CoV-2, Rapid DETECTED (*)     All other components within normal limits   MICROSCOPIC URINALYSIS - Abnormal; Notable for the following components:    Bacteria, UA 4+ (*)     All other components within normal limits   CBC       All other labs were within normal range or not returned as of this dictation. EMERGENCY DEPARTMENT COURSE and DIFFERENTIAL DIAGNOSIS/MDM:   Vitals:    Vitals:    01/14/21 1549 01/14/21 1617 01/14/21 1630 01/14/21 1719   BP:  (!) 111/53     Pulse:       Resp:       Temp:   100.3 °F (37.9 °C)    TempSrc:   Tympanic    SpO2: 96% 92%  97%   Height:             MDM  66-year-old female who presents after a fall over the past weekend. On exam she has got raccoons eyes, she has generalized weakness without any focal deficits. Apparently had been walking after the fall but today stopped wanting to walk. Will get x-ray of the chest hips will get CT head and C-spine she is on Eliquis. She has no bruising to abdomen or chest.  Does not complain of any pain. Patient continues to have vomiting. With her fall and the vomiting I am going to get a CT abdomen pelvis for further evaluation. In addition, she is mildly hypoxic at about 89% on room air she is not on oxygen at home. She lives in a nursing facility. I am concerned that she could also have Covid will screen for Covid she is on nasal cannula. Imaging is showing a possible diverticulitis. The bases of her lungs look to be sharp infiltrates. This was not seen on the chest x-ray. Given the diverticulitis or hypoxia or positive Covid and her nitrite positive UTI will admit her for further evaluation and treatment. I called and spoke with Claudia Ayoub, who is aware of patient's presentation work-up here in the ER agrees to meet for further evaluation and treatment for Dr. Hanane Donald. Procedures    FINAL IMPRESSION      1. COVID-19    2. Acute respiratory failure with hypoxia (HCC)    3. Fall, initial encounter    4. Closed head injury, initial encounter    5. Diverticulitis of colon    6. Acute urinary tract infection        DISPOSITION/PLAN   DISPOSITION Decision To Admit 01/14/2021 05:10:30 PM      PATIENT REFERRED TO:  No follow-up provider specified. DISCHARGE MEDICATIONS:  New Prescriptions    No medications on file              Summation      Patient Course:      ED Medications administered this visit:    Medications   0.9 % sodium chloride bolus (1,000 mLs Intravenous New Bag 1/14/21 1504)   ondansetron (ZOFRAN) injection 4 mg (4 mg Intravenous Given 1/14/21 1524)   iopamidol (ISOVUE-370) 76 % injection 75 mL (75 mLs Intravenous Given 1/14/21 1537)   acetaminophen (TYLENOL) tablet 650 mg (650 mg Oral Given 1/14/21 1643)       New Prescriptions from this visit:    New Prescriptions    No medications on file       Follow-up:  No follow-up provider specified. Final Impression:   1. COVID-19    2. Acute respiratory failure with hypoxia (HCC)    3. Fall, initial encounter    4. Closed head injury, initial encounter    5. Diverticulitis of colon    6.  Acute urinary tract infection               (Please note that portions of this note were completed with a voice recognition program.  Efforts were made to edit the dictations but occasionally words are mis-transcribed.)           Macario Tripathi PA-C  01/14/21 1287

## 2021-01-15 NOTE — PROGRESS NOTES
[facility]. Patient is on 2L of O2 and placed on continuous pulse ox and tele. Patient responds to voices but is not communicating at this time. Will continue to monitor. Bed alarm is on and call light within reach.

## 2021-01-15 NOTE — PROGRESS NOTES
Physical Therapy  Facility/Department: [facility] MED SURG  Daily Treatment Note  NAME: Moe Schafer  : 1930  MRN: [id number]    Date of Service: 1/15/2021    Discharge Recommendations:  Continue to assess pending progress, Subacute/Skilled Nursing Facility, ECF with PT        Assessment   Treatment Diagnosis: difficulty walking  Prognosis: Good  Decision Making: Medium Complexity  PT Education: PT Role;Transfer Training;General Safety  Patient Education: Educated pt on above with fair understanding  REQUIRES PT FOLLOW UP: Yes  Activity Tolerance  Activity Tolerance: Patient Tolerated treatment well     Patient Diagnosis(es): The primary encounter diagnosis was COVID-19. Diagnoses of Acute respiratory failure with hypoxia ([address] Fall, initial encounter, Closed head injury, initial encounter, Diverticulitis of colon, and Acute urinary tract infection were also pertinent to this visit. has a past medical history of Acute kidney failure ([address] Anemia, Aphasia, Arthritis, Atrial fibrillation (HCC), Bile duct calculus, Calculus of gallbladder and bile duct without cholecystitis without obstruction, Cerebral infarction ([address] CHF (congestive heart failure) ([address] Chronic kidney disease, stage 3, CVA (cerebral vascular accident) ([address] Fall, Gout, Gout, Gout, Hyperlipidemia, Hypertension, Hypokalemia, Hypokalemia, Hypomagnesemia, Hypomagnesemia, Hypothyroidism, IBS (irritable bowel syndrome), Irritable bowel syndrome, Ischemia, Left carotid stenosis, Metabolic encephalopathy, Mitral valve insufficiency, Nonrheumatic mitral (valve) insufficiency, Seizures ([address] TIA (transient ischemic attack), and UTI (urinary tract infection). has a past surgical history that includes Cataract removal with implant (Bilateral); Cholecystectomy; Carpal tunnel release (2012); ERCP (N/A, 2/11/2019); Total knee arthroplasty (Right); Total knee arthroplasty (Left); Upper gastrointestinal endoscopy (07/31/2019); and Upper gastrointestinal endoscopy (N/A, 7/31/2019). Restrictions  Restrictions/Precautions  Restrictions/Precautions: General Precautions, Fall Risk, Isolation  Subjective   General  Chart Reviewed: Yes  Family / Caregiver Present: No  Referring Practitioner: Oneil Taylor CNP  Subjective  Subjective: no new c/o          Orientation  Orientation  Overall Orientation Status: Impaired  Orientation Level: Oriented to person;Disoriented to time;Disoriented to place  Cognition      Objective   Bed mobility  Rolling to Right: Minimal assistance  Supine to Sit: Minimal assistance  Sit to Supine: Moderate assistance  Transfers  Sit to Stand: Moderate Assistance  Stand to sit: Moderate Assistance  Stand Pivot Transfers: Moderate Assistance;2 Person Assistance  Comment: Verbal and tactile cuing for hand placement  Ambulation  Ambulation?: Yes  Ambulation 1  Surface: level tile  Device: Rolling Walker  Assistance:  Moderate assistance;2 Person assistance  Distance: 3 steps x 2 to/from [facility] to/from bed     Balance  Posture: Fair  Sitting - Static: Fair  Sitting - Dynamic: Fair;-  Standing - Static: +;Poor  Standing - Dynamic: Poor  Exercises  Straight Leg Raise: x10 AAROM  Quad Sets: x15  Heelslides: x15 AAROM  Hip Flexion: x15  Hip Abduction: x15 AAROM  Knee Short Arc Quad: x15 AAROM  Ankle Pumps: x15 AAROM  Comments: Above ex completed in supine       G-Code     OutComes Score     AM-PAC Score     Goals  Short term goals  Time Frame for Short term goals: 10 visits  Short term goal 1: Patient will complete bed mobility and transfers with CGAx1 in order to ease functional mobility Short term goal 2: Patient will tolerate 35-45' ther-ex in order to increase endurance and ease ADLs  Short term goal 3: Patient will ambulate 75feet with CGAx1 and use of RW in order to ease functional mobility    Plan    Plan  Times per week: 7 times per week  Times per day: Twice a day(1 time per day on weekends)  Current Treatment Recommendations: Strengthening, Balance Training, Functional Mobility Training, Transfer Training, Gait Training, Endurance Training, Neuromuscular Re-education, Manual Therapy - Soft Tissue Mobilization, Positioning, Equipment Evaluation, Education, & procurement, Patient/Caregiver Education & Training, Safety Education & Training, Home Exercise Program  Safety Devices  Type of devices: Call light within reach, Chair alarm in place, Nurse notified, All fall risk precautions in place, Bed alarm in place, Gait belt, Patient at risk for falls, Left in bed(left pt in semi prone position)     Therapy Time   Individual Concurrent Group Co-treatment   Time In 1250         Time Out 1329         Minutes [address], [facility]

## 2021-01-15 NOTE — PROGRESS NOTES
2+ assist with walker for transfer to commode. Upon return to chair, patient barely unable to bare weight, sways forward and cannot ambulate to chair; Daisha Ndiaye has to sit patient down per self. Patient INC large amount and voided.

## 2021-01-15 NOTE — ACP (ADVANCE CARE PLANNING)
Advance Care Planning     Advance Care Planning Activator (Inpatient)  Conversation Note      Date of ACP Conversation: 1/14/2021    Conversation Conducted with: Patient with [address]: Named in Advance Directive or Healthcare Power of  (name) Jaye Chu    ACP Activator: [id number] makes decisions on behalf of the incapacitated patient: Decision Maker is asked to consider and make decisions based on patient values, known preferences, or best interests. Health Care Decision Maker:     Current Designated Health Care Decision Maker:   Primary Decision Maker: Noelle Christiansen - Child - [phone]    Secondary Decision Maker: Jesus Cody - Child - [phone]  (If there is a [address] named in the [address] [facility] Makers\" box in the ACP activity, but it is not visible above, be sure to open that field and then select the health care decision maker relationship (ie \"primary\") in the blank space to the right of the name.) Validate  this information as still accurate & up-to-date; edit [employer] field as needed.)    Note: Assess and validate information in current ACP documents, as indicated. .    Care Preferences    Ventilation: \"If you were in your present state of health and suddenly became very ill and were unable to breathe on your own, what would your preference be about the use of a ventilator (breathing machine) if it were available to you? \"      Would the patient desire the use of ventilator (breathing machine)?: no    \"If your health worsens and it becomes clear that your chance of recovery is unlikely, what would your preference be about the use of a ventilator (breathing machine) if it were available to you? \"     Would the patient desire the use of ventilator (breathing machine)?: No      Resuscitation \"CPR works best to restart the heart when there is a sudden event, like a heart attack, in someone who is otherwise healthy. Unfortunately, CPR does not typically restart the heart for people who have serious health conditions or who are very sick. \"    \"In the event your heart stopped as a result of an underlying serious health condition, would you want attempts to be made to restart your heart (answer \"yes\" for attempt to resuscitate) or would you prefer a natural death (answer \"no\" for do not attempt to resuscitate)? \" no      NOTE: If the patient has a valid advance directive AND now provides care preference(s) that are inconsistent with that prior directive, advise the patient to consider either: creating a new advance directive that complies with state-specific requirements; or, if that is not possible, orally revoking that prior directive in accordance with state-specific requirements, which must be documented in the EHR. [x] Yes   [] No   Educated Patient / Leopoldo Denison regarding differences between Advance Directives and portable DNR orders.     Length of ACP Conversation in minutes:      Conversation Outcomes:  [x] ACP discussion completed  [x] Existing advance directive reviewed with patient; no changes to patient's previously recorded wishes  [] New Advance Directive completed  [x] Portable Do Not Rescitate prepared for Provider review and signature  [] POLST/POST/MOLST/MOST prepared for Provider review and signature      Follow-up plan:    [] Schedule follow-up conversation to continue planning  [] Referred individual to Provider for additional questions/concerns   [] Advised patient/agent/surrogate to review completed ACP document and update if needed with changes in condition, patient preferences or care setting    [x] This note routed to one or more involved healthcare providers

## 2021-01-15 NOTE — CONSULTS
Remdesivir Daily Monitoring      Criteria for use (confirm all are met): yes  Suspected/Confirmed COVID-19 positive and requiring hospitalization  Patient requires supplemental oxygen (this is the population for whom remdesivir demonstrated a shortened duration of illness; benefit less clear for those on high flow oxygen or mechanical ventilation, but may be reasonable to consider if change in respiratory status was recent)  Not recommended to be used in patients with baseline ALT 5x ULN or more    There are no PK data available for severe renal impairment (eGFR < 30 mL/min) or on RRT. Consider risk/benefit for individual patient. Baseline CrCl: Estimated Creatinine Clearance: 38 mL/min (based on SCr of 0.89 mg/dL). Ref. Range 1/14/2021 14:25 1/15/2021 06:00   GFR Non-African American Latest Ref Range: >60 mL/min 47 (L) 60 (L)       Ensure LFTs are ordered at baseline & consider if monitoring during therapy is appropriate (this information is included in CMP or can be ordered separately). Package insert states: Perform hepatic laboratory testing in all patients before starting VEKLURY and while receiving VEKLURY as clinically appropriate. Baseline ALT:    Ref. Range 1/14/2021 14:25 1/15/2021 06:00   ALT Latest Ref Range: 5 - 33 U/L 12 10       Duration of therapy should be limited to 5 days. Anticipated stop date: 1/19/21    Also assess corticosteroid therapy: If patient has had symptoms for 7 days or more and is on supplemental oxygen, then a course of dexamethasone can also be considered.     Dexamethasone indicated? no  If yes, is dexamethasone ordered? dexamethasone  Consider baseline CRP (steroids may be beneficial if > 20 mg/L and may be harmful if < 10 mg/L): not ordered    JOHN Nettles.Ph., 1/15/2021,8:25 AM

## 2021-01-15 NOTE — PROGRESS NOTES
Writer asked patient if she was able to take pills tonight and patient shook head \"no. \" Patient is still not responding verbally.

## 2021-01-15 NOTE — PROGRESS NOTES
Patient has been tolerating RA well with no s/s of respiratory distress t/o shift. She has been more verbal as shift has progressed, but remains difficult to move for transfers.

## 2021-01-15 NOTE — PROGRESS NOTES
Discussed discharge plans with the son. Patient is a 80year old female here with Covid positive and Acute hypoxia. She is alert and oriented. Patient is a  and lives at [address] She uses a walker for ambulation. Patient requires some assistance with her ADL's. The nursing home manages her medications and transportation. Her PCP is Dr. Radha Hardy MD. She has medical insurance that helps with medication costs. The discharge plan is to return to [address]. She has a DNR CCA on file. LSW to monitor and assist with any needs or concerns as they arise.     BROOKE Hernandez

## 2021-01-15 NOTE — H&P
[facility]            HISTORY AND PHYSICAL     Pt Name: Elzbieta Richards  MRN: [id number]  [place] 5/2/1930  Date of evaluation: 1/14/2021  Provider: Wyatt Trinh Dr       Chief Complaint   Patient presents with    Head Injury     fell Saturday night and is now having nausea. On Eliquis    Nausea         HISTORY OF PRESENT ILLNESS      History Obtained From:  patient  PCP: Ling Cintron MD    History of Present Illness: The patient is a 80 y.o. female who presents with nausea and generalized fatigue not wanting to ambulate from nursing facility. Per nursing facility she had a fall last Saturday sustaining a head injury. She is on Eliquis. She reports that she just feels sick to her stomach. Denies any pain. This is a somewhat limited HPI as patient is responding but she is slow to respond to questions. She is able to say her name and where she is and who her son is.     Past Medical History:        Diagnosis Date    Acute kidney failure ([address] 06/28/2019    Anemia     Aphasia     Arthritis     Atrial fibrillation (HCC)     Bile duct calculus     Calculus of gallbladder and bile duct without cholecystitis without obstruction     Cerebral infarction ([address]     CHF (congestive heart failure) (HCC)     Chronic kidney disease, stage 3     CVA (cerebral vascular accident) ([address]     Fall     Gout     Gout     Gout     Hyperlipidemia     Hypertension     Hypokalemia 06/28/2019    Hypokalemia     Hypomagnesemia 06/28/2019    Hypomagnesemia     Hypothyroidism     IBS (irritable bowel syndrome)     Irritable bowel syndrome     Ischemia     muscle    Left carotid stenosis     Metabolic encephalopathy 86/80/3356    Mitral valve insufficiency     Nonrheumatic mitral (valve) insufficiency 06/28/2019    Seizures (HCC)     TIA (transient ischemic attack)     UTI (urinary tract infection)        Past Surgical History: Procedure Laterality Date   Eduardo Love  2012    Dr. Sara Johns - right side    CATARACT REMOVAL WITH IMPLANT Bilateral     CHOLECYSTECTOMY      ERCP N/A 2/11/2019    Driss Birmingham MD - ERCP, Sphincterotomy, balloon dilatation and stent placement with removal of multiple stones    TOTAL KNEE ARTHROPLASTY Right     TOTAL KNEE ARTHROPLASTY Left     UPPER GASTROINTESTINAL ENDOSCOPY  07/31/2019    with stent removal by Dr. Bernie Cheung 7/31/2019    EGD STENT REMOVAL performed by Anurag Soliman MD at [address] [place] [address]       Medications Prior to Admission:    Prior to Admission medications    Medication Sig Start Date End Date Taking?  Authorizing Provider   sertraline (ZOLOFT) 50 MG tablet Take 50 mg by mouth daily  8/26/20  Yes Historical Provider, MD   metoprolol tartrate 75 MG TABS Take 75 mg by mouth 2 times daily 5/27/20  Yes Hanh Albert MD   levETIRAcetam (KEPPRA) 500 MG tablet Take 1 tablet by mouth 2 times daily  Patient taking differently: Take 250 mg by mouth 2 times daily  5/27/20  Yes Hanh Albert MD   docusate sodium (COLACE) 100 MG capsule Take 100 mg by mouth daily   Yes Historical Provider, MD   ondansetron (ZOFRAN) 4 MG tablet Take 4 mg by mouth every 6 hours as needed for Nausea or Vomiting   Yes Historical Provider, MD   cholestyramine (QUESTRAN) 4 g packet Take 1 packet by mouth 3 times daily (with meals) 2/14/20  Yes Anurag Soliman MD   potassium chloride (MICRO-K) 10 MEQ extended release capsule Take 10 mEq by mouth 2 times daily   Yes Historical Provider, MD   ferrous sulfate 325 (65 Fe) MG tablet Take 1 tablet by mouth daily (with breakfast) 11/5/19  Yes Anurag Soliman MD   apixaban (ELIQUIS) 5 MG TABS tablet Take 5 mg by mouth 2 times daily   Yes Historical Provider, MD   atorvastatin (LIPITOR) 20 MG tablet Take 1 tablet by mouth nightly 6/21/19  Yes Audra Darling PA-C losartan (COZAAR) 25 MG tablet Take 1 tablet by mouth daily 6/22/19  Yes Shane Darling PA-C   aspirin 81 MG EC tablet Take 1 tablet by mouth daily 4/11/19  Yes Keyshawn Everett MD   levothyroxine (SYNTHROID) 112 MCG tablet Take 1 tablet by mouth Daily 11/29/18  Yes Christian Mercer MD   acetaminophen (TYLENOL) 500 MG tablet Take 500 mg by mouth 2 times daily    Yes Historical Provider, MD   Artificial Saliva (BIOTENE DRY MOUTH MOISTURIZING) SOLN liquid Take 1 drop by mouth as needed    Historical Provider, MD   polyethylene glycol (GLYCOLAX) packet Take 17 g by mouth daily as needed for Constipation    Historical Provider, MD       Allergies:  Patient has no known allergies. Social History:   TOBACCO:   reports that she has never smoked. She has never used smokeless tobacco.  ETOH:   reports no history of alcohol use. Family History:       Problem Relation Age of Onset    High Blood Pressure Mother     Heart Disease Father        Review of Systems:  Review of Systems   Constitutional: Negative for chills, diaphoresis and fever. HENT: Negative for congestion, ear pain, rhinorrhea and sore throat. Eyes: Negative for discharge, redness and visual disturbance. Respiratory: Negative for cough, chest tightness, shortness of breath and wheezing. Cardiovascular: Negative for chest pain and palpitations. Gastrointestinal: Positive for nausea. Negative for abdominal pain, blood in stool, constipation, diarrhea and vomiting. Endocrine: Negative for polydipsia, polyphagia and polyuria. Genitourinary: Negative for decreased urine volume, difficulty urinating, dysuria, frequency and hematuria. Musculoskeletal: Negative for arthralgias, back pain and myalgias. Skin: Negative for pallor and rash. Allergic/Immunologic: Negative for food allergies and immunocompromised state. Neurological: Negative for dizziness, syncope, weakness and light-headedness. Hematological: Negative for adenopathy. Does not bruise/bleed easily. Psychiatric/Behavioral: Negative for behavioral problems and suicidal ideas. The patient is not nervous/anxious. All other systems were reviewed with the patient and are negative except as stated    Objective:    Vitals:   Temp: 97.2 °F (36.2 °C)  BP: 118/83  Resp: 20  Pulse: 90  SpO2: 96 %  -----------------------------------------------------------------  Exam:  GEN: Well-developed well-nourished no obvious distress  HEENT: Pupils are equal round and reactive to light. Patient has diffuse bruising to the face raccoon sign. No hyphema, no subconjunctival hemorrhage, no chemosis. Visual tracking normal.  Mucous membranes are moist.  Uvula is midline. No oral pharyngeal swelling or edema. NECK: Supple, no tenderness  CVS: Regular rate although irregular, murmur  PULM: Clear to auscultation bilaterally. No respiratory distress  ABD: Soft and nontender, nondistended  EXT: Patient is able to move bilateral upper extremities although slowly. She is able to activate the muscles in her lower legs and start to lift but is not able to fully lift the lower legs. No tenderness no pain bilateral edema noted. NEURO: Patient is awake alert and oriented to person place time and situation. No focal neurologic deficit. SKIN: No rashes.   No skin lesions.    -----------------------------------------------------------------  Diagnostic Data:   · All diagnostic data was reviewed  Lab Results   Component Value Date    WBC 6.3 01/14/2021    HGB 12.8 01/14/2021    MCV 99.5 01/14/2021    PLT See Reflexed IPF Result 01/14/2021      Lab Results   Component Value Date    GLUCOSE 144 (H) 01/14/2021    BUN 24 (H) 01/14/2021    CREATININE 1.09 (H) 01/14/2021     01/14/2021    K 3.5 (L) 01/14/2021    CALCIUM 8.8 01/14/2021    CL 99 01/14/2021    CO2 27 01/14/2021     Lab Results   Component Value Date    WBCUA 5 TO 10 01/14/2021 RBCUA 5 TO 10 01/14/2021    EPITHUA 0 TO 2 01/14/2021    LEUKOCYTESUR NEGATIVE 01/14/2021    SPECGRAV 1.025 (H) 01/14/2021    GLUCOSEU NEGATIVE 01/14/2021    KETUA NEGATIVE 01/14/2021    PROTEINU 2+ (A) 01/14/2021    HGBUR 1+ (A) 01/14/2021    CASTUA NOT REPORTED 01/14/2021    CRYSTUA NOT REPORTED 01/14/2021    BACTERIA 4+ (A) 01/14/2021    YEAST NOT REPORTED 01/14/2021       CT ABDOMEN PELVIS W IV CONTRAST Additional Contrast? None   Final Result   1. Significant sigmoid diverticulosis with segment of wall thickening   without serosal haziness. This may be related to low-grade diverticulitis. 2.  Status post cholecystectomy. Mild intra hepatic central biliary ductal   dilatation likely secondary to cholecystectomy. 3.  No bowel obstruction, appendicitis or urinary obstruction. 4.  Patient has findings most compatible with airspace disease at the lung   bases left greater than right. XR PELVIS (1-2 VIEWS)   Final Result   No acute osseous abnormality in the pelvis. Osteopenia degrades evaluation   for subtle nondisplaced fracture. Consider further evaluation with MRI as   clinically warranted. Moderate bilateral hip joint osteoarthritis. XR CHEST PORTABLE   Final Result   No x-ray evidence of acute trauma to the chest or acute cardiopulmonary   disease. CT Cervical Spine WO Contrast   Final Result   No acute bony abnormality. Advanced degenerative changes with unchanged grade 1 anterolisthesis C4 on C5   and C7 on T1.         CT Head WO Contrast   Final Result   No acute intracranial abnormality. Assessment/ Plan:   26-year-old female with history of stroke and atrial fibrillation, who presented with nausea to ER Covid positive found to be hypoxic in the upper 80s. Had a fall almost 1 week ago with significant facial trauma although no acute findings on CT head. Admitted for Covid pneumonia with hypoxia as well as a UTI and mild diverticulitis. 1.  Covid positive  2. Acute hypoxia  -Patient is admitted to [facility] 5 floor for further evaluation and treatment. Estimated length of stay is greater than 3 midnights. We will treat her with Decadron, remdesivir, ivermectin. In addition she will be given vitamin D, zinc and vitamin C. She will be placed on enhanced droplet and contact isolation. Prone positioning as tolerated. We will continue to monitor ferritin, fibrinogen, D-dimer. 3.  Acute urinary tract infection  -We will start patient on Rocephin. Follow urine culture. 4.  Mild diverticulitis  -Questionable diverticulitis on CT scan. Patient on Rocephin for urinary tract infection. She is without any abdominal pain she has no elevation in her white blood cell count. And the reading was questionable. At this point will not add additional antibiotics. We will see how patient tolerates. 5.  Fall with head injury  -Patient had a fall almost 1 week ago is on Eliquis and has significant bruising to face. CT scan shows no acute intracranial injury but does show a subcutaneous hematoma. She has had some deconditioning following the fall. Will consult PT OT. DVT prophylaxis:  Patient is on Eliquis    GI prophylaxis:  Pepcid    CODE STATUS:  DNR-CCA    This is a 80 y.o. female admitted for COVID-19 with hypoxia as well as acute urinary tract infection. This patient requires an inpatient admission as expected length of stay is greater than 3 midnights. Reviewed old charts and EKG. Consult made to pharmacy, PT OT, case management  Advance directive discussed. Discussed all this with the patient who is in agreements. Sons were contacted by nurses and updated. Agree with plan.     [place], [place]

## 2021-01-15 NOTE — PROGRESS NOTES
Progress Note    SUBJECTIVE: Fall    OBJECTIVE: She is sitting up in the chair alert and oriented with severe ecchymosis to her face. She denies headache or dizziness. She denies nausea vomiting at this time. She is on Eliquis no further signs of bleeding. She denies an appetite at this time but is working on her breakfast.  She has had no further vomiting. Vitals:   Vitals:    01/15/21 1501   BP: 134/80   Pulse: 82   Resp: 16   Temp: 97.7 °F (36.5 °C)   SpO2: 95%     Weight: 161 lb 6 oz (73.2 kg)   Height: 5' (152.4 cm)   -----------------------------------------------------------------  Exam:    CONSTITUTIONAL:  awake, alert, cooperative, no apparent distress, and appears stated age  EYES:  Lids and lashes normal, pupils equal, round and reactive to light, extra ocular muscles intact, sclera clear, conjunctiva normal  ENT:  Normocephalic, without obvious abnormality, atraumatic, sinuses nontender on palpation, external ears without lesions, oral pharynx with moist mucus membranes, tonsils without erythema or exudates, gums normal and good dentition. NECK:  Supple, symmetrical, trachea midline, no adenopathy, thyroid symmetric, not enlarged and no tenderness, skin normal  HEMATOLOGIC/LYMPHATICS:  no cervical lymphadenopathy and no supraclavicular lymphadenopathy  LUNGS:  no increased work of breathing, good air exchange and crackles right base and left base. R>L  CARDIOVASCULAR:  irregularly irregular rhythm, normal S1 and S2 and puffy BLE  ABDOMEN:  No scars, normal bowel sounds, soft, non-distended, non-tender, no masses palpated, no hepatosplenomegally  MUSCULOSKELETAL:  There is no redness, warmth, or swelling of the joints. Full range of motion noted. Motor strength is 5 out of 5 all extremities bilaterally.   Tone is normal.  NEUROLOGIC:  Mental Status Exam:  Level of Alertness:   awake  Orientation:   person, place, time  Memory:   normal  SKIN:  Bruising periorbital and forehead EXT:     Puffy, non-pitting edema to BLE  -----------------------------------------------------------------  Diagnostic Data: Reviewed    ASSESSMENT:   Principal Problem:    covid-19 virus   Active Problems:    Acute cystitis without hematuria    Hypoxia    COVID-19 virus infection  Resolved Problems:    * No resolved hospital problems.  *        PLAN:  · COVID-19 virus with hypoxia  · IV remdesivir  · Dexamethasone  · Supplemental oxygen to maintain SPO2 greater than 92%  · Acapella  · Continuous pulse ox via telemetry  · PT/OT  · Chronic atrial fibrillation  · Telemetry  · Continue Lopressor, Eliquis, aspirin, Lipitor  · Chronic diastolic CHF  · I/O  · Decrease IV fluids to 50 an hour  · Chronic CKD stage III  · Monitor kidney function daily  · High risk medications: Remdesivir  · Discharge plan: Pending      KATIA Mahmood, NP-C

## 2021-01-15 NOTE — PROGRESS NOTES
Nutrition Assessment     Type and Reason for Visit: Initial(missing malnutrition screen )    Nutrition Recommendations/Plan:   1. Continue current diet. 2. Start 4 oz ensure enlive TID with meals. Nutrition Assessment:  predicted suboptimal oral intakes r/t impaired respiratory function aeb PO 1-25%. Pt asleep at this time. Nurse reports PO of 25% of lunch. add ONS TID with meals. Malnutrition Assessment:  Malnutrition Status: Insufficient data    Estimated Daily Nutrient Needs:  Energy (kcal): 9716-5649(69-72/SG); Weight Used for Energy Requirements:  Current     Protein (g): 59-68g(1.3-1.5g/kg); Weight Used for Protein Requirements:  Ideal        Fluid (ml/day): 1314 ml; Weight Used for Fluid Requirements:  1 ml/kcal      Nutrition Related Findings: unable to assess, asleep fully covered up      Current Nutrition Therapies:    DIET CARDIAC;     Anthropometric Measures:  · Height: 5' (152.4 cm)  · Current Body Wt: 161 lb 6 oz (73.2 kg)   · BMI: 31.5    Nutrition Diagnosis:   · Predicted inadequate energy intake related to impaired respiratory function as evidenced by intake 0-25%      Nutrition Interventions:   Food and/or Nutrient Delivery:  Continue Current Diet, Start Oral Nutrition Supplement  Nutrition Education/Counseling:  Education not appropriate   Coordination of Nutrition Care:  Continue to monitor while inpatient    Goals:  PO > 75% of meals     Recent Labs     01/14/21  1425 01/15/21  0600    141   K 3.5* 3.4*   CL 99 107   CO2 27 25   BUN 24* 19   CREATININE 1.09* 0.89   GLUCOSE 144* 112*   ALT 12 10   ALKPHOS 87 75   GFR                            Lab Results   Component Value Date    LABALBU 3.1 01/15/2021    LABALBU 4.5 04/23/2012      Nutrition Monitoring and Evaluation:   Behavioral-Environmental Outcomes:  None Identified   Food/Nutrient Intake Outcomes:  Food and Nutrient Intake, Supplement Intake  Physical Signs/Symptoms Outcomes:  Biochemical Data, Weight Discharge Planning:    Continue current diet     Electronically signed by Kristi tSaley RD, LD on 1/15/21 at 2:08 PM EST    Contact: [phone]

## 2021-01-15 NOTE — PROGRESS NOTES
Physical Therapy    Facility/Department: [facility] AT THE [facility] MED SURG  Initial Assessment    NAME: Edis Vergara  : 1930  MRN: [id number]    Date of Service: 1/15/2021    Discharge Recommendations:  Continue to assess pending progress, Subacute/Skilled Nursing Facility, ECF with PT        Assessment   Body structures, Functions, Activity limitations: Decreased functional mobility ; Decreased ADL status; Decreased strength;Decreased balance;Decreased safe awareness;Decreased endurance;Decreased coordination;Decreased posture  Assessment: PT evaluation completed. Patient performed supine to sit out of bed with modAx1 and sit to stands with modAx1 with cues to push from bed vs walker. Patient demonstrated strong anterior lean upon standing and required prompting to shift weight posteriorly in order to safely ambulate with RW. Patient ambulated with RW with modAx2 5 steps to chair. Patient would benefit from continued therapy in order to address deficits in strength, balance and functional mobility  Treatment Diagnosis: difficulty walking  Prognosis: Good  Decision Making: Medium Complexity  PT Education: PT Role  REQUIRES PT FOLLOW UP: Yes  Activity Tolerance  Activity Tolerance: Patient Tolerated treatment well       Patient Diagnosis(es): The primary encounter diagnosis was COVID-19. Diagnoses of Acute respiratory failure with hypoxia ([address] Fall, initial encounter, Closed head injury, initial encounter, Diverticulitis of colon, and Acute urinary tract infection were also pertinent to this visit. has a past medical history of Acute kidney failure ([facility] [address] Anemia, Aphasia, Arthritis, Atrial fibrillation (HCC), Bile duct calculus, Calculus of gallbladder and bile duct without cholecystitis without obstruction, Cerebral infarction ([address] CHF (congestive heart failure) ([address] Chronic kidney disease, stage 3, CVA (cerebral vascular accident) ([address] Fall, Gout, Gout, Gout, Hyperlipidemia, Hypertension, Hypokalemia, Hypokalemia, Hypomagnesemia, Hypomagnesemia, Hypothyroidism, IBS (irritable bowel syndrome), Irritable bowel syndrome, Ischemia, Left carotid stenosis, Metabolic encephalopathy, Mitral valve insufficiency, Nonrheumatic mitral (valve) insufficiency, Seizures ([address] TIA (transient ischemic attack), and UTI (urinary tract infection). has a past surgical history that includes Cataract removal with implant (Bilateral); Cholecystectomy; Carpal tunnel release (2012); ERCP (N/A, 2/11/2019); Total knee arthroplasty (Right); Total knee arthroplasty (Left); Upper gastrointestinal endoscopy (07/31/2019); and Upper gastrointestinal endoscopy (N/A, 7/31/2019).     Restrictions  Restrictions/Precautions  Restrictions/Precautions: General Precautions, Fall Risk, Isolation  Vision/Hearing  Vision: Impaired  Vision Exceptions: Wears glasses for reading  Hearing: Exceptions to [facility]  Hearing Exceptions: Hard of hearing/hearing concerns;Bilateral hearing aid     Subjective  General  Chart Reviewed: Yes  Patient assessed for rehabilitation services?: Yes  Family / Caregiver Present: No  Referring Practitioner: Rhona Gilbert CNP  Referral Date : 01/14/21  Diagnosis: COVID 19 virus infection  Pain Screening  Patient Currently in Pain: No          Orientation     Social/Functional History  Social/Functional History  Lives With: Alone  Type of Home: Facility  Home Layout: One level  Home Access: Level entry  Bathroom Shower/Tub: Shower chair with back, Walk-in shower  Bathroom Toilet: Handicap height Bathroom Equipment: Grab bars in shower, Grab bars around toilet  Home Equipment: Rolling walker  Receives Help From: Personal care attendant  ADL Assistance: Needs assistance  Homemaking Assistance: Needs assistance  Ambulation Assistance: Needs assistance  Transfer Assistance: Needs assistance  Additional Comments: Pt reports that she has been at [address] [place] [address] assisted living, and also receiving therapy there. She repors that she has somebody with her for functional mobility, and she gets help with bathing and dressing. Pt reports she uses a FWW and that she has been at [address] [place] [address] since March. Objective     Observation/Palpation  Observation: bruising along face due to fall    AROM RLE (degrees)  RLE AROM: WFL  AROM LLE (degrees)  LLE AROM : WFL  Strength RLE  Comment: grossly 3+/5  Strength LLE  Comment: grossly 3+/5        Bed mobility  Rolling to Left: Minimal assistance  Rolling to Right: Minimal assistance  Supine to Sit: Moderate assistance  Transfers  Sit to Stand: Moderate Assistance  Stand to sit: Moderate Assistance  Ambulation  Ambulation?: Yes  Ambulation 1  Surface: level tile  Device: Rolling Walker  Assistance:  Moderate assistance;2 Person assistance(modAx2)  Quality of Gait: flexed forwards posture, cues to shift weight posterioly  Distance: 5 steps to chair     Balance  Sitting - Static: Fair  Sitting - Dynamic: Fair;-  Standing - Static: +;Poor  Standing - Dynamic: Poor        Plan   Plan  Times per week: 7 times per week  Times per day: Twice a day(1 time per day on weekends)  Current Treatment Recommendations: Strengthening, Balance Training, Functional Mobility Training, Transfer Training, Gait Training, Endurance Training, Neuromuscular Re-education, Manual Therapy - Soft Tissue Mobilization, Positioning, Equipment Evaluation, Education, & procurement, Patient/Caregiver Education & Training, Safety Education & Training, Home Exercise Program  Safety Devices

## 2021-01-15 NOTE — DISCHARGE INSTR - COC
Continuity of Care Form    Patient Name: Elzbieta Richards   :  1930  MRN:  [id number]    Admit date:  2021  Discharge date:  2021    Code Status Order: DNR-CCA   Advance Directives:   [facility] Documentation       Date/Time Healthcare Directive Type of Healthcare Directive Copy in [address] Agent's Name Healthcare Agent's Phone Number    [phone]  Yes, patient has an advance directive for healthcare treatment  Living will  Yes, copy in chart  [facility]            Admitting Physician:  Marcela Bravo MD  PCP: Ling Cintron MD    Discharging Nurse: [address] [place] [address] Unit/Room#: 6425/6653-19  Discharging Unit Phone Number: [phone]    Emergency Contact:   Extended Emergency Contact Information  Primary Emergency Contact: [address] Phone: [phone]  Relation: Child  Hearing or visual needs: None  Other needs: None  Preferred language: English   needed?  No  Secondary Emergency Contact: [facility] Phone: [phone]  Relation: Child    Past Surgical History:  Past Surgical History:   Procedure Laterality Date    CARPAL TUNNEL RELEASE      Dr. Yadira Navas - right side    CATARACT REMOVAL WITH IMPLANT Bilateral     CHOLECYSTECTOMY      ERCP N/A 2019    Checo Pablo MD - ERCP, Sphincterotomy, balloon dilatation and stent placement with removal of multiple stones    TOTAL KNEE ARTHROPLASTY Right     TOTAL KNEE ARTHROPLASTY Left     UPPER GASTROINTESTINAL ENDOSCOPY  2019    with stent removal by Dr. John Cooley 2019    EGD STENT REMOVAL performed by Gloria Castillo MD at [address] [place] [address]       Immunization History:   Immunization History   Administered Date(s) Administered    Influenza Vaccine, unspecified formulation 2015    Influenza Virus Vaccine 10/01/2011, 10/01/2016, 10/01/2017 Last Vital Signs: BP (!) 164/94   Pulse 75   Temp 97.5 °F (36.4 °C) (Temporal)   Resp 16   Ht 5' (1.524 m)   Wt 165 lb 12.6 oz (75.2 kg)   SpO2 96%   BMI 32.38 kg/m²     Last documented pain score (0-10 scale): Pain Level: 0  Last Weight:   Wt Readings from Last 1 Encounters:   01/19/21 165 lb 12.6 oz (75.2 kg)     Mental Status:  oriented, alert and logical    IV Access:  - None    Nursing Mobility/ADLs:  Walking   Assisted  Transfer  Assisted  Bathing  Assisted  Dressing  Assisted  Toileting  Assisted  Feeding  Independent  Med Admin  Assisted  Med Delivery   crushed and prefers mixed with applesauce. Wound Care Documentation and Therapy:        Elimination:  Continence:   · Bowel: Yes - mostly. · Bladder: No  Urinary Catheter: None   Colostomy/Ileostomy/Ileal Conduit: No       Date of Last BM: 1/19/2021    Intake/Output Summary (Last 24 hours) at 1/19/2021 1055  Last data filed at 1/19/2021 0642  Gross per 24 hour   Intake 600 ml   Output 525 ml   Net 75 ml     I/O last 3 completed shifts: In: 1447.1 [P.O.:800; I.V.:647.1]  Out: 525 [Urine:525]    Safety Concerns:     History of Falls (last 30 days) and At Risk for Falls    Impairments/Disabilities:      Vision and Hearing    Nutrition Therapy:  Current Nutrition Therapy:   - Oral Diet:  Cardiac    Routes of Feeding: Oral  Liquids: No Restrictions  Daily Fluid Restriction: no  Last Modified Barium Swallow with Video (Video Swallowing Test): not done    Treatments at the Time of Hospital Discharge:   Respiratory Treatments: See MAR  Oxygen Therapy:  is not on home oxygen therapy.   Ventilator:    - No ventilator support    Rehab Therapies: Physical Therapy and Occupational Therapy  Weight Bearing Status/Restrictions: No weight bearing restirctions  Other Medical Equipment (for information only, NOT a DME order):  walker and bedside commode  Other Treatments: None    Patient's personal belongings (please select all that are sent with patient): Glasses, Jewelry, (ring), pants, shirt. RN SIGNATURE:  Electronically signed by Stephanie Payne RN on 1/19/21 at 4:28 PM EST    CASE MANAGEMENT/SOCIAL WORK SECTION    Inpatient Status Date: 1/14/21    Readmission Risk Assessment Score:  Readmission Risk              Risk of Unplanned Readmission:        16           Discharging to Facility/ Agency   · Name: [facility]  · [address] [place] [address]  · 21   · TFY:[phone]    Dialysis Facility (if applicable)   · Name:  · Address:  · Dialysis Schedule:  · Phone:  · Fax:    / signature: Electronically signed by BROOKE Morrow on 1/15/21 at 10:57 AM EST    PHYSICIAN SECTION    Prognosis: Fair    Condition at Discharge: Stable    Rehab Potential (if transferring to Rehab): Fair    Recommended Labs or Other Treatments After Discharge: Give the Zofran with the Flagyl. Continue Acapella. Physician Certification: I certify the above information and transfer of Elzbieta Richards  is necessary for the continuing treatment of the diagnosis listed and that she requires [facility] for greater 30 days.      Update Admission H&P: No change in H&P    PHYSICIAN SIGNATURE:  Electronically signed by KATIA Matthews CNP on 1/19/21 at 4:22 PM EST 1 pair

## 2021-01-15 NOTE — PROGRESS NOTES
Patient transferred to [facility] and back up to chair. Patient has been in prone position since last documentation.

## 2021-01-15 NOTE — PROGRESS NOTES
Patient repositioned to back/high fowlers to eat lunch. Patient had been prone since last documentation.

## 2021-01-15 NOTE — PROGRESS NOTES
Occupational Therapy   Occupational Therapy Initial Assessment  Date: 1/15/2021   Patient Name: Elzbieta Richards  MRN: [id number]     : 1930    Date of Service: 1/15/2021    Discharge Recommendations:  Continue to assess pending progress       Assessment   Performance deficits / Impairments: Decreased functional mobility ; Decreased safe awareness;Decreased balance;Decreased coordination;Decreased ADL status; Decreased endurance;Decreased strength;Decreased ROM  Assessment: Pt is a 80year old female admitted to hospital COVID-19. Pt presents with decreased ability to complete functional transfers and functional mobility, as well as ADL and functional tasks. Pt reports that she receives a significant amount of assistance at [place], but would like to be able to assist [facility] ADL tasks more, as she has become so weak since being sick. Pt would benefit from skilled occupational therapy services to address deficits and improve safety and participation with ADL and functional tasks. Treatment Diagnosis: generalized weakness  Prognosis: Good  Decision Making: Medium Complexity  OT Education: OT Role;Plan of Care  REQUIRES OT FOLLOW UP: Yes  Safety Devices  Safety Devices in place: Yes  Type of devices: Left in chair;Nurse notified;Call light within reach; Chair alarm in place           Patient Diagnosis(es): The primary encounter diagnosis was COVID-19. Diagnoses of Acute respiratory failure with hypoxia ([facility] [address] Fall, initial encounter, Closed head injury, initial encounter, Diverticulitis of colon, and Acute urinary tract infection were also pertinent to this visit. has a past medical history of Acute kidney failure ([facility] [address] Anemia, Aphasia, Arthritis, Atrial fibrillation (HCC), Bile duct calculus, Calculus of gallbladder and bile duct without cholecystitis without obstruction, Cerebral infarction ([facility] [address] CHF (congestive heart failure) ([facility] [address] Chronic kidney disease, stage 3, CVA (cerebral vascular accident) ([facility] [address] Fall, Gout, Gout, Gout, Hyperlipidemia, Hypertension, Hypokalemia, Hypokalemia, Hypomagnesemia, Hypomagnesemia, Hypothyroidism, IBS (irritable bowel syndrome), Irritable bowel syndrome, Ischemia, Left carotid stenosis, Metabolic encephalopathy, Mitral valve insufficiency, Nonrheumatic mitral (valve) insufficiency, Seizures ([address] TIA (transient ischemic attack), and UTI (urinary tract infection). has a past surgical history that includes Cataract removal with implant (Bilateral); Cholecystectomy; Carpal tunnel release (2012); ERCP (N/A, 2/11/2019); Total knee arthroplasty (Right); Total knee arthroplasty (Left); Upper gastrointestinal endoscopy (07/31/2019); and Upper gastrointestinal endoscopy (N/A, 7/31/2019). Treatment Diagnosis: generalized weakness      Restrictions  Restrictions/Precautions  Restrictions/Precautions: General Precautions, Fall Risk, Isolation    Subjective   General  Chart Reviewed: Yes  Patient assessed for rehabilitation services?: Yes  Family / Caregiver Present: No  Referring Practitioner: STEFANIA Saleh  Diagnosis: COVID-19  Subjective  Subjective: Pt reports 0/10 pain at time of evaluation with head shake. General Comment  Comments: Pt sitting upright in chair on therapist arrival, agreeable to therapy evaluation.   Patient Currently in Pain: No  Pain Assessment  Pain Assessment: 0-10  Pain Level: 0  Vital Signs  Temp: 98.1 °F (36.7 °C)  Temp Source: Temporal  Pulse: 93  Heart Rate Source: Apical  Resp: 18  BP: (!) 162/99  BP Location: Left upper arm  MAP (mmHg): 116  Patient Position: Up in chair Level of Consciousness: Alert (0)  MEWS Score: 1  Patient Currently in Pain: No  Oxygen Therapy  SpO2: 95 %  Pulse Oximeter Device Mode: Continuous  Pulse Oximeter Device Location: Right;Finger  O2 Device: None (Room air)  Social/Functional History  Social/Functional History  Lives With: Alone  Type of Home: Facility  Home Layout: One level  Home Access: Level entry  Bathroom Shower/Tub: Shower chair with back, Walk-in shower  Bathroom Toilet: Handicap height  Bathroom Equipment: Grab bars in shower, Grab bars around toilet  Home Equipment: Rolling walker  Receives Help From: Personal care attendant  ADL Assistance: Needs assistance  Homemaking Assistance: Needs assistance  Ambulation Assistance: Needs assistance  Transfer Assistance: Needs assistance  Additional Comments: Pt reports that she has been at [address] [place] [address] assisted living, and also receiving therapy there. She repors that she has somebody with her for functional mobility, and she gets help with bathing and dressing. Pt reports she uses a FWW and that she has been at [address] [place] [address] since March. Objective   Vision: Impaired  Vision Exceptions: Wears glasses for reading  Hearing: Exceptions to WFL(Pt states she doesn't wear her hearing aids because \"my ears are full of wax. \")  Hearing Exceptions: Hard of hearing/hearing concerns;Bilateral hearing aid          Functional Mobility  Functional Mobility Comments: Not assessed at evaluation. ADL  Feeding: Setup  Grooming: Moderate assistance;Maximum assistance  UE Bathing: Maximum assistance  LE Bathing: Maximum assistance  UE Dressing: Maximum assistance  LE Dressing: Maximum assistance  Toileting: Maximum assistance        Bed mobility  Comment: Pt in chair for OT evaluation. Bed mobility not assessed. PT report states modA. Transfers  Sit to stand:  Moderate assistance;2 Person assistance  Stand to sit: Moderate assistance;2 Person assistance  Transfer Comments: Not seen at eval, but per PT report, modAx2 LUE AROM : Exceptions  LUE General AROM: Pt with ~90 degrees shoulder flexion, which pt reports is normal and functional for her. Left Hand AROM: WFL  RUE AROM : Exceptions  RUE General AROM: Pt with ~90 degrees shoulder flexion, which pt reports is normal and functional for her. Right Hand AROM: WFL  LUE Strength  Gross LUE Strength: WFL  LUE Strength Comment: Grossly 3/5  RUE Strength  Gross RUE Strength: WFL  RUE Strength Comment: Grssly 3/5        Plan   Plan  Times per week: 7x/week  Times per day: Daily  Current Treatment Recommendations: Strengthening, Safety Education & Training, Patient/Caregiver Education & Training, Self-Care / ADL, Balance Training, Functional Mobility Training, Endurance Training      AM-PAC Score  AM-[facility] Inpatient Daily Activity Raw Score: 13 (01/15/21 0859)  AM-PAC Inpatient ADL T-Scale Score : 32.03 (01/15/21 0859)  ADL Inpatient CMS 0-100% Score: 63.03 (01/15/21 0859)  ADL Inpatient CMS G-Code Modifier : CL (01/15/21 0859)    Goals  Short term goals  Time Frame for Short term goals: 20 visits  Short term goal 1: Pt will engage in greater than 10 minutes BUE therex/theract to improve UB strength for increased independence and participation in functional transfers and ADl tasks. Short term goal 2: Pt will participate in ADL bathing/grooming tasks with modA to improve participation and independence with ADL tasks. Short term goal 3: Pt will complete functional mobility and transfers during ADL with LRAD and CGA. Short term goal 4: Pt will tolerate standing for greater than 1 minue without LOB or rest break to improve safety and endurance for ADL and functional mobilty.        Therapy Time   Individual Concurrent Group Co-treatment   Time In 0830         Time Out 0845         Minutes 15         Timed Code Treatment Minutes: [address], [place]

## 2021-01-16 PROBLEM — R09.02 HYPOXIA: Status: RESOLVED | Noted: 2021-01-01 | Resolved: 2021-01-01

## 2021-01-16 PROBLEM — K57.92 DIVERTICULITIS: Status: ACTIVE | Noted: 2021-01-01

## 2021-01-16 NOTE — FLOWSHEET NOTE
On BSC with assist. Transfers poorly. Up to bed with 2 assist. unsteady on feet. Personal alarm on for safety.  Continue to monitor

## 2021-01-16 NOTE — PROGRESS NOTES
Restrictions/Precautions  Restrictions/Precautions: General Precautions, Fall Risk, Isolation  Subjective   General  Chart Reviewed: Yes  Family / Caregiver Present: No  Referring Practitioner: Rosenda Shepard CNP  Subjective  Subjective: Pt denies pain today. Pt states she is tired, but willing to participate in therapy. Orientation  Orientation  Overall Orientation Status: Impaired  Orientation Level: Oriented to person;Disoriented to time;Disoriented to place  Cognition      Objective      Transfers  Comment: Pt declined transfers d/t nausea  Ambulation  Ambulation?: (Pt declined d/t nausea, nursing notified.)     Balance  Sitting - Static: Fair  Sitting - Dynamic: Fair;-  Exercises  Straight Leg Raise: x10  Quad Sets: x15  Heelslides: x15  Hip Flexion: x15  Hip Abduction: 2x15  Knee Long Arc Quad: x15  Ankle Pumps: 2x15  Comments: BLE ther ex completed in seated and reclined. Pt required frequent RBs d/t fatigue and nausea.  Hip add with pillow x15                                  Goals  Short term goals  Time Frame for Short term goals: 10 visits  Short term goal 1: Patient will complete bed mobility and transfers with CGAx1 in order to ease functional mobility  Short term goal 2: Patient will tolerate 35-45' ther-ex in order to increase endurance and ease ADLs  Short term goal 3: Patient will ambulate 75feet with CGAx1 and use of RW in order to ease functional mobility    Plan    Plan  Times per week: 7 times per week  Times per day: Twice a day  Current Treatment Recommendations: Strengthening, Balance Training, Functional Mobility Training, Transfer Training, Gait Training, Endurance Training, Neuromuscular Re-education, Manual Therapy - Soft Tissue Mobilization, Positioning, Equipment Evaluation, Education, & procurement, Patient/Caregiver Education & Training, Safety Education & Training, Home Exercise Program  Safety Devices Type of devices: Call light within reach, Chair alarm in place, Nurse notified, All fall risk precautions in place, Patient at risk for falls, Left in chair     Therapy Time   Individual Concurrent Group Co-treatment   Time In 0851         Time Out 0915         Minutes [address] [place] [address]

## 2021-01-16 NOTE — PROGRESS NOTES
[facility] SPECIALTY [facility]  OCCUPATIONAL THERAPY  No Visit Note    [] ICU    [x] Acute   Patient: Moe Schafer  Room: 3719/7547-02      Moe Schafer not seen on 1/16/2021 at 1:39 PM due to pt refusal stating \"I am too tired and I don't want to\". Pt educated on importance and pt continued to refuse.          Signature:  SHANA Cody

## 2021-01-16 NOTE — PROGRESS NOTES
Miriam Alonso M.D. Internal Medicine Progress Note 1/16/21    SUBJECTIVE:    Patient seen for f/u of COVID-19 virus infection. She complains of abdominal pain and nausea this morning. No vomiting. No diarrhea. Breathing is a little improved and she was weaned to room air and maintaining SpO2 of 93-94%    ROS:   Constitutional: negative  for fevers, and negative for chills. Respiratory: negative for shortness of breath, negative for cough, and negative for wheezing  Cardiovascular: negative for chest pain, and negative for palpitations  Gastrointestinal: positive for abdominal pain, positive for nausea,negative for vomiting, negative for diarrhea, and negative for constipation     All other systems were reviewed with the patient and are negative unless otherwise stated in HPI    OBJECTIVE:  Vitals:   Temp: 97.8 °F (36.6 °C)  BP: (!) 144/88  Resp: 18  Pulse: 77  SpO2: 94 % on room air    24HR INTAKE/OUTPUT:      Intake/Output Summary (Last 24 hours) at 1/16/2021 1425  Last data filed at 1/16/2021 1309  Gross per 24 hour   Intake 3111.14 ml   Output 700 ml   Net 2411.14 ml     -----------------------------------------------------------------  Exam:  GEN:    Awake, alert and oriented x3. EYES:  EOMI, pupils equal   NECK: Supple. No lymphadenopathy. No carotid bruit  CVS:    regular rate and rhythm, no audible murmur  PULM:  diminished but clear without wheezing, rales or rhonchi, no acute respiratory distress  ABD:    Bowels sounds normal.  Abdomen is soft. No distention. LLQ tenderness to palpation. EXT:   no edema bilaterally . No calf tenderness. NEURO: Moves all extremities.   Motor and sensory are grossly intact  SKIN:  significant facial and scalp ecchymosis with scalp hematoma  -----------------------------------------------------------------  Diagnostic Data:    · All available data reviewed  Lab Results   Component Value Date    WBC 5.9 01/16/2021    HGB 12.4 01/16/2021 .3 01/16/2021     01/16/2021      Lab Results   Component Value Date    GLUCOSE 132 (H) 01/16/2021    BUN 27 (H) 01/16/2021    CREATININE 0.98 (H) 01/16/2021     01/16/2021    K 3.6 (L) 01/16/2021    CALCIUM 8.7 01/16/2021     01/16/2021    CO2 25 01/16/2021       CT ABDOMEN PELVIS W IV CONTRAST Additional Contrast? None   Final Result   1. Significant sigmoid diverticulosis with segment of wall thickening   without serosal haziness. This may be related to low-grade diverticulitis. 2.  Status post cholecystectomy. Mild intra hepatic central biliary ductal   dilatation likely secondary to cholecystectomy. 3.  No bowel obstruction, appendicitis or urinary obstruction. 4.  Patient has findings most compatible with airspace disease at the lung   bases left greater than right. XR PELVIS (1-2 VIEWS)   Final Result   No acute osseous abnormality in the pelvis. Osteopenia degrades evaluation   for subtle nondisplaced fracture. Consider further evaluation with MRI as   clinically warranted. Moderate bilateral hip joint osteoarthritis. XR CHEST PORTABLE   Final Result   No x-ray evidence of acute trauma to the chest or acute cardiopulmonary   disease. CT Cervical Spine WO Contrast   Final Result   No acute bony abnormality. Advanced degenerative changes with unchanged grade 1 anterolisthesis C4 on C5   and C7 on T1.         CT Head WO Contrast   Final Result   No acute intracranial abnormality. ASSESSMENT / PLAN:  · COVID-19 virus infection with hypoxia but no respiratory distress  ? IV remdesivir  ? Dexamethasone  ? Acapella  ? PT / OT  ? Supplemental O2  · Possible \"low grade\" diverticulitis  ? Start cipro and flagyl  ? May be the cause of her nausea / abd pain  · Chronic atrial fibrillation  ? Continue Metoprolol for rate control  ? Continue Eliquis for stroke prophylaxis  · Chronic diastolic CHF  ?  Continue Losartan, Metoprolol · Chronic kidney disease stage 3  ? Monitor renal function / lytes  · UTI  ?  Rocephin IV  · Nutrition status: at risk for malnutrition  · DVT prophylaxis: Eliquis   · High risk medications:  Remdesivir    · Disposition:  Discharge plan is  back to Jose C Shrestha M.D.  1/16/2021  2:25 PM

## 2021-01-16 NOTE — FLOWSHEET NOTE
Vitals checked and assessment completed. Returned to bed with max of 2 assist. Transfers poorly. Positioned to side. Resting with eyes closed.

## 2021-01-17 NOTE — FLOWSHEET NOTE
Up in chair at bedside. Vitals checked and assessment completed. Pulse ox 94 % in room air. Denies SOB. Continues to deny abdominal pain or nausea. Personal alarm on for safety.  Continue to monitor

## 2021-01-17 NOTE — PROGRESS NOTES
Occupational Therapy  Facility/Department: [facility] AT THE [facility] MED SURG  Daily Treatment Note  NAME: Ebonie Manley  : 1930  MRN: [id number]    Date of Service: 2021    Discharge Recommendations:  Continue to assess pending progress       Assessment      Activity Tolerance  Activity Tolerance: Patient limited by fatigue  Safety Devices  Safety Devices in place: Yes  Type of devices: Bed alarm in place;Call light within reach; Left in bed         Patient Diagnosis(es): The primary encounter diagnosis was COVID-19. Diagnoses of Acute respiratory failure with hypoxia ([address] Fall, initial encounter, Closed head injury, initial encounter, Diverticulitis of colon, and Acute urinary tract infection were also pertinent to this visit. has a past medical history of Acute kidney failure ([address] Anemia, Aphasia, Arthritis, Atrial fibrillation (HCC), Bile duct calculus, Calculus of gallbladder and bile duct without cholecystitis without obstruction, Cerebral infarction ([address] CHF (congestive heart failure) ([address] Chronic kidney disease, stage 3, CVA (cerebral vascular accident) ([address] Fall, Gout, Gout, Gout, Hyperlipidemia, Hypertension, Hypokalemia, Hypokalemia, Hypomagnesemia, Hypomagnesemia, Hypothyroidism, IBS (irritable bowel syndrome), Irritable bowel syndrome, Ischemia, Left carotid stenosis, Metabolic encephalopathy, Mitral valve insufficiency, Nonrheumatic mitral (valve) insufficiency, Seizures ([address] TIA (transient ischemic attack), and UTI (urinary tract infection). has a past surgical history that includes Cataract removal with implant (Bilateral); Cholecystectomy; Carpal tunnel release (); ERCP (N/A, 2019); Total knee arthroplasty (Right); Total knee arthroplasty (Left); Upper gastrointestinal endoscopy (2019); and Upper gastrointestinal endoscopy (N/A, 2019).     Restrictions  Restrictions/Precautions  Restrictions/Precautions: General Precautions, Fall Risk, Isolation  Subjective   General Chart Reviewed: Yes  Patient assessed for rehabilitation services?: Yes  Response to previous treatment: Patient with no complaints from previous session  Family / Caregiver Present: No  Referring Practitioner: STEFANIA Landeros  Diagnosis: COVID-19  Subjective  Subjective: Pt had no complaints of pain this date. General Comment  Comments: Pt lying in bed upon arrival. Pt agreed to participate in therapy session this date. Orientation     Objective                                                                Type of ROM/Therapeutic Exercise  Type of ROM/Therapeutic Exercise: AROM  Comment: Pt tolerated BUE AROM x 7 planes x 10 reps x 1 set to increase UE strength and endurance in order to increase Ind with ADL tasks. Pt required RBs as needed secondary to fatigue. Plan   Plan  Times per week: 7x/week  Times per day: Daily  Current Treatment Recommendations: Strengthening, Safety Education & Training, Patient/Caregiver Education & Training, Self-Care / ADL, Balance Training, Functional Mobility Training, Endurance Training  G-Code     OutComes Score                                                  AM-PAC Score             Goals  Short term goals  Time Frame for Short term goals: 20 visits  Short term goal 1: Pt will engage in greater than 10 minutes BUE therex/theract to improve UB strength for increased independence and participation in functional transfers and ADl tasks. Short term goal 2: Pt will participate in ADL bathing/grooming tasks with modA to improve participation and independence with ADL tasks. Short term goal 3: Pt will complete functional mobility and transfers during ADL with LRAD and CGA. Short term goal 4: Pt will tolerate standing for greater than 1 minue without LOB or rest break to improve safety and endurance for ADL and functional mobilty.        Therapy Time   Individual Concurrent Group Co-treatment   Time In 0919         Time Out 0036

## 2021-01-17 NOTE — PROGRESS NOTES
Physical Therapy  Facility/Department: [facility] AT THE [facility] MED SURG  Daily Treatment Note  NAME: Prince Barkley  : 1930  MRN: [id number]    Date of Service: 2021    Discharge Recommendations:  Continue to assess pending progress, Subacute/Skilled Nursing Facility, ECF with PT        Assessment   Treatment Diagnosis: difficulty walking  Prognosis: Good  PT Education: Goals;PT Role;Plan of Care;General Safety;Transfer Training  Patient Education: Educated pt on above with fair understanding noted. REQUIRES PT FOLLOW UP: Yes  Activity Tolerance  Activity Tolerance: Patient Tolerated treatment well;Patient limited by fatigue     Patient Diagnosis(es): The primary encounter diagnosis was COVID-19. Diagnoses of Acute respiratory failure with hypoxia ([address] Fall, initial encounter, Closed head injury, initial encounter, Diverticulitis of colon, and Acute urinary tract infection were also pertinent to this visit. has a past medical history of Acute kidney failure ([address] Anemia, Aphasia, Arthritis, Atrial fibrillation (HCC), Bile duct calculus, Calculus of gallbladder and bile duct without cholecystitis without obstruction, Cerebral infarction ([address] CHF (congestive heart failure) ([address] Chronic kidney disease, stage 3, CVA (cerebral vascular accident) ([address] Fall, Gout, Gout, Gout, Hyperlipidemia, Hypertension, Hypokalemia, Hypokalemia, Hypomagnesemia, Hypomagnesemia, Hypothyroidism, IBS (irritable bowel syndrome), Irritable bowel syndrome, Ischemia, Left carotid stenosis, Metabolic encephalopathy, Mitral valve insufficiency, Nonrheumatic mitral (valve) insufficiency, Seizures ([address] TIA (transient ischemic attack), and UTI (urinary tract infection). has a past surgical history that includes Cataract removal with implant (Bilateral); Cholecystectomy; Carpal tunnel release (2012); ERCP (N/A, 2/11/2019); Total knee arthroplasty (Right); Total knee arthroplasty (Left); Upper gastrointestinal endoscopy (07/31/2019); and Upper gastrointestinal endoscopy (N/A, 7/31/2019). Restrictions  Restrictions/Precautions  Restrictions/Precautions: General Precautions, Fall Risk, Isolation  Subjective   General  Chart Reviewed: Yes  Response To Previous Treatment: Patient with no complaints from previous session. Family / Caregiver Present: No  Referring Practitioner: Judy Aden CNP  Subjective  Subjective: Pt with no pain but stated that she is feeling tired. Orientation  Orientation  Overall Orientation Status: Impaired  Orientation Level: Oriented to person;Disoriented to time;Disoriented to place; Disoriented to situation  Cognition      Objective      Transfers  Sit to Stand: Maximum Assistance  Stand to sit: Maximum Assistance  Comment: Max verbal cues for safety with fair understanding noted. Ambulation  Ambulation?: No(Attempted x 2 but pt unable to advance LEs d/t fatigue/weakness.)     Balance  Posture: Fair  Sitting - Static: Fair  Sitting - Dynamic: Fair;-  Standing - Static: Poor  Standing - Dynamic: Poor  Exercises  Straight Leg Raise: 15x  Quad Sets: 15x  Heelslides: 15x  Hip Abduction: 15x  Ankle Pumps: 15x  Comments: Above exercises completed in recliner with AAROM as needed.       G-Code     OutComes Score    AM-PAC Score    Goals  Short term goals  Time Frame for Short term goals: 10 visits  Short term goal 1: Patient will complete bed mobility and transfers with CGAx1 in order to ease functional mobility  Short term goal 2: Patient will tolerate 35-45' ther-ex in order to increase endurance and ease ADLs  Short term goal 3: Patient will ambulate 75feet with CGAx1 and use of RW in order to ease functional mobility    Plan    Plan Times per week: 7 times per week  Times per day: Twice a day  Current Treatment Recommendations: Strengthening, Balance Training, Functional Mobility Training, Transfer Training, Gait Training, Endurance Training, Neuromuscular Re-education, Manual Therapy - Soft Tissue Mobilization, Positioning, Equipment Evaluation, Education, & procurement, Patient/Caregiver Education & Training, Safety Education & Training, Home Exercise Program  Safety Devices  Type of devices: Call light within reach, Chair alarm in place, Nurse notified, All fall risk precautions in place, Patient at risk for falls, Left in chair     Therapy Time   Individual Concurrent Group Co-treatment   Time In 1534         Time Out 1559         Minutes [address] [place] [address]

## 2021-01-17 NOTE — PROGRESS NOTES
BUN 27 (H) 01/17/2021    CREATININE 0.82 01/17/2021     01/17/2021    K 4.0 01/17/2021    CALCIUM 8.3 (L) 01/17/2021     01/17/2021    CO2 22 01/17/2021       CT ABDOMEN PELVIS W IV CONTRAST Additional Contrast? None   Final Result   1. Significant sigmoid diverticulosis with segment of wall thickening   without serosal haziness. This may be related to low-grade diverticulitis. 2.  Status post cholecystectomy. Mild intra hepatic central biliary ductal   dilatation likely secondary to cholecystectomy. 3.  No bowel obstruction, appendicitis or urinary obstruction. 4.  Patient has findings most compatible with airspace disease at the lung   bases left greater than right. XR PELVIS (1-2 VIEWS)   Final Result   No acute osseous abnormality in the pelvis. Osteopenia degrades evaluation   for subtle nondisplaced fracture. Consider further evaluation with MRI as   clinically warranted. Moderate bilateral hip joint osteoarthritis. XR CHEST PORTABLE   Final Result   No x-ray evidence of acute trauma to the chest or acute cardiopulmonary   disease. CT Cervical Spine WO Contrast   Final Result   No acute bony abnormality. Advanced degenerative changes with unchanged grade 1 anterolisthesis C4 on C5   and C7 on T1.         CT Head WO Contrast   Final Result   No acute intracranial abnormality. ASSESSMENT / PLAN:  · COVID-19 virus infection with hypoxia but no respiratory distress  ? IV remdesivir  ? Dexamethasone  ? Acapella  ? PT / OT  ? Supplemental O2  · Possible \"low grade\" diverticulitis  ? Start cipro and flagyl  ? May be the cause of her nausea / abd pain  · Chronic atrial fibrillation  ? Continue Metoprolol for rate control  ? Continue Eliquis for stroke prophylaxis  · Chronic diastolic CHF  ? Continue Losartan, Metoprolol  · Chronic kidney disease stage 3  ? Monitor renal function / lytes  · UTI  ?  Rocephin IV · Nutrition status: at risk for malnutrition  · DVT prophylaxis: Eliquis   · High risk medications:  Remdesivir    · Disposition:  Discharge plan is  back to Renay Blum M.D.  1/17/2021  3:11 PM

## 2021-01-17 NOTE — FLOWSHEET NOTE
Awake , resting in bed. Vitals checked and assessment completed. Alert and oriented x 3. Denies pain  or nausea this morning. Bed alarm on for safety, call light within reach.  Continue to monitor

## 2021-01-18 NOTE — PROGRESS NOTES
Physical Therapy  Facility[facility]Department[facility] MED SURG  Daily Treatment Note  NAME: Adia Ledezma  : 1930  MRN: [id number]    Date of Service: 2021    Discharge Recommendations:  Continue to assess pending progress, Subacute/Skilled Nursing Facility, ECF with PT        Patient Diagnosis(es): The primary encounter diagnosis was COVID-19. Diagnoses of Acute respiratory failure with hypoxia ([address] Fall, initial encounter, Closed head injury, initial encounter, Diverticulitis of colon, and Acute urinary tract infection were also pertinent to this visit. has a past medical history of Acute kidney failure ([address] Anemia, Aphasia, Arthritis, Atrial fibrillation (HCC), Bile duct calculus, Calculus of gallbladder and bile duct without cholecystitis without obstruction, Cerebral infarction ([address] CHF (congestive heart failure) ([address] Chronic kidney disease, stage 3, CVA (cerebral vascular accident) ([address] Fall, Gout, Gout, Gout, Hyperlipidemia, Hypertension, Hypokalemia, Hypokalemia, Hypomagnesemia, Hypomagnesemia, Hypothyroidism, IBS (irritable bowel syndrome), Irritable bowel syndrome, Ischemia, Left carotid stenosis, Metabolic encephalopathy, Mitral valve insufficiency, Nonrheumatic mitral (valve) insufficiency, Seizures ([address] TIA (transient ischemic attack), and UTI (urinary tract infection). has a past surgical history that includes Cataract removal with implant (Bilateral); Cholecystectomy; Carpal tunnel release (); ERCP (N/A, 2019); Total knee arthroplasty (Right); Total knee arthroplasty (Left); Upper gastrointestinal endoscopy (2019); and Upper gastrointestinal endoscopy (N/A, 2019). Restrictions  Restrictions/Precautions  Restrictions/Precautions: General Precautions, Fall Risk, Isolation  Subjective   General  Chart Reviewed: Yes  Response To Previous Treatment: Patient with no complaints from previous session.   Family / Caregiver Present: No Referring Practitioner: Sherif Joya CNP  Subjective  Subjective: Pt with no pain but stated that she is feeling tired, overall pt reports she is feeling better. Orientation  Orientation  Overall Orientation Status: Within Functional Limits  Cognition      Objective   Bed mobility  Rolling to Left: Moderate assistance  Rolling to Right: Moderate assistance  Supine to Sit: Moderate assistance(x2)  Sit to Supine: Moderate assistance  Comment: Cues for technique with fair follow through noted. Pt declined lying sidelying and/or semi prone. Transfers  Sit to Stand: Minimal Assistance(x2)  Stand Pivot Transfers: Minimal Assistance(x2)  Comment: 2 person assist to ensure safety d/t level of fatigue. cues for proper hand placement. Ambulation  Ambulation?: Yes  Ambulation 1  Surface: level tile  Device: Rolling Walker  Assistance: Minimal assistance(x2)  Quality of Gait: forward flexed posture, reduced step height and length. Distance: 10ft  Comments: Pt with knee buckling noted therefore requiring Torsten to correct. Continue to progress as tolerated. Balance  Standing - Static: -;Fair  Standing - Dynamic: Poor;+  Exercises  Straight Leg Raise: 15x  Quad Sets: 15x  Heelslides: 15x  Gluteal Sets: 15x  Hip Flexion: 15x  Hip Abduction: 15x  Knee Long Arc Quad: 20x  Knee Short Arc Quad: 15x  Knee Active Range of Motion: 15x  Ankle Pumps: 20x  Comments: B LE therex completed supine + isometrics. AAROM SLR and hip abd to achieve full available ROM. Pt requires moderate encouragement to complete therex.      Goals  Short term goals  Time Frame for Short term goals: 10 visits  Short term goal 1: Patient will complete bed mobility and transfers with CGAx1 in order to ease functional mobility  Short term goal 2: Patient will tolerate 35-45' ther-ex in order to increase endurance and ease ADLs  Short term goal 3: Patient will ambulate 75feet with CGAx1 and use of RW in order to ease functional mobility Plan    Plan  Times per week: 7 times per week  Times per day: Twice a day  Current Treatment Recommendations: Strengthening, Balance Training, Functional Mobility Training, Transfer Training, Gait Training, Endurance Training, Neuromuscular Re-education, Manual Therapy - Soft Tissue Mobilization, Positioning, Equipment Evaluation, Education, & procurement, Patient/Caregiver Education & Training, Safety Education & Training, Home Exercise Program  Safety Devices  Type of devices:  All fall risk precautions in place, Bed alarm in place, Call light within reach, Gait belt, Left in bed          Mary Moss, PTA [id number]

## 2021-01-18 NOTE — PROGRESS NOTES
Progress Note    SUBJECTIVE: Fall    OBJECTIVE: She is sitting up in the bed alert and oriented with severe ecchymosis to her face. She denies headache or dizziness. She complains of nausea but no vomiting at this time. She is on Eliquis no further signs of bleeding. She denies an appetite at this time. Currently she complains of nausea without emesis. Per the notes she is having difficulty with ambulation with severe weakness. Vitals:   Vitals:    01/18/21 1323   BP: 134/75   Pulse: 69   Resp: 18   Temp: 97.3 °F (36.3 °C)   SpO2: 95%     Weight: 167 lb 15.9 oz (76.2 kg)   Height: 5' (152.4 cm)   -----------------------------------------------------------------  Exam:    CONSTITUTIONAL:  awake, alert, cooperative, no apparent distress, and appears stated age  EYES:  Lids and lashes normal, pupils equal, round and reactive to light, extra ocular muscles intact, sclera clear, conjunctiva normal  ENT:  Normocephalic, without obvious abnormality, atraumatic, sinuses nontender on palpation, external ears without lesions, oral pharynx with moist mucus membranes, tonsils without erythema or exudates, gums normal and good dentition. NECK:  Supple, symmetrical, trachea midline, no adenopathy, thyroid symmetric, not enlarged and no tenderness, skin normal  HEMATOLOGIC/LYMPHATICS:  no cervical lymphadenopathy and no supraclavicular lymphadenopathy  LUNGS:  no increased work of breathing, good air exchange and crackles right base and left base. CARDIOVASCULAR:  irregularly irregular rhythm, normal S1 and S2 and puffy BLE  ABDOMEN:  No scars, normal bowel sounds, soft, non-distended, non-tender, no masses palpated, no hepatosplenomegally  MUSCULOSKELETAL:  There is no redness, warmth, or swelling of the joints. Full range of motion noted. Motor strength is 5 out of 5 all extremities bilaterally.   Tone is normal.  NEUROLOGIC:  Mental Status Exam:  Level of Alertness:   awake  Orientation:   person, place, time Memory:   normal  SKIN:  Bruising periorbital and forehead  EXT:     Puffy, non-pitting edema to BLE  -----------------------------------------------------------------  Diagnostic Data: Reviewed    ASSESSMENT:   Principal Problem:    COVID-19 virus infection  Active Problems:    Chronic atrial fibrillation    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 (HCC)    Acute cystitis without hematuria    Chronic kidney disease, stage 3    Diverticulitis  Resolved Problems:    Hypoxia        PLAN:  · COVID-19 virus with hypoxia  · Continue IV remdesivir  · Dexamethasone  · Supplemental oxygen to maintain SPO2 greater than 92% --no oxygen at this time  · Acapella  · Continuous pulse ox via telemetry  · PT/OT  · Chronic atrial fibrillation  · Telemetry  · Continue Lopressor, Eliquis, aspirin, Lipitor  · Chronic diastolic CHF  · I/O  · Stop IV fluids to 50 an hour  · Chronic CKD stage III  · Monitor kidney function daily  · Hypertension  · Increase losartan to 50 mg.   Blood pressure today 164/104  · High risk medications: Remdesivir  · Discharge plan: Pending      KATIA Sagastume, NP-C

## 2021-01-18 NOTE — PROGRESS NOTES
Physical Therapy  Facility/Department: Critical access hospital AT THE [facility] MED SURG  Daily Treatment Note  NAME: Radha Hassan  : 1930  MRN: [id number]    Date of Service: 2021    Discharge Recommendations:  Continue to assess pending progress, Subacute/Skilled Nursing Facility, ECF with PT        Patient Diagnosis(es): The primary encounter diagnosis was COVID-19. Diagnoses of Acute respiratory failure with hypoxia ([address] Fall, initial encounter, Closed head injury, initial encounter, Diverticulitis of colon, and Acute urinary tract infection were also pertinent to this visit. has a past medical history of Acute kidney failure ([address] Anemia, Aphasia, Arthritis, Atrial fibrillation (HCC), Bile duct calculus, Calculus of gallbladder and bile duct without cholecystitis without obstruction, Cerebral infarction ([address] CHF (congestive heart failure) ([address] Chronic kidney disease, stage 3, CVA (cerebral vascular accident) ([address] Fall, Gout, Gout, Gout, Hyperlipidemia, Hypertension, Hypokalemia, Hypokalemia, Hypomagnesemia, Hypomagnesemia, Hypothyroidism, IBS (irritable bowel syndrome), Irritable bowel syndrome, Ischemia, Left carotid stenosis, Metabolic encephalopathy, Mitral valve insufficiency, Nonrheumatic mitral (valve) insufficiency, Seizures ([address] TIA (transient ischemic attack), and UTI (urinary tract infection). has a past surgical history that includes Cataract removal with implant (Bilateral); Cholecystectomy; Carpal tunnel release (); ERCP (N/A, 2019); Total knee arthroplasty (Right); Total knee arthroplasty (Left); Upper gastrointestinal endoscopy (2019); and Upper gastrointestinal endoscopy (N/A, 2019). Restrictions  Restrictions/Precautions  Restrictions/Precautions: General Precautions, Fall Risk, Isolation  Subjective   General  Chart Reviewed: Yes  Response To Previous Treatment: Patient with no complaints from previous session.   Family / Caregiver Present: No Current Treatment Recommendations: Strengthening, Balance Training, Functional Mobility Training, Transfer Training, Gait Training, Endurance Training, Neuromuscular Re-education, Manual Therapy - Soft Tissue Mobilization, Positioning, Equipment Evaluation, Education, & procurement, Patient/Caregiver Education & Training, Safety Education & Training, Home Exercise Program  Safety Devices  Type of devices:  All fall risk precautions in place, Call light within reach, Gait belt, Nurse notified, Left in chair, Chair alarm in place     Therapy Time   Individual Concurrent Group Co-treatment   Time In 1009         Time Out 1042         Minutes 200 Meredith Quintero, Huan

## 2021-01-18 NOTE — PROGRESS NOTES
Occupational Therapy  Facility/Department: Critical access hospital AT THE [facility] MED SURG  Daily Treatment Note  NAME: Shaun Shaikh  : 1930  MRN: [id number]    Date of Service: 2021    Discharge Recommendations:  Continue to assess pending progress       Assessment      OT Education: Transfer Training  Patient Education: Educated on safe transfer techniques with limited carryover  Barriers to Learning: confusion  Safety Devices  Safety Devices in place: Yes  Type of devices: Bed alarm in place;Call light within reach; Left in bed         Patient Diagnosis(es): The primary encounter diagnosis was COVID-19. Diagnoses of Acute respiratory failure with hypoxia ([address] Fall, initial encounter, Closed head injury, initial encounter, Diverticulitis of colon, and Acute urinary tract infection were also pertinent to this visit. has a past medical history of Acute kidney failure ([address] Anemia, Aphasia, Arthritis, Atrial fibrillation (HCC), Bile duct calculus, Calculus of gallbladder and bile duct without cholecystitis without obstruction, Cerebral infarction ([address] CHF (congestive heart failure) ([address] Chronic kidney disease, stage 3, CVA (cerebral vascular accident) ([address] Fall, Gout, Gout, Gout, Hyperlipidemia, Hypertension, Hypokalemia, Hypokalemia, Hypomagnesemia, Hypomagnesemia, Hypothyroidism, IBS (irritable bowel syndrome), Irritable bowel syndrome, Ischemia, Left carotid stenosis, Metabolic encephalopathy, Mitral valve insufficiency, Nonrheumatic mitral (valve) insufficiency, Seizures ([address] TIA (transient ischemic attack), and UTI (urinary tract infection). has a past surgical history that includes Cataract removal with implant (Bilateral); Cholecystectomy; Carpal tunnel release (); ERCP (N/A, 2019); Total knee arthroplasty (Right); Total knee arthroplasty (Left); Upper gastrointestinal endoscopy (2019); and Upper gastrointestinal endoscopy (N/A, 2019).     Restrictions  Restrictions/Precautions Restrictions/Precautions: General Precautions, Fall Risk, Isolation  Subjective   General  Chart Reviewed: Yes  Patient assessed for rehabilitation services?: Yes  Response to previous treatment: Patient with no complaints from previous session  Family / Caregiver Present: No  Referring Practitioner: STEFANIA Dean  Diagnosis: COVID-19  Subjective  Subjective: Pt denies pain this date. General Comment  Comments: Pt seated in bedside chair upon arrival.  Agreeable to OT participation. Pleasant and compliant throughout. Orientation     Objective                   Transfers  Sit Pivot Transfers: Minimal assistance;2 Person assistance  Sit to stand: Minimal assistance;2 Person assistance  Stand to sit: Minimal assistance;2 Person assistance  Transfer Comments: Pt completed stand step transfer from bedside chair to EOB with min (A) x 2 with max verbal prompts for sequencing and technique. Type of ROM/Therapeutic Exercise  Type of ROM/Therapeutic Exercise: Free weights  Comment: Pt completed B UE ther ex with 1# dumbell, 12 reps x 2 sets x 5 planes with frequent rest breaks required. Plan   Plan  Times per week: 7x/week  Times per day: Daily  Current Treatment Recommendations: Strengthening, Safety Education & Training, Patient/Caregiver Education & Training, Self-Care / ADL, Balance Training, Functional Mobility Training, Endurance Training  G-Code     OutComes Score                                                  AM-PAC Score             Goals  Short term goals  Time Frame for Short term goals: 20 visits  Short term goal 1: Pt will engage in greater than 10 minutes BUE therex/theract to improve UB strength for increased independence and participation in functional transfers and ADl tasks. Short term goal 2: Pt will participate in ADL bathing/grooming tasks with modA to improve participation and independence with ADL tasks.

## 2021-01-19 NOTE — PROGRESS NOTES
Occupational Therapy  Facility/Department: [facility] AT THE [facility] MED SURG  Daily Treatment Note  NAME: Mary Garcia  : 1930  MRN: [id number]    Date of Service: 2021    Discharge Recommendations:  Continue to assess pending progress       Assessment      Activity Tolerance  Activity Tolerance: Patient limited by fatigue  Safety Devices  Safety Devices in place: Yes  Type of devices: Call light within reach; Left in chair;Chair alarm in place         Patient Diagnosis(es): The primary encounter diagnosis was COVID-19. Diagnoses of Acute respiratory failure with hypoxia ([address] Fall, initial encounter, Closed head injury, initial encounter, Diverticulitis of colon, and Acute urinary tract infection were also pertinent to this visit. has a past medical history of Acute kidney failure ([address] Anemia, Aphasia, Arthritis, Atrial fibrillation (HCC), Bile duct calculus, Calculus of gallbladder and bile duct without cholecystitis without obstruction, Cerebral infarction ([address] CHF (congestive heart failure) ([address] Chronic kidney disease, stage 3, CVA (cerebral vascular accident) ([address] Fall, Gout, Gout, Gout, Hyperlipidemia, Hypertension, Hypokalemia, Hypokalemia, Hypomagnesemia, Hypomagnesemia, Hypothyroidism, IBS (irritable bowel syndrome), Irritable bowel syndrome, Ischemia, Left carotid stenosis, Metabolic encephalopathy, Mitral valve insufficiency, Nonrheumatic mitral (valve) insufficiency, Seizures ([address] TIA (transient ischemic attack), and UTI (urinary tract infection). has a past surgical history that includes Cataract removal with implant (Bilateral); Cholecystectomy; Carpal tunnel release (); ERCP (N/A, 2019); Total knee arthroplasty (Right); Total knee arthroplasty (Left); Upper gastrointestinal endoscopy (2019); and Upper gastrointestinal endoscopy (N/A, 2019).     Restrictions  Restrictions/Precautions  Restrictions/Precautions: General Precautions, Fall Risk, Isolation  Subjective General  Chart Reviewed: Yes  Patient assessed for rehabilitation services?: Yes  Response to previous treatment: Patient with no complaints from previous session  Family / Caregiver Present: No  Referring Practitioner: STEFANIA Trotter  Diagnosis: COVID-19  Subjective  Subjective: Pt denies pain this date. General Comment  Comments: Pt seated in bedside chair upon arrival.  Pt agreed to participate in therapy session this date. Orientation     Objective                                                                Type of ROM/Therapeutic Exercise  Type of ROM/Therapeutic Exercise: AROM  Comment: Pt tolerated BUE AROM x 7 planes x 15 reps x 1 set to increase UE strength in order to increase Ind with ADL tasks. pt required RBs as needed secondary to fatigue. Plan   Plan  Times per week: 7x/week  Times per day: Daily  Current Treatment Recommendations: Strengthening, Safety Education & Training, Patient/Caregiver Education & Training, Self-Care / ADL, Balance Training, Functional Mobility Training, Endurance Training  G-Code     OutComes Score                                                  AM-PAC Score             Goals  Short term goals  Time Frame for Short term goals: 20 visits  Short term goal 1: Pt will engage in greater than 10 minutes BUE therex/theract to improve UB strength for increased independence and participation in functional transfers and ADl tasks. Short term goal 2: Pt will participate in ADL bathing/grooming tasks with modA to improve participation and independence with ADL tasks. Short term goal 3: Pt will complete functional mobility and transfers during ADL with LRAD and CGA. Short term goal 4: Pt will tolerate standing for greater than 1 minue without LOB or rest break to improve safety and endurance for ADL and functional mobilty.        Therapy Time   Individual Concurrent Group Co-treatment   Time In 1617         Time Out 1210         Minutes 23 MEME Gay/BRADLEY

## 2021-01-19 NOTE — PROGRESS NOTES
Patient states at this time that the zofran did not help her nausea. Will alert [facility] CNP.

## 2021-01-19 NOTE — PROGRESS NOTES
Patient leaving floor at this time via [id number] [facility]. Belongings sent with patient.

## 2021-01-19 NOTE — PROGRESS NOTES
Kinza Oliva M.D. Internal Medicine Progress Note 1/17/21    SUBJECTIVE:    Patient seen for f/u of COVID-19 virus infection. She is weaned off O2. Afebrile. Appetite improving. ROS:   Constitutional: negative  for fevers, and negative for chills. Respiratory: negative for shortness of breath, positive for cough, and negative for wheezing  Cardiovascular: negative for chest pain, and negative for palpitations  Gastrointestinal: negative for abdominal pain, negative for nausea,negative for vomiting, negative for diarrhea, and negative for constipation     All other systems were reviewed with the patient and are negative unless otherwise stated in HPI    OBJECTIVE:  Vitals:   Temp: 97.5 °F (36.4 °C)  BP: (!) 164/94  Resp: 16  Pulse: 75  SpO2: 96 % on room air    24HR INTAKE/OUTPUT:      Intake/Output Summary (Last 24 hours) at 1/19/2021 0809  Last data filed at 1/19/2021 2360  Gross per 24 hour   Intake 1447.12 ml   Output 525 ml   Net 922.12 ml     -----------------------------------------------------------------  Exam:  GEN:    Awake, alert and oriented x3. EYES:  EOMI, pupils equal   NECK: Supple. No lymphadenopathy. No carotid bruit  CVS:    regular rate and rhythm, no audible murmur  PULM:  diminished but clear without wheezing, rales or rhonchi, no acute respiratory distress  ABD:    Bowels sounds normal.  Abdomen is soft. No distention. no tenderness to palpation. EXT:   no edema bilaterally . No calf tenderness. NEURO: Moves all extremities.   Motor and sensory are grossly intact  SKIN:  facial ecchymosis / scalp hematoma unchanged  -----------------------------------------------------------------  Diagnostic Data:    · All available data reviewed  Lab Results   Component Value Date    WBC 8.6 01/19/2021    HGB 11.6 (L) 01/19/2021    MCV 97.8 01/19/2021     01/19/2021      Lab Results   Component Value Date    GLUCOSE 171 (H) 01/19/2021    BUN 22 01/19/2021 CREATININE 0.76 01/19/2021     (L) 01/19/2021    K 3.3 (L) 01/19/2021    CALCIUM 8.2 (L) 01/19/2021     01/19/2021    CO2 23 01/19/2021       CT ABDOMEN PELVIS W IV CONTRAST Additional Contrast? None   Final Result   1. Significant sigmoid diverticulosis with segment of wall thickening   without serosal haziness. This may be related to low-grade diverticulitis. 2.  Status post cholecystectomy. Mild intra hepatic central biliary ductal   dilatation likely secondary to cholecystectomy. 3.  No bowel obstruction, appendicitis or urinary obstruction. 4.  Patient has findings most compatible with airspace disease at the lung   bases left greater than right. XR PELVIS (1-2 VIEWS)   Final Result   No acute osseous abnormality in the pelvis. Osteopenia degrades evaluation   for subtle nondisplaced fracture. Consider further evaluation with MRI as   clinically warranted. Moderate bilateral hip joint osteoarthritis. XR CHEST PORTABLE   Final Result   No x-ray evidence of acute trauma to the chest or acute cardiopulmonary   disease. CT Cervical Spine WO Contrast   Final Result   No acute bony abnormality. Advanced degenerative changes with unchanged grade 1 anterolisthesis C4 on C5   and C7 on T1.         CT Head WO Contrast   Final Result   No acute intracranial abnormality. ASSESSMENT / PLAN:  · COVID-19 virus infection with hypoxia but no respiratory distress  ? Finished remdesivir  ? Dexamethasone  ? Acapella  ? PT / OT  ? Supplemental O2  · Possible \"low grade\" diverticulitis  ? cipro and flagyl  ? May be the cause of her nausea / abd pain  · Chronic atrial fibrillation  ? Continue Metoprolol for rate control  ? Continue Eliquis for stroke prophylaxis  · Chronic diastolic CHF  ? Continue Losartan, Metoprolol  · Chronic kidney disease stage 3  ? Monitor renal function / lytes  · UTI  ?  Rocephin IV  · Nutrition status: at risk for malnutrition · DVT prophylaxis: Eliquis   · High risk medications: none  · Disposition:  Discharge plan is  back to Latrice Grant M.D.  1/19/2021  8:09 AM

## 2021-01-19 NOTE — PROGRESS NOTES
Paperwork faxed to [facility] [phone] at this time. Trina Mcdaniels will call back with an ETA.

## 2021-01-19 NOTE — PROGRESS NOTES
Physical Therapy  Facility/Department: [facility] AT THE [facility] MED SURG  Daily Treatment Note  NAME: Mary Garcia  : 1930  MRN: [id number]    Date of Service: 2021    Discharge Recommendations:  Continue to assess pending progress, Subacute/Skilled Nursing Facility, ECF with PT        Assessment   Treatment Diagnosis: difficulty walking  Prognosis: Good  PT Education: Goals;PT Role;Plan of Care;General Safety;Transfer Training;Gait Training  Patient Education: Educated pt on above with fair understanding noted. REQUIRES PT FOLLOW UP: Yes  Activity Tolerance  Activity Tolerance: Patient limited by fatigue;Patient limited by endurance  Activity Tolerance: Limited by nausea     Patient Diagnosis(es): The primary encounter diagnosis was COVID-19. Diagnoses of Acute respiratory failure with hypoxia ([address] Fall, initial encounter, Closed head injury, initial encounter, Diverticulitis of colon, and Acute urinary tract infection were also pertinent to this visit. has a past medical history of Acute kidney failure ([address] Anemia, Aphasia, Arthritis, Atrial fibrillation (HCC), Bile duct calculus, Calculus of gallbladder and bile duct without cholecystitis without obstruction, Cerebral infarction ([address] CHF (congestive heart failure) ([address] Chronic kidney disease, stage 3, CVA (cerebral vascular accident) ([address] Fall, Gout, Gout, Gout, Hyperlipidemia, Hypertension, Hypokalemia, Hypokalemia, Hypomagnesemia, Hypomagnesemia, Hypothyroidism, IBS (irritable bowel syndrome), Irritable bowel syndrome, Ischemia, Left carotid stenosis, Metabolic encephalopathy, Mitral valve insufficiency, Nonrheumatic mitral (valve) insufficiency, Seizures ([address] TIA (transient ischemic attack), and UTI (urinary tract infection). has a past surgical history that includes Cataract removal with implant (Bilateral); Cholecystectomy; Carpal tunnel release (2012); ERCP (N/A, 2/11/2019); Total knee arthroplasty (Right); Total knee arthroplasty (Left); Upper gastrointestinal endoscopy (07/31/2019); and Upper gastrointestinal endoscopy (N/A, 7/31/2019). Restrictions  Restrictions/Precautions  Restrictions/Precautions: General Precautions, Fall Risk, Isolation  Subjective   General  Chart Reviewed: Yes  Response To Previous Treatment: Patient with no complaints from previous session. Family / Caregiver Present: No  Referring Practitioner: Anu Porter CNP  Subjective  Subjective: Pt denies any pain at this time. Pt reports fatigue. General Comment  Comments: Pt assisted with use ob BSC          Orientation  Orientation  Overall Orientation Status: Within Functional Limits  Cognition      Objective   Bed mobility  Comment: Pt up in recliner upon arrival, pt needing to use BSC. Transfers  Sit to Stand: Moderate Assistance  Stand to sit: Moderate Assistance  Comment: Pt with cues for hand placement. Multiple attempts to achieve sit<>stand. Ambulation  Ambulation?: Yes  Ambulation 1  Surface: level tile  Device: Rolling Walker  Assistance: Minimal assistance  Quality of Gait: forward flexed posture, reduced step height and length. Gait Deviations: Slow Coleen;Decreased step length;Decreased step height;Shuffles  Distance: 4-5 steps recliner<>BSC. Comments: Pt with safety cues prior to sitting including complete turn and hand placement. Pt is fall risk during ambulation. Exercises  Hip Flexion: 15x  Hip Abduction: 15x  Knee Long Arc Quad: 20x  Ankle Pumps: 20x  Comments: Pt declined d/t feeling nauseated. Nursing alerted.      Goals  Short term goals  Time Frame for Short term goals: 10 visits  Short term goal 1: Patient will complete bed mobility and transfers with CGAx1 in order to ease functional mobility Short term goal 2: Patient will tolerate 35-45' ther-ex in order to increase endurance and ease ADLs  Short term goal 3: Patient will ambulate 75feet with CGAx1 and use of RW in order to ease functional mobility    Plan    Plan  Times per week: 7 times per week  Times per day: Twice a day  Current Treatment Recommendations: Strengthening, Balance Training, Functional Mobility Training, Transfer Training, Gait Training, Endurance Training, Neuromuscular Re-education, Manual Therapy - Soft Tissue Mobilization, Positioning, Equipment Evaluation, Education, & procurement, Patient/Caregiver Education & Training, Safety Education & Training, Home Exercise Program  Safety Devices  Type of devices:  All fall risk precautions in place, Call light within reach, Chair alarm in place, Gait belt, Patient at risk for falls, Left in chair, Nurse notified     Therapy Time   Individual Concurrent Group Co-treatment   Time In 1109         Time Out 1137         Minutes 100 [place], [place]

## 2021-01-19 NOTE — PROGRESS NOTES
Physical Therapy  Facility/Department: [facility] AT THE [facility] MED SURG  Daily Treatment Note  NAME: Kraig Oneal  : 1930  MRN: [id number]    Date of Service: 2021    Discharge Recommendations:  Continue to assess pending progress, Subacute/Skilled Nursing Facility, ECF with PT        Assessment   Treatment Diagnosis: difficulty walking  Prognosis: Good  PT Education: Goals;PT Role;Plan of Care;General Safety;Transfer Training;Gait Training  Patient Education: Educated pt on above with fair understanding noted. REQUIRES PT FOLLOW UP: Yes  Activity Tolerance  Activity Tolerance: Patient Tolerated treatment well;Patient limited by fatigue;Patient limited by endurance     Patient Diagnosis(es): The primary encounter diagnosis was COVID-19. Diagnoses of Acute respiratory failure with hypoxia ([address] Fall, initial encounter, Closed head injury, initial encounter, Diverticulitis of colon, and Acute urinary tract infection were also pertinent to this visit. has a past medical history of Acute kidney failure ([address] Anemia, Aphasia, Arthritis, Atrial fibrillation (HCC), Bile duct calculus, Calculus of gallbladder and bile duct without cholecystitis without obstruction, Cerebral infarction ([address] CHF (congestive heart failure) ([address] Chronic kidney disease, stage 3, CVA (cerebral vascular accident) ([address] Fall, Gout, Gout, Gout, Hyperlipidemia, Hypertension, Hypokalemia, Hypokalemia, Hypomagnesemia, Hypomagnesemia, Hypothyroidism, IBS (irritable bowel syndrome), Irritable bowel syndrome, Ischemia, Left carotid stenosis, Metabolic encephalopathy, Mitral valve insufficiency, Nonrheumatic mitral (valve) insufficiency, Seizures ([address] TIA (transient ischemic attack), and UTI (urinary tract infection).

## 2021-01-19 NOTE — PROGRESS NOTES
01/19/21 1504   Treatment   Treatment Type Vibratory mucous clearing therapy or intervention   $Bronchial Hygiene $Oscillatory therapy   Oxygen Therapy/Pulse Ox   O2 Device None (Room air)   SpO2 95 %   Patient Observation   Observations acapella x10 done

## 2021-01-19 NOTE — DISCHARGE SUMMARY
Discharge Summary    Elzbieta Richards  :  1930  MRN:  [id number]    Admit date:  2021      Discharge date: 2021     Admitting Physician:  Marcela Bravo MD    Discharge Diagnoses:    Principal Problem:    COVID-19 virus infection  Active Problems:    Chronic atrial fibrillation    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 ([address]    Acute cystitis without hematuria    Chronic kidney disease, stage 3    Diverticulitis  Resolved Problems:    Hypoxia      Hospital Course:   Elzbieta Richards is a 80 y.o. female admitted with COVID-19 virus infection. She presented to the emergency room with complaints of nausea and generalized fatigue and not wanting to ambulate due to extreme weakness. Nursing staff at the facility reported that she had fallen prior to coming in and sustained a head injury without any loss of consciousness. She is on Eliquis for chronic atrial fibrillation. She did complain of nausea without emesis. Denied any other pain. CT of the abdomen showed low-grade diverticulitis and she was started on IV Cipro and Flagyl. CT of the head was negative for any acute process. She was provided IV steroids remdesivir and ivermectin as well. She was also given vitamin C D and zinc.  She had daily labs that are stable. She was also found to have an acute urinary tract infection and initially was started on Rocephin. She is doing well. She does have some nausea with the Flagyl I will prescribe some Zofran to take when she is taken the Flagyl for 7 days. She is not hypoxic. Cipro will cover the urine as well as the diverticulitis as well. She is working with physical therapy and does still continue to have weakness and is a 2+ assist for transfer. She was hypomagnesium this morning and did receive 2 g of magnesium. Recheck her levels are normal.  I did increase her potassium to 20 mEq twice daily due to hypokalemia.     Consultants:  none    Procedures: none Complications: none    Discharge Condition: fair    Exam:  GEN:  alert and oriented to person, place and time, well-developed and well-nourished, in no acute distress  EYES: No gross abnormalities. , PERRL and EOMI  NECK: normal, supple, no lymphadenopathy,  no carotid bruits  PULM: clear to auscultation bilaterally- no wheezes, rales or rhonchi, normal air movement, no respiratory distress and decreased breath sounds noted-diminished throughout  COR: regular rate & rhythm, no murmurs, no gallops, S1 normal and S2 normal  ABD:  soft, non-tender, non-distended, normal bowel sounds, no masses or organomegaly  EXT:   Puffy but no pitting edema  NEURO: follows commands, LOYOLA, no deficits  SKIN:  Ecchymosis to scalp and periorbital    Significant Diagnostic Studies:   Lab Results   Component Value Date    WBC 8.6 01/19/2021    HGB 11.6 (L) 01/19/2021     01/19/2021       Lab Results   Component Value Date    BUN 22 01/19/2021    CREATININE 0.76 01/19/2021     (L) 01/19/2021    K 3.3 (L) 01/19/2021    CALCIUM 8.2 (L) 01/19/2021     01/19/2021    CO2 23 01/19/2021    LABGLOM >60 01/19/2021       Lab Results   Component Value Date    WBCUA 5 TO 10 01/14/2021    RBCUA 5 TO 10 01/14/2021    EPITHUA 0 TO 2 01/14/2021    LEUKOCYTESUR NEGATIVE 01/14/2021    SPECGRAV 1.025 (H) 01/14/2021    GLUCOSEU NEGATIVE 01/14/2021    KETUA NEGATIVE 01/14/2021    PROTEINU 2+ (A) 01/14/2021    HGBUR 1+ (A) 01/14/2021    CASTUA NOT REPORTED 01/14/2021    CRYSTUA NOT REPORTED 01/14/2021    BACTERIA 4+ (A) 01/14/2021    YEAST NOT REPORTED 01/14/2021       Echocardiogram Complete 2d With Doppler With Color    Result Date: 1/15/2021 [facility] Transthoracic Echocardiography Report (TTE)  Patient Name Yoan Wilder   Date of Study               01/15/2021               ELISABET RIOS   Date of      05/02/1930  Gender                      Female  Birth   Age          80 year(s)  Race                           Room Number  0329        Height:                     60 inch, 152.4 cm   Corporate ID [id number]    Weight:                     161 pounds, 73 kg  #   Patient Acct [de-identified]   BSA:          1.7 m^2       BMI:      31.44  #                                                              kg/m^2   MR #         [id number]      Sonographer                 Yarelis Smalls   Accession #  [id number]  Interpreting Physician      Vivi Hall   Fellow                   Referring Nurse             Maggi Márquez, RADHA                           Practitioner   Interpreting             Referring Physician  Fellow  Type of Study   TTE procedure:2D Echocardiogram, M-Mode, Doppler, Color Doppler. Procedure Date Date: 01/15/2021 Start: 10:37 AM Study Location: [facility] Indications:Congestive heart failure and COVID 19. History / Tech. Comments: CHF PMHX: HTN, A fib, TIA, CVA, Covid + Patient Status: Inpatient Height: 60 inches Weight: 161 pounds BSA: 1.7 m^2 BMI: 31.44 kg/m^2 BP: 162/99 mmHg CONCLUSIONS Summary Global left ventricular systolic function appears persevered with an estimated ejection fraction of 55%. The left ventricular cavity size is within normal limits and the left ventricular wall thickness is mildly increased. No definite specific wall motion abnormalities were identified. The left atrium is severely dilated (>40) with a left atrial volume index of 72 ml/m2. Mitral annular calcification is seen. Moderate mitral regurgitation. Severe tricuspid regurgitation. Mild to moderate pulmonary hypertension with an estimated right ventricular systolic pressure of 37 mmHg.  Diastolic function can not be assessed because of atrial fibrillation and valvular abnormalities. Signature ----------------------------------------------------------------------------  Electronically signed by Yarelis Smalls(Sonographer) on 01/15/2021 11:38  AM ---------------------------------------------------------------------------- ----------------------------------------------------------------------------  Electronically signed by Vivi Hall(Interpreting physician) on 01/15/2021  04:22 PM ---------------------------------------------------------------------------- FINDINGS Left Atrium The left atrium is severely dilated (>40) with a left atrial volume index of 72 ml/m2. Left Ventricle Global left ventricular systolic function appears persevered with an estimated ejection fraction of 55%. The left ventricular cavity size is within normal limits and the left ventricular wall thickness is mildly increased. No definite specific wall motion abnormalities were identified. Right Atrium The right atrium appears mildly dilated. Right Ventricle Normal right ventricular size and function. Mitral Valve Mitral annular calcification is seen. Moderate mitral regurgitation. Aortic Valve Aortic leaflets show calcification without restriction of motion. No aortic insufficiency. Tricuspid Valve Severe tricuspid regurgitation. Mild to moderate pulmonary hypertension with an estimated right ventricular systolic pressure of 37 mmHg. Pulmonic Valve Pulmonic valve is normal in structure and function. Pericardial Effusion An anterior echo free space is seen suggestive of a small effusion or fat pad. Miscellaneous IVC Increased diameter, but still has inspiratory variation suggesting upper normal or mildly elevated RA filling pressure (i.e. CVP) . Diastolic function can not be assessed because of atrial fibrillation and valvular abnormalities.  M-mode / 2D Measurements & Calculations:   LVIDd:3.75 cm(3.7 - 5.6 cm)     Diastolic IXPKNE:75.90 ml  LVIDs:3.18 cm(2.2 - 4.0 cm)     Systolic TDIRDI:87.41 ml IVSd:1.27 cm(0.6 - 1.1 cm)      Aortic Root:3.14 cm(2.0 - 3.7 cm)  LVPWd:1.24 cm(0.6 - 1.1 cm)     LA Dimension: 6.31 cm(1.9 - 4.0 cm)  Fractional Shortening:15.2 %    LA volume/Index: 123 ml /72m^2  Calculated LVEF (%): 67.52 %    AV Cusp Separation: 1.06 cm   Mitral:                               Aortic   Peak E-Wave: 1.01 m/s                 Peak Velocity: 0.72 m/s                                        Mean Velocity: 0.49 m/s  Peak Gradient: 4.06 mmHg              Peak Gradient: 2.09 mmHg                                        Mean Gradient: 1.07 mmHg                                         Acceleration Time: 53.87 msec                                         AV VTI: 11.72 cm   Tricuspid:                            Pulmonic:   Estimated RVSP: 36.94 mmHg  Peak TR Velocity: 2.69 m/s            Acceleration Time: 53.87 msec  Peak TR Gradient: 28.9444 mmHg  Estimated RA Pressure: 8 mmHg                                        Estimated PASP: 36.94 mmHg      Xr Pelvis (1-2 Views)    Result Date: 1/14/2021  EXAMINATION: ONE XRAY VIEW OF THE PELVIS 1/14/2021 1:53 pm COMPARISON: Radiographs 06/17/2019 HISTORY: ORDERING SYSTEM PROVIDED HISTORY: fall TECHNOLOGIST PROVIDED HISTORY: fall FINDINGS: The bones are osteopenic. The femoral heads are located. No acute fracture or dislocation in the pelvis. Degenerative disc disease in the lower lumbar spine. Bilateral SI joint and pubic symphysis degenerative changes. Moderate bilateral hip joint osteoarthritis. No acute osseous abnormality in the pelvis. Osteopenia degrades evaluation for subtle nondisplaced fracture. Consider further evaluation with MRI as clinically warranted. Moderate bilateral hip joint osteoarthritis.      Ct Head Wo Contrast    Result Date: 1/14/2021 EXAMINATION: CT OF THE HEAD WITHOUT CONTRAST  1/14/2021 1:43 pm TECHNIQUE: CT of the head was performed without the administration of intravenous contrast. Dose modulation, iterative reconstruction, and/or weight based adjustment of the mA/kV was utilized to reduce the radiation dose to as low as reasonably achievable. COMPARISON: May 25, 2020 HISTORY: ORDERING SYSTEM PROVIDED HISTORY: fall on eliquis TECHNOLOGIST PROVIDED HISTORY: fall on eliquis FINDINGS: BRAIN/VENTRICLES: There is no acute intracranial hemorrhage, mass effect or midline shift. No abnormal extra-axial fluid collection. The gray-white differentiation is maintained without evidence of an acute infarct. There is no evidence of hydrocephalus. Chronic periventricular white matter microvascular ischemic changes again noted. New small 1 cm low-density focus in the left centrum semiovale related to remote infarct. ORBITS: The visualized portion of the orbits demonstrate no acute abnormality. SINUSES: Very small amount of fluid again noted in the right mastoid air cells. No acute findings of the visualized paranasal sinuses. SOFT TISSUES/SKULL:  Subcutaneous hematoma overlies the right frontal bone but no fracture. No acute intracranial abnormality.      Ct Cervical Spine Wo Contrast    Result Date: 1/14/2021 EXAMINATION: CT OF THE CERVICAL SPINE WITHOUT CONTRAST 1/14/2021 10:44 am TECHNIQUE: CT of the cervical spine was performed without the administration of intravenous contrast. Multiplanar reformatted images are provided for review. Dose modulation, iterative reconstruction, and/or weight based adjustment of the mA/kV was utilized to reduce the radiation dose to as low as reasonably achievable. COMPARISON: 06/17/2019 HISTORY: ORDERING SYSTEM PROVIDED HISTORY: fall TECHNOLOGIST PROVIDED HISTORY: fall FINDINGS: BONES/ALIGNMENT: There is no acute fracture. Alignment is unchanged relative to prior with degenerative grade 1 anterolisthesis C4 on C5 and C7 on T1. Ankylosis of the C2-C3 facets bilaterally. Heterogeneous bone mineralization which appears similar to prior. DEGENERATIVE CHANGES: Advanced degenerative changes throughout the cervical spine with severe disc height loss, degenerative endplate changes, and uncovertebral spurring as well as facet arthrosis. Advanced degenerative changes at the atlantoaxial articulation. Unchanged mild retroflexion of the odontoid without appreciable crowding at the upper canal.  Multilevel degenerative spinal canal stenosis greatest at C5-C6. Severe neuroforaminal stenosis at multiple levels bilaterally, also unchanged to prior. SOFT TISSUES: There is no prevertebral soft tissue swelling. No acute bony abnormality. Advanced degenerative changes with unchanged grade 1 anterolisthesis C4 on C5 and C7 on T1.      Ct Abdomen Pelvis W Iv Contrast Additional Contrast? None    Result Date: 1/14/2021 EXAMINATION: CT OF THE ABDOMEN AND PELVIS WITH CONTRAST 1/14/2021 3:36 pm TECHNIQUE: CT of the abdomen and pelvis was performed with the administration of intravenous contrast. Multiplanar reformatted images are provided for review. Dose modulation, iterative reconstruction, and/or weight based adjustment of the mA/kV was utilized to reduce the radiation dose to as low as reasonably achievable. COMPARISON: None HISTORY: ORDERING SYSTEM PROVIDED HISTORY: fall, vomiting TECHNOLOGIST PROVIDED HISTORY: fall, vomiting FINDINGS: Lower Chest: Bibasilar atelectasis is noted. Some patchy ground-glass changes are present in the left lower lobe which may be related to focal pneumonitis. The heart is mildly enlarged. Coronary artery calcification is evident. Organs: Gallbladder has been surgically removed. Hepatic steatosis is noted. Mild central biliary ductal dilatation is evident similar to that noted on prior chest CT of 7 February 2019 likely secondary to cholecystectomy. Tiny dots of pneumobilia are noted in the left lobe of the liver. Spleen, pancreas, and right adrenal are unremarkable. A stable hypodense mass in the left adrenal of 1.3 cm is noted Hounsfield numbers of 78-80 on this contrast study. A 2-3 mm nonobstructing papillary nephrolith right kidney is noted. No suspicious renal masses are demonstrated; a stable left renal cyst is evident. No obstructing renal calculi are present. GI/Bowel: Small fat containing periumbilical hernia without strangulation is noted. Scattered colonic diverticula are present, with moderate to significant diverticulitis in the sigmoid. Mid sigmoid wall thickening is noted without serosal haziness. Earlier low-grade diverticulitis is not excluded. No pericolonic abscess. Stomach is decompressed limiting assessment. No free fluid, free air or bowel obstruction is demonstrated. Pelvis: No evidence of appendicitis. Bladder is unremarkable.   No abnormal fluid collections, pelvic or inguinal adenopathy. Peritoneum/Retroperitoneum: Scattered moderate calcified plaque in the aorta. No aortic aneurysm. No retroperitoneal or mesenteric adenopathy. Shotty lymph node near the head of the pancreas is noted. Bones/Soft Tissues: No acute osseous or soft tissue abnormality. Multilevel degenerative changes are present in the spine. 1.  Significant sigmoid diverticulosis with segment of wall thickening without serosal haziness. This may be related to low-grade diverticulitis. 2.  Status post cholecystectomy. Mild intra hepatic central biliary ductal dilatation likely secondary to cholecystectomy. 3.  No bowel obstruction, appendicitis or urinary obstruction. 4.  Patient has findings most compatible with airspace disease at the lung bases left greater than right. Xr Chest Portable    Result Date: 1/14/2021  EXAMINATION: ONE XRAY VIEW OF THE CHEST 1/14/2021 1:52 pm COMPARISON: May 26, 2020 HISTORY: ORDERING SYSTEM PROVIDED HISTORY: fall TECHNOLOGIST PROVIDED HISTORY: fall FINDINGS: No evidence of traumatic process of the lungs. No evidence of pneumothorax or pleural effusion. No evidence of acute traumatic process involving the cardiac/mediastinal structures. No acute fracture demonstrated. No x-ray evidence of acute trauma to the chest or acute cardiopulmonary disease.        Assessment and Plan:  Patient Active Problem List    Diagnosis Date Noted    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 ([facility]      Priority: High    Chronic atrial fibrillation      Priority: High    Severe mitral regurgitation by prior echocardiogram      Priority: High    Diverticulitis 01/16/2021    covid-19 virus  01/14/2021    COVID-19 virus infection 01/14/2021    Abdominal bloating 10/09/2020    Irritable bowel syndrome with diarrhea 10/09/2020    Bilious vomiting 05/26/2020    S/P ERCP 05/26/2020    TIA (transient ischemic attack)     Nausea 02/14/2020  Aphasia     Falling episodes 02/10/2020    History of cerebral infarction 02/10/2020    Cerebrovascular accident (CVA) determined by clinical assessment ([facility] 02/09/2020    Chronic kidney disease, stage 3     Left carotid artery stenosis     Diarrhea 03/11/2019    VERONICA (acute kidney injury) ([facility] 02/13/2019    Acute cystitis without hematuria 02/08/2019    Hypothyroidism     Hyperlipidemia     Hypertension     Gout     Arthritis         Discharge Medications:       [facility]   Home Medication Instructions [id number]    Printed on:01/19/21 0019   Medication Information                      acetaminophen (TYLENOL) 500 MG tablet  Take 500 mg by mouth 2 times daily              apixaban (ELIQUIS) 5 MG TABS tablet  Take 5 mg by mouth 2 times daily             Artificial Saliva (BIOTENE DRY MOUTH MOISTURIZING) SOLN liquid  Take 1 drop by mouth as needed             ascorbic acid (VITAMIN C) 1000 MG tablet  Take 1 tablet by mouth 2 times daily for 7 days             aspirin 81 MG EC tablet  Take 1 tablet by mouth daily             atorvastatin (LIPITOR) 20 MG tablet  Take 1 tablet by mouth nightly             cholestyramine (QUESTRAN) 4 g packet  Take 1 packet by mouth 3 times daily (with meals)             ciprofloxacin (CIPRO) 500 MG tablet  Take 1 tablet by mouth 2 times daily for 7 days             docusate sodium (COLACE) 100 MG capsule  Take 100 mg by mouth daily             ferrous sulfate 325 (65 Fe) MG tablet  Take 1 tablet by mouth daily (with breakfast)             levETIRAcetam (KEPPRA) 500 MG tablet  Take 1 tablet by mouth 2 times daily             levothyroxine (SYNTHROID) 112 MCG tablet  Take 1 tablet by mouth Daily             losartan (COZAAR) 50 MG tablet  Take 1 tablet by mouth daily             metoprolol tartrate 75 MG TABS  Take 75 mg by mouth 2 times daily             metroNIDAZOLE (FLAGYL) 500 MG tablet  Take 1 tablet by mouth 3 times daily for 7 days ondansetron (ZOFRAN) 4 MG tablet  Take 4 mg by mouth every 6 hours as needed for Nausea or Vomiting             ondansetron (ZOFRAN-ODT) 4 MG disintegrating tablet  Take 1 tablet by mouth 3 times daily for 7 days Give with the Flagyl             polyethylene glycol (GLYCOLAX) packet  Take 17 g by mouth daily as needed for Constipation             potassium chloride (KLOR-CON M) 20 MEQ extended release tablet  Take 1 tablet by mouth 2 times daily             sertraline (ZOLOFT) 50 MG tablet  Take 50 mg by mouth daily              Vitamin D (CHOLECALCIFEROL) 50 MCG (2000 UT) TABS tablet  Take 1 tablet by mouth daily for 7 days             zinc sulfate (ZINCATE) 220 (50 Zn) MG capsule  Take 2 capsules by mouth daily for 7 days                 Patient Instructions:    Activity: activity as tolerated  Diet: cardiac diet  Wound Care: none needed  Other: None    Disposition:   DC to [address] [facility]    Follow up:  Patient will be followed by Malick Mistry MD in 1-2 weeks    CORE MEASURES on Discharge (if applicable)  ACE/ARB in CHF: NA  Statin in MI: NA  ASA in [place]: NA  Statin in CVA: NA  Antiplatelet in CVA: NA    Total time spent on discharge services: 40 minutes    Including the following activities:  Evaluation and Management of patient  Discussion with patient and/or surrogate about current care plan  Coordination with Case Management and/or   Coordination of care with Consultants (if applicable)   Coordination of care with Receiving Facility Physician (if applicable)  Completion of DME forms (if applicable)  Preparation of Discharge Summary  Preparation of Medication Reconciliation  Preparation of Discharge Prescriptions    Signed:  KATIA Guerrero, NP-C  1/19/2021, 4:23 PM

## 2021-01-21 PROBLEM — K58.0 IRRITABLE BOWEL SYNDROME WITH DIARRHEA: Status: RESOLVED | Noted: 2020-01-01 | Resolved: 2021-01-01

## 2021-01-21 PROBLEM — R11.0 NAUSEA: Status: RESOLVED | Noted: 2020-02-14 | Resolved: 2021-01-01

## 2021-01-21 PROBLEM — N30.00 ACUTE CYSTITIS WITHOUT HEMATURIA: Status: RESOLVED | Noted: 2019-02-08 | Resolved: 2021-01-01

## 2021-01-21 PROBLEM — U07.1 COVID-19: Status: RESOLVED | Noted: 2021-01-01 | Resolved: 2021-01-01

## 2021-01-21 PROBLEM — R11.14 BILIOUS VOMITING: Status: RESOLVED | Noted: 2020-05-26 | Resolved: 2021-01-01

## 2021-01-21 PROBLEM — N17.9 AKI (ACUTE KIDNEY INJURY) (HCC): Status: RESOLVED | Noted: 2019-02-13 | Resolved: 2021-01-01

## 2021-01-21 PROBLEM — R19.7 DIARRHEA: Status: RESOLVED | Noted: 2019-03-11 | Resolved: 2021-01-01

## 2021-01-21 PROBLEM — R14.0 ABDOMINAL BLOATING: Status: RESOLVED | Noted: 2020-01-01 | Resolved: 2021-01-01

## 2021-04-04 PROBLEM — E87.0 HYPERNATREMIA: Status: ACTIVE | Noted: 2021-01-01

## 2021-04-04 PROBLEM — I63.9 ACUTE CVA (CEREBROVASCULAR ACCIDENT) (HCC): Status: ACTIVE | Noted: 2021-01-01

## 2021-04-04 NOTE — ED NOTES
Bed: 04  Expected date:   Expected time:   Means of arrival:   Comments:  EMS     Ze Mehta RN  04/04/21 [id number]

## 2021-04-04 NOTE — VIRTUAL HEALTH
Yes Anshu Darling PA-C   aspirin 81 MG EC tablet Take 1 tablet by mouth daily 4/11/19  Yes Jorgito Brown MD   levothyroxine (SYNTHROID) 112 MCG tablet Take 1 tablet by mouth Daily 11/29/18  Yes Shell Hsu MD   acetaminophen (TYLENOL) 500 MG tablet Take 500 mg by mouth 2 times daily    Yes Historical Provider, MD   albuterol (PROVENTIL) (2.5 MG/3ML) 0.083% nebulizer solution Take 2.5 mg by nebulization every 6 hours as needed for Wheezing    Historical Provider, MD   Mouthwashes Von Ding) LIQD oral solution Swish and spit 15 mLs daily as needed    Historical Provider, MD   zinc sulfate (ZINCATE) 220 (50 Zn) MG capsule Take 2 capsules by mouth daily for 7 days 1/20/21 1/27/21  KATIA Islas - CNP   ascorbic acid (VITAMIN C) 1000 MG tablet Take 1 tablet by mouth 2 times daily for 7 days 1/19/21 1/26/21  KATIA Islas - CNP   Vitamin D (CHOLECALCIFEROL) 50 MCG (2000 UT) TABS tablet Take 1 tablet by mouth daily for 7 days 1/20/21 1/27/21  KATIA Islas - CNP   Artificial Saliva (BIOTENE DRY MOUTH MOISTURIZING) SOLN liquid Take 1 drop by mouth as needed    Historical Provider, MD   polyethylene glycol (GLYCOLAX) packet Take 17 g by mouth daily as needed for Constipation    Historical Provider, MD   ondansetron (ZOFRAN) 4 MG tablet Take 4 mg by mouth every 6 hours as needed for Nausea or Vomiting    Historical Provider, MD   cholestyramine (QUESTRAN) 4 g packet Take 1 packet by mouth 3 times daily (with meals) 2/14/20   Terrance Euceda MD    Scheduled Meds:  Continuous Infusions:  PRN Meds:.  Past Medical History   has a past medical history of Acute kidney failure ([facility] [address] Anemia, Aphasia, Arthritis, Atrial fibrillation ([address] Bile duct calculus, Calculus of gallbladder and bile duct without cholecystitis without obstruction, Cerebral infarction ([address] CHF (congestive heart failure) ([address] Chronic kidney disease, stage 3, CVA (cerebral vascular accident) ([facility] [address] Fall, Gout, Gout, kg)   SpO2 95%   BMI 29.69 kg/m²     NIH Stroke Scale  NIH Stroke Scale Assessed: Yes  Level of Consciousness (1a. ): Alert  LOC Questions (1b. ): Answers one correctly  LOC Commands (1c. ): Performs both tasks correctly  Best Gaze (2. ): Normal  Visual (3. ): No visual loss  Facial Palsy (4. ): Normal symmetrical movement  Motor Arm, Left (5a. ): No drift  Motor Arm, Right (5b. ): No drift  Motor Leg, Left (6a. ): No drift  Motor Leg, Right (6b. ): No drift  Limb Ataxia (7. ): Absent  Sensory (8. ): Normal  Best Language (9. ): Mild to moderate aphasia  Dysarthria (10. ): Mild to moderate dysarthria  Extinction and Inattention (11): No abnormality  Total: 3  Pre-Morbid mRS: 2    Imaging:  Images were personally reviewed with PACS used to review images including:  CT brain without contrast: no hemorrhag  CTA imaging: stable unchanged nondominant right vert occlusion    Assessment    80 y.o. female who presents with known right vert nondominant occlusion with presentation from Nursing home after lunch 13:30 with concern of lethargy, dysarthria and encephalopathy. Differential DDx:  1. Metabolic encephalopathy vs breakthrough seizure and post ictal  2. diarhea      Recommendations:  1. NIH 4  2. Recommend Inpatient Neurology Consult for further assessment and evaluation   3. No iv tpa  4. Diarrhea - metabolic workup  5. Neurology consult - for possible breakthough seizure on keppra  6. May give the 500mg dose if morning dosed missed        Discussed with ED Physician    At least 36 min of Telemedicine and time in conversation directly with ED staff and physician for the patient who is in imminent and life threatening deterioration without further treatment and evaluation. This Virtual Visit was conducted with patient's (and/or legal guardian's) consent, to provide telestroke consultation and necessary medical care.   Time spent examining patient, reviewing the images personally, reviewing the chart, perform high complexity decision making and speaking with the nursing staff regarding recommendations    This is a Phone Consult, I have not seen the patient face to face, the telemedicine device was not utilized. Nicolás Mehta MD   Stroke, Neurocritical Care And/or [address] [place] [address] Stroke Network  [id number]  Electronically signed 4/4/2021 at 4:11 PM    Patient Location:  [address] [place] [address] ED    Provider Location ([facility]): [place], [place]    This virtual visit was conducted via interactive/real-time audio/video.

## 2021-04-04 NOTE — ED PROVIDER NOTES
[facility] ED  EMERGENCY DEPARTMENT ENCOUNTER      Pt Name: Mars Contreras  MRN: [id number]  [place] 5/2/1930  Date of evaluation: 4/4/2021  Provider: Shanell Navarrete MD    CHIEF COMPLAINT     Chief Complaint   Patient presents with    Diarrhea     x 1 day    Aphasia     Onset 1330. Sent from [address] [place] [address]   (Location/Symptom, Timing/Onset, Context/Setting,Quality, Duration, Modifying Factors, Severity)  Note limiting factors. Mars Contreras is a80 y.o. female who presents to the emergency department      This is a 80years old patient from nursing home coming to the ER due to the fact that after lunch today roughly about 130 developed some slurred speech, lethargy and obtundation. Also reported she had some diarrhea. She denies any headache, nausea, vomiting, shortness of breath, cough, fever. She is somewhat hard to interact with and she seemed to have slowed mentation and slow capacity to follow commands. Nursing Notes werereviewed. REVIEW OF SYSTEMS    (2-9 systems for level 4, 10 or more for level 5)     Review of Systems   Constitutional: Negative. HENT: Negative. Eyes: Negative. Respiratory: Negative. Cardiovascular: Negative. Gastrointestinal: Positive for diarrhea. Negative for abdominal distention, abdominal pain, anal bleeding, blood in stool, constipation, nausea, rectal pain and vomiting. Endocrine: Negative. Genitourinary: Negative. Musculoskeletal: Negative. Skin: Negative. Neurological: Negative. Hematological: Negative. Except as noted above the remainder of the review of systems was reviewed and negative.        PAST MEDICAL HISTORY     Past Medical History:   Diagnosis Date    Acute kidney failure ([facility] [address] 06/28/2019    Anemia     Aphasia     Arthritis     Atrial fibrillation ([facility] [address]     Bile duct calculus     Calculus of gallbladder and bile duct without cholecystitis without obstruction     mouth nightly    CHOLESTYRAMINE (QUESTRAN) 4 G PACKET    Take 1 packet by mouth 3 times daily (with meals)    DOCUSATE SODIUM (COLACE) 100 MG CAPSULE    Take 100 mg by mouth daily    FERROUS SULFATE 325 (65 FE) MG TABLET    Take 1 tablet by mouth daily (with breakfast)    LEVETIRACETAM (KEPPRA) 500 MG TABLET    Take 1 tablet by mouth 2 times daily    LEVOTHYROXINE (SYNTHROID) 112 MCG TABLET    Take 1 tablet by mouth Daily    LOSARTAN (COZAAR) 50 MG TABLET    Take 1 tablet by mouth daily    METOPROLOL TARTRATE 75 MG TABS    Take 75 mg by mouth 2 times daily    MOUTHWASHES (BIOTENE) LIQD ORAL SOLUTION    Swish and spit 15 mLs daily as needed    NUTRITIONAL SUPPLEMENTS (RESOURCE JUICE DRINK PO)    Take 1 Dose by mouth 3 times daily    ONDANSETRON (ZOFRAN) 4 MG TABLET    Take 4 mg by mouth every 6 hours as needed for Nausea or Vomiting    POLYETHYLENE GLYCOL (GLYCOLAX) PACKET    Take 17 g by mouth daily as needed for Constipation    POTASSIUM CHLORIDE (KLOR-CON M) 20 MEQ EXTENDED RELEASE TABLET    Take 1 tablet by mouth 2 times daily    SERTRALINE (ZOLOFT) 50 MG TABLET    Take 50 mg by mouth daily     VITAMIN D (CHOLECALCIFEROL) 50 MCG (2000 UT) TABS TABLET    Take 1 tablet by mouth daily for 7 days    ZINC SULFATE (ZINCATE) 220 (50 ZN) MG CAPSULE    Take 2 capsules by mouth daily for 7 days            Patient has no known allergies. FAMILY HISTORY       Family History   Problem Relation Age of Onset    High Blood Pressure Mother     Heart Disease Father           SOCIAL HISTORY       Social History     Socioeconomic History    Marital status:       Spouse name: None    Number of children: None    Years of education: None    Highest education level: None   Occupational History    None   Social Needs    Financial resource strain: None    Food insecurity     Worry: None     Inability: None    Transportation needs     Medical: None     Non-medical: None   Tobacco Use    Smoking status: Never Smoker    Smokeless tobacco: Never Used   Substance and Sexual Activity    Alcohol use: No    Drug use: No    Sexual activity: None   Lifestyle    Physical activity     Days per week: None     Minutes per session: None    Stress: None   Relationships    Social connections     Talks on phone: None     Gets together: None     Attends Voodoo service: None     Active member of club or organization: None     Attends meetings of clubs or organizations: None     Relationship status: None    Intimate partner violence     Fear of current or ex partner: None     Emotionally abused: None     Physically abused: None     Forced sexual activity: None   Other Topics Concern    None   Social History Narrative    None       SCREENINGS   NIH Stroke Scale  NIH Stroke Scale Assessed: Yes  Level of Consciousness (1a. ): Alert  LOC Questions (1b. ):  Answers one correctly  LOC Commands (1c. ): Performs both tasks correctly  Best Gaze (2. ): Normal  Visual (3. ): No visual loss  Facial Palsy (4. ): Normal symmetrical movement  Motor Arm, Left (5a. ): No drift  Motor Arm, Right (5b. ): No drift  Motor Leg, Left (6a. ): No drift  Motor Leg, Right (6b. ): No drift  Limb Ataxia (7. ): Absent  Sensory (8. ): Normal  Best Language (9. ): Mild to moderate aphasia  Dysarthria (10. ): Mild to moderate dysarthria  Extinction and Inattention (11): No abnormality  Total: 3                PHYSICAL EXAM    (up to 7 for level 4, 8 or more for level 5)     ED Triage Vitals   BP Temp Temp Source Pulse Resp SpO2 Height Weight   04/04/21 1432 04/04/21 1436 04/04/21 1436 04/04/21 1436 04/04/21 1436 04/04/21 1432 -- 04/04/21 1518   123/73 97.3 °F (36.3 °C) Temporal 89 20 91 %  152 lb (68.9 kg)       Physical Exam    DIAGNOSTIC RESULTS     EKG: All EKG's are interpreted by the Emergency Department Physician who either signs orCo-signs this chart in the absence of a cardiologist.        RADIOLOGY:   plain film images such as CT, Ultrasound and MRI are read by the radiologistMindy Rivera radiographic images are visualized and preliminarily interpreted by the emergency physician with the below findings:        Interpretation per the Radiologist below, ifavailable at the time of this note:    XR CHEST PORTABLE   Final Result   1. Mild cardiomegaly and pulmonary vascular congestion. Mild bibasilar   atelectasis. 2. No acute focal airspace consolidation. CTA HEAD NECK W CONTRAST   Final Result   Partially occluded right vertebral artery. No large vessel occlusion visualized within the head. CT Head WO Contrast   Final Result   Addendum 1 of 1   ADDENDUM:   Findings were discussed with Karolyn Montalvo at 3:17 pm on 4/4/2021. Final            ED BEDSIDE ULTRASOUND:   Performed by ED Physician - none    LABS:  Labs Reviewed   COMPREHENSIVE METABOLIC PANEL W/ REFLEX TO MG FOR LOW K - Abnormal; Notable for the following components:       Result Value    Glucose 110 (*)     BUN 24 (*)     CREATININE 1.23 (*)     Sodium 145 (*)     Chloride 111 (*)     Total Protein 6.1 (*)     GFR Non- 41 (*)     GFR  50 (*)     All other components within normal limits   PROTIME-INR - Abnormal; Notable for the following components:    Protime 19.7 (*)     All other components within normal limits   COVID-19, RAPID   CBC WITH AUTO DIFFERENTIAL   TROPONIN   POCT GLUCOSE       All other labs were within normal range ornot returned as of this dictation.     EMERGENCY DEPARTMENT COURSE and DIFFERENTIAL DIAGNOSIS/MDM:   Vitals:    Vitals:    04/04/21 1432 04/04/21 1436 04/04/21 1518   BP: 123/73     Pulse:  89    Resp:  20    Temp:  97.3 °F (36.3 °C)    TempSrc:  Temporal    SpO2: 91% 95%    Weight:   152 lb (68.9 kg)           MDM  Number of Diagnoses or Management Options  Diarrhea, unspecified type  Dysarthria  Lethargy  Diagnosis management comments: Patient presents to the ER due to the fact that after lunch today about 1:30 PM developed

## 2021-04-05 PROBLEM — R13.10 DYSPHAGIA: Status: ACTIVE | Noted: 2021-01-01

## 2021-04-05 NOTE — PROGRESS NOTES
[facility]  Office: [address], DO, Snow Dean, DO, Keri Veras, DO, Justin Makwood Blood, DO, Arsenio Theodore MD, Afia Partida MD, Sheri Hassan MD, Capri Greenfield MD, Jing Cody MD, Edward Salas MD, Glo Robles MD, Nae Pulliam MD, Theodore Hernandez DO, Delores Sommer MD, Jaymie Mckeon DO, Patricia Sparrow MD,  Zak Barnhart DO, Earle Morales MD, Taye Haque MD, Cosmo Blair MD, Angela Hernandez MD, Annabella Montano, Nancy Love, CNP, Tracey Hamilton, CNP, Peter Mullins, CNS, Madeleine Randall, CNP, Tameka Montenegro, CNP, Sobia Perdomo, CNP, Lawrence Lockett, CNP, Sunday Eamon, CNP, Loan Argueta PA-C, Joaquim Essex, [facility], Reuben Castillo, CNP, Virgilio Rivera, CNP, Charisma Farmer, CNP, [place], CNP, Channing Pack, CNP, Saleem Montero, [address] [place] [address]    Progress Note    4/5/2021    9:52 AM    Name:   Anton Lopez  MRN:     [id number]     Acct:      [de-identified]   Room:   [facility] Day:  1  Admit Date:  4/4/2021 10:28 PM    PCP:   Flash Cartwright MD  Code Status:  Full Code    Subjective:     C/C: Stroke like symptoms    Interval History Status: not changed. Pt seen and evaluated. Sleeping in bed. Awakens easily. She currently has no complaints. Oriented to self and place, but not time. She is afebrile and hemodynamically stable. She underwent MRI today which showed multiple new infarcts, detailed below in report. Labs reviewed. Discussed with neurology, Dr. Kim Cosby. 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 Pt initially presented to [place] ED from SNF where she was noted to develop AMS and slurred speech.  She was evaluated by the telestroke team, no TPA with absence of focal deficit. Recommendation to transfer to [facility] for further neuro work-up. CT with no acute changes, noted partially occluded right vertebral artery. MRI pending. Review of Systems:     Constitutional:  negative for chills, fevers, sweats  Respiratory:  negative for cough, dyspnea on exertion, shortness of breath, wheezing  Cardiovascular:  negative for chest pain, chest pressure/discomfort, lower extremity edema, palpitations  Gastrointestinal:  negative for abdominal pain, constipation, diarrhea, nausea, vomiting  Neurological:  negative for dizziness, headache    Medications:      Allergies:  No Known Allergies    Current Meds:   Scheduled Meds:    [Held by provider] acetaminophen  500 mg Oral BID    [Held by provider] apixaban  5 mg Oral BID    [Held by provider] aspirin  81 mg Oral Daily    [Held by provider] atorvastatin  20 mg Oral Nightly    [Held by provider] cholestyramine light  1 packet Oral TID WC    [Held by provider] docusate sodium  100 mg Oral Daily    [Held by provider] ferrous sulfate  325 mg Oral Daily with breakfast    [Held by provider] levETIRAcetam  500 mg Oral BID    [Held by provider] levothyroxine  112 mcg Oral Daily    [Held by provider] losartan  50 mg Oral Daily    [Held by provider] metoprolol tartrate  75 mg Oral BID    [Held by provider] potassium chloride  20 mEq Oral BID    [Held by provider] sertraline  50 mg Oral Daily    sodium chloride flush  10 mL Intravenous 2 times per day     Continuous Infusions:    sodium chloride      sodium chloride       PRN Meds: albuterol, ondansetron, polyethylene glycol, sodium chloride flush, sodium chloride, promethazine **OR** ondansetron, magnesium hydroxide    Data:     Past Medical History:   has a past medical history of Acute kidney failure ([facility] [address] Anemia, Aphasia, Arthritis, Atrial fibrillation (HCC), Bile duct calculus, Calculus of gallbladder and bile duct without cholecystitis without obstruction, Cerebral infarction ([facility] [address] CHF (congestive heart failure) ([facility] [address] Chronic kidney disease, Chronic kidney disease, stage 3, Constipation, COVID-19, CVA (cerebral vascular accident) ([facility] [address] Dysarthria and anarthria, Fall, Gout, Gout, Gout, Hyperlipidemia, Hypertension, Hypokalemia, Hypokalemia, Hypomagnesemia, Hypomagnesemia, Hypothyroidism, IBS (irritable bowel syndrome), Irritable bowel syndrome, Ischemia, Left carotid stenosis, Metabolic encephalopathy, Mitral valve insufficiency, Nonrheumatic mitral (valve) insufficiency, Seizures ([facility] [address] TIA (transient ischemic attack), and UTI (urinary tract infection). Social History:   reports that she has never smoked. She has never used smokeless tobacco. She reports that she does not drink alcohol or use drugs. Family History:   Family History   Problem Relation Age of Onset    High Blood Pressure Mother     Heart Disease Father        Vitals:  BP (!) 126/58   Pulse 68   Temp 98.2 °F (36.8 °C) (Axillary)   Resp 28   Ht 5' (1.524 m)   Wt 145 lb (65.8 kg)   SpO2 98%   BMI 28.32 kg/m²   Temp (24hrs), Av.8 °F (36.6 °C), Min:97.3 °F (36.3 °C), Max:98.2 °F (36.8 °C)    No results for input(s): POCGLU in the last 72 hours. I/O (24Hr):   No intake or output data in the 24 hours ending 21 0952    Labs:  Hematology:  Recent Labs     21  1540   WBC 4.9   RBC 4.21   HGB 13.3   HCT 42.6   .2   MCH 31.6   MCHC 31.2   RDW 14.1      MPV 11.2   INR 1.7     Chemistry:  Recent Labs     21  1540 21  1750   *  --    K 4.4  --    *  --    CO2 20  --    GLUCOSE 110*  --    BUN 24*  --    CREATININE 1.23*  --    ANIONGAP 14  --    LABGLOM 41*  --    GFRAA 50*  --    CALCIUM 9.7  --    TROPHS 39* 38*     Recent Labs     21  1540   PROT 6.1*   LABALBU 3.7   AST 16   ALT 11   ALKPHOS 91   BILITOT 0.36     ABG:  Lab Results   Component Value Date    FIO2 NOT REPORTED 2020     Lab Results Component Value Date/Time    SPECIAL LAC  1 ML 05/25/2020 07:40 AM     Lab Results   Component Value Date/Time    CULTURE NO GROWTH 5 DAYS 05/25/2020 07:40 AM       Radiology:  Ct Head Wo Contrast    Addendum Date: 4/4/2021    ADDENDUM: Findings were discussed with Fritz Tran at 3:17 pm on 4/4/2021. Result Date: 4/4/2021  No CT evidence of an acute infarct. Xr Chest Portable    Result Date: 4/4/2021  1. Mild cardiomegaly and pulmonary vascular congestion. Mild bibasilar atelectasis. 2. No acute focal airspace consolidation. Cta Head Neck W Contrast    Addendum Date: 4/4/2021    ADDENDUM: 3D reconstructed images were performed on a separate workstation and provided for review. Result Date: 4/4/2021  Partially occluded right vertebral artery. No large vessel occlusion visualized within the head.        Physical Examination:        General appearance:  alert, cooperative and no distress  Mental Status:  oriented to person, place and time and normal affect  Lungs:  clear to auscultation bilaterally, normal effort  Heart:  Regular rate, irregularly irregular rhythm, no murmur  Abdomen:  soft, nontender, nondistended, normal bowel sounds, no masses, hepatomegaly, splenomegaly  Extremities:  no edema, redness, tenderness in the calves  Skin:  no gross lesions, rashes, induration    Assessment:        Hospital Problems           Last Modified POA    * (Principal) Acute CVA (cerebrovascular accident) ([address] 4/4/2021 Yes    Chronic atrial fibrillation (Chronic) 4/4/2021 Yes    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 ([address] (Chronic) 4/4/2021 Yes    Hypothyroidism (Chronic) 4/4/2021 Yes    Hyperlipidemia (Chronic) 4/4/2021 Yes    Hypertension (Chronic) 4/4/2021 Yes    Acute kidney injury superimposed on CKD ([address] 4/4/2021 Yes          Plan:        #Acute CVA: MRI revealing few scattered foci of acute/subacute ischemia in the right frontal and left parietal lobes likely related to an embolic phenomenon. Neurology following. Pt on aspirin, eliquis, statin. EEG revealing encephalopathy, no epileptiform changes noted. Pt currently on keppra, levels pending.   - will defer to neurology regarding further anticoagulation in the setting of acute CVA. Consult to hematology regarding potential eliquis failure    #Acute kidney injury CKD III 3a  - pre-renal in setting of TBW deficit  - continue IV fluids. Monitor creatinine daily  - hold losartan    #Hypernatremia  - secondary to total body water deficit. Continue 0.45NS, monitor sodium daily    #Chronic a-fib  - continue metoprolol, eliquis    #Chronic HFpEF  - stable. Blood pressure control    #HTN  - controlled on current regimen.  Continue to monitor    #HLD  - continue statin, increase to 40 mg nightly with acute CVA    #Hypothyroidism  - check TSH, continue levothyroxine     #IBS  - continue questran    #Replete electrolytes per protocol      Milagros Nuñez PA-C  4/5/2021  9:52 AM

## 2021-04-05 NOTE — ED NOTES
Patient INC of stool at this time. Brief and linen changed at this time. Patient able to verbalize c/o at this time.       Sheryle Boys, RN  04/04/21 6745

## 2021-04-05 NOTE — PROCEDURES
EEG REPORT       Patient: Brian Ceron Age: 80 y.o. MRN: [id number]    Date: 4/4/2021  Referring Provider: Mariano Bennett MD    History: This routine 30 minute scalp EEG was recorded with video- monitoring for a 80 y.o. [place] female who presented with encephalopathy. This EEG was performed to evaluate for focal and epileptiform abnormalities.      Tal Barakat   Current Facility-Administered Medications   Medication Dose Route Frequency Provider Last Rate Last Admin    0.45 % sodium chloride infusion   Intravenous Continuous Osbaldo Pen PA-C 75 mL/hr at 04/05/21 1114 New Bag at 04/05/21 1114    sodium chloride flush 0.9 % injection 10 mL  10 mL Intravenous PRN Aquiles Hazel MD        acetaminophen (TYLENOL) tablet 500 mg  500 mg Oral BID Vessie [place], APRN - CNS   500 mg at 04/05/21 1121    albuterol (PROVENTIL) nebulizer solution 2.5 mg  2.5 mg Nebulization Q6H PRN Vessie Camp, APRN - CNS        apixaban (ELIQUIS) tablet 5 mg  5 mg Oral BID Vessie Camp, APRN - CNS   5 mg at 04/05/21 1311    aspirin EC tablet 81 mg  81 mg Oral Daily Vessie Camp, APRN - CNS   81 mg at 04/05/21 1310    atorvastatin (LIPITOR) tablet 20 mg  20 mg Oral Nightly Vessie Camp, APRN - CNS   20 mg at 04/05/21 1135    cholestyramine light packet 4 g  1 packet Oral TID WC Vessie Camp, APRN - CNS   4 g at 04/05/21 1137    docusate sodium (COLACE) capsule 100 mg  100 mg Oral Daily Vessie Camp, APRN - CNS   100 mg at 04/05/21 1310    ferrous sulfate (FE TABS 325) EC tablet 325 mg  325 mg Oral Daily with breakfast Vessie Camp, APRN - CNS   325 mg at 04/05/21 1311    levETIRAcetam (KEPPRA) tablet 500 mg  500 mg Oral BID Vessie Camp, APRN - CNS   500 mg at 04/05/21 1136    levothyroxine (SYNTHROID) tablet 112 mcg  112 mcg Oral Daily Vessie Camp, APRN - CNS   112 mcg at 04/05/21 1136    [Held by provider] losartan (COZAAR) tablet 50 mg  50 mg Oral Daily aKssidy Conti, APRN - CNS        metoprolol tartrate (LOPRESSOR) tablet 75 mg  75 mg Oral BID Redia Silk, APRN - CNS   75 mg at 04/05/21 1136    ondansetron (ZOFRAN) tablet 4 mg  4 mg Oral Q6H PRN Redia Silk, APRN - CNS        polyethylene glycol (GLYCOLAX) packet 17 g  17 g Oral Daily PRN Redia Silk, APRN - CNS        potassium chloride (KLOR-CON M) extended release tablet 20 mEq  20 mEq Oral BID Redia Silk, APRN - CNS   20 mEq at 04/05/21 1135    sertraline (ZOLOFT) tablet 50 mg  50 mg Oral Daily Redia Silk, APRN - CNS   50 mg at 04/05/21 1310    sodium chloride flush 0.9 % injection 10 mL  10 mL Intravenous 2 times per day Redia Silk, APRN - CNS   10 mL at 04/05/21 1115    sodium chloride flush 0.9 % injection 10 mL  10 mL Intravenous PRN Redia Silk, APRN - CNS        0.9 % sodium chloride infusion  25 mL Intravenous PRN Redia Silk, APRN - CNS        promethazine (PHENERGAN) tablet 12.5 mg  12.5 mg Oral Q6H PRN Redia Silk, APRN - CNS        Or    ondansetron (ZOFRAN) injection 4 mg  4 mg Intravenous Q6H PRN Redia Silk, APRN - CNS        magnesium hydroxide (MILK OF MAGNESIA) 400 MG/5ML suspension 30 mL  30 mL Oral Daily PRN Redia Silk, APRN - CNS           Technical Description: This is a 21 channel digital EEG recording with time-locked video. Electrodes were placed in accordance with the 10-20 International System of Electrode Placement. Single lead EKG monitoring as well as temporal electrodes were included. The patient was not sleep deprived. This recording was obtained during wakefulness. EEG Description: The dominant background activity during maximal recorded wakefulness consisted of 6-7 Hz of polymorphic theta activity of 25-40 uV. There was poor anterior posterior amplitude gradient. There was no asymmetry between the two hemispheres. Occasional runs of 2 to 3 Hz of diffuse rhythmic activity was seen lasting for 3 to 4 seconds.   During behavioral sleep, rudimentary

## 2021-04-05 NOTE — PROGRESS NOTES
Physical Therapy    DATE: 2021    NAME: Nadine Villalobos  MRN: [id number]   : 1930      Patient not seen this date for Physical Therapy due to:    Testing: pt awaiting transport to MRI. PT will check back as time allows.       Electronically signed by Allegra Duque PT on 2021 at 9:14 AM

## 2021-04-05 NOTE — H&P
[facility]  Office: [address], DO, Uyen Hernandez, DO, Emiliana Marti, DO, Yeimi Prince, DO, Rober Soto MD, Shawna Kingston MD, Tuan Gamez MD, Farnaz Malcolm MD, Gardenia Rodriguez MD, Joe Guadalupe MD, Yamile Joya MD, Chuyita Bhatti MD, Piper Springer DO, Susanna Gimenez MD, Leslie Diaz DO, Miguel Allen MD,  Santino Mansfield DO, Jessica Hannah MD, Ashok Teresa MD, Ángela Johnston MD, Shelly Lira MD, Demetrice Leslie, [facility], [facility], CNP, Heather Galo, CNP, Latrice Ceballos, CNS, Trudy Wilcox, CNP, Mar Crump, CNP, Butch Pena, CNP, Ladi Potter, CNP, Helen Ramos, CNP, Paty Millard PA-C, Tamir Courtney, [facility], Niko Thrasher, CNP, Myrtle Henderson, CNP, Hollie Meyer, CNP, Krystina Gonzales, CNP, Florentin Lara, CNP, Jasiel Hernandez, [address]    HISTORY AND PHYSICAL EXAMINATION            Date:   4/4/2021  Patient name:  Guille Garvey  Date of admission:  4/4/2021 10:28 PM  MRN:   [id number]  Account:  [de-identified]  YOB: 1930  PCP:    Cornell Liang MD  Room:   3770/5289-32  Code Status:    Full Code    Chief Complaint:     AMS, Dysarthria    History Obtained From:     patient, electronic medical record    History of Present Illness:     Guille Garvey is a 80 y.o. Non-/non  female who presents with No chief complaint on file. and is admitted to the hospital for the management of Acute CVA (cerebrovascular accident) ([facility] [address]     This is a 79-year-old female with underlying history of diastolic heart failure, CKD stage III, chronic A. 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  Telestroke team was consulted from the outlying facility; Dr. Flaquita Christensen evaluated the patient, and recommended transfer to higher level of care and neurology consult for further work-up of breakthrough seizures. Initial NIH documented as 4. Presenting labs reveal sodium: 145, potassium: 4.4, chloride: 111, BUN: 24, creatinine: 1.23, GFR: 41, troponin: 39--> 88, COVID-19: Negative, CT head: Without acute findings,   CXR: Pulmonary vascular congestion. given the patient is on chronic anticoagulation she was not a candidate for TPA. Patient is transferred to [facility] for higher level of care and neurology consult which was called prior to transfer. 2D echo (2020):  Left ventricle is normal in size, normal wall thickness, global left  ventricular systolic function is low normal, calculated ejection fraction is 58%, visually appears to be 50%. Inferior wall appears akinetic from the mid to apex. Evidence of diastolic dysfunction. Negative bubble study, no shunt noted. Mild to moderate mitral regurgitation. Moderate to severe tricuspid regurgitation. Estimated right ventricular systolic pressure is 35 mmHg. Mild pulmonic insufficiency.       On my evaluation, patient is lying in bed and appears comfortable. Her speech is slowed and somewhat difficult to understand, but she is alert and oriented to self, time, and situation. She denies any chest pain or shortness of breath. She does endorse diarrhea but confirms this is a chronic finding with her IBS. Patient is following commands and moving all extremities, however she does seem to have some left-sided weakness. At baseline, patient endorses left-sided residuals from previous stroke, but confirms she feels weaker than normal.  She is largely wheelchair dependent at baseline.   Denies headache, dizziness/lightheadedness, neck pain, numbness tingling to extremities, and is afebrile. Past Medical History:     Past Medical History:   Diagnosis Date    Acute kidney failure ([facility] [address] 06/28/2019    Anemia     Aphasia     Arthritis     Atrial fibrillation (HCC)     Bile duct calculus     Calculus of gallbladder and bile duct without cholecystitis without obstruction     Cerebral infarction (HCC)     CHF (congestive heart failure) (HCC)     Chronic kidney disease     Stage 3    Chronic kidney disease, stage 3     Constipation     COVID-19     CVA (cerebral vascular accident) ([facility] [address]     Dysarthria and anarthria     Fall     Gout     Gout     Gout     Hyperlipidemia     Hypertension     Hypokalemia 06/28/2019    Hypokalemia     Hypomagnesemia 06/28/2019    Hypomagnesemia     Hypothyroidism     IBS (irritable bowel syndrome)     Irritable bowel syndrome     Ischemia     muscle    Left carotid stenosis     Metabolic encephalopathy 05/32/5714    Mitral valve insufficiency     Nonrheumatic mitral (valve) insufficiency 06/28/2019    Seizures (HCC)     TIA (transient ischemic attack)     UTI (urinary tract infection)         Past Surgical History:     Past Surgical History:   Procedure Laterality Date    CARPAL TUNNEL RELEASE  2012    Dr. Rosalind Ordonez - right side    CATARACT REMOVAL WITH IMPLANT Bilateral     CHOLECYSTECTOMY      ERCP N/A 2/11/2019    Driss Birmingham MD - ERCP, Sphincterotomy, balloon dilatation and stent placement with removal of multiple stones    TOTAL KNEE ARTHROPLASTY Right     TOTAL KNEE ARTHROPLASTY Left     UPPER GASTROINTESTINAL ENDOSCOPY  07/31/2019    with stent removal by Dr. Naif Foley N/A 7/31/2019    EGD STENT REMOVAL performed by Bonilla Mckinney MD at [address] [place] [address]        Medications Prior to Admission:     Prior to Admission medications    Medication Sig Start Date End Date Taking?  Authorizing Provider   Nutritional Supplements (RESOURCE JUICE DRINK PO) Take 1 Dose by mouth 3 times daily    Historical Provider, MD   albuterol (PROVENTIL) (2.5 MG/3ML) 0.083% nebulizer solution Take 2.5 mg by nebulization every 6 hours as needed for Wheezing    Historical Provider, MD   Mouthwashes Mark Cortes) LIQD oral solution Swish and spit 15 mLs daily as needed    Historical Provider, MD   losartan (COZAAR) 50 MG tablet Take 1 tablet by mouth daily 1/20/21   KATIA Underwood - CNP   potassium chloride (KLOR-CON M) 20 MEQ extended release tablet Take 1 tablet by mouth 2 times daily 1/19/21   KATIA Underwood - CNP   zinc sulfate (ZINCATE) 220 (50 Zn) MG capsule Take 2 capsules by mouth daily for 7 days 1/20/21 1/27/21  KATIA Underwood - CNP   ascorbic acid (VITAMIN C) 1000 MG tablet Take 1 tablet by mouth 2 times daily for 7 days 1/19/21 1/26/21  KATIA Underwood - CNP   Vitamin D (CHOLECALCIFEROL) 50 MCG (2000 UT) TABS tablet Take 1 tablet by mouth daily for 7 days 1/20/21 1/27/21  KATIA Underwood - CNP   sertraline (ZOLOFT) 50 MG tablet Take 50 mg by mouth daily  8/26/20   Historical Provider, MD   Artificial Saliva (BIOTENE DRY MOUTH MOISTURIZING) SOLN liquid Take 1 drop by mouth as needed    Historical Provider, MD   metoprolol tartrate 75 MG TABS Take 75 mg by mouth 2 times daily 5/27/20   Mark White MD   levETIRAcetam (KEPPRA) 500 MG tablet Take 1 tablet by mouth 2 times daily 5/27/20   Mark White MD   docusate sodium (COLACE) 100 MG capsule Take 100 mg by mouth daily    Historical Provider, MD   polyethylene glycol (GLYCOLAX) packet Take 17 g by mouth daily as needed for Constipation    Historical Provider, MD   ondansetron (ZOFRAN) 4 MG tablet Take 4 mg by mouth every 6 hours as needed for Nausea or Vomiting    Historical Provider, MD   cholestyramine (QUESTRAN) 4 g packet Take 1 packet by mouth 3 times daily (with meals) 2/14/20   Bonilla Mckinney MD   ferrous sulfate 325 (65 Fe) MG tablet Take 1 tablet by mouth daily (with breakfast) 11/5/19   Bonilla Mckinney MD   apixaban (ELIQUIS) 5 MG TABS tablet Take 5 mg by mouth 2 times daily    Historical Provider, MD   atorvastatin (LIPITOR) 20 MG tablet Take 1 tablet by mouth nightly 19   Zion Darling PA-C   aspirin 81 MG EC tablet Take 1 tablet by mouth daily 19   Edin Knight MD   levothyroxine (SYNTHROID) 112 MCG tablet Take 1 tablet by mouth Daily 18   Nidhi Morgan MD   acetaminophen (TYLENOL) 500 MG tablet Take 500 mg by mouth 2 times daily     Historical Provider, MD        Allergies:     Patient has no known allergies. Social History:     Tobacco:    reports that she has never smoked. She has never used smokeless tobacco.  Alcohol:      reports no history of alcohol use. Drug Use:  reports no history of drug use. Family History:     Family History   Problem Relation Age of Onset    High Blood Pressure Mother     Heart Disease Father        Review of Systems:     Positive and Negative as described in HPI. Review of Systems   Unable to perform ROS: Mental status change       Physical Exam:   BP (!) 150/79   Pulse 84   Temp 98.2 °F (36.8 °C) (Axillary)   Resp 19   SpO2 95%   Temp (24hrs), Av.8 °F (36.6 °C), Min:97.3 °F (36.3 °C), Max:98.2 °F (36.8 °C)    No results for input(s): POCGLU in the last 72 hours. No intake or output data in the 24 hours ending 21 [id number]    Physical Exam  Vitals signs and nursing note reviewed. Constitutional:       General: She is not in acute distress. Appearance: She is obese. She is not toxic-appearing or diaphoretic. HENT:      Head: Normocephalic and atraumatic. Right Ear: External ear normal.      Left Ear: External ear normal.      Nose: Nose normal. No congestion or rhinorrhea. Mouth/Throat:      Mouth: Mucous membranes are dry. Pharynx: Oropharynx is clear. Eyes:      Extraocular Movements: Extraocular movements intact.       Conjunctiva/sclera: Conjunctivae normal.      Pupils: Pupils are equal, round, and reactive to light.   Neck:      Musculoskeletal: Normal range of motion and neck supple. No neck rigidity or muscular tenderness. Cardiovascular:      Rate and Rhythm: Normal rate and regular rhythm. Pulses: Normal pulses. Heart sounds: Normal heart sounds. No murmur. No friction rub. No gallop. Pulmonary:      Effort: Pulmonary effort is normal. No respiratory distress. Breath sounds: Normal breath sounds. No wheezing, rhonchi or rales. Abdominal:      General: Bowel sounds are normal. There is no distension. Palpations: Abdomen is soft. There is no mass. Tenderness: There is no abdominal tenderness. Musculoskeletal:         General: No swelling. Right lower leg: No edema. Left lower leg: No edema. Skin:     General: Skin is warm and dry. Capillary Refill: Capillary refill takes less than 2 seconds. Coloration: Skin is not jaundiced. Findings: No bruising. Neurological:      Mental Status: She is alert and oriented to person, place, and time. Cranial Nerves: Dysarthria present. Motor: Weakness present. No tremor or seizure activity.       Comments: Generalized slowing   Psychiatric:         Mood and Affect: Mood normal.         Behavior: Behavior normal.         Investigations:      Laboratory Testing:  Recent Results (from the past 24 hour(s))   EKG 12 Lead    Collection Time: 04/04/21  3:25 PM   Result Value Ref Range    Ventricular Rate 88 BPM    Atrial Rate 88 BPM    QRS Duration 78 ms    Q-T Interval 392 ms    QTc Calculation (Bazett) 474 ms    R Axis 7 degrees    T Axis -11 degrees   CBC Auto Differential    Collection Time: 04/04/21  3:40 PM   Result Value Ref Range    WBC 4.9 3.5 - 11.3 k/uL    RBC 4.21 3.95 - 5.11 m/uL    Hemoglobin 13.3 11.9 - 15.1 g/dL    Hematocrit 42.6 36.3 - 47.1 %    .2 82.6 - 102.9 fL    MCH 31.6 25.2 - 33.5 pg    MCHC 31.2 28.4 - 34.8 g/dL    RDW 14.1 11.8 - 14.4 %    Platelets 443 623 - 878 k/uL    MPV 11.2 8.1 - 13.5 fL    NRBC Automated 0.0 0.0 per 100 WBC    Differential Type NOT REPORTED     WBC Morphology NOT REPORTED     RBC Morphology NOT REPORTED     Platelet Estimate NOT REPORTED     Seg Neutrophils 80 (H) 36 - 65 %    Lymphocytes 9 (L) 24 - 43 %    Monocytes 11 3 - 12 %    Eosinophils % 0 (L) 1 - 4 %    Immature Granulocytes 0 0 %    Basophils 0 0 - 2 %    Segs Absolute 3.92 1.50 - 8.10 k/uL    Absolute Lymph # 0.44 (L) 1.10 - 3.70 k/uL    Absolute Mono # 0.54 0.10 - 1.20 k/uL    Absolute Eos # 0.00 0.00 - 0.44 k/uL    Absolute Immature Granulocyte 0.00 0.00 - 0.30 k/uL    Basophils Absolute 0.00 0.0 - 0.2 k/uL    Morphology Normal    Comprehensive Metabolic Panel w/ Reflex to MG    Collection Time: 04/04/21  3:40 PM   Result Value Ref Range    Glucose 110 (H) 70 - 99 mg/dL    BUN 24 (H) 8 - 23 mg/dL    CREATININE 1.23 (H) 0.50 - 0.90 mg/dL    Bun/Cre Ratio 20 9 - 20    Calcium 9.7 8.6 - 10.4 mg/dL    Sodium 145 (H) 135 - 144 mmol/L    Potassium 4.4 3.7 - 5.3 mmol/L    Chloride 111 (H) 98 - 107 mmol/L    CO2 20 20 - 31 mmol/L    Anion Gap 14 9 - 17 mmol/L    Alkaline Phosphatase 91 35 - 104 U/L    ALT 11 5 - 33 U/L    AST 16 <32 U/L    Total Bilirubin 0.36 0.3 - 1.2 mg/dL    Total Protein 6.1 (L) 6.4 - 8.3 g/dL    Albumin 3.7 3.5 - 5.2 g/dL    Albumin/Globulin Ratio 1.5 1.0 - 2.5    GFR Non- 41 (L) >60 mL/min    GFR African American 50 (L) >60 mL/min    GFR Comment          GFR Staging         Troponin    Collection Time: 04/04/21  3:40 PM   Result Value Ref Range    Troponin, High Sensitivity 39 (H) 0 - 14 ng/L    Troponin T NOT REPORTED <0.03 ng/mL    Troponin Interp NOT REPORTED    Protime-INR    Collection Time: 04/04/21  3:40 PM   Result Value Ref Range    Protime 19.7 (H) 11.5 - 14.2 sec    INR 1.7    COVID-19, Rapid    Collection Time: 04/04/21  4:02 PM    Specimen: Nasopharyngeal Swab   Result Value Ref Range    Specimen Description . NASOPHARYNGEAL SWAB     SARS-CoV-2, Rapid Not Detected Not Detected   Troponin    Collection Time: 04/04/21  5:50 PM   Result Value Ref Range    Troponin, High Sensitivity 38 (H) 0 - 14 ng/L    Troponin T NOT REPORTED <0.03 ng/mL    Troponin Interp NOT REPORTED        Imaging/Diagnostics:  Ct Head Wo Contrast    Addendum Date: 4/4/2021    ADDENDUM: Findings were discussed with Nathalie De Oliveira at 3:17 pm on 4/4/2021. Result Date: 4/4/2021  No CT evidence of an acute infarct. Xr Chest Portable    Result Date: 4/4/2021  1. Mild cardiomegaly and pulmonary vascular congestion. Mild bibasilar atelectasis. 2. No acute focal airspace consolidation. Cta Head Neck W Contrast    Addendum Date: 4/4/2021    ADDENDUM: 3D reconstructed images were performed on a separate workstation and provided for review. Result Date: 4/4/2021  Partially occluded right vertebral artery. No large vessel occlusion visualized within the head. Assessment :      Hospital Problems           Last Modified POA    * (Principal) Acute CVA (cerebrovascular accident) ([address] 4/4/2021 Yes    Chronic atrial fibrillation (Chronic) 4/4/2021 Yes    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 ([address] (Chronic) 4/4/2021 Yes    Hypothyroidism (Chronic) 4/4/2021 Yes    Hyperlipidemia (Chronic) 4/4/2021 Yes    Hypertension (Chronic) 4/4/2021 Yes    Acute kidney injury superimposed on CKD ([address] 4/4/2021 Yes          Plan:     Patient status inpatient in the Progressive Unit/Step down    1. Strokelike symptoms: Patient evaluated by telestroke at the outlying facility, no acute findings on CT imaging, neurology is consulted for further work-up and appreciate further recommendations, MRI brain in a.m.  2. Seizure disorder: Neurology consulted for possible breakthrough seizures, continue patient's home dose Keppra, and check levetiracetam level  3.  VERONICA on CKD: Baseline creatinine ~0.8, clinically patient appears very dry, suspect poor free water intake given borderline sodium, repeat electrolytes now, continue gentle hydration for now, and repeat renal function in a.m.  4. Chronic A. fib: Continue home dose Eliquis, rate controlled with metoprolol  5. Chronic diastolic heart failure: No evidence of acute exacerbation, clinically the patient appears dry, last EF: 58%  6. Hypertension: Resume home dose losartan and metoprolol  7. HLD: On statin  8. Hypothyroidism: Resume home dose levothyroxine, check thyroid panel  9. IBS: Chronic diarrhea, on Questran  10. Routine labs, home medications resumed, n.p.o. for now, nursing to complete bedside swallow eval prior to any p.o. intake  11. DVT prophylaxis the patient with Eliquis  12. Full code    Consultations:   IP CONSULT TO NEUROLOGY    Patient is admitted as inpatient status because of co-morbidities listed above, severity of signs and symptoms as outlined, requirement for current medical therapies and most importantly because of direct risk to patient if care not provided in a hospital setting. Expected length of stay > 48 hours.     KATIA Davis NP  4/4/2021  11:29 PM    Copy sent to Dr. Rosana Savage MD

## 2021-04-05 NOTE — PLAN OF CARE
Problem: Falls - Risk of:  Goal: Will remain free from falls  Description: Will remain free from falls  Outcome: Ongoing  Note: No falls entire shift. Fall precautions in place. Continue to monitor. Problem: Skin Integrity:  Goal: Skin integrity will stabilize  Description: Skin integrity will stabilize  Outcome: Ongoing  Note: Skin assessment complete. Interventions in place. See doc flowsheet for interventions initiated. Pt turned every two hours to prevent skin breakdown. Will continue to monitor for additional needs and skin breakdown

## 2021-04-05 NOTE — PROGRESS NOTES
Physical Therapy    Facility/Department: [facility] NEURO  Initial Assessment    NAME: Lenora Dwyer  : 1930  MRN: [id number]    Date of Service: 2021    Discharge Recommendations: Further therapy recommended at discharge. No chief complaint on file. Lenora Dwyer is a80 y.o. female who presents to the emergency department       This is a 80years old patient from nursing home coming to the ER due to the fact that after lunch today roughly about 130 developed some slurred speech, lethargy and obtundation. Also reported she had some diarrhea. She denies any headache, nausea, vomiting, shortness of breath, cough, fever. She is somewhat hard to interact with and she seemed to have slowed mentation and slow capacity to follow commands. PT Equipment Recommendations  Equipment Needed: No    Assessment   Body structures, Functions, Activity limitations: Decreased functional mobility ; Decreased endurance;Decreased ADL status; Decreased balance;Decreased strength;Decreased safe awareness;Decreased high-level IADLs  Assessment: Pt ambulates 3 side steps at EOB with min A x2 and RW. Pt currently requires 24 hr support to complete functional mobility safely d/t high fall risk from decreased balance and endurance. Pt would benefit from continued therapy to promote endurance, balance, and strengthening. Prognosis: Good  Decision Making: Medium Complexity  PT Education: Goals;PT Role;Plan of Care  Barriers to Learning: none  REQUIRES PT FOLLOW UP: Yes  Activity Tolerance  Activity Tolerance: Patient limited by fatigue;Patient limited by endurance       Patient Diagnosis(es): There were no encounter diagnoses.      has a past medical history of Acute kidney failure ([address] Anemia, Aphasia, Arthritis, Atrial fibrillation (HCC), Bile duct calculus, Calculus of gallbladder and bile duct without cholecystitis without obstruction, Cerebral infarction ([address] CHF (congestive heart failure) ([address] Chronic kidney disease, Chronic kidney disease, stage 3, Constipation, COVID-19, CVA (cerebral vascular accident) ([address] Dysarthria and anarthria, Fall, Gout, Gout, Gout, Hyperlipidemia, Hypertension, Hypokalemia, Hypokalemia, Hypomagnesemia, Hypomagnesemia, Hypothyroidism, IBS (irritable bowel syndrome), Irritable bowel syndrome, Ischemia, Left carotid stenosis, Metabolic encephalopathy, Mitral valve insufficiency, Nonrheumatic mitral (valve) insufficiency, Seizures ([address] TIA (transient ischemic attack), and UTI (urinary tract infection). has a past surgical history that includes Cataract removal with implant (Bilateral); Cholecystectomy; Carpal tunnel release (2012); ERCP (N/A, 2/11/2019); Total knee arthroplasty (Right); Total knee arthroplasty (Left); Upper gastrointestinal endoscopy (07/31/2019); and Upper gastrointestinal endoscopy (N/A, 7/31/2019). Restrictions  Restrictions/Precautions  Restrictions/Precautions: Fall Risk, Up as Tolerated  Required Braces or Orthoses?: No  Vision/Hearing  Vision: Within Functional Limits  Hearing: Within functional limits     Subjective  General  Patient assessed for rehabilitation services?: Yes  Response To Previous Treatment: Not applicable  Family / Caregiver Present: No  Follows Commands: Within Functional Limits  Subjective  Subjective: RN and pt agreeable to PT. pt agreeable and pleasant.  Pt supine in bed at start of eval.  Pain Screening  Patient Currently in Pain: No  Pain Assessment  Pain Assessment: 0-10  Pain Level: 0  Vital Signs  Patient Currently in Pain: No       Orientation  Orientation  Overall Orientation Status: Impaired  Orientation Level: Oriented to person;Disoriented to situation;Oriented to time;Oriented to place  Social/Functional History  Social/Functional History  Lives With: Alone  Type of Home: Facility  Home Layout: One level  Home Access: Level entry  Bathroom Shower/Tub: Walk-in shower  Bathroom Toilet: Standard  Bathroom Equipment: Grab bars in shower, Grab bars around toilet  Home Equipment: Rolling walker, Wheelchair-manual(use of RW for short distances, use of manual w/c longer distances)  Receives Help From: Family  ADL Assistance: Needs assistance  Homemaking Assistance: Needs assistance  Homemaking Responsibilities: No  Active : Yes  Mode of Transportation: Car  Occupation: Retired  Type of occupation:   Leisure & Hobbies: bingo  Cognition   Cognition  Overall Cognitive Status: Exceptions  Following Commands: Follows one step commands with increased time; Follows one step commands with repetition  Attention Span: Attends with cues to redirect  Safety Judgement: Decreased awareness of need for assistance;Decreased awareness of need for safety  Problem Solving: Assistance required to generate solutions;Assistance required to correct errors made;Assistance required to identify errors made  Insights: Decreased awareness of deficits  Initiation: Requires cues for all  Sequencing: Requires cues for all    Objective          Joint Mobility  Spine: WFL  ROM RLE: WFL  ROM LLE: WFL  ROM RUE: WFL  ROM LUE: WFL  Strength RLE  Strength RLE: Exception  R Hip Flexion: 2+/5  R Knee Flexion: 4-/5  R Knee Extension: 4-/5  R Ankle Dorsiflexion: 3+/5  R Ankle Plantar flexion: 3+/5  Strength LLE  Strength LLE: Exception  L Hip Flexion: 2+/5  L Knee Flexion: 4-/5  L Knee Extension: 4-/5  L Ankle Dorsiflexion: 3+/5  L Ankle Plantar Flexion: 3+/5  Strength RUE  Strength RUE: Exception  R Shoulder Flexion: 3/5  R Elbow Flexion: 3+/5  R Elbow Extension: 3+/5  Strength LUE  Strength LUE: Exception  L Shoulder Flexion: 3/5  L Elbow Flexion: 3+/5  L Elbow Extension: 3+/5  Tone RLE  RLE Tone: Normotonic  Tone LLE  LLE Tone: Normotonic  Motor Control  Gross Motor?: WFL  Sensation  Overall Sensation Status: WFL(pt denies n/t at this time)  Bed mobility  Supine to Sit: Maximum assistance  Sit to Supine: Maximum assistance  Scooting:  Moderate assistance  Comment: Assistance required for LE, cues to progress trunk for bed mobility with good return. Transfers  Sit to Stand: Minimal Assistance  Stand to sit: Minimal Assistance  Comment: RW for UE support, cues for hand placement with fair return. Pt requires assistance to move L foot posteriorly towards bed prior to standing. Ambulation  Ambulation?: Yes  More Ambulation?: No  Ambulation 1  Surface: level tile  Device: Rolling Walker  Assistance: Minimal assistance;2 Person assistance  Gait Deviations: Slow Coleen;Decreased step length;Decreased step height  Distance: 3 side steps at EOB  Comments: unsteady, requires max cues to progress task with good return. Pt has posterior lean in standing. Stairs/Curb  Stairs?: No     Balance  Posture: Good  Sitting - Static: Fair  Sitting - Dynamic: Fair;-  Standing - Static: Fair;-  Standing - Dynamic: Poor;+  Comments: Pt with L lean in sitting, able to correct with verbal cues. Standing balance assessed with RW        Plan   Plan  Times per week: 5-6x  Current Treatment Recommendations: Strengthening, IADL Training, Neuromuscular Re-education, Home Exercise Program, Safety Education & Training, Balance Training, Patient/Caregiver Education & Training, Functional Mobility Training, Equipment Evaluation, Education, & procurement, Transfer Training, Gait Training, ADL/Self-care Training  Safety Devices  Type of devices:  All fall risk precautions in place, Bed alarm in place, Call light within reach, Gait belt, Left in bed             AM-PAC Score  AM-PAC Inpatient Mobility Raw Score : 13 (04/05/21 1551)  AM-PAC Inpatient T-Scale Score : 36.74 (04/05/21 1551)  Mobility Inpatient CMS 0-100% Score: 64.91 (04/05/21 1551)  Mobility Inpatient CMS G-Code Modifier : CL (04/05/21 1551)          Goals  Short term goals  Time Frame for Short term goals: 14 visits  Short term goal 1: Complete bed mobility with SBA  Short term goal 2: Complete transfers with SBA and RW  Short term goal 3: Complete 50 ft of gait with RW and SBA  Short term goal 4: Achieve good standing dynamic balance  Short term goal 5: Participate in 30 minutes of therapy to promote endurance       Therapy Time   Individual Concurrent Group Co-treatment   Time In 1351         Time Out 1418         Minutes 27         Timed Code Treatment Minutes: 20 Minutes       Liseth Lazaro   Evaluation/treatment performed by Student PT under the supervision of co-signing PT who agrees with all evaluation/treatment and documentation.

## 2021-04-05 NOTE — PROGRESS NOTES
Goals:  Short-term Goals  Goal 1: Pt. will recall 3-5 units with and without distractions with 90% accuracy. Goal 2: Pt. will complete functional problem solving tasks with 90% accuracy. Goal 3: Pt. will complete 4-5 step functional verbal sequencing tasks with 90% accuracy. Goal 4: Pt. will complete remainder of evaluation. Patient/family involved in developing goals and treatment plan: yes    Subjective:  General  Chart Reviewed: Yes  Family / Caregiver Present: No  Social/Functional History  Type of Home: Facility  Active : No  Vision  Vision: Within Functional Limits  Hearing  Hearing: Within functional limits      Objective:     Oral/Motor  Oral Motor: Within functional limits    Expression  Primary Mode of Expression: Verbal    Motor Speech  Motor Speech: Exceptions to [facility](Pt. with slow and deliberate speech. 100% intelligibility)  Dysarthria : Mild         Cognition:      Orientation  Overall Orientation Status: Within Functional Limits  Attention  Attention: Within Functional Limits  Memory  Memory: Exceptions to [facility]  Short-term Memory: Moderate(0/3 increased to 1/3 with min verbal cues, 1/3)  Immediate Memory: Moderate(3/3, 0/5, 3/3)  Problem Solving  Problem Solving: Exceptions to [facility]  Verbal Reasoning Skills: Mild-Moderate(Antonyms: 2/3, Inductive Reasonin/4, Deductive Reasoning & Similarities/Differences: NT)  Safety/Judgement  Safety/Judgement: Exceptions to [facility]  Insight: Moderate  (1/3)  Flexibility of Thought: Severe(0/3)  Word Associations: To be assessed in therapy  Verbal Sequencing:  Severe (0/4)  Word Generation:  To be assessed in therapy    Prognosis:  Speech Therapy Prognosis  Prognosis: Fair  Individuals consulted  Consulted and agree with results and recommendations: Patient    Education:  Patient Education: yes  Patient Education Response: Verbalizes understanding          Therapy Time:   Individual Concurrent Group Co-treatment   Time In 1100         Time Out 1110 Minutes 10                 Stone Hutson M.A.  CCC-SLP  4/5/2021 11:22 AM

## 2021-04-05 NOTE — PROGRESS NOTES
PHARMACY NOTE:    Car Adriano was ordered Biotene Dry Mouth Moisturizing Liquis. As per the [address], herbals and certain dietary supplements/OTC will be discontinued. The herbal or dietary supplement/OTC may be continued after discharge from the hospital.      Leslie Cipro. D.    4/4/2021  11:08 PM

## 2021-04-05 NOTE — CONSULTS
Department of Neurology                                         Resident Consult Note    Reason for Consult: Encephalopathy, dysarthria, lethargy  Requesting Physician:  Asya Devi    History Obtained From:  electronic medical record, staff    CHIEF COMPLAINT:       Encephalopathy, dysarthria, lethargy    HISTORY OF PRESENT ILLNESS:       The patient is a 80 y.o. female who presents with Encephalopathy, dysarthria, lethargy is known well 1:30 PM today. Patient has known history of right vert nondominant occlusion history of seizures on Keppra. Stroke alert  NIH 4  Premorbid MR S2  Patient was suspected to have metabolic encephalopathy due to diarrhea versus breakthrough seizure and post ictal  Patient was transferred to [address] [place] [address] for further evaluation and management with neurology consult. CT head 4/4/21 was unremarkable  CTA head and neck 4/4/21 partially occluded right vertebral artery. No LVO. Comorbidities include seizures, A. fib, hyperlipidemia, hypertension, hypothyroidism, chronic diastolic CHF due to valvular disease, Covid 19 infection, TIA, CVA,  75-85% stenosis proximal left ICA, proximal right ICA 50%. 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 Proximal P2 right PCA 50%    He was last seen in the clinic 9/21/2020. Due to side effects of Keppra, Keppra was being tapered off and the plan was if patient has another seizure to repeat EEG and if any epileptiform discharges considered starting Lamictal to replace Keppra. Keppra was started on February 10, 2020 at a dose of 250 mg twice daily and was increased on 5/27/2022 to the dose of 500 twice daily     imaging:  MRI brain 5/26/2020. No acute infarct. Motion degraded exam.  Cerebral parenchymal volume loss with chronic microvascular white matter ischemic disease. MRI brain 2/9/2020 small vessel ischemic change bilaterally with multiple small prior infarcts.   No acute infarct. MRI brain 4/7/2019 moderately severe microangiopathic changes throughout. No acute infarct. CTA head and neck 5/25/2020.  75% focal stenosis at the origins of bilateral cervical ICAs. Occlusion in the V1 segment and partial occlusion in mid V3 segment of right vertebral artery with asymmetrically decreased contrast opacification of V2 and proximal V3 segment improved since prior study. Focal fusiform dilatation of the distal V2 segment of left vertebral artery measuring up to 6 mm stable  90% focal stenosis in distal V4 segment of right vertebral artery stable. CT perfusion 2/9/2020 question of decreased perfusion in the right parieto-occipital lobe. No significant mismatch. CTA head neck with contrast 2/9/2020. Right vert occlusion with reconstitution of retrofilling in mid V3 segment. Minimal contrast in distal V2 and proximal V3 segment. Asymmetrically decreased contrast opacification of V4 segment of right vertebral artery secondary to proximal occlusion. 50% focal stenosis in proximal P2 segment of right PCA. 75% focal stenosis at the origin of the bilateral ICAs. Partially visualized fluid collection in the right axilla. CT head 4/7/2019. No acute intracranial abnormality. Enlarging right fore-head subcutaneous hematoma. CT perfusion 4/7/2019 possible small area of ischemia posterior MCA territory on the left. Possible ischemia or cross cerebellar diaschisis right cerebellar hemisphere. CTA neck with contrast 4/7/2019. Moderately severe 75-85% stenosis proximal left ICA. Moderate stenosis proximal right ICA 50%. 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 Narrowed V3 segment right vertebral artery. EEG 10/6/2020. Generalized slowing with no evidence of epileptiform activity    EEG 2/10/2020. Mild left hemispheric slowing, more prominent in the left frontal temporal head area, admixed with occasional sharp waves.   This is concordant with a possible focal structural process over this area, along with possible epileptiform process. Video EEG 4/8/2019. No events were recorded. Interictal EEG was abnormal due to frequent left hemispheric delta and theta slowing suggestive of underlying neuronal dysfunction. This finding can also be secondary to postictal slowing. Monitoring was continued in order to record the patient's typical events.          PAST MEDICAL HISTORY :       Past Medical History:        Diagnosis Date    Acute kidney failure ([address] 06/28/2019    Anemia     Aphasia     Arthritis     Atrial fibrillation (HCC)     Bile duct calculus     Calculus of gallbladder and bile duct without cholecystitis without obstruction     Cerebral infarction ([address]     CHF (congestive heart failure) (HCC)     Chronic kidney disease     Stage 3    Chronic kidney disease, stage 3     Constipation     COVID-19     [facility]re[facility]) [facility]     Dysarthria and anarthria     Fall     Gout     Gout     Gout     Hyperlipidemia     Hypertension     Hypokalemia 06/28/2019    Hypokalemia     Hypomagnesemia 06/28/2019    Hypomagnesemia     Hypothyroidism     IBS (irritable bowel syndrome)     Irritable bowel syndrome     Ischemia     muscle    Left carotid stenosis     Metabolic encephalopathy 00/28/6714    Mitral valve insufficiency     Nonrheumatic mitral (valve) insufficiency 06/28/2019    Seizures (HCC)     TIA (transient ischemic attack)     UTI (urinary tract infection)        Past Surgical History:        Procedure Laterality Date    CARPAL TUNNEL RELEASE  2012    Dr. Kaela Knutson - right side    CATARACT REMOVAL WITH IMPLANT Bilateral     CHOLECYSTECTOMY      ERCP N/A 2/11/2019    Driss Birmingham MD - ERCP, Sphincterotomy, balloon dilatation and stent placement with removal of multiple stones    TOTAL KNEE ARTHROPLASTY Right     TOTAL KNEE ARTHROPLASTY Left     UPPER GASTROINTESTINAL ENDOSCOPY  07/31/2019    with stent removal by Dr. Stacia Eddy 7/31/2019    EGD STENT REMOVAL performed by Jagdeep Kerr MD at [facility] History:   Social History     Socioeconomic History    Marital status:      Spouse name: Not on file    Number of children: Not on file    Years of education: Not on file    Highest education level: Not on file   Occupational History    Not on file   Social Needs    Financial resource strain: Not on file    Food insecurity     Worry: Not on file     Inability: Not on file    Transportation needs     Medical: Not on file     Non-medical: Not on file   Tobacco Use    Smoking status: Never Smoker    Smokeless tobacco: Never Used   Substance and Sexual Activity    Alcohol use: No    Drug use: No    Sexual activity: Not on file   Lifestyle    Physical activity     Days per week: Not on file     Minutes per session: Not on file    Stress: Not on file   Relationships    Social connections     Talks on phone: Not on file     Gets together: Not on file     Attends Spiritism service: Not on file     Active member of club or organization: Not on file     Attends meetings of clubs or organizations: Not on file     Relationship status: Not on file    Intimate partner violence     Fear of current or ex partner: Not on file     Emotionally abused: Not on file     Physically abused: Not on file     Forced sexual activity: Not on file   Other Topics Concern    Not on file   Social History Narrative    Not on file       Family History:       Problem Relation Age of Onset    High Blood Pressure Mother     Heart Disease Father        Allergies:  Patient has no known allergies. Home Medications:  Prior to Admission medications    Medication Sig Start Date End Date Taking?  Authorizing Provider   Nutritional Supplements (RESOURCE JUICE DRINK PO) Take 1 Dose by mouth 3 times daily    Historical Provider, MD   albuterol (PROVENTIL) (2.5 MG/3ML) 0.083% nebulizer solution Take 2.5 mg by nebulization every 6 hours as needed for Wheezing    Historical Provider, MD   Mouthwashes Pablo Godinez) LIQD oral solution Swish and spit 15 mLs daily as needed    Historical Provider, MD   losartan (COZAAR) 50 MG tablet Take 1 tablet by mouth daily 1/20/21   KATIA Marinelli CNP   potassium chloride (KLOR-CON M) 20 MEQ extended release tablet Take 1 tablet by mouth 2 times daily 1/19/21   KATIA Marinelli CNP   zinc sulfate (ZINCATE) 220 (50 Zn) MG capsule Take 2 capsules by mouth daily for 7 days 1/20/21 1/27/21  KATIA Marinelli CNP   ascorbic acid (VITAMIN C) 1000 MG tablet Take 1 tablet by mouth 2 times daily for 7 days 1/19/21 1/26/21  KATIA Marinelli CNP   Vitamin D (CHOLECALCIFEROL) 50 MCG (2000 UT) TABS tablet Take 1 tablet by mouth daily for 7 days 1/20/21 1/27/21  KATIA Marinelli CNP   sertraline (ZOLOFT) 50 MG tablet Take 50 mg by mouth daily  8/26/20   Historical Provider, MD   Artificial Saliva (BIOTENE DRY MOUTH MOISTURIZING) SOLN liquid Take 1 drop by mouth as needed    Historical Provider, MD   metoprolol tartrate 75 MG TABS Take 75 mg by mouth 2 times daily 5/27/20   Jenniffer Gomes MD   levETIRAcetam (KEPPRA) 500 MG tablet Take 1 tablet by mouth 2 times daily 5/27/20   Jenniffer Gomes MD   docusate sodium (COLACE) 100 MG capsule Take 100 mg by mouth daily    Historical Provider, MD   polyethylene glycol (GLYCOLAX) packet Take 17 g by mouth daily as needed for Constipation    Historical Provider, MD   ondansetron (ZOFRAN) 4 MG tablet Take 4 mg by mouth every 6 hours as needed for Nausea or Vomiting    Historical Provider, MD   cholestyramine (QUESTRAN) 4 g packet Take 1 packet by mouth 3 times daily (with meals) 2/14/20   Torres To MD   ferrous sulfate 325 (65 Fe) MG tablet Take 1 tablet by mouth daily (with breakfast) 11/5/19   Torres To MD   apixaban (ELIQUIS) 5 MG TABS tablet Take 5 mg by mouth 2 times daily    Historical Provider, MD   atorvastatin (LIPITOR) 20 MG tablet Take 1 tablet by mouth nightly 6/21/19   Zion Darling PA-C   aspirin 81 MG EC tablet Take 1 tablet by mouth daily 4/11/19   Edin Knight MD   levothyroxine (SYNTHROID) 112 MCG tablet Take 1 tablet by mouth Daily 11/29/18   Nidhi Morgan MD   acetaminophen (TYLENOL) 500 MG tablet Take 500 mg by mouth 2 times daily     Historical Provider, MD       Current Medications:   Current Facility-Administered Medications: [Held by provider] acetaminophen (TYLENOL) tablet 500 mg, 500 mg, Oral, BID  albuterol (PROVENTIL) nebulizer solution 2.5 mg, 2.5 mg, Nebulization, Q6H PRN  [Held by provider] apixaban (ELIQUIS) tablet 5 mg, 5 mg, Oral, BID  [Held by provider] aspirin EC tablet 81 mg, 81 mg, Oral, Daily  [Held by provider] atorvastatin (LIPITOR) tablet 20 mg, 20 mg, Oral, Nightly  [Held by provider] cholestyramine light packet 4 g, 1 packet, Oral, TID WC  [Held by provider] docusate sodium (COLACE) capsule 100 mg, 100 mg, Oral, Daily  [Held by provider] ferrous sulfate (FE TABS 325) EC tablet 325 mg, 325 mg, Oral, Daily with breakfast  [Held by provider] levETIRAcetam (KEPPRA) tablet 500 mg, 500 mg, Oral, BID  [Held by provider] levothyroxine (SYNTHROID) tablet 112 mcg, 112 mcg, Oral, Daily  [Held by provider] losartan (COZAAR) tablet 50 mg, 50 mg, Oral, Daily  [Held by provider] metoprolol tartrate (LOPRESSOR) tablet 75 mg, 75 mg, Oral, BID  ondansetron (ZOFRAN) tablet 4 mg, 4 mg, Oral, Q6H PRN  polyethylene glycol (GLYCOLAX) packet 17 g, 17 g, Oral, Daily PRN  [Held by provider] potassium chloride (KLOR-CON M) extended release tablet 20 mEq, 20 mEq, Oral, BID  [Held by provider] sertraline (ZOLOFT) tablet 50 mg, 50 mg, Oral, Daily  sodium chloride flush 0.9 % injection 10 mL, 10 mL, Intravenous, 2 times per day  sodium chloride flush 0.9 % injection 10 mL, 10 mL, Intravenous, PRN  0.9 % sodium chloride infusion, 25 mL, Intravenous, PRN promethazine (PHENERGAN) tablet 12.5 mg, 12.5 mg, Oral, Q6H PRN **OR** ondansetron (ZOFRAN) injection 4 mg, 4 mg, Intravenous, Q6H PRN  magnesium hydroxide (MILK OF MAGNESIA) 400 MG/5ML suspension 30 mL, 30 mL, Oral, Daily PRN  [Held by provider] 0.9 % sodium chloride infusion, , Intravenous, Continuous    REVIEW OF SYSTEMS:       CONSTITUTIONAL: negative for fatigue and malaise   EYES: negative for double vision and photophobia    HEENT: negative for tinnitus and sore throat   RESPIRATORY: negative for cough, shortness of breath   CARDIOVASCULAR: negative for chest pain, palpitations   GASTROINTESTINAL: negative for nausea, vomiting   GENITOURINARY: negative for incontinence   MUSCULOSKELETAL: negative for neck or back pain   NEUROLOGICAL: negative for seizures   PSYCHIATRIC: negative for fatigue     Review of systems otherwise negative. PHYSICAL EXAM:       BP (!) 150/79   Pulse 84   Temp 98.2 °F (36.8 °C) (Axillary)   Resp 19   SpO2 95%     CONSTITUTIONAL:  Well developed, well nourished, alert and oriented x 2, in no acute distress. GCS 15, nontoxic.+dysarthria, no aphasia. EOMI.     HEAD:  normocephalic, atraumatic    EYES:  PERRLA, EOMI.   ENT:  moist mucous membranes   NECK:  supple, symmetric, no midline tenderness to palpation    BACK:  without midline tenderness, step-offs or deformities    LUNGS:  Equal air entry bilaterally   CARDIOVASCULAR:  normal s1 / s2   ABDOMEN:  Soft, no rigidity   NEUROLOGIC:    Mental status   Alert and oriented; intact memory with no confusion, speech or language problems; no hallucinations or delusions     Cranial nerves   II - visual fields intact to confrontation                                                III, IV, VI  extra-ocular muscles full: no pupillary defect; no KIRSTY, no nystagmus, no ptosis                                                                      V - normal facial sensation                                                               VII - normal facial symmetry                                                             VIII - intact hearing                                                                             IX, X - symmetrical palate                                                                  XI - symmetrical shoulder shrug                                                       XII - midline tongue without atrophy or fasciculation     Motor function  Normal muscle bulk and tone; normal power 5/5     Sensory function Intact to touch     Cerebellar  No involuntary movements or tremors     Reflex function Intact 2+ DTR and symmetric with no pathologic reflex or Babinski sign     Gait                  Not tested             INITIAL NIH STROKE SCALE:    Time Performed:  11 PM     1a. Level of consciousness:  0  1b. Level of consciousness questions:  0 - answers both questions correctly  1c. Level of consciousness questions:  0 - performs both tasks correctly  2. Best Gaze:  0 - normal  3. Visual:  0 - no visual loss  4. Facial Palsy:  0 - normal symmetric movement  5a. Motor left arm:  0 - no drift, limb holds 90 (or 45) degrees for full 10 seconds  5b. Motor right arm:  0 - no drift, limb holds 90 (or 45) degrees for full 10 seconds  6a. Motor left le - no movement  Poor effort  6b. Motor right le - no movement Poor effort  7. Limb Ataxia:  0 - absent  8. Sensory:  0 - normal; no sensory loss  9. Best Language:1  10. Dysarthria:  2 - severe; patient speech is so slurred as to be unintelligible in the absence of or our of proportion to any dysphagia, or is mute/anarthric   11.   Extinction and Inattention:  0 - no abnormality         SKIN:  no rash      LABS AND IMAGING:     CBC with Differential:    Lab Results   Component Value Date    WBC 4.9 2021    RBC 4.21 2021    RBC 4.34 2012    HGB 13.3 2021    HCT 42.6 2021     2021     2012    .2 04/04/2021    MCH 31.6 04/04/2021    MCHC 31.2 04/04/2021    RDW 14.1 04/04/2021    LYMPHOPCT 9 04/04/2021    MONOPCT 11 04/04/2021    BASOPCT 0 04/04/2021    MONOSABS 0.54 04/04/2021    LYMPHSABS 0.44 04/04/2021    EOSABS 0.00 04/04/2021    BASOSABS 0.00 04/04/2021    DIFFTYPE NOT REPORTED 04/04/2021     BMP:    Lab Results   Component Value Date     04/04/2021    K 4.4 04/04/2021     04/04/2021    CO2 20 04/04/2021    BUN 24 04/04/2021    LABALBU 3.7 04/04/2021    LABALBU 4.5 04/23/2012    CREATININE 1.23 04/04/2021    CALCIUM 9.7 04/04/2021    GFRAA 50 04/04/2021    LABGLOM 41 04/04/2021    GLUCOSE 110 04/04/2021    GLUCOSE 88 04/23/2012       Radiology Review:  As above      ASSESSMENT AND PLAN:       Patient Active Problem List   Diagnosis    Hypothyroidism    Hyperlipidemia    Hypertension    Gout    Left carotid artery stenosis    Acute kidney injury superimposed on CKD (HCC)    Chronic atrial fibrillation    Severe mitral regurgitation by prior echocardiogram    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 ([address]    Cerebrovascular accident (CVA) determined by clinical assessment [facility]    Falling episodes    History of cerebral infarction    Aphasia    S/P ERCP    TIA (transient ischemic attack)    COVID-19 virus infection    Diverticulitis    Acute CVA (cerebrovascular accident) ([facility]    Hypernatremia       80 y.o. female who presents with   Encephalopathy, dysarthria, Fannie Crumb  Likely Metabolic encephalopathy however the possibility of breakthrough seizures cannot be entirely excluded     Other comorbidities include seizures, A. fib, hyperlipidemia, hypertension, hypothyroidism, chronic diastolic CHF due to valvular disease, Covid 19 infection, TIA, CVA,  75-85% stenosis proximal left ICA, proximal right ICA 50%. 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  Proximal P2 right PCA 50%         - MRI Brain W WO   - EEG to r/o seizure - ASA 81mg  daily, eliquis              - Continue home dose Keppra 500 mg BID with plan to gradually taper off keppra and replace with lamictal if EEG consistent with seizures. - Folic acid 1mg BID   - lipitor 40mg nightly    - Fasting Lipid panel   - PT, OT, Speech eval    - Hydrate with IVF NS @ 75cc/hr    - Telemetry    - Neuro checks per protocol  - We recommend SBP normotension  - Blood glucose goal less than 180  - Please avoid dextrose containing solutions    Additional recommendations may follow    Please contact Neurology with any changes in patients neurologic status. Thank you for your consult.        Sherwin Bernheim, MD   4/4/2021  11:50 PM

## 2021-04-05 NOTE — CARE COORDINATION
Case Management Initial Discharge Plan  Gertrudis Gabriela Barakat,             Met with:patient to discuss discharge plans. Information verified: address, contacts, phone number, , insurance Yes    Emergency Contact/Next of Kin name & number: Song Ayon    PCP: Jennifer Wintesr MD  Date of last visit: 2021    Insurance Provider: Medicare and Medicaid    Discharge Planning    Living Arrangements:  [place]:  ECF/Assisted Living    Home has 3 stories  2 stairs to climb to get into front door,  0 stairs to climb to reach second floor (uses elevator to get to third floor)  Location of bedroom/bathroom in home Third floor    Patient able to perform ADL's:Assisted, uses a WC to get in and out of building but states she ambulates in her room    Current Services (outpatient & in home) None  DME equipment: WC  DME provider: N/A    Receiving oral anticoagulation therapy? Yes    If indicated:   Physician managing anticoagulation treatment: Dr Cassidy Maxwell  Where does patient obtain lab work for ATC treatment? N/A      Potential Assistance Needed:  Leonides Price, Transportation, Outpatient PT/OT    Patient agreeable to home care: No  [place] of choice provided:  n/a    Prior SNF/Rehab Placement and Facility: No  Agreeable to SNF/Rehab: Yes  [place] of choice provided: yes     Evaluation: no    Expected Discharge date:       Patient expects to be discharged to:  Plans to return to [facility]  Follow Up Appointment: Best Day/ Time:      Transportation provider: Transport service  Transportation arrangements needed for discharge: Yes    Readmission Risk              Risk of Unplanned Readmission:        21             Does patient have a readmission risk score greater than 14?: Yes  If yes, follow-up appointment must be made within 7 days of discharge.      Goals of Care: Safety    Discharge Plan: Probable SNF verses ARU before returning to [facility]          Electronically signed by Tiarra Hahn RN on 4/5/21 at 4:07 PM EDT

## 2021-04-06 NOTE — PROGRESS NOTES
_                         Today's Date: 4/6/2021  Patient Name: Willie Castellon  Date of admission: 4/4/2021 10:28 PM  Patient's age: 80 y.o., 5/2/1930  Admission Dx: Acute CVA (cerebrovascular accident) [facility]) [I63.9]      Requesting Physician: Niki Clark DO    CHIEF COMPLAINT: Acute CVA. Consult for recommendations regard to anticoagulation. SUBJECTIVE:  . The patient was seen and examined. Clinically no changes. No new events. No headaches. No active bleeding. No fever. BRIEF CASE HISTORY:    The patient is a 80 y.o. female who is admitted to the hospital for further management of recurrent and acute CVA. Patient's main history is from the chart. She has multiple comorbidities including previous CVA. She has history of chronic A. fib on Eliquis. Currently she is a nursing home resident. Eliquis was given as prescribed. She had multiple other comorbidities as stated below. She was seen in emergency room at different from long-term facility after developing altered mental status and slurred speech. Past Medical History:   has a past medical history of Acute kidney failure ([address] Anemia, Aphasia, Arthritis, Atrial fibrillation ([address] Bile duct calculus, Calculus of gallbladder and bile duct without cholecystitis without obstruction, Cerebral infarction ([address] CHF (congestive heart failure) ([address] Chronic kidney disease, Chronic kidney disease, stage 3, Constipation, COVID-19, CVA (cerebral vascular accident) ([address] Dysarthria and anarthria, Fall, Gout, Gout, Gout, Hyperlipidemia, Hypertension, Hypokalemia, Hypokalemia, Hypomagnesemia, Hypomagnesemia, Hypothyroidism, IBS (irritable bowel syndrome), Irritable bowel syndrome, Ischemia, Left carotid stenosis, Metabolic encephalopathy, Mitral valve insufficiency, Nonrheumatic mitral (valve) insufficiency, Seizures ([address] TIA (transient ischemic attack), and UTI (urinary tract infection). Past Surgical History:   has a past surgical history that includes Cataract removal with implant (Bilateral); Cholecystectomy; Carpal tunnel release (2012); ERCP (N/A, 2/11/2019); Total knee arthroplasty (Right); Total knee arthroplasty (Left); Upper gastrointestinal endoscopy (07/31/2019); and Upper gastrointestinal endoscopy (N/A, 7/31/2019). Family History: family history includes Heart Disease in her father; High Blood Pressure in her mother. Social History:   reports that she has never smoked. She has never used smokeless tobacco. She reports that she does not drink alcohol or use drugs. Medications:    Prior to Admission medications    Medication Sig Start Date End Date Taking?  Authorizing Provider   atorvastatin (LIPITOR) 40 MG tablet Take 1 tablet by mouth nightly 4/6/21  Yes Marylen Binder, PA-C   Nutritional Supplements (RESOURCE JUICE DRINK PO) Take 1 Dose by mouth 3 times daily   Yes Historical Provider, MD   albuterol (PROVENTIL) (2.5 MG/3ML) 0.083% nebulizer solution Take 2.5 mg by nebulization every 6 hours as needed for Wheezing   Yes Historical Provider, MD   losartan (COZAAR) 50 MG tablet Take 1 tablet by mouth daily 1/20/21  Yes KATIA Ram CNP   sertraline (ZOLOFT) 50 MG tablet Take 50 mg by mouth daily  8/26/20  Yes Historical Provider, MD   Artificial Saliva (BIOTENE DRY MOUTH MOISTURIZING) SOLN liquid Take 1 drop by mouth as needed   Yes Historical Provider, MD   levETIRAcetam (KEPPRA) 500 MG tablet Take 1 tablet by mouth 2 times daily 5/27/20  Yes Bradford Sierra MD   docusate sodium (COLACE) 100 MG capsule Take 100 mg by mouth daily   Yes Historical Provider, MD   polyethylene glycol (GLYCOLAX) packet Take 17 g by mouth daily as needed for Constipation   Yes Historical Provider, MD   ondansetron (ZOFRAN) 4 MG tablet Take 4 mg by mouth every 6 hours as needed for Nausea or Vomiting   Yes Historical Provider, MD   cholestyramine (QUESTRAN) 4 g packet Take 1 packet by mouth 3 times daily (with meals) 2/14/20  Yes Terrance Euceda MD   ferrous sulfate 325 (65 Fe) MG tablet Take 1 tablet by mouth daily (with breakfast) 11/5/19  Yes Terrance Euceda MD   apixaban (ELIQUIS) 5 MG TABS tablet Take 5 mg by mouth 2 times daily   Yes Historical Provider, MD   aspirin 81 MG EC tablet Take 1 tablet by mouth daily 4/11/19  Yes Jorgito Brown MD   levothyroxine (SYNTHROID) 112 MCG tablet Take 1 tablet by mouth Daily 11/29/18  Yes Shell Hsu MD   acetaminophen (TYLENOL) 500 MG tablet Take 500 mg by mouth 2 times daily    Yes Historical Provider, MD   Mouthwashes Von Ding) LIQD oral solution Swish and spit 15 mLs daily as needed    Historical Provider, MD   zinc sulfate (ZINCATE) 220 (50 Zn) MG capsule Take 2 capsules by mouth daily for 7 days 1/20/21 1/27/21  KATIA Islas CNP   ascorbic acid (VITAMIN C) 1000 MG tablet Take 1 tablet by mouth 2 times daily for 7 days 1/19/21 1/26/21  KATIA Islas CNP   Vitamin D (CHOLECALCIFEROL) 50 MCG (2000 UT) TABS tablet Take 1 tablet by mouth daily for 7 days 1/20/21 1/27/21  KATIA Islas CNP     No current facility-administered medications for this encounter.       Current Outpatient Medications   Medication Sig Dispense Refill    atorvastatin (LIPITOR) 40 MG tablet Take 1 tablet by mouth nightly      Nutritional Supplements (RESOURCE JUICE DRINK PO) Take 1 Dose by mouth 3 times daily      albuterol (PROVENTIL) (2.5 MG/3ML) 0.083% nebulizer solution Take 2.5 mg by nebulization every 6 hours as needed for Wheezing      losartan (COZAAR) 50 MG tablet Take 1 tablet by mouth daily 30 tablet 3    sertraline (ZOLOFT) 50 MG tablet Take 50 mg by mouth daily       Artificial Saliva (BIOTENE DRY MOUTH MOISTURIZING) SOLN liquid Take 1 drop by mouth as needed      levETIRAcetam (KEPPRA) 500 MG tablet Take 1 tablet by mouth 2 times daily 60 tablet 3    docusate sodium (COLACE) 100 MG capsule Take 100 mg by mouth muscle aches or pains. No limitation of movement. No back pain. No gait disturbance, No joint complaints. · Dermatologic: No skin rashes or pruritus. No skin lesions or discolorations. · Psychiatric: No depression, anxiety, or stress or signs of schizophrenia. No change in mood or affect. · Hematologic: No history of bleeding tendency. No bruises or ecchymosis. No history of clotting problems. · Infectious disease: No fever, chills or frequent infections. · Endocrine: No polydipsia or polyuria. No temperature intolerance. · Neurologic: As above. · Allergic/Immunologic: No nasal congestion or hives. No repeated infections. PHYSICAL EXAM:      BP (!) 154/82   Pulse 87   Temp 98.1 °F (36.7 °C) (Oral)   Resp 16   Ht 5' (1.524 m)   Wt 145 lb (65.8 kg)   SpO2 96%   BMI 28.32 kg/m²    Temp (24hrs), Av.3 °F (36.8 °C), Min:98.1 °F (36.7 °C), Max:98.4 °F (36.9 °C)      General appearance - not in pain or distress  Mental status - alert follows commands. Slurred speech. Eyes - pupils equal and reactive, extraocular eye movements intact  Ears - bilateral TM's and external ear canals normal  Nose - normal and patent, no erythema, discharge or polyps  Mouth - mucous membranes moist, pharynx normal without lesions  Neck - supple, no significant adenopathy  Lymphatics - no palpable lymphadenopathy, no hepatosplenomegaly  Chest - clear to auscultation, no wheezes, rales or rhonchi, symmetric air entry  Heart - normal rate, regular rhythm, normal S1, S2, no murmurs, rubs, clicks or gallops  Abdomen - soft, nontender, nondistended, no masses or organomegaly  Neurological -slurred speech. No obvious focal deficit otherwise.   Musculoskeletal - no joint tenderness, deformity or swelling  Extremities - peripheral pulses normal, no pedal edema, no clubbing or cyanosis  Skin - normal coloration and turgor, no rashes, no suspicious skin lesions noted           DATA:      Labs:       CBC:   Recent Labs 04/04/21  1540 04/06/21  0539   WBC 4.9 6.2   HGB 13.3 12.4   HCT 42.6 39.8    151     BMP:   Recent Labs     04/04/21  1540 04/06/21  0853   * 142   K 4.4 4.4   CO2 20 20   BUN 24* 21   CREATININE 1.23* 1.03*   LABGLOM 41* 50*   GLUCOSE 110* 85     PT/INR:   Recent Labs     04/04/21  1540   PROTIME 19.7*   INR 1.7     APTT:No results for input(s): APTT in the last 72 hours. LIVER PROFILE:  Recent Labs     04/04/21  1540 04/06/21  0853   AST 16 23   ALT 11 12   LABALBU 3.7 3.2*     FL MODIFIED BARIUM SWALLOW W VIDEO  Narrative: EXAMINATION:  MODIFIED BARIUM SWALLOW WAS PERFORMED IN CONJUNCTION WITH SPEECH PATHOLOGY  SERVICES    TECHNIQUE:  Fluoroscopic evaluation of the swallowing mechanism was performed with  multiple consistency of barium product. FLUOROSCOPY DOSE AND TYPE OR TIME AND EXPOSURES:  2.9 minutes    DAP 31.565 mGy    COMPARISON:  None    HISTORY:  ORDERING SYSTEM PROVIDED HISTORY: dysphagia after CVA    FINDINGS:  No evidence of aspiration. Deep penetration of thin and thick liquid consistency via straw. Premature vallecular spillage with liquid consistencies. Minimal vallecular residual with pudding and soft solid consistencies. Impression: No evidence of aspiration. Penetration with liquid consistencies. Please see separate speech pathology report for full discussion of findings  and recommendations.             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 Patient had recurrent CVA despite the use of Eliquis. 2. Patient needs to be on anticoagulation due to her underlying A. fib and all other comorbidities and recurrent CVAs. 3. Considering age and performance I do not think there is any added benefit of switching to a different agent. Recommendations to continue on Eliquis and antiplatelets. 4. Possible discharge today. .    Discussed with patient and Nurse. MD Castro [facility]                            This note is created with the assistance of a speech recognition program.  While intending to generate a document that actually reflects the content of the visit, the document can still have some errors including those of syntax and sound a like substitutions which may escape proof reading. It such instances, actual meaning can be extrapolated by contextual diversion.

## 2021-04-06 NOTE — PROGRESS NOTES
skilled OT services during her acute hospitilization stay to maximize safety and increase indpendence in ADLs, IADLs and functional mobility tasks. Pt currently requires 24/7 physical assist to safely engage in all aspects of ADLs, IADLs and functional mobility tasks. Prognosis: Good  Decision Making: Medium Complexity  Patient Education: Educated on OT role, OT poc, activity promotion, bed mobility/use of grab bars, balance, hand/foot placement with RW-fair return  REQUIRES OT FOLLOW UP: Yes  Activity Tolerance  Activity Tolerance: Patient limited by fatigue;Treatment limited secondary to decreased cognition  Safety Devices  Safety Devices in place: Yes  Type of devices: Gait belt;Bed alarm in place; Patient at risk for falls;Call light within reach; Left in bed  Restraints  Initially in place: No           Patient Diagnosis(es): There were no encounter diagnoses. has a past medical history of Acute kidney failure ([address] Anemia, Aphasia, Arthritis, Atrial fibrillation ([address] Bile duct calculus, Calculus of gallbladder and bile duct without cholecystitis without obstruction, Cerebral infarction ([address] CHF (congestive heart failure) ([address] Chronic kidney disease, Chronic kidney disease, stage 3, Constipation, COVID-19, CVA (cerebral vascular accident) ([address] Dysarthria and anarthria, Fall, Gout, Gout, Gout, Hyperlipidemia, Hypertension, Hypokalemia, Hypokalemia, Hypomagnesemia, Hypomagnesemia, Hypothyroidism, IBS (irritable bowel syndrome), Irritable bowel syndrome, Ischemia, Left carotid stenosis, Metabolic encephalopathy, Mitral valve insufficiency, Nonrheumatic mitral (valve) insufficiency, Seizures ([address] TIA (transient ischemic attack), and UTI (urinary tract infection). has a past surgical history that includes Cataract removal with implant (Bilateral); Cholecystectomy; Carpal tunnel release (2012); ERCP (N/A, 2/11/2019); Total knee arthroplasty (Right); Total knee arthroplasty (Left);  Upper gastrointestinal endoscopy (07/31/2019); and Upper gastrointestinal endoscopy (N/A, 7/31/2019). Restrictions  Restrictions/Precautions  Restrictions/Precautions: Fall Risk, Up as Tolerated  Required Braces or Orthoses?: No    Subjective   General  Patient assessed for rehabilitation services?: Yes  Family / Caregiver Present: No  Diagnosis: Acute CVA  General Comment  Comments: RN ok'd for OT evalaution this AM. Pt agreeable to session, pleasent/cooperative throughout. Pt presents with aphasia throughout. Patient Currently in Pain: No  Vital Signs  Patient Currently in Pain: No     Social/Functional History  Social/Functional History  Lives With: Alone  Type of Home: Facility  Home Layout: One level  Home Access: Level entry  Bathroom Shower/Tub: Walk-in shower  Bathroom Toilet: Standard  Bathroom Equipment: Grab bars in shower, Grab bars around toilet  Home Equipment: Rolling walker, Wheelchair-manual(Pt reports using RW for short distances; however, needs assistance for transfers. Mostly uses WC per chart.)  Receives Help From: Family  ADL Assistance: Needs assistance  Homemaking Assistance: Needs assistance  Homemaking Responsibilities: No  Ambulation Assistance: Needs assistance(Uses RW for short distances only. Often uses manual WC.)  Transfer Assistance: Needs assistance(Reports needing assistance for functional transfers at baseline)  Active : Yes  Mode of Transportation: Car  Occupation: Retired  Type of occupation:   Leisure & Hobbies: bingo  Additional Comments: Some information gathered from physical therapy note. Objective   Hearing: Within functional limits(Appears WFL throughout session)    Orientation  Overall Orientation Status: Impaired  Orientation Level: Disoriented to place;Oriented to person;Disoriented to situation     Balance  Sitting Balance: Moderate assistance(Grossly CGA throughout with 1-2 UE support to maintain.  Pt had 1 LOB when first attempting to put socks on with Mod A to correct. Sat EOB statically, ROM/MM, grooming, LB dressing. ~20 minutes.)  Standing Balance: Maximum assistance  Standing Balance  Time: First time up ~30 seconds, Second time up ~45 seconds  Activity: functional transfers, static standing EOB  Comment: Use of RW. Pt unsteady on feet requiring Max A on first attempt to maintain standing balance with poor tolerance. Second attempt pt progressed to Mod A for static standing. Pt initiated sitting back down EOB on both attempts. Unsafe to progress mobility d/t unsteadiness and poor standing tolerance/balance. Functional Mobility  Functional Mobility Comments: Unable to assess    ADL  Feeding: Modified independent ;Setup; Increased time to complete;Verbal cueing  Grooming: Moderate assistance; Increased time to complete;Setup;Verbal cueing(Sitting EOB pt washed face with setup and verbal cue to initiate, Mod IND. Pt combed hair, requiring Mod A to reach back of head d/t cognition and possible fatigue.)  UE Bathing: Moderate assistance; Increased time to complete;Setup;Verbal cueing  LE Bathing: Moderate assistance; Increased time to complete;Setup;Verbal cueing  UE Dressing: Moderate assistance; Increased time to complete;Verbal cueing;Setup  LE Dressing: Moderate assistance; Increased time to complete;Setup;Verbal cueing(Pt required Mod A to maintain LE in figure four position to don/doff socks. Pt required assist to thread socks over toes. With increased time pt was able to pull socks up.)  Toileting: Maximum assistance; Increased time to complete;Setup     Tone RUE  RUE Tone: Normotonic  Tone LUE  LUE Tone: Normotonic    Coordination  Movements Are Fluid And Coordinated: No  Coordination and Movement description: Decreased speed  Quality of Movement Other  Comment: Decreased speed in coordination assessment in B hands. Bed mobility  Supine to Sit: Maximum assistance(Max A for trunk and B LE progression.  Verbal cues and assist to reach for handrail with good return.)  Sit to Supine: Moderate assistance(Assist for B LE progression)  Scooting: Maximal assistance(Max A to scoot EOB with max verbal cues.)  Comment: HOB elevated. Increased time/effort. Transfers  Sit to stand: Maximum assistance(Assist d/t weakness)  Stand to sit: Moderate assistance(Mod A for controlled decent)  Transfer Comments: Use of RW. Verbal/tactile cues for proper sequencing and hand placement during transfers. Cognition  Overall Cognitive Status: Exceptions  Arousal/Alertness: Delayed responses to stimuli  Following Commands: Follows one step commands with increased time; Follows one step commands with repetition  Attention Span: Attends with cues to redirect  Safety Judgement: Decreased awareness of need for assistance;Decreased awareness of need for safety  Problem Solving: Assistance required to correct errors made;Assistance required to identify errors made;Decreased awareness of errors;Assistance required to generate solutions  Insights: Decreased awareness of deficits  Initiation: Requires cues for some  Sequencing: Requires cues for some        Sensation  Overall Sensation Status: WFL(Pt denies any numbness or tingling at this time)        LUE PROM (degrees)  LUE PROM: WFL  LUE AROM (degrees)  LUE AROM : Exceptions  LUE General AROM: Shoulder flexion 0-70, elbow and wrist tested [facility]  L Shoulder Flexion 0-180: ~0-70  Left Hand AROM (degrees)  Left Hand AROM: L  RUE PROM (degrees)  RUE PROM: WFL  RUE AROM (degrees)  RUE AROM : Exceptions  RUE General AROM: ~0-70, elbow and wrist tested [facility]  Right Hand AROM (degrees)  Right Hand AROM: WFL  LUE Strength  Gross LUE Strength: Exceptions to [facility]  L Shoulder Flex: 3-/5  L Shoulder Ext: 3-/5  L Elbow Flex: 3+/5  L Elbow Ext: 3+/5  L Hand General: 3+/5  RUE Strength  Gross RUE Strength: Exceptions to [facility]  R Shoulder Flex: 3-/5  R Shoulder Ext: 3-/5  R Elbow Flex: 3+/5  R Elbow Ext: 3+/5  R Hand General: 3+/5 Plan   Plan  Times per week: 3-4x/week  Current Treatment Recommendations: Safety Education & Training, Balance Training, Patient/Caregiver Education & Training, Functional Mobility Training, Equipment Evaluation, Education, & procurement, Home Management Training, Endurance Training, Strengthening, ROM, Cognitive/Perceptual Training, Self-Care / ADL      AM-PAC Score        AM-PAC Inpatient Daily Activity Raw Score: 15 (04/06/21 1125)  AM-PAC Inpatient ADL T-Scale Score : 34.69 (04/06/21 1125)  ADL Inpatient CMS 0-100% Score: 56.46 (04/06/21 1125)  ADL Inpatient CMS G-Code Modifier : CK (04/06/21 1125)    Goals  Short term goals  Time Frame for Short term goals: By discharge, pt will:  Short term goal 1: Demonstrate functional transfers with Mod A, using LRD  Short term goal 2: Demonstrate +3 minutes of static standing with Min A to increase engagement in ADLs  Short term goal 3: Demonstrate +10 minutes of dynamic sitting balance during functional task with CGA and no LOB  Short term goal 4: Demonstrate UB bathing/dressing with Min A, setup, use of AE/DME  Short term goal 5: Demonstrate LB dressing/bathing with Min A, setup, use of AE/DME  Short term goal 6: Demonstrate sequencing a  3 step simple ADL task to promote increased cognition for increased engagement in ADLs       Therapy Time   Individual Concurrent Group Co-treatment   Time In 0856         Time Out 0936         Minutes 40         Timed Code Treatment Minutes: Gay 115, OTR/L

## 2021-04-06 NOTE — PROGRESS NOTES
therapy goals have been added to pt goals. Updated goal: Pt will complete OMEX dysphagia exercises 10-20x per session. Plan:  [x] Continue ST services    [] Discharge from ST:      Discharge recommendations: [] Inpatient Rehab   [] [place]   [] Outpatient Therapy  [] Follow up at trauma clinic   [x] Other: Further therapy recommended at discharge. Completed by: Dionne Sarabia  Clinician    Cosigned By:   Светлана Gillis A.CCC/SLP

## 2021-04-06 NOTE — PROGRESS NOTES
Candelario Molina RN pulled the patient's IV, printed off the [facility] order for [facility] of [place], [place]. Patient's belongings were gathered and given to the ambulance transporting the patient to [facility] in [facility].        [address]  Report called to [facility] at  [address] [facility], RN's questions answered.

## 2021-04-06 NOTE — PLAN OF CARE
Problem: Falls - Risk of:  Goal: Will remain free from falls  Description: Will remain free from falls  4/6/2021 0420 by Pooja Vazquez RN  Outcome: Met This Shift     Problem: Falls - Risk of:  Goal: Absence of physical injury  Description: Absence of physical injury  4/6/2021 0420 by Pooja Vazquez RN  Outcome: Met This Shift   Patient remained free from injury. Patient verbalized understanding of need for the safety precautions. Demonstrates proper use of assistive devices. Bed remains in the lowest position. Call light remains within reach. [facility] [place] [facility] in use.

## 2021-04-06 NOTE — DISCHARGE INSTR - COC
Continuity of Care Form    Patient Name: Willie Castellon   :  1930  MRN:  [id number]    Admit date:  2021  Discharge date:  2021    Code Status Order: Full Code   Advance Directives:      Admitting Physician:  Niki Clark DO  PCP: Nidhi Morgan MD    Discharging Nurse: [facility] Unit/Room#: 7245/4863-13  Discharging Unit Phone Number: ***    Emergency Contact:   Extended Emergency Contact Information  Primary Emergency Contact: [address] Phone: [phone]  Relation: Child  Hearing or visual needs: None  Other needs: None  Preferred language: [place]   needed?  No  Secondary Emergency Contact: Mellisa Harris  [place] Phone: [phone]  Relation: Child    Past Surgical History:  Past Surgical History:   Procedure Laterality Date    CARPAL TUNNEL RELEASE      Dr. Ale Oro - right side    CATARACT REMOVAL WITH IMPLANT Bilateral     CHOLECYSTECTOMY      ERCP N/A 2019    Katie Cabrera MD - ERCP, Sphincterotomy, balloon dilatation and stent placement with removal of multiple stones    TOTAL KNEE ARTHROPLASTY Right     TOTAL KNEE ARTHROPLASTY Left     UPPER GASTROINTESTINAL ENDOSCOPY  2019    with stent removal by Dr. Delores Wright 2019    EGD STENT REMOVAL performed by Shravan Don MD at [address] [place] [address]       Immunization History:   Immunization History   Administered Date(s) Administered    Influenza Vaccine, unspecified formulation 2015    Influenza Virus Vaccine 10/01/2011, 10/01/2016, 10/01/2017    Influenza, Quadv, IM, (6 mo and older Fluzone, Flulaval, Fluarix and 3 yrs and older Afluria) 10/01/2018    Zoster Live (Zostavax) 2015       Active Problems:  Patient Active Problem List   Diagnosis Code    Hypothyroidism E03.9    Hyperlipidemia E78.5    Hypertension I10    Gout M10.9    Left carotid artery stenosis I65.22    Chronic atrial fibrillation I48.20    Severe mitral regurgitation by prior echocardiogram I34.0    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 (HCC) I50.32, I38    Cerebrovascular accident (CVA) determined by clinical assessment ([facility] I63.9    Falling episodes R29.6    History of cerebral infarction Z86.73    Aphasia R47.01    S/P ERCP Z98.890    TIA (transient ischemic attack) G45.9    COVID-19 virus infection U07.1    Diverticulitis K57.92    Acute CVA (cerebrovascular accident) ([facility] I63.9    Hypernatremia E87.0    Dysphagia R13.10    Diarrhea R19.7    Atrial fibrillation (HCC) I48.91    Vertebral artery stenosis I65.09       Isolation/Infection:   Isolation            No Isolation          Patient Infection Status       Infection Onset Added Last Indicated Last Indicated By Review Planned Expiration Resolved Resolved By    None active    Resolved    COVID-19 21 COVID-19, PCR   21         COVID-19 Rule Out 21 COVID-19, PCR (Ordered)   21 Rule-Out Test Resulted    COVID-19 Rule Out 20 Covid-19 Ambulatory (Ordered)   20 Rule-Out Test Resulted    COVID-19 Rule Out 20 COVID-19 (Ordered)   20 Rule-Out Test Resulted            Nurse Assessment:  Last Vital Signs: BP (!) 144/89   Pulse 75   Temp 98.4 °F (36.9 °C) (Oral)   Resp 16   Ht 5' (1.524 m)   Wt 145 lb (65.8 kg)   SpO2 94%   BMI 28.32 kg/m²     Last documented pain score (0-10 scale): Pain Level: 0  Last Weight:   Wt Readings from Last 1 Encounters:   21 145 lb (65.8 kg)     Mental Status:  disoriented    IV Access:  - None    Nursing Mobility/ADLs:  Walking   Dependent  Transfer  Dependent  Bathing  Dependent  Dressing  Dependent  Toileting  Dependent  Feeding  Dependent  Med Admin  Dependent  Med Delivery   crushed    Wound Care Documentation and Therapy:        Elimination:  Continence:   · Bowel: No  · Bladder: No  Urinary Catheter: None   Colostomy/Ileostomy/Ileal Conduit: No       Date of Last BM: 04/06/2021  No intake or output data in the 24 hours ending 04/06/21 1307  No intake/output data recorded. Safety Concerns:     History of Falls (last 30 days), At Risk for Falls and Aspiration Risk    Impairments/Disabilities:      Speech, Vision and Hearing    Nutrition Therapy:  Current Nutrition Therapy:   - Oral Diet:  Dysphagia 2 mechanically altered    Routes of Feeding: Oral  Liquids: Honey Thick Liquids  Daily Fluid Restriction: no  Last Modified Barium Swallow with Video (Video Swallowing Test): done on 04/06/2021/***    Treatments at the Time of Hospital Discharge:   Respiratory Treatments: **N/A*  Oxygen Therapy:  is not on home oxygen therapy. Ventilator:    - No ventilator support    Rehab Therapies: Physical Therapy, Occupational Therapy and Speech/Language Therapy  Weight Bearing Status/Restrictions: No weight bearing restirctions  Other Medical Equipment (for information only, NOT a DME order):  wheelchair and walker  Other Treatments: N/A    Patient's personal belongings (please select all that are sent with patient):  Glasses, Dentures upper and lower    RN SIGNATURE:  Electronically signed by Shahab Parkinson RN on 4/6/21 at 4:44 PM EDT    CASE MANAGEMENT/SOCIAL WORK SECTION    Inpatient Status Date:4-4    Readmission Risk Assessment Score:  Readmission Risk              Risk of Unplanned Readmission:        18           Discharging to Facility/ Agency   · Name: [facility]  · Address:  · Phone:  · Fax:    Dialysis Facility (if applicable)   · Name:  · Address:  · Dialysis Schedule:  · Phone:  · Fax:    / signature: Electronically signed by Cezar Jones RN on 4/6/21 at 1:10 PM EDT    PHYSICIAN SECTION    Prognosis: Fair    Condition at Discharge: Stable    Rehab Potential (if transferring to Rehab):  Fair    Recommended Labs or Other Treatments After Discharge: Physician Certification: I certify the above information and transfer of Manasa Marisel  is necessary for the continuing treatment of the diagnosis listed and that she requires Acute Rehab for greater 30 days.      Update Admission H&P: Changes in H&P as follows - refer to discharge    PHYSICIAN SIGNATURE:  Electronically signed by Zay Esparza DO on 4/6/21 at 1:44 PM EDT

## 2021-04-06 NOTE — CARE COORDINATION
Transitional planning. Jesús Carson and spoke with admissions Evita Perez. Aware D/C time 9:30pm.RN will let family know. Transportation set up through [employer] for 9:30pm per [address] calls and [facility] will  at 3:00pm. RN aware. Alcon Brownlee aware. Called son Padmini Loredo and he is aware. Will let his brother Linden Cortés know.     [address]([facility]), MAR, face sheet, H&P faxed to [address] [place] [address] Ave [phone]    Discharge [facility] Case Management Department  Written by: Marta Garcia RN    Patient Name: Fercho Lin  Attending Provider: Beatriz Beth DO  Admit Date: 2021 10:28 PM  MRN: [id number]  Account: [de-identified]                     : 1930  Discharge Date:       Disposition: long term care facility-[facility]/ [facility] per ambulance    Marta Garcia RN

## 2021-04-06 NOTE — PROGRESS NOTES
[facility]  Office: [address], DO, Uyen Hernandez, DO, Emiliana Marti, DO, Yeimiabdirashid Prince, DO, Rober Soto MD, Shawna Kingston MD, Tuan Gamez MD, Farnaz Malcolm MD, Gardenia Rodriguez MD, Joe Guadalupe MD, Yamile Joya MD, Chuyita Bhatti MD, Piper Springer DO, Susanna Gimenez MD, Leslie Diaz DO, Miguel Allen MD,  Santino Mansfield, DO, Jessica Hannah MD, Ashok Teresa MD, Ángela Johnston MD, Shelly Lira MD, Demetrice Leslie, Vasyl Murphy, CNP, Heather Galo, CNP, Latrice Ceballos, CNS, Trudy Wilcox, CNP, Mar Crump, CNP, Butch Pena, CNP, Ladi Potter, CNP, Helen Ramos, CNP, Paty Millard PA-C, Tamir Courtney, [facility], Niko Thrasher, CNP, Myrtle Henderson, CNP, Hollie Meyer, CNP, Krystina Gonzales, CNP, Florentin Lara, CNP, Jasiel Hernandez, [address] [place] [address]    Progress Note    4/6/2021    1:47 PM    Name:   Guille Garvey  MRN:     [id number]     Acct:      [de-identified]   Room:   [address] [place] [address] Day:  2  Admit Date:  4/4/2021 10:28 PM    PCP:   Cornell Liang MD  Code Status:  DNR-CCA    Subjective:     C/C: Stroke like symptoms    Interval History Status: improved. Pt seen and evaluated this morning. She is doing better today, more awake. Alert to self and time. Pleasant. She denies pain or any new complaints. Worked with therapy this morning and did well. She underwent MBSS this a.m. and recommendation is for dysphagia II diet with honey thick liquids. She is afebrile and hemodynamically stable. Labs reviewed. Discussed with nursing staff. 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  Pt initially presented to [place] ED from [facility] where she was noted to develop AMS and slurred speech. She was evaluated by the telestroke team, no TPA with absence of focal deficit. Recommendation to transfer to [facility].  for further neuro work-up. CT with no acute changes, noted partially occluded right vertebral artery. MRI showing few scattered foci of acute/subacute ischemia in the right frontal and left parietal lobes likely related to an embolic phenomenon. No further work-up necessary per neurology. Hematology consulted regarding concern for eliquis failure and is recommending continuation of eliquis, aspirin. Pt is in stable condition and is appropriate for discharge to acute rehab. Review of Systems:     Constitutional:  negative for chills, fevers, sweats  Respiratory:  negative for cough, dyspnea on exertion, shortness of breath, wheezing  Cardiovascular:  negative for chest pain, chest pressure/discomfort, lower extremity edema, palpitations  Gastrointestinal:  negative for abdominal pain, constipation, diarrhea, nausea, vomiting  Neurological:  negative for dizziness, headache    Medications:      Allergies:  No Known Allergies    Current Meds:   Scheduled Meds:    atorvastatin  40 mg Oral Nightly    acetaminophen  500 mg Oral BID    apixaban  5 mg Oral BID    aspirin  81 mg Oral Daily    cholestyramine light  1 packet Oral TID WC    docusate sodium  100 mg Oral Daily    ferrous sulfate  325 mg Oral Daily with breakfast    levETIRAcetam  500 mg Oral BID    levothyroxine  112 mcg Oral Daily    [Held by provider] losartan  50 mg Oral Daily    metoprolol tartrate  75 mg Oral BID    potassium chloride  20 mEq Oral BID    sertraline  50 mg Oral Daily    sodium chloride flush  10 mL Intravenous 2 times per day     Continuous Infusions:    sodium chloride       PRN Meds: sodium chloride flush, albuterol, ondansetron, polyethylene glycol, sodium chloride flush, sodium chloride, promethazine **OR** ondansetron, magnesium hydroxide    Data:     Past Medical History:   has a past medical history of Acute kidney failure ([facility] Anemia, Aphasia, Arthritis, Atrial fibrillation (HCC), Bile duct calculus, Calculus of gallbladder and bile duct without cholecystitis without obstruction, Cerebral infarction ([facility] CHF (congestive heart failure) ([facility] Chronic kidney disease, Chronic kidney disease, stage 3, Constipation, COVID-19, CVA (cerebral vascular accident) ([facility] Dysarthria and anarthria, Fall, Gout, Gout, Gout, Hyperlipidemia, Hypertension, Hypokalemia, Hypokalemia, Hypomagnesemia, Hypomagnesemia, Hypothyroidism, IBS (irritable bowel syndrome), Irritable bowel syndrome, Ischemia, Left carotid stenosis, Metabolic encephalopathy, Mitral valve insufficiency, Nonrheumatic mitral (valve) insufficiency, Seizures ([facility] TIA (transient ischemic attack), and UTI (urinary tract infection). Social History:   reports that she has never smoked. She has never used smokeless tobacco. She reports that she does not drink alcohol or use drugs. Family History:   Family History   Problem Relation Age of Onset    High Blood Pressure Mother     Heart Disease Father        Vitals:  BP (!) 144/89   Pulse 75   Temp 98.4 °F (36.9 °C) (Oral)   Resp 16   Ht 5' (1.524 m)   Wt 145 lb (65.8 kg)   SpO2 94%   BMI 28.32 kg/m²   Temp (24hrs), Av.3 °F (36.8 °C), Min:98.2 °F (36.8 °C), Max:98.4 °F (36.9 °C)    No results for input(s): POCGLU in the last 72 hours. I/O (24Hr):   No intake or output data in the 24 hours ending 21 1347    Labs:  Hematology:  Recent Labs     21  1540 21  0539   WBC 4.9 6.2   RBC 4.21 3.89*   HGB 13.3 12.4   HCT 42.6 39.8   .2 102.3   MCH 31.6 31.9   MCHC 31.2 31.2   RDW 14.1 14.2    151   MPV 11.2 11.7   INR 1.7  --      Chemistry:  Recent Labs     21  1540 21  1750 21  0853   *  --  142   K 4.4  --  4.4   *  --  114*   CO2 20  --  20   GLUCOSE 110*  --  85   BUN 24*  --  21   CREATININE 1.23*

## 2021-04-06 NOTE — PROGRESS NOTES
by mouth daily      polyethylene glycol (GLYCOLAX) packet Take 17 g by mouth daily as needed for Constipation      ondansetron (ZOFRAN) 4 MG tablet Take 4 mg by mouth every 6 hours as needed for Nausea or Vomiting      cholestyramine (QUESTRAN) 4 g packet Take 1 packet by mouth 3 times daily (with meals) 90 packet 3    ferrous sulfate 325 (65 Fe) MG tablet Take 1 tablet by mouth daily (with breakfast) 180 tablet 1    apixaban (ELIQUIS) 5 MG TABS tablet Take 5 mg by mouth 2 times daily      aspirin 81 MG EC tablet Take 1 tablet by mouth daily 30 tablet 3    levothyroxine (SYNTHROID) 112 MCG tablet Take 1 tablet by mouth Daily 90 tablet 3    acetaminophen (TYLENOL) 500 MG tablet Take 500 mg by mouth 2 times daily       Mouthwashes (BIOTENE) LIQD oral solution Swish and spit 15 mLs daily as needed      zinc sulfate (ZINCATE) 220 (50 Zn) MG capsule Take 2 capsules by mouth daily for 7 days 14 capsule 0    ascorbic acid (VITAMIN C) 1000 MG tablet Take 1 tablet by mouth 2 times daily for 7 days 14 tablet 0    Vitamin D (CHOLECALCIFEROL) 50 MCG (2000 UT) TABS tablet Take 1 tablet by mouth daily for 7 days 7 tablet 0       Allergies: Cezar Barakat has No Known Allergies.     Past Medical History:   Diagnosis Date    Acute kidney failure ([facility] 06/28/2019    Anemia     Aphasia     Arthritis     Atrial fibrillation (HCC)     Bile duct calculus     Calculus of gallbladder and bile duct without cholecystitis without obstruction     Cerebral infarction ([facility]     CHF (congestive heart failure) (HCC)     Chronic kidney disease     Stage 3    Chronic kidney disease, stage 3     Constipation     COVID-19     CVA (cerebral vascular accident) ([facility]     Dysarthria and anarthria     Fall     Gout     Gout     Gout     Hyperlipidemia     Hypertension     Hypokalemia 06/28/2019    Hypokalemia     Hypomagnesemia 06/28/2019    Hypomagnesemia     Hypothyroidism     IBS (irritable bowel syndrome)     Irritable bowel syndrome     Ischemia     muscle    Left carotid stenosis     Metabolic encephalopathy     Mitral valve insufficiency     Nonrheumatic mitral (valve) insufficiency 2019    Seizures (HCC)     TIA (transient ischemic attack)     UTI (urinary tract infection)        Past Surgical History:   Procedure Laterality Date    CARPAL TUNNEL RELEASE      Dr. Andrew Mercer - right side    CATARACT REMOVAL WITH IMPLANT Bilateral     CHOLECYSTECTOMY      ERCP N/A 2019    Driss Birmingham MD - ERCP, Sphincterotomy, balloon dilatation and stent placement with removal of multiple stones    TOTAL KNEE ARTHROPLASTY Right     TOTAL KNEE ARTHROPLASTY Left     UPPER GASTROINTESTINAL ENDOSCOPY  2019    with stent removal by Dr. Froylan Ignacio N/A 2019    EGD STENT REMOVAL performed by Verito Foley MD at [facility]       Medications:     atorvastatin  40 mg Oral Nightly    acetaminophen  500 mg Oral BID    apixaban  5 mg Oral BID    aspirin  81 mg Oral Daily    cholestyramine light  1 packet Oral TID WC    docusate sodium  100 mg Oral Daily    ferrous sulfate  325 mg Oral Daily with breakfast    levETIRAcetam  500 mg Oral BID    levothyroxine  112 mcg Oral Daily    [Held by provider] losartan  50 mg Oral Daily    metoprolol tartrate  75 mg Oral BID    potassium chloride  20 mEq Oral BID    sertraline  50 mg Oral Daily    sodium chloride flush  10 mL Intravenous 2 times per day     PRN Meds include: sodium chloride flush, albuterol, ondansetron, polyethylene glycol, sodium chloride flush, sodium chloride, promethazine **OR** ondansetron, magnesium hydroxide    Objective:   BP (!) 154/82   Pulse 87   Temp 98.1 °F (36.7 °C) (Oral)   Resp 16   Ht 5' (1.524 m)   Wt 145 lb (65.8 kg)   SpO2 96%   BMI 28.32 kg/m²     Blood pressure range: Systolic (47RKV), XCS:674 , Min:124 , ABL:340   ; Diastolic (32SZY), EO, Min:72, Max:90      ROS: CONSTITUTIONAL: negative for fatigue and malaise   EYES: negative for double vision and photophobia    HEENT: negative for tinnitus and sore throat   RESPIRATORY: negative for cough, shortness of breath   CARDIOVASCULAR: negative for chest pain, palpitations, or syncope   GASTROINTESTINAL: Positive diarrhea. negative for abdominal pain, nausea, vomiting, or constipation    GENITOURINARY: negative for incontinence or retention    MUSCULOSKELETAL: negative for neck or back pain, negative for extremity pain   NEUROLOGICAL: Negative for seizures, headaches, weakness, numbness, confusion, aphasia, dysarthria   PSYCHIATRIC: negative for agitation, hallucination, SI/HI   SKIN Negative for spontaneous contusions, rashes, or lesions        NEUROLOGIC EXAMINATION  GENERAL  Appears comfortable and in no distress   HEENT  NC/ AT   HEART  S1 and S2 heard; palpation of pulses: radial pulse    NECK  Supple and no bruits heard   MENTAL STATUS:  Alert, oriented, intact memory, no confusion, normal speech, normal language, no hallucination or delusion   CRANIAL NERVES: II     -      Visual fields intact to confrontation  III,IV,VI -  PERR, EOMs full, no ptosis  V     -     Normal facial sensation   VII    -     Normal facial symmetry  VIII   -     Intact hearing   IX,X -     Symmetrical palate  XI    -     Symmetrical shoulder shrug  XII   -     Midline tongue, no atrophy    MOTOR FUNCTION: RUE: Significant for good strength of grade 5/5 in proximal and distal muscle groups   LUE: Significant for good strength of grade 5/5 in proximal and distal muscle groups   RLE: Significant for good strength of grade 4/5 in proximal and distal muscle groups   LLE: Significant for good strength of grade 4/5 in proximal and distal muscle groups      Normal bulk, normal tone and no involuntary movements, no tremor   SENSORY FUNCTION:  Normal touch, normal pin, normal vibration, normal proprioception   CEREBELLAR FUNCTION:  Intact fine motor control over upper limbs and lower limbs   REFLEX FUNCTION:  Symmetric in upper and lower extremities, no Babinski sign   STATION and GAIT  Not tested     Data:    Lab Results:   CBC:   Recent Labs     04/04/21  1540 04/06/21  0539   WBC 4.9 6.2   HGB 13.3 12.4    151     BMP:    Recent Labs     04/04/21  1540 04/06/21  0853   * 142   K 4.4 4.4   * 114*   CO2 20 20   BUN 24* 21   CREATININE 1.23* 1.03*   GLUCOSE 110* 85         Lab Results   Component Value Date    CHOL 86 04/06/2021    LDLCHOLESTEROL 37 04/06/2021    HDL 34 (L) 04/06/2021    TRIG 73 04/06/2021    ALT 12 04/06/2021    AST 23 04/06/2021    TSH 0.73 04/05/2021    INR 1.7 04/04/2021    LABA1C 5.7 05/25/2020       No results found for: PHENYTOIN, PHENYTOIN, VALPROATE, CBMZ    IMAGING  CT Head w/o Contrast 4/4/2021  Impression   No CT evidence of an acute infarct. CTA Head and Neck W Contrast 4/4/2021  Impression   Partially occluded right vertebral artery.       No large vessel occlusion visualized within the head. MRI Brain W WO Contrast 4/5/2021  Impression   Few scattered foci of acute/subacute ischemia in the right frontal and left   parietal lobes likely related to an embolic phenomenon.       Extensive chronic microvascular disease.       Few scattered foci of blooming artifact on susceptibility weighted imaging   most pronounced in the basal ganglia and temporal lobes that is nonspecific. Differential considerations include hypertensive microangiopathy and amyloid   angiopathy.           Assessment and Plan  80 y.o. female admitted on 4/4/2021 with PMHx of A. fib on Eliquis, seizure disorder, hypertension, hyperlipidemia, hypothyroid who is a resident of a long-term care facility who presents for further evaluation of encephalopathy. MRI revealed bilateral hemispheric strokes  Known history of A. Fib.   Symptoms likely secondary to hypovolemia causing hypoperfusion  - On ASA 81 and Eliquis 5mg  - Statin for secondary prevention, LDL 37  - Heme/onc reccs noted  - PT/OT/SLP    // Seizure disorder  - continue home dose keppra 500mg BID    Further recommendations/modifications may follow after speaking with attending physician. Thank you for your consult.     Ángela Johnston MD   Neurology PGY-2  4/6/2021

## 2021-04-06 NOTE — PROCEDURES
penetration, no aspiration with thin and nectar thick liquid. No penetration, no aspiration with honey thick liquid. No penetration, no aspiration with puree and soft solids. Min vallecula residual noted with puree and soft solids. Premature spill noted with liquid. Mild-moderate extended mastication noted with soft solids as pt. is missing teeth. ST to recommend Dysphagia Minced and Moist (Dysphagia II) with Moderately Thick (Honey) liquid. If s/s of aspiration occur, d/c all PO and recommend NPO. Results and recommendations reported to RN. Patient Degrees: upright in chair  Consistencies Administered: Dysphagia Soft and Bite-Sized (Dysphagia III); Dysphagia Pureed (Dysphagia I); Thin straw; Thin teaspoon;Nectar straw;Nectar  teaspoon;Honey teaspoon    Recommended Diet:  Solid consistency: Dysphagia Minced and Moist (Dysphagia II)  Liquid consistency: Moderately Thick (Honey)  Medication administration: Meds in puree    Safe Swallow Protocol:     Compensatory Swallowing Strategies: Upright as possible for all oral intake;Eat/Feed slowly; Small bites/sips    Recommendations/Treatment  Requires SLP Intervention: Yes  D/C Recommendations: Further therapy recommended at discharge. Recommended Exercises:    Therapeutic Interventions: Oral motor exercises; Maryjane;Diet tolerance monitoring;Patient/Family education    Education: Images and recommendations were reviewed with pt. And RN following this exam.   Patient Education: yes  Patient Education Response: Demonstrated understanding    Prognosis  Prognosis for safe diet advancement: fair  Duration/Frequency of Treatment  Duration/Frequency of Treatment: 3-5 x week      Goals:    Long Term: To Maximize safety with intake, optimize nutrition/hydration and minimize risk for aspiration. Repeat MBSS Recommended in   3-5  Days. Short Term:     Goal 1: Pt. will complete OMEX for dysphagia 10-20 x per session. Dysphagia Goals:  The patient will tolerate recommended diet without observed clinical signs of aspiration      Oral Preparation / Oral Phase  Oral Phase: Impaired   Oral Phase: Premature spill noted with liquid. Mild-moderate extended mastication noted with soft solids as pt. is missing teeth. Pharyngeal Phase  Pharyngeal Phase: Impaired   Pharyngeal: Pt. with deep penetration, no aspiration with thin and nectar thick liquid. No penetration, no aspiration with honey thick liquid. No penetration, no aspiration with with puree and soft solids. Min vallecula residual noted with puree and soft solids. Esophageal Phase  Esophageal Screen: WFL        Pain   Patient Currently in Pain: No  Pain Level: 0      Therapy Time:   Individual Concurrent Group Co-treatment   Time In 1005         Time Out 1015         Minutes 10                   FOZIA MASON M.A. CCC-SLP 4/6/2021, 11:17 AM

## 2021-04-06 NOTE — DISCHARGE SUMMARY
[facility]  Office: [address], DO, Efrem Luque, DO, Jacek Kessler, DO, Jaime Prince, DO, Michele Acosta MD, Roderick Castaneda MD, Jenny Larose MD, Rosa Garland MD, Tracey Ibanez MD, Gabino Simpson MD, Sola Engel MD, Pamella Nj MD, Radha Torre, DO, Anahi Dunlap MD, Pramod Baptiste DO, Albert Chávez MD,  Namrata Boyd DO, Christiana Bernal MD, Oscar Tavarez MD, Daniela Oquendo MD, Rani Kline MD, Alie Torres [facility], [facility], CNP, Delio Mahoney, CNP, Teo Tatum, CNS, Hank Rivera, CNP, Thompson Lombard, CNP, Cole Solorzano, CNP, Lefty Lim, CNP, Emily Tamayo, CNP, Breezy Mosher PA-C, Gold Lopez, [facility], Sheryl Miranda, CNP, Angel Lawrence, CNP, Victorino Bennett, CNP, Barb Nelson, CNP, Joselito Hollis, CNP, Erick Glasgow,  [facility]    Discharge Summary     Patient ID: Aida Murphy  :  1930   MRN: [id number]     ACCOUNT:  [de-identified]   Patient's PCP: Jodi Berg MD  Admit Date: 2021   Discharge Date: 2021  Length of Stay: 2  Code Status:  DNR-CCA  Admitting Physician: Berlin Stratton DO  Discharge Physician: Fritz Hester PA-C     Active Discharge Diagnoses:     Hospital Problem Lists:  Principal Problem:    Acute CVA (cerebrovascular accident) [facility])  Active Problems:    Chronic atrial fibrillation    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 ([facility]    Dysphagia    Hypothyroidism    Hyperlipidemia    Hypertension    History of cerebral infarction    Diarrhea    Atrial fibrillation ([facility]    Vertebral artery stenosis  Resolved Problems:    Acute kidney injury superimposed on CKD [facility])      Admission Condition:  poor     Discharged Condition: fair    Hospital Stay:     Hospital Course:      80year-old female with history significant for HFpEF, CKD III, chronic atrial fibrillation on eliquis, previous CVA, HLD, HTN, hypothyroidism, seizure disorder, carotid artery stenosis, 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 Pt initially presented to [place] ED from SNF where she was noted to develop AMS and slurred speech. She was evaluated by the telestroke team, no TPA with absence of focal deficit. Recommendation to transfer to [facility].  for further neuro work-up. CT with no acute changes, noted partially occluded right vertebral artery. MRI showing few scattered foci of acute/subacute ischemia in the right frontal and left parietal lobes likely related to an embolic phenomenon. No further work-up necessary per neurology. Hematology consulted regarding concern for eliquis failure and is recommending continuation of eliquis, aspirin. Pt is in stable condition and is appropriate for discharge to acute rehab. Significant therapeutic interventions:     #Acute CVA: MRI revealing few scattered foci of acute/subacute ischemia in the right frontal and left parietal lobes likely related to an embolic phenomenon. Neurology following. Pt on aspirin, eliquis, statin. EEG revealing encephalopathy, no epileptiform changes noted. Pt remains on keppra.     #Acute kidney injury CKD III 3a  - resolved     #Chronic a-fib  - continue metoprolol, eliquis     #Chronic HFpEF  - stable. Blood pressure control     #HTN  - controlled on current regimen.  Continue to monitor     #HLD  - continue statin, increase to 40 mg nightly with acute CVA     #Hypothyroidism  - check TSH, continue levothyroxine      #IBS  - continue questran     #Dysphagia II diet, honey thick liquids    Significant Diagnostic Studies:   Labs / Micro:  CBC:   Lab Results   Component Value Date    WBC 6.2 04/06/2021    RBC 3.89 04/06/2021    RBC 4.34 04/23/2012    HGB 12.4 04/06/2021    HCT 39.8 04/06/2021    .3 04/06/2021    MCH 31.9 04/06/2021    MCHC 31.2 04/06/2021    RDW 14.2 04/06/2021     04/06/2021     04/23/2012 BMP:    Lab Results   Component Value Date    GLUCOSE 85 04/06/2021    GLUCOSE 88 04/23/2012     04/06/2021    K 4.4 04/06/2021     04/06/2021    CO2 20 04/06/2021    ANIONGAP 8 04/06/2021    BUN 21 04/06/2021    CREATININE 1.03 04/06/2021    BUNCRER NOT REPORTED 04/06/2021    CALCIUM 9.0 04/06/2021    LABGLOM 50 04/06/2021    GFRAA >60 04/06/2021    GFR      04/06/2021    GFR NOT REPORTED 04/06/2021     HFP:    Lab Results   Component Value Date    PROT 5.6 04/06/2021     CMP:    Lab Results   Component Value Date    GLUCOSE 85 04/06/2021    GLUCOSE 88 04/23/2012     04/06/2021    K 4.4 04/06/2021     04/06/2021    CO2 20 04/06/2021    BUN 21 04/06/2021    CREATININE 1.03 04/06/2021    ANIONGAP 8 04/06/2021    ALKPHOS 74 04/06/2021    ALT 12 04/06/2021    AST 23 04/06/2021    BILITOT 0.25 04/06/2021    LABALBU 3.2 04/06/2021    LABALBU 4.5 04/23/2012    ALBUMIN 1.3 04/06/2021    LABGLOM 50 04/06/2021    GFRAA >60 04/06/2021    GFR      04/06/2021    GFR NOT REPORTED 04/06/2021    PROT 5.6 04/06/2021    CALCIUM 9.0 04/06/2021     PT/INR:    Lab Results   Component Value Date    PROTIME 19.7 04/04/2021    INR 1.7 04/04/2021     FLP:    Lab Results   Component Value Date    CHOL 86 04/06/2021    TRIG 73 04/06/2021    HDL 34 04/06/2021     TSH:    Lab Results   Component Value Date    TSH 0.73 04/05/2021     Radiology:  Ct Head Wo Contrast    Addendum Date: 4/4/2021    ADDENDUM: Findings were discussed with Chris Kwan at 3:17 pm on 4/4/2021. Result Date: 4/4/2021  No CT evidence of an acute infarct. Xr Chest Portable    Result Date: 4/4/2021  1. Mild cardiomegaly and pulmonary vascular congestion. Mild bibasilar atelectasis. 2. No acute focal airspace consolidation. Cta Head Neck W Contrast    Addendum Date: 4/4/2021    ADDENDUM: 3D reconstructed images were performed on a separate workstation and provided for review.      Result Date: 4/4/2021  Partially occluded right vertebral artery. No large vessel occlusion visualized within the head. Mri Brain W Wo Contrast    Result Date: 4/5/2021  Few scattered foci of acute/subacute ischemia in the right frontal and left parietal lobes likely related to an embolic phenomenon. Extensive chronic microvascular disease. Few scattered foci of blooming artifact on susceptibility weighted imaging most pronounced in the basal ganglia and temporal lobes that is nonspecific. Differential considerations include hypertensive microangiopathy and amyloid angiopathy. Fl Modified Barium Swallow W Video    Result Date: 4/6/2021  No evidence of aspiration. Penetration with liquid consistencies. Please see separate speech pathology report for full discussion of findings and recommendations. Consultations:    Consults:     Final Specialist Recommendations/Findings:   IP CONSULT TO NEUROLOGY  IP CONSULT TO ONCOLOGY      The patient was seen and examined on day of discharge and this discharge summary is in conjunction with any daily progress note from day of discharge. Discharge plan:     Disposition: [facility] acute [facility]hab    Physician Follow Up:     No follow-up provider specified.      Requiring Further Evaluation/Follow Up POST HOSPITALIZATION/Incidental Findings:     Diet: Dysphagia II diet, honey thick liquid    Activity: As tolerated    Discharge Medications:      Medication List      CHANGE how you take these medications    atorvastatin 40 MG tablet  Commonly known as: LIPITOR  Take 1 tablet by mouth nightly  What changed:   · medication strength  · how much to take        CONTINUE taking these medications    acetaminophen 500 MG tablet  Commonly known as: TYLENOL     albuterol (2.5 MG/3ML) 0.083% nebulizer solution  Commonly known as: PROVENTIL     ascorbic acid 1000 MG tablet  Commonly known as: VITAMIN C  Take 1 tablet by mouth 2 times daily for 7 days     aspirin 81 MG EC tablet  Take 1 tablet by mouth daily     biotene dry mouth moisturizing Soln liquid     biotene Liqd oral solution     cholestyramine 4 g packet  Commonly known as: Questran  Take 1 packet by mouth 3 times daily (with meals)     docusate sodium 100 MG capsule  Commonly known as: COLACE     Eliquis 5 MG Tabs tablet  Generic drug: apixaban     ferrous sulfate 325 (65 Fe) MG tablet  Commonly known as: IRON 325  Take 1 tablet by mouth daily (with breakfast)     levETIRAcetam 500 MG tablet  Commonly known as: KEPPRA  Take 1 tablet by mouth 2 times daily     levothyroxine 112 MCG tablet  Commonly known as: SYNTHROID  Take 1 tablet by mouth Daily     losartan 50 MG tablet  Commonly known as: COZAAR  Take 1 tablet by mouth daily     ondansetron 4 MG tablet  Commonly known as: ZOFRAN     polyethylene glycol 17 g packet  Commonly known as: GLYCOLAX     RESOURCE JUICE DRINK PO     sertraline 50 MG tablet  Commonly known as: ZOLOFT     vitamin D 50 MCG (2000 UT) Tabs tablet  Commonly known as: CHOLECALCIFEROL  Take 1 tablet by mouth daily for 7 days     zinc sulfate 220 (50 Zn) MG capsule  Commonly known as: ZINCATE  Take 2 capsules by mouth daily for 7 days        STOP taking these medications    potassium chloride 20 MEQ extended release tablet  Commonly known as: KLOR-CON M           Where to Get Your Medications      Information about where to get these medications is not yet available    Ask your nurse or doctor about these medications  · atorvastatin 40 MG tablet         No discharge procedures on file. Time Spent on discharge is  34 mins in patient examination, evaluation, counseling as well as medication reconciliation, prescriptions for required medications, discharge plan and follow up. Electronically signed by   Neal Ivey PA-C  4/6/2021  1:55 PM      Thank you Dr. Irma Kelly MD for the opportunity to be involved in this patient's care.

## 2021-04-06 NOTE — CONSULTS
_                         Today's Date: 4/5/2021  Patient Name: Anton Lopez  Date of admission: 4/4/2021 10:28 PM  Patient's age: 80 y.o., 5/2/1930  Admission Dx: Acute CVA (cerebrovascular accident) [facility]) [I63.9]      Requesting Physician: Mag Schneider DO    CHIEF COMPLAINT: Acute CVA. Consult for recommendations regard to anticoagulation. History Obtained From:  patient, electronic medical record    HISTORY OF PRESENT ILLNESS:      The patient is a 80 y.o. female who is admitted to the hospital for further management of recurrent and acute CVA. Patient's main history is from the chart. She has multiple comorbidities including previous CVA. She has history of chronic A. fib on Eliquis. Currently she is a nursing home resident. Eliquis was given as prescribed. She had multiple other comorbidities as stated below. She was seen in emergency room at different from long-term facility after developing altered mental status and slurred speech. Past Medical History:   has a past medical history of Acute kidney failure ([address] Anemia, Aphasia, Arthritis, Atrial fibrillation ([address] Bile duct calculus, Calculus of gallbladder and bile duct without cholecystitis without obstruction, Cerebral infarction ([address] CHF (congestive heart failure) ([address] Chronic kidney disease, Chronic kidney disease, stage 3, Constipation, COVID-19, CVA (cerebral vascular accident) ([address] Dysarthria and anarthria, Fall, Gout, Gout, Gout, Hyperlipidemia, Hypertension, Hypokalemia, Hypokalemia, Hypomagnesemia, Hypomagnesemia, Hypothyroidism, IBS (irritable bowel syndrome), Irritable bowel syndrome, Ischemia, Left carotid stenosis, Metabolic encephalopathy, Mitral valve insufficiency, Nonrheumatic mitral (valve) insufficiency, Seizures ([address] TIA (transient ischemic attack), and UTI (urinary tract infection).     Past Surgical History:   has a past surgical history that includes at 04/05/21 1311    aspirin EC tablet 81 mg  81 mg Oral Daily KATIA Sarkar   81 mg at 04/05/21 1310    cholestyramine light packet 4 g  1 packet Oral TID WC KATIA Sarkar   4 g at 04/05/21 1137    docusate sodium (COLACE) capsule 100 mg  100 mg Oral Daily KATIA Sarkar   100 mg at 04/05/21 1310    ferrous sulfate (FE TABS 325) EC tablet 325 mg  325 mg Oral Daily with breakfast KATIA Sarkar   325 mg at 04/05/21 1311    levETIRAcetam (KEPPRA) tablet 500 mg  500 mg Oral BID KATIA Sarkar   500 mg at 04/05/21 1136    levothyroxine (SYNTHROID) tablet 112 mcg  112 mcg Oral Daily KATIA Sarkar   112 mcg at 04/05/21 1136    [Held by provider] losartan (COZAAR) tablet 50 mg  50 mg Oral Daily KATIA Sarkar        metoprolol tartrate (LOPRESSOR) tablet 75 mg  75 mg Oral BID KATIA Sarkar   75 mg at 04/05/21 1136    ondansetron (ZOFRAN) tablet 4 mg  4 mg Oral Q6H PRN KATIA Sarkar        polyethylene glycol (GLYCOLAX) packet 17 g  17 g Oral Daily PRN KATIA Sarkar        potassium chloride (KLOR-CON M) extended release tablet 20 mEq  20 mEq Oral BID KATIA Sarkar   20 mEq at 04/05/21 1135    sertraline (ZOLOFT) tablet 50 mg  50 mg Oral Daily KATIA Sarkar   50 mg at 04/05/21 1310    sodium chloride flush 0.9 % injection 10 mL  10 mL Intravenous 2 times per day KATIA Sarkar   10 mL at 04/05/21 1115    sodium chloride flush 0.9 % injection 10 mL  10 mL Intravenous PRN KATIA Sarkar        0.9 % sodium chloride infusion  25 mL Intravenous PRN KATIA Sarkar        promethazine (PHENERGAN) tablet 12.5 mg  12.5 mg Oral Q6H PRN KATIA Sarkar - CNS        Or    ondansetron (ZOFRAN) injection 4 mg  4 mg Intravenous Q6H PRN KATIA Sarkar - CNS        magnesium hydroxide (MILK OF MAGNESIA) 400 MG/5ML suspension 30 mL  30 mL Oral Daily PRN KATIA Mantilla - CNS           Allergies:  Patient has no known allergies. REVIEW OF SYSTEMS:      · General: Positive for weakness and fatigue. Fatigue. No unanticipated weight loss or decreased appetite. No fever or chills. · Eyes: No blurred vision, eye pain or double vision. · Ears: No hearing problems or drainage. No tinnitus. · Throat: No sore throat, problems with swallowing or dysphagia. · Respiratory: No cough, sputum or hemoptysis. No shortness of breath. No pleuritic chest pain. · Cardiovascular: No chest pain, orthopnea or PND. No lower extremity edema. No palpitation. · Gastrointestinal: No problems with swallowing. No abdominal pain or bloating. No nausea or vomiting. No diarrhea or constipation. No GI bleeding. · Genitourinary: No dysuria, hematuria, frequency or urgency. · Musculoskeletal: No muscle aches or pains. No limitation of movement. No back pain. No gait disturbance, No joint complaints. · Dermatologic: No skin rashes or pruritus. No skin lesions or discolorations. · Psychiatric: No depression, anxiety, or stress or signs of schizophrenia. No change in mood or affect. · Hematologic: No history of bleeding tendency. No bruises or ecchymosis. No history of clotting problems. · Infectious disease: No fever, chills or frequent infections. · Endocrine: No polydipsia or polyuria. No temperature intolerance. · Neurologic: As above. · Allergic/Immunologic: No nasal congestion or hives. No repeated infections. PHYSICAL EXAM:      BP (!) 126/58   Pulse 68   Temp 98.2 °F (36.8 °C) (Axillary)   Resp 28   Ht 5' (1.524 m)   Wt 145 lb (65.8 kg)   SpO2 98%   BMI 28.32 kg/m²    Temp (24hrs), Av °F (36.7 °C), Min:97.3 °F (36.3 °C), Max:98.2 °F (36.8 °C)      General appearance - not in pain or distress  Mental status - alert follows commands. Slurred speech.   Eyes - pupils equal and reactive, extraocular craniocervical junction is unremarkable. There is no acute hemorrhage, mass effect, or midline shift. There is  satisfactory overall gray-white matter differentiation. There is extensive  chronic microvascular disease. The ventricular structures are symmetric and  unremarkable. The infratentorial structures including the cerebellopontine  angles and internal auditory canals are unremarkable. There are few  scattered foci of restricted diffusion within the right frontal and left  parietal lobes. There are few scattered foci of blooming artifact on  susceptibility weighted imaging most pronounced in the basal ganglia and  temporal lobes. There is no abnormal postcontrast enhancement. ORBITS: The visualized portion of the orbits demonstrate no acute abnormality. SINUSES: The paranasal sinuses are normally aerated. There is fluid in the  right mastoid air cells. BONES/SOFT TISSUES: The bone marrow signal intensity appears normal. The soft  tissues demonstrate no acute abnormality. Impression: Few scattered foci of acute/subacute ischemia in the right frontal and left  parietal lobes likely related to an embolic phenomenon. Extensive chronic microvascular disease. Few scattered foci of blooming artifact on susceptibility weighted imaging  most pronounced in the basal ganglia and temporal lobes that is nonspecific. Differential considerations include hypertensive microangiopathy and amyloid  angiopathy.             IMPRESSION:    Primary Problem  Acute CVA (cerebrovascular accident) [facility])    Active Hospital Problems    Diagnosis Date Noted    Dysphagia [R13.10] 04/05/2021     Priority: High    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 ([address] [I50.32, I38]      Priority: High    Chronic atrial fibrillation [I48.20]      Priority: High    Acute CVA (cerebrovascular accident) ([address] [I63.9] 04/04/2021    History of cerebral infarction [Z86.73] 02/10/2020    Acute kidney

## 2021-04-12 PROBLEM — R56.9 SEIZURE (HCC): Status: ACTIVE | Noted: 2021-01-01

## 2021-04-12 NOTE — PROGRESS NOTES
[place] [address] [id number]  Dept: [phone]  Dept Fax: [phone]    NEUROLOGY FOLLOW UP NOTE                                              PATIENT NAME: Radha Maloney   PATIENT MRN: [id number]  FOLLOW UP TODAY: 4/12/2021      INITIAL & INTERVAL HISTORY:     Radha Maloney is a 80 y.o. right handed female  With PMH of HTN, HLD, CKD, atrial fibrillation on Eliquis, stroke (April 2021), CHF who is here for follow up of her strokes, seizures. Patient refers she is doing good after the evaluation on the hospital on April 2021. Patient was transfer to [facility] on 4/4/2021 for encephalopathy, dysarthria and concern for strokes. Patient is living on long term facility and was having 5 days of diarrhea when she had a sudden onset dysarthria, AMS. Patient went to [facility] ED and stroke evaluation was done. In [facility] was found to have bilateral hemispheric punctate stroke concern for caridoembolic. EEG showed encephalopathy. LDL was 34. There was a discussion with hematology oncology to see if the patient will switch from Eliquis to another medication since she has strokes on Eliquis but decision by the hematology oncology was to keep her on the Eliquis and the aspirin. Smoking: Denies  Alcohol: Denies  Illicit Drugs: Denies     Social: [employer], [place] - at [facility]    Intermittent Hx: Patient has been evaluated for multiple episodes of right-sided weakness, dysarthria on 4/9/2019, 2/10/2020, 5/25/2020. Patient is known to have CT head and neck showing bilateral ICA stenosis with 75%, PCA stenosis of 50% and right vertebral occlusion. Patient only on medical therapy due to risk factors.   Multiple MRIs done different admissions which were unremarkable for any acute stroke but EEGs done on 4/9/2019 showed occasional left hemispheric delta and theta suggestive of underlying neuronal dysfunction and February 10, 2020 showing some dysfunctions and sharp waves. Patient was started at that time on February 10, 2020 on Keppra 250 mg p.o. twice daily but then on the second admission on 5/27/2020 was increased to 500 mg p.o. twice daily    Office visit 7/20/2020: Patient referred that after discharge on 5/27/2020 she is he having some episodes of slurred speech and possible weakness falls. No new right-sided weakness. She lives at [facility] ([phone]). Upon calling [facility] nursing facility was able to talk with the nurse and the floor and she was telling me that for a couple of days she has been having some problem with the swallowing and in the last 2 weeks she has 4 falls. She is still getting PT and OT in the facility but there has been no weakness or numbness or any speech difficulty recently. I was able to let her know about the new change in her Keppra dose. Office Visit 9/21/2020: Patient referred in this visit that overall she feels somewhat better but still with weakness on bilateral lower extremity but more on the right lower extremity. Her speech seems somewhat slurred but when asking the patient she refer the she talks like that. Refer no side effects with the medication like any mood changes. Patient refer that she feels sometimes depressed because now with the COVID situation unable to see family. I was able to call [facility] Monday again and nurse who was taking care of the patient refer that patient still weak and needs about 2 or 3 people to move her around although she still uses the walker. Referred that mentally the patient is not agitated or extremely confused but sometimes is more forgetful. They also noticed that the speech was a little bit more slurred. Facility told me that patient gets routine labs and visits from doctors who do evaluations and patient and abdomen nothing has been found in terms of infections.   Patient gets physical therapy and suggested   moderate encephalopathy of nonspecific etiology.  No abnormal   epileptiform activity was seen.  Note was made of irregular   cardiac rhythm. 3. Head CT WO and CTA H/N (4/4/2021)  Impression   No CT evidence of an acute infarct.         Impression   Partially occluded right vertebral artery.      There is 33% stenosis of the proximal left cervical   internal carotid artery and 25% stenosis of the proximal right        No large vessel occlusion visualized within the head.           4. Brain MRI without contrast (5/26/2020)  Impression    1. Motion degraded examination.  No evidence of an acute infarct. 2. Cerebral parenchymal volume loss with chronic microvascular white matter    ischemic disease.           5. TTE (1/15/2021)  Summary  Global left ventricular systolic function appears persevered with an  estimated ejection fraction of 55%. The left ventricular cavity size is within normal limits and the left  ventricular wall thickness is mildly increased. No definite specific wall motion abnormalities were identified. The left atrium is severely dilated (>40) with a left atrial volume index of  72 ml/m2. Mitral annular calcification is seen. Moderate mitral regurgitation. Severe tricuspid regurgitation. Mild to moderate pulmonary hypertension with an estimated right ventricular  systolic pressure of 37 mmHg. Diastolic function can not be assessed because of atrial fibrillation and  valvular abnormalities    6. CTA head and neck (5/25/2020)  Impression    75% focal stenosis at the origins of the bilateral cervical internal carotid    arteries         Occlusion in the V1 segment and partial occlusion in mid V3 segment of the    right vertebral artery, with asymmetrically decreased contrast opacification    of the V2 and proximal V3 segments, improved since the prior study.         Focal fusiform dilatation of the distal V2 segment of left vertebral artery    measuring up to 6 mm, stable.      90% focal stenosis in the distal V4 segment of the right vertebral artery,    stable.         Fluid collection measuring up to 5 cm in the right axilla, may be related to    right shoulder joint effusion.  Follow-up with targeted ultrasound may be    beneficial for further evaluation.           7. CT head without (5/25/2020)  Impression    No acute intracranial abnormality.  Specifically, no acute intracranial    hemorrhage or mass effect.         Chronic small vessel ischemic disease.         Critical results were called by Dr. Kameron Pepper Sessions to [address] [place] [address] on    5/25/2020 at 06:03.           8. EEG (2/10/2020)  EEG mild left slowing more frontal temporal with occasional sharp wave    9. Brain MRI WO (2/9/2020)  Impression    Multifocal small-vessel ischemic change bilaterally with multiple small prior    infarcts.         No acute infarct         Moderate right mastoid opacification           10. LTME (4/8/2019)  Day 1 - 4/8/2019, starting at 11:23     Interictal EEG Samples: In the awake state, the background activity consisted of 8-9 Hz of alpha activity of 2040 µV which was better formed over the right hemisphere. There were frequent runs of 3-5 Hz of polymorphic delta activity over the left hemisphere. During periods of behavioral sleep, vertex sharp waves and symmetric spindles were seen. No abnormalities were activated by sleep. The EKG channel revealed no abnormalities.     Ictal EEG Recording / Patient Events: During this period the patient had no events or seizures.     Summary: During this day of recording no events were recorded. The interictal EEG was abnormal due to frequent left hemispheric delta and theta slowing suggestive of underlying neuronal dysfunction. This finding can also be secondary to postictal slowing. Monitoring was continued in order to record the patients typical events.  The EKG channel revealed no abnormalities.     Day 2 - 4/9/2019, reviewed through 7:30 am    Interictal EEG Samples: Previously seen left hemispheric slowing appeared less frequent.     Ictal EEG Recording / Patient Events: During this period the patient had no events or seizures.     Summary: During this day of recording no events were recorded. The interictal EEG was abnormal due to occasional left hemispheric delta and theta slowing suggestive of underlying neuronal dysfunction. This finding can also be secondary to postictal slowing. Monitoring was continued in order to record the patients typical events. The EKG channel revealed no abnormalities. LABS & TESTS:      Lab Results   Component Value Date    WBC 6.2 04/06/2021    HGB 12.4 04/06/2021    HCT 39.8 04/06/2021    .3 04/06/2021     04/06/2021     REVIEW OF SYSTEMS:     Constitutional  Negative for fever and chills    HEENT  Negative for ear discharge, ear pain, nosebleed    Eyes  Negative for photophobia, pain and discharge    Respiratory  Negative for hemoptysis and sputum    Cardiovascular  Negative for orthopnea, claudication and PND    Gastrointestinal  Negative for abdominal pain, diarrhea, blood in stool    Musculoskeletal  Negative for joint pain, negative for myalgia    Skin  Negative for rash or itching    Neurological Negative for seizures, headache, aphasia  Positive for weakness    Endo/heme/allergies  Negative for polydipsia, environmental allergy    Psychiatric/behavioral  Negative for suicidal ideation. Patient is not anxious      VITALS  /70   Pulse 77   Ht 5' (1.524 m)   Wt 145 lb (65.8 kg)   SpO2 90%   BMI 28.32 kg/m²     PHYSICAL EXAMINATION:     Physical Exam     General appearance: cooperative  Skin: no rash or skin lesions. HEENT: normocephalic  Optic Fundi: deferred  Neck: supple, no cervcical adenopathy or carotid bruit  Lungs: clear to auscultation  Heart: Regular rate and rhythm, normal S1, S2. No murmurs, clicks or gallops.   Peripheral pulses: radial pulses palpable  Abdominal: BS present, soft, NT, ND  Extremities: no edema    NEUROLOGICAL EXAMINATION:      GENERAL  Appears comfortable and in no distress   HEENT  NC/ AT   HEART  S1 and S2 heard; palpation of pulses: radial pulse    NECK  Supple and no bruits heard   MENTAL STATUS:  Alert, oriented x3, good mood, aphasia  Patient with good communication orally and written. CRANIAL NERVES: II     -      Visual fields intact to confrontation  III,IV,VI -  PERR, EOMs full, mild ptosis on left eye but for patient is normal  V     -     Normal facial sensation   VII    -     Normal facial symmetry  VIII   -     Intact hearing   IX,X -     Symmetrical palate  XI    -     Symmetrical shoulder shrug  XII   -     Midline tongue, no atrophy    MOTOR FUNCTION: RUE: Significant for good strength of grade 5/5 in proximal and distal muscle groups   LUE: Significant for good strength of grade 5/5 in proximal and distal muscle groups   RLE: Significant for good strength of grade 4/5 in proximal and distal muscle groups   LLE: Significant for good strength of grade 4-/5 in proximal and distal muscle groups      Normal bulk, normal tone and no involuntary movements, no tremor   SENSORY FUNCTION:  Normal touch, normal pin   CEREBELLAR FUNCTION:  Intact fine motor control over upper limbs   REFLEX FUNCTION:  Symmetric in upper and lower extremities, no Babinski sign   STATION and GAIT  deferred, wheelchair-bound     ASSESSMENT:       Case of a 80-year-old female patient with history of A. fib on Eliquis, CHF, CKD stage III, bilateral ICA stenosis, PCA stenosis at right vertebral occlusion in follow-up for strokes and seizure disorder    // Strokes //   Patient with new bilateral hemispheric punctate infarcts resembling cardioembolic event. Currently on aspirin and Eliquis. Patient had a discussion with hematology oncology and decision was to keep her on the Eliquis and not changing her medication. Patient continue on atorvastatin 40, aspirin 81.   Patient with good response and resolution after evaluation in the hospital.  We will continue same medications for the moment but will have a discussion with family since patient is 80years old and has 2 medications that can increase the risk of bleeding (aspirin with Eliquis) and will see if patient will benefit only from Eliquis. No CTA head and neck showed improvement of bilateral carotid stenosis. // Seizures //   Patient with abnormal EEGs in the past and currently on Keppra 500 mg p.o. twice daily. Patient currently tolerating and no seizure-like activity. We will continue with the medication and will evaluate further next appointment to see if there is any need to continue medication. PLAN:      1. Will recommend to continue Eliquis 5 mg p.o. twice daily  2. Recommend to continue aspirin 81 mg p.o. daily until evaluation with family to see if patient will need to be on both (aspirin and Eliquis). 3. Recommend to continue atorvastatin 40 mg p.o. nightly  4. Recommend to continue Keppra 500 mg p.o. twice daily  5. Recommend to continue PT, OT and a falls precaution since patient is on Eliquis  6. Continue all medical management  7. Follow-up with primary as appointed  8. Follow-up with general neurology in 3 to 4 months    Ms. Barakat received counseling on the following healthy behaviors: medical compliance, smoking cessation, blood pressure control, regular follow up with primary doctor.       Electronically signed by Karlos Meneses MD on 4/12/2021 at 3:40 PM

## 2021-06-04 NOTE — PLAN OF CARE
"Date of Service:  12/10/2019    Name:  Valentina Olmedo  :  1961  MRN:  [id number]    HPI:   Valentina Olmedo 58 y.o. female with delirium (waxing and waning AMS), seizure disorder, elevated INR (3.5 on ), and neurocognitive deficits comes in with worsening depression.     ([facility]) and PCA ([facility]) come with pt.  Has PCA 33 hrs/week.      said pt not able express herself, sometimes confused, ongoing for about a week, PCA agrees with this.    ROS: worsening depression and anxiety. Memory problems, insomnia, snoring, gasping, weight gain >20 pounds since 2019. Denies SI/HI.  No psychotic sx,        Timeline:  -Multiple recent ED visits for AMS, dizziness, abdominal pain. Last visit yesterday at [facility], where her coumadin was held for the night.     -Dates: , , .    - PCA reports recent visits to neurologists, No change in meds.   [facility], 2 weeks ago.  [facility], in Nov    -Pt last seen by me on 19. At that visit, Benadryl and Cogentin DC'd and trazodone dose decreased to 100mg.     -In Sept, pt called requesting trazodone be increased back to  200mg.      -According to EHR, Cogentin 1mg dispensed by [facility] on 10/24/19.    Noncompliance with seizure medications:  PCA reports: \"neurologist said blood level negative for one of her pills\".     reports he is unable to manage her on w/e when PCA not there.  \"I can't handle her.  She doesn't take her pills.\"      Neurocognitive deficits  Neuropsych testing done (3/20/18).  Note excerpt: \"Implications/recommendations:  Her current test performance demonstrates significant decline relative to last evaluation in .The possibility of significant medication side effects secondary to her anti-epileptic and psychotropic drug regime should be explored, as well possible onset of dementia.\"  --Irais De La Cruz PhD at [facility], 18-----      Therapy-individual therapy with Loretta " Problem: Falls - Risk of:  Goal: Will remain free from falls  Description  Will remain free from falls  Outcome: Ongoing  Note:   Education has been provided to defer the use of the bed alarm and/or restraint free alarm as the patient has shown competency in calling for assistance and verbalizing the risk. Bed alarm is activated at this time. Non slip socks are being worn by patient as well. Will continue to monitor. Problem: Risk for Impaired Skin Integrity  Goal: Tissue integrity - skin and mucous membranes  Description  Structural intactness and normal physiological function of skin and  mucous membranes. Outcome: Ongoing  Note:   Patient requires assistance turning and repositioning every two hours and as needed. Skin kept clean and dry and is intact at this time. Monitoring for skin changes every shift and as needed. No new open areas on skin or in mucous membranes noted at this time, will continue to monitor. Problem: Safety:  Goal: Free from accidental physical injury  Description  Free from accidental physical injury  Outcome: Ongoing     Problem: Daily Care:  Goal: Daily care needs are met  Description  Daily care needs are met  Outcome: Ongoing  Note:   Daily care needs continuing to be assessed. Able to perform self care. Able to verbalize need to for assistance, and encourage to express needs/concerns. Will continue to assist with daily care as verbalized and encourage independence with out sacrificing safety. Call light and personal belongings within reach. Horacio Munroe RN      Problem: Pain:  Goal: Patient's pain/discomfort is manageable  Description  Patient's pain/discomfort is manageable  Outcome: Ongoing  Note:   Assess patients pain every four hours and as needed using the 1-10 pain scale. Patient encouraged to reposition every two hours and as needed. Medications as prescribed by physician. Alternatives to pain medications provided as tolerated. Will continue to monitor. [facility].      Social  Lives with  in [facility] apartment in [place].  Has 3 cats-Rabia, Yosi Márquez.    Weekly Med set- up.    Medical  Seizure disorder, Neurologist Praveena Benitez, [facility]    Hypothyroid    On warfarin for P.E, April 2017       Medications:       Current Outpatient Medications on File Prior to Visit   Medication Sig Dispense Refill     albuterol (PROAIR HFA;PROVENTIL HFA;VENTOLIN HFA) 90 mcg/actuation inhaler Inhale 2 puffs every 4 (four) hours as needed for wheezing. 1 Inhaler 6     atorvastatin (LIPITOR) 20 MG tablet TAKE 1 TABLET BY MOUTH EVERY EVENING FOR CHOLESTEROL 90 tablet 3     carBAMazepine (TEGRETOL) 200 mg tablet Take 200 mg by mouth 3 (three) times a day. 2 tabs (400mg) q AM, 1.5 tabs (300mg) q PM and HS       DULoxetine (CYMBALTA) 60 MG capsule Take 2 capsules (120 mg total) by mouth daily. 180 capsule 1     EPINEPHrine 0.15 mg/0.15 mL atIn Inject 1 each into the shoulder, thigh, or buttocks.       ethotoin (PEGANONE) 250 mg Tab Take 1,000 mg by mouth 3 (three) times a day.       levothyroxine (SYNTHROID, LEVOTHROID) 200 MCG tablet TAKE 1 TABLET(200 MCG) BY MOUTH DAILY 90 tablet 3     LORazepam (ATIVAN) 1 MG tablet Take 1 tablet (1 mg total) by mouth 3 (three) times a day as needed for anxiety. 8 tablet 0     melatonin 3 mg Tab tablet Take 1 tablet (3 mg total) by mouth at bedtime as needed. 90 tablet 1     nicotine polacrilex (NICORETTE) 2 mg gum Apply 1 each (2 mg total) to the mouth or throat as needed for smoking cessation. 220 each 2     omeprazole (PRILOSEC) 20 MG capsule TAKE 1 CAPSULE BY MOUTH DAILY BEFORE BREAKFAST 90 capsule 3     oxybutynin (DITROPAN XL) 10 MG ER tablet TAKE 1 TABLET BY MOUTH EVERY DAY 30 tablet 11     polyethylene glycol (MIRALAX) 17 gram packet Take 17 g by mouth daily as needed.        QUEtiapine (SEROQUEL) 200 MG tablet Take 1 tablet (200 mg total) by mouth at bedtime (generic. Seroquel) 90 tablet 1     risperiDONE  (RISPERDAL) 0.5 MG tablet Take 1 tablet (0.5 mg total) by mouth at bedtime. 90 tablet 1     rosuvastatin (CRESTOR) 20 MG tablet Take 1 tablet (20 mg total) by mouth daily. 90 tablet 3     SENNA 8.6 mg tablet TAKE 2 TABLETS (17.2MG) BY MOUTH TWICE A  tablet 3     topiramate (TOPAMAX) 25 MG tablet Take 1 tablet (25 mg total) by mouth at bedtime. 90 tablet 1     traZODone (DESYREL) 100 MG tablet Take 2 tablets (200 mg total) by mouth at bedtime. 60 tablet 2     VITAMIN D3 2,000 unit capsule TAKE 1 CAPSULE BY MOUTH DAILY 30 capsule 11     warfarin (COUMADIN/JANTOVEN) 5 MG tablet Take 1 &1/2 tablets (7.5mg) by mouth daily, as directed.  Adjust dose based on INR results. 140 tablet 1     Current Facility-Administered Medications on File Prior to Visit   Medication Dose Route Frequency Provider Last Rate Last Dose     [COMPLETED] LORazepam tablet 1 mg (ATIVAN)  1 mg Oral Once Hayder Gleason DO   1 mg at 12/09/19 1456     [DISCONTINUED] midazolam (PF) injection 1 mg (VERSED)  1 mg Intravenous Once Hayder Gleason DO         [DISCONTINUED] sodium chloride 0.9% 1,000 mL  1,000 mL Intravenous Once Hayder Gleason DO           Associated Clinical Documents:       Notes reviewed in EPIC and [facility] including: medication reconciliation, nurses's notes, progress notes, recent labs, PMH, and OSH records.    ROS:       10 point ROS was negative except for the items listed in HPI.      MSE:      Vital signs: refer to nursing notes from today.   Alert & oriented x 3.  Poor eye contact  Appearance: Seated in w/c, obese, red streaks in hair, appears older than age.  Speech: Slow rate, paucity of words. Word finding difficulty.   Gait: Not observed.  Musculoskeletal: Motor slowing, no abnormal movements.  Mood/Affect: dysphoric, anxious.  Thought Process: Perseverative  Thought Content: Denies SI. No homicide ideation.  Associations: Intact, denies delusions.  Perceptions: See HPI  Memory: recent and remote memory  deficit, not formally tested  Attention span and concentration: poor  Language: Intact.  Fund of Knowledge: Below normal.  Insight and Judgement: fair    Impression:        1. Ongoing delirium.   2. Possible subthreshold seizures (not adherence to medication).  3. Neurocognitive deficits-worsening. Last tested 3/20/18. Failed Neuropsych appt 8/9/19.  4. Major depressive disorder, recurrent mod-severe, worsening.  5. R/o CHRIS- multiple referrals and multiple NS's to sleep clinic (last time 7/24).      Pt needs admission to evaluate delirium/medical issues and  inpatient psych to address worsening depression.      Plan:       1.  Pt,  and PCA escorted to by RN and I.   2.  Report given to Teja Lu MD.  3.  Medication reconciliation- MA to contact [facility] home nurse, request fax med list here.      Total Time and Coordination of Care:       40 Minutes spent in the care of this patient with >50% of this time was spent in Face-to-face counseling specifically discussing and educating about delirium, need for hospitalization, elevated INR.    Prolonged service    I spent an additional 40 minutes with the patient from  Start Time 11:30  End Time 12:10  Additional time needed due to cognitive issues, answering questions from pt,  and PCA.  Face-to-face discussion and coordination of care with nurses and ED physician.        This dictation was completed with speech recognition software and there may be unintended word substitutions.

## 2023-05-20 NOTE — PROGRESS NOTES
Attending Physician Statement:    I have discussed the case of Severa Antes, including pertinent history and exam findings with the resident. I have seen and examined the patient and the key elements of the encounter have been performed by me. I have reviewed medications, clinical laboratory, imaging and other diagnostic tests with the residents. I agree with the assessment, plan and orders as documented by the resident with changes made to the note as needed. History:  Ms. Severa Antes is a 80 y.o. female was seen in the clinic for seizures and strokes and post hospital follow-up. Patient is a resident of a long-term care facility. She was admitted in the hospital on 4/4/2021 for altered mental status and lethargy. Initially she was transferred to [facility]. She was having diarrhea for few days. 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 Patient has a known history of A. fib and was compliant with Eliquis  Heme-onc was consulted by the primary service regarding further recommendations on Eliquis as patient had embolic infarcts after being on Eliquis. Heme-onc prefers not to change Eliquis. Patient was continued on aspirin and Eliquis on discharge. She is on Lipitor 40 mg. Since discharge from the hospital, endorses baseline weakness in the left lower extremity otherwise denies any new neurologic concerns during this clinic visit. Denies any seizures or seizure-like activity or episodes of confusion or staring spells. She is compliant with Keppra 500 mg twice a day. Denies any side effects on the medications. She is also compliant with aspirin, Eliquis and Lipitor.   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, left Problem: Potential for Falls  Goal: Patient will remain free of falls  Description: INTERVENTIONS:  - Educate patient/family on patient safety including physical limitations  - Instruct patient to call for assistance with activity   - Consult OT/PT to assist with strengthening/mobility   - Keep Call bell within reach  - Keep bed low and locked with side rails adjusted as appropriate  - Keep care items and personal belongings within reach  - Initiate and maintain comfort rounds  - Make Fall Risk Sign visible to staff  - Offer Toileting every  Hours, in advance of need  - Initiate/Maintain alarm  - Obtain necessary fall risk management equipment:   - Apply yellow socks and bracelet for high fall risk patients  - Consider moving patient to room near nurses station  Outcome: Progressing     Problem: MOBILITY - ADULT  Goal: Maintain or return to baseline ADL function  Description: INTERVENTIONS:  -  Assess patient's ability to carry out ADLs; assess patient's baseline for ADL function and identify physical deficits which impact ability to perform ADLs (bathing, care of mouth/teeth, toileting, grooming, dressing, etc )  - Assess/evaluate cause of self-care deficits   - Assess range of motion  - Assess patient's mobility; develop plan if impaired  - Assess patient's need for assistive devices and provide as appropriate  - Encourage maximum independence but intervene and supervise when necessary  - Involve family in performance of ADLs  - Assess for home care needs following discharge   - Consider OT consult to assist with ADL evaluation and planning for discharge  - Provide patient education as appropriate  Outcome: Progressing  Goal: Maintains/Returns to pre admission functional level  Description: INTERVENTIONS:  - Perform BMAT or MOVE assessment daily    - Set and communicate daily mobility goal to care team and patient/family/caregiver     - Collaborate with rehabilitation services on mobility goals if consulted  - Perform Range of Motion  times a day  - Reposition patient every  hours    - Dangle patient  times a day  - Stand patient  times a day  - Ambulate patient  times a day  - Out of bed to chair  times a day   - Out of bed for meals times a day  - Out of bed for toileting  - Record patient progress and toleration of activity level   Outcome: Progressing     Problem: Prexisting or High Potential for Compromised Skin Integrity  Goal: Skin integrity is maintained or improved  Description: INTERVENTIONS:  - Identify patients at risk for skin breakdown  - Assess and monitor skin integrity  - Assess and monitor nutrition and hydration status  - Monitor labs   - Assess for incontinence   - Turn and reposition patient  - Assist with mobility/ambulation  - Relieve pressure over bony prominences  - Avoid friction and shearing  - Provide appropriate hygiene as needed including keeping skin clean and dry  - Evaluate need for skin moisturizer/barrier cream  - Collaborate with interdisciplinary team   - Patient/family teaching  - Consider wound care consult   Outcome: Progressing

## 2024-04-04 NOTE — PROGRESS NOTES
- I have prescribed Tymlos 80 mcg sq daily    - we will start a prior authorization, to ensure this is approved by your insurance.  We will let you know if they decline it    - continue your calcium plus vitamin D    - continue weight bearing exercise, as tolerated    - I would like you to return to clinic in 3 months for follow up    - you have non-fasting labs to complete 1 week before your follow up appointment      Clinic Expectations:    If you are being seen for diabetes, please bring all of the devices you are using (glucometer, continuous glucose monitor, insulin pump), so that your blood glucose readings or pump settings can be reviewed.  If you arrive without any data, you will be asked to reschedule your appointment.  This is to maximize the benefit of your appointment for you.  If there is no blood glucose data to review, I am unable to adjust your medications appropriately.    It is my goal to run an on-time clinic and see patients at their scheduled appointment time, however, I need your assistance in order to make this happen:     Please arrive at least 15-20 minutes earlier than your allotted appointment time, for the check-in process.       Please arrive 20-30 minutes before your appointment if you are in a wheelchair or need assistance to ambulate.    This will help the staff to check you in, ask you the relevant questions, take your blood pressure, weight and pulse, update your medications, and download any medical devices before I come to see you, and helps to keep appointments running on time.    If you arrive late to your appointment, you may be asked to reschedule for the courtesy of the other scheduled patients who arrive on time.  I will make every effort to see you the same day, however, this may not always be possible.     There are occasions when an emergency develops, unexpected issues arise, or another patient requires more time than expected, and subsequent appointments are delayed as  [facility] ETA 1800. a result.  Please try to be understanding when this happens, as I am making every effort to see patients at their scheduled appointment times.      If you are NOT able to keep your appointment on the scheduled day, please call to cancel it at least 24 - 48 hours ahead. This will help us to use that time to see someone else who is in need of medical attention.      Please bring your updated medication list or all of your medications with you when you come for your appointment.  This should include any supplements you are taking.       Please complete blood work two weeks prior to your next appointment, if indicated or advised previously.     Please schedule your follow up appointment at the end of your appointment, if a follow up is indicated.    I am in clinic on Monday through Friday.  Please make your best effort to call with questions or for refills on those days, during office hours.      We allow up to 3 business days to address refills.  Please do not wait until you are out of medication to request refills, as refills may not be addressed immediately.  Refills are provided during office hours only (M-F 8am - 5 pm), and will not be filled by on call providers after hours or on weekends.

## (undated) DEVICE — CANISTER, RIGID, 3000CC: Brand: MEDLINE INDUSTRIES, INC.

## (undated) DEVICE — SYSTEM BX CAP BILI RAP EXCHG CAP LOK DEV COMPATIBLE W/ OLY

## (undated) DEVICE — TUBING, SUCTION, 1/4" X 12', STRAIGHT: Brand: MEDLINE

## (undated) DEVICE — SNARE ENDOSCP M L240CM LOOP W27MM SHTH DIA2.4MM OVL FLX

## (undated) DEVICE — RETRIEVAL BALLOON CATHETER: Brand: EXTRACTOR™ PRO RX

## (undated) DEVICE — YANKAUER,FLEXIBLE HANDLE,REGLR CAPACITY: Brand: MEDLINE INDUSTRIES, INC.

## (undated) DEVICE — FORCEPS BX L240CM JAW DIA2.4MM ORNG L CAP W/ NDL DISP RAD

## (undated) DEVICE — CO2 CANNULA,SUPERSOFT, ADLT,7'O2,7'CO2: Brand: MEDLINE

## (undated) DEVICE — SPHINCTEROTOME: Brand: DREAMTOME™ RX 44

## (undated) DEVICE — YANKAUER,BULB TIP,W/O VENT,RIGID,STERILE: Brand: MEDLINE

## (undated) DEVICE — 1200CC GUARDIAN II: Brand: GUARDIAN